# Patient Record
Sex: MALE | Race: WHITE | NOT HISPANIC OR LATINO | URBAN - METROPOLITAN AREA
[De-identification: names, ages, dates, MRNs, and addresses within clinical notes are randomized per-mention and may not be internally consistent; named-entity substitution may affect disease eponyms.]

---

## 2017-01-01 ENCOUNTER — INPATIENT (INPATIENT)
Facility: HOSPITAL | Age: 82
LOS: 15 days | Discharge: HOME | DRG: 300 | End: 2017-01-17
Attending: HOSPITALIST | Admitting: STUDENT IN AN ORGANIZED HEALTH CARE EDUCATION/TRAINING PROGRAM
Payer: MEDICARE

## 2017-01-01 VITALS
SYSTOLIC BLOOD PRESSURE: 138 MMHG | OXYGEN SATURATION: 99 % | WEIGHT: 167.55 LBS | DIASTOLIC BLOOD PRESSURE: 75 MMHG | HEART RATE: 74 BPM | RESPIRATION RATE: 20 BRPM | TEMPERATURE: 99 F

## 2017-01-01 LAB
ALBUMIN SERPL ELPH-MCNC: 2.5 G/DL — LOW (ref 3.3–5)
ALP SERPL-CCNC: 72 U/L — SIGNIFICANT CHANGE UP (ref 40–120)
ALT FLD-CCNC: 14 U/L RC — SIGNIFICANT CHANGE UP (ref 10–45)
ANION GAP SERPL CALC-SCNC: 10 MMOL/L — SIGNIFICANT CHANGE UP (ref 5–17)
APTT BLD: 29.2 SEC — SIGNIFICANT CHANGE UP (ref 27.5–37.4)
AST SERPL-CCNC: 29 U/L — SIGNIFICANT CHANGE UP (ref 10–40)
BASOPHILS # BLD AUTO: 0 K/UL — SIGNIFICANT CHANGE UP (ref 0–0.2)
BASOPHILS NFR BLD AUTO: 0.3 % — SIGNIFICANT CHANGE UP (ref 0–2)
BILIRUB SERPL-MCNC: 0.5 MG/DL — SIGNIFICANT CHANGE UP (ref 0.2–1.2)
BUN SERPL-MCNC: 19 MG/DL — SIGNIFICANT CHANGE UP (ref 7–23)
CALCIUM SERPL-MCNC: 8.1 MG/DL — LOW (ref 8.4–10.5)
CHLORIDE SERPL-SCNC: 101 MMOL/L — SIGNIFICANT CHANGE UP (ref 96–108)
CO2 SERPL-SCNC: 26 MMOL/L — SIGNIFICANT CHANGE UP (ref 22–31)
CREAT SERPL-MCNC: 0.92 MG/DL — SIGNIFICANT CHANGE UP (ref 0.5–1.3)
DIGOXIN SERPL-MCNC: 0.2 NG/ML — LOW (ref 0.8–2)
EOSINOPHIL # BLD AUTO: 0 K/UL — SIGNIFICANT CHANGE UP (ref 0–0.5)
EOSINOPHIL NFR BLD AUTO: 0.4 % — SIGNIFICANT CHANGE UP (ref 0–6)
GAS PNL BLDV: SIGNIFICANT CHANGE UP
GLUCOSE SERPL-MCNC: 102 MG/DL — HIGH (ref 70–99)
HCT VFR BLD CALC: 34.9 % — LOW (ref 39–50)
HGB BLD-MCNC: 11.6 G/DL — LOW (ref 13–17)
INR BLD: 1.05 RATIO — SIGNIFICANT CHANGE UP (ref 0.88–1.16)
LDH SERPL L TO P-CCNC: 208 U/L — SIGNIFICANT CHANGE UP (ref 50–242)
LYMPHOCYTES # BLD AUTO: 0.9 K/UL — LOW (ref 1–3.3)
LYMPHOCYTES # BLD AUTO: 18.6 % — SIGNIFICANT CHANGE UP (ref 13–44)
MCHC RBC-ENTMCNC: 33.2 GM/DL — SIGNIFICANT CHANGE UP (ref 32–36)
MCHC RBC-ENTMCNC: 33.6 PG — SIGNIFICANT CHANGE UP (ref 27–34)
MCV RBC AUTO: 101 FL — HIGH (ref 80–100)
MONOCYTES # BLD AUTO: 0.6 K/UL — SIGNIFICANT CHANGE UP (ref 0–0.9)
MONOCYTES NFR BLD AUTO: 12.8 % — SIGNIFICANT CHANGE UP (ref 2–14)
NEUTROPHILS # BLD AUTO: 3.2 K/UL — SIGNIFICANT CHANGE UP (ref 1.8–7.4)
NEUTROPHILS NFR BLD AUTO: 68 % — SIGNIFICANT CHANGE UP (ref 43–77)
PLATELET # BLD AUTO: 86 K/UL — LOW (ref 150–400)
POTASSIUM SERPL-MCNC: 3.8 MMOL/L — SIGNIFICANT CHANGE UP (ref 3.5–5.3)
POTASSIUM SERPL-SCNC: 3.8 MMOL/L — SIGNIFICANT CHANGE UP (ref 3.5–5.3)
PROT SERPL-MCNC: 7.1 G/DL — SIGNIFICANT CHANGE UP (ref 6–8.3)
PROTHROM AB SERPL-ACNC: 11.3 SEC — SIGNIFICANT CHANGE UP (ref 10–13.1)
RBC # BLD: 3.45 M/UL — LOW (ref 4.2–5.8)
RBC # FLD: 13.7 % — SIGNIFICANT CHANGE UP (ref 10.3–14.5)
SODIUM SERPL-SCNC: 137 MMOL/L — SIGNIFICANT CHANGE UP (ref 135–145)
URATE SERPL-MCNC: 4.9 MG/DL — SIGNIFICANT CHANGE UP (ref 3.4–8.8)
WBC # BLD: 4.7 K/UL — SIGNIFICANT CHANGE UP (ref 3.8–10.5)
WBC # FLD AUTO: 4.7 K/UL — SIGNIFICANT CHANGE UP (ref 3.8–10.5)

## 2017-01-01 PROCEDURE — 71020: CPT | Mod: 26

## 2017-01-01 PROCEDURE — 71260 CT THORAX DX C+: CPT | Mod: 26

## 2017-01-01 PROCEDURE — 99285 EMERGENCY DEPT VISIT HI MDM: CPT | Mod: GC

## 2017-01-01 PROCEDURE — 74177 CT ABD & PELVIS W/CONTRAST: CPT | Mod: 26

## 2017-01-01 RX ORDER — FUROSEMIDE 40 MG
40 TABLET ORAL ONCE
Qty: 0 | Refills: 0 | Status: COMPLETED | OUTPATIENT
Start: 2017-01-01 | End: 2017-01-01

## 2017-01-01 RX ADMIN — Medication 40 MILLIGRAM(S): at 21:39

## 2017-01-01 NOTE — ED PROVIDER NOTE - MEDICAL DECISION MAKING DETAILS
r/o SVC syndrome, CT chest/neck. cxr. ekg labs. r/o SVC syndrome, CT chest/neck. cxr. ekg labs.  Attg: PT presents with 3 week history of right arm neck/face swelling; with bilateral le edema; + smoking hx; no cp/sob; no fevers; on exam rue edema, right facial edema; no neurovascular deficits; nontender abdomen; 2+ bilateral le edema;r/o svc syndrome, fluid overload, dvt; Plan: ct chest, abdomen, pelvis, labs rue us, bnp, admission

## 2017-01-01 NOTE — ED PROVIDER NOTE - PHYSICAL EXAMINATION
Gen: NAD, AOx3  Head: NCAT  HEENT: PERRL, oral mucosa moist, normal conjunctiva, neck supple, significant JVD on rt with swelling, no palpable masses, normal oropharynx w/o swelling  Lung: CTAB, no respiratory distress  CV: rrr, no murmur, Normal perfusion  Abd: soft, NTND  MSK: +3 pitting edema rt UE +2radial pulse, +2 swelling b/l LE with chronic venous stasis skin changes  Neuro: No focal neurologic deficits  Skin: No rash   Psych: normal affect Gen: NAD, AOx3  Head: NCAT  HEENT: PERRL, oral mucosa moist, normal conjunctiva, neck supple, significant JVD on rt with swelling, no palpable masses, normal oropharynx w/o swelling  Lung: CTAB, no respiratory distress  CV: irregular, no murmur, Normal perfusion  Abd: soft, NTND  MSK: +3 pitting edema rt UE +2radial pulse, +2 pitting swelling b/l LE  Neuro: No focal neurologic deficits  Skin: No rash, dilated/prominent superficial veins on chest wall, chronic venous stasis skin changes b/l LE  Psych: normal affect

## 2017-01-01 NOTE — ED ADULT NURSE NOTE - OBJECTIVE STATEMENT
84 yo male a&ox3 presents to the ED with the c/o b/l leg swelling, r arm swelling and r neck pain. According to the patient he fell 3 weeks ago. Pt states that he felt dizzy before the fall. According to the pt he notice swelling last week, progressively getting worst. +2 pitting edema. Pt c/o r arm pain and states that his arm feels heavy. 10/10 pain in b/l legs and arm. Abd round, soft non tender and non distended. MAEX4. States that the only medication he takes on a daily basis is digoxin. Denies cp, sob or cough.

## 2017-01-01 NOTE — ED PROVIDER NOTE - PROGRESS NOTE DETAILS
patient vasovagal with heparin bolus, AOX3, pale/diaphoretic. no pain. 5/5 equal strength, PERRL, FS 96, getting EKG will continue with heparin gtt -Slowey DO

## 2017-01-01 NOTE — ED PROVIDER NOTE - ATTENDING CONTRIBUTION TO CARE
Attg: PT presents with 3 week history of right arm neck/face swelling; with bilateral le edema; + smoking hx; no cp/sob; no fevers; on exam rue edema, right facial edema; no neurovascular deficits; nontender abdomen; 2+ bilateral le edema;r/o svc syndrome, fluid overload, dvt; Plan: ct chest, abdomen, pelvis, labs rue us, bnp, admission

## 2017-01-01 NOTE — ED PROVIDER NOTE - OBJECTIVE STATEMENT
84yo M with some sort of cardiac disease with rt arm/neck swelling x3 weeks, saw dr 3 weeks ago- told nothing to do. also with b/l LE edema, swelling getting worse. no pain. no paresthesias. mild SOB at times. feels swelling of throat, no difficulty eating/swallowing. no HA/vision changes. no weakness. no h/o CA. nonsmoker. no wt loss. no night sweats.

## 2017-01-02 DIAGNOSIS — I48.91 UNSPECIFIED ATRIAL FIBRILLATION: ICD-10-CM

## 2017-01-02 DIAGNOSIS — D69.6 THROMBOCYTOPENIA, UNSPECIFIED: ICD-10-CM

## 2017-01-02 DIAGNOSIS — R91.8 OTHER NONSPECIFIC ABNORMAL FINDING OF LUNG FIELD: ICD-10-CM

## 2017-01-02 DIAGNOSIS — I82.621 ACUTE EMBOLISM AND THROMBOSIS OF DEEP VEINS OF RIGHT UPPER EXTREMITY: ICD-10-CM

## 2017-01-02 DIAGNOSIS — I26.99 OTHER PULMONARY EMBOLISM WITHOUT ACUTE COR PULMONALE: ICD-10-CM

## 2017-01-02 DIAGNOSIS — E89.0 POSTPROCEDURAL HYPOTHYROIDISM: Chronic | ICD-10-CM

## 2017-01-02 DIAGNOSIS — I87.1 COMPRESSION OF VEIN: ICD-10-CM

## 2017-01-02 LAB
APTT BLD: 172.4 SEC — CRITICAL HIGH (ref 27.5–37.4)
APTT BLD: 78.3 SEC — HIGH (ref 27.5–37.4)
APTT BLD: 94.4 SEC — HIGH (ref 27.5–37.4)
HCT VFR BLD CALC: 32.4 % — LOW (ref 39–50)
HCT VFR BLD CALC: 33.8 % — LOW (ref 39–50)
HGB BLD-MCNC: 11.1 G/DL — LOW (ref 13–17)
HGB BLD-MCNC: 11.6 G/DL — LOW (ref 13–17)
MCHC RBC-ENTMCNC: 34.2 GM/DL — SIGNIFICANT CHANGE UP (ref 32–36)
MCHC RBC-ENTMCNC: 34.2 PG — HIGH (ref 27–34)
MCHC RBC-ENTMCNC: 34.2 PG — HIGH (ref 27–34)
MCHC RBC-ENTMCNC: 34.3 GM/DL — SIGNIFICANT CHANGE UP (ref 32–36)
MCV RBC AUTO: 99.5 FL — SIGNIFICANT CHANGE UP (ref 80–100)
MCV RBC AUTO: 99.8 FL — SIGNIFICANT CHANGE UP (ref 80–100)
PLATELET # BLD AUTO: 86 K/UL — LOW (ref 150–400)
PLATELET # BLD AUTO: 94 K/UL — LOW (ref 150–400)
RBC # BLD: 3.25 M/UL — LOW (ref 4.2–5.8)
RBC # BLD: 3.39 M/UL — LOW (ref 4.2–5.8)
RBC # FLD: 13.4 % — SIGNIFICANT CHANGE UP (ref 10.3–14.5)
RBC # FLD: 13.4 % — SIGNIFICANT CHANGE UP (ref 10.3–14.5)
T4 FREE SERPL-MCNC: 1.5 NG/DL — SIGNIFICANT CHANGE UP (ref 0.9–1.8)
TSH SERPL-MCNC: 2.19 UIU/ML — SIGNIFICANT CHANGE UP (ref 0.27–4.2)
WBC # BLD: 6 K/UL — SIGNIFICANT CHANGE UP (ref 3.8–10.5)
WBC # BLD: 7.7 K/UL — SIGNIFICANT CHANGE UP (ref 3.8–10.5)
WBC # FLD AUTO: 6 K/UL — SIGNIFICANT CHANGE UP (ref 3.8–10.5)
WBC # FLD AUTO: 7.7 K/UL — SIGNIFICANT CHANGE UP (ref 3.8–10.5)

## 2017-01-02 PROCEDURE — 99223 1ST HOSP IP/OBS HIGH 75: CPT

## 2017-01-02 PROCEDURE — 99231 SBSQ HOSP IP/OBS SF/LOW 25: CPT

## 2017-01-02 PROCEDURE — 93971 EXTREMITY STUDY: CPT | Mod: 26

## 2017-01-02 RX ORDER — HEPARIN SODIUM 5000 [USP'U]/ML
INJECTION INTRAVENOUS; SUBCUTANEOUS
Qty: 25000 | Refills: 0 | Status: DISCONTINUED | OUTPATIENT
Start: 2017-01-02 | End: 2017-01-02

## 2017-01-02 RX ORDER — HEPARIN SODIUM 5000 [USP'U]/ML
1100 INJECTION INTRAVENOUS; SUBCUTANEOUS
Qty: 25000 | Refills: 0 | Status: DISCONTINUED | OUTPATIENT
Start: 2017-01-02 | End: 2017-01-07

## 2017-01-02 RX ORDER — DIGOXIN 250 MCG
125 TABLET ORAL
Qty: 0 | Refills: 0 | COMMUNITY

## 2017-01-02 RX ORDER — DIGOXIN 250 MCG
0 TABLET ORAL
Qty: 0 | Refills: 0 | COMMUNITY

## 2017-01-02 RX ORDER — HEPARIN SODIUM 5000 [USP'U]/ML
6500 INJECTION INTRAVENOUS; SUBCUTANEOUS EVERY 6 HOURS
Qty: 0 | Refills: 0 | Status: DISCONTINUED | OUTPATIENT
Start: 2017-01-02 | End: 2017-01-07

## 2017-01-02 RX ORDER — DIGOXIN 250 MCG
0.12 TABLET ORAL DAILY
Qty: 0 | Refills: 0 | Status: DISCONTINUED | OUTPATIENT
Start: 2017-01-02 | End: 2017-01-17

## 2017-01-02 RX ORDER — HEPARIN SODIUM 5000 [USP'U]/ML
6500 INJECTION INTRAVENOUS; SUBCUTANEOUS ONCE
Qty: 0 | Refills: 0 | Status: COMPLETED | OUTPATIENT
Start: 2017-01-02 | End: 2017-01-02

## 2017-01-02 RX ORDER — ACETAMINOPHEN 500 MG
650 TABLET ORAL EVERY 6 HOURS
Qty: 0 | Refills: 0 | Status: DISCONTINUED | OUTPATIENT
Start: 2017-01-02 | End: 2017-01-17

## 2017-01-02 RX ORDER — HEPARIN SODIUM 5000 [USP'U]/ML
3000 INJECTION INTRAVENOUS; SUBCUTANEOUS EVERY 6 HOURS
Qty: 0 | Refills: 0 | Status: DISCONTINUED | OUTPATIENT
Start: 2017-01-02 | End: 2017-01-07

## 2017-01-02 RX ADMIN — HEPARIN SODIUM 1400 UNIT(S)/HR: 5000 INJECTION INTRAVENOUS; SUBCUTANEOUS at 01:07

## 2017-01-02 RX ADMIN — HEPARIN SODIUM 5000 UNIT(S): 5000 INJECTION INTRAVENOUS; SUBCUTANEOUS at 01:08

## 2017-01-02 RX ADMIN — HEPARIN SODIUM 1100 UNIT(S)/HR: 5000 INJECTION INTRAVENOUS; SUBCUTANEOUS at 17:02

## 2017-01-02 RX ADMIN — HEPARIN SODIUM 1100 UNIT(S)/HR: 5000 INJECTION INTRAVENOUS; SUBCUTANEOUS at 09:51

## 2017-01-02 RX ADMIN — Medication 0.12 MILLIGRAM(S): at 12:07

## 2017-01-02 RX ADMIN — HEPARIN SODIUM 0 UNIT(S)/HR: 5000 INJECTION INTRAVENOUS; SUBCUTANEOUS at 08:35

## 2017-01-02 NOTE — H&P ADULT. - RADIOLOGY RESULTS AND INTERPRETATION
CT scan chest personally reviewed - large goiter extending into chest compression R chest veins  R arm doppler - DVT

## 2017-01-02 NOTE — H&P ADULT. - HISTORY OF PRESENT ILLNESS
86 yo man with PMH of A.fib refused anticoagulation, HTN, past thyroidectomy a/w R arm swelling.  Pt reports he had his thyroid completely removed 60 yrs ago.  3 week ago started having R arm swelling and mild SOB but still fully functional.  Chronic throat clearing issues and some difficulty swallowing.  No chest pain.  No h/o malignancy or blood clots.  Can still move R arm and arm is not painful.

## 2017-01-02 NOTE — ED ADULT NURSE REASSESSMENT NOTE - NS ED NURSE REASSESS COMMENT FT1
0110:Shortly after pt received Heparin Bolus, pt began to appear pale, diaphoretic and clammy. Pt FS 96. States that he is feeling "fine". Pt denies pain. Lungs clear, equal b/l, no sob. Pt places on cardiac monitor NSR.

## 2017-01-02 NOTE — H&P ADULT. - EXTREMITIES COMMENTS
R arm markedly swollen, full range of motion of arm and hand, good cap refill, warm, 2+ radial pulse  R leg > L leg edema

## 2017-01-02 NOTE — H&P ADULT. - ASSESSMENT
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  Last TFTs normal in August 2016.  Gen surg consulted - will see patient and assess if surgery (with potential CT surgery input) is feasible.  On heparin gtt for now.  Unclear etiology of pt's thrombocytopenia.      I have discussed pt's status with nephew Nilesh and another family member Debi.  Prognosis of this condition may be poor if major surgery is needed and pt thought too high risk - will need to be a discussion with patient once surgery has evaluated patient.

## 2017-01-02 NOTE — H&P ADULT. - PROBLEM SELECTOR PLAN 1
Due to large goiter  Gen surg consulted - will see pt and see if CT surgery needs to be called as well - will need to discuss if pt can tolerate major surgery if needed  Consulted endocrine as well - sending TSH/T4 - but last TFTs show that goiter is not hormonally active  Check echo

## 2017-01-03 LAB
ANION GAP SERPL CALC-SCNC: 9 MMOL/L — SIGNIFICANT CHANGE UP (ref 5–17)
BASOPHILS # BLD AUTO: 0 K/UL — SIGNIFICANT CHANGE UP (ref 0–0.2)
BASOPHILS NFR BLD AUTO: 0 % — SIGNIFICANT CHANGE UP (ref 0–2)
BUN SERPL-MCNC: 20 MG/DL — SIGNIFICANT CHANGE UP (ref 7–23)
CALCIUM SERPL-MCNC: 8.2 MG/DL — LOW (ref 8.4–10.5)
CHLORIDE SERPL-SCNC: 103 MMOL/L — SIGNIFICANT CHANGE UP (ref 96–108)
CO2 SERPL-SCNC: 28 MMOL/L — SIGNIFICANT CHANGE UP (ref 22–31)
CREAT SERPL-MCNC: 0.68 MG/DL — SIGNIFICANT CHANGE UP (ref 0.5–1.3)
EOSINOPHIL # BLD AUTO: 0 K/UL — SIGNIFICANT CHANGE UP (ref 0–0.5)
EOSINOPHIL NFR BLD AUTO: 0.5 % — SIGNIFICANT CHANGE UP (ref 0–6)
GLUCOSE SERPL-MCNC: 81 MG/DL — SIGNIFICANT CHANGE UP (ref 70–99)
HCT VFR BLD CALC: 33.2 % — LOW (ref 39–50)
HGB BLD-MCNC: 11.5 G/DL — LOW (ref 13–17)
INR BLD: 1.05 RATIO — SIGNIFICANT CHANGE UP (ref 0.88–1.16)
LYMPHOCYTES # BLD AUTO: 0.8 K/UL — LOW (ref 1–3.3)
LYMPHOCYTES # BLD AUTO: 18 % — SIGNIFICANT CHANGE UP (ref 13–44)
MCHC RBC-ENTMCNC: 34.7 GM/DL — SIGNIFICANT CHANGE UP (ref 32–36)
MCHC RBC-ENTMCNC: 34.7 PG — HIGH (ref 27–34)
MCV RBC AUTO: 100 FL — SIGNIFICANT CHANGE UP (ref 80–100)
MONOCYTES # BLD AUTO: 0.6 K/UL — SIGNIFICANT CHANGE UP (ref 0–0.9)
MONOCYTES NFR BLD AUTO: 13 % — SIGNIFICANT CHANGE UP (ref 2–14)
NEUTROPHILS # BLD AUTO: 2.9 K/UL — SIGNIFICANT CHANGE UP (ref 1.8–7.4)
NEUTROPHILS NFR BLD AUTO: 68.6 % — SIGNIFICANT CHANGE UP (ref 43–77)
PLATELET # BLD AUTO: 91 K/UL — LOW (ref 150–400)
POTASSIUM SERPL-MCNC: 3.2 MMOL/L — LOW (ref 3.5–5.3)
POTASSIUM SERPL-SCNC: 3.2 MMOL/L — LOW (ref 3.5–5.3)
PROTHROM AB SERPL-ACNC: 11.4 SEC — SIGNIFICANT CHANGE UP (ref 10–13.1)
RBC # BLD: 3.31 M/UL — LOW (ref 4.2–5.8)
RBC # FLD: 13.4 % — SIGNIFICANT CHANGE UP (ref 10.3–14.5)
SODIUM SERPL-SCNC: 140 MMOL/L — SIGNIFICANT CHANGE UP (ref 135–145)
WBC # BLD: 4.2 K/UL — SIGNIFICANT CHANGE UP (ref 3.8–10.5)
WBC # FLD AUTO: 4.2 K/UL — SIGNIFICANT CHANGE UP (ref 3.8–10.5)

## 2017-01-03 PROCEDURE — 99233 SBSQ HOSP IP/OBS HIGH 50: CPT | Mod: GC

## 2017-01-03 PROCEDURE — 99232 SBSQ HOSP IP/OBS MODERATE 35: CPT

## 2017-01-03 RX ORDER — POTASSIUM CHLORIDE 20 MEQ
40 PACKET (EA) ORAL ONCE
Qty: 0 | Refills: 0 | Status: COMPLETED | OUTPATIENT
Start: 2017-01-03 | End: 2017-01-03

## 2017-01-03 RX ADMIN — Medication 0.12 MILLIGRAM(S): at 06:10

## 2017-01-03 RX ADMIN — HEPARIN SODIUM 1100 UNIT(S)/HR: 5000 INJECTION INTRAVENOUS; SUBCUTANEOUS at 08:52

## 2017-01-03 RX ADMIN — HEPARIN SODIUM 1100 UNIT(S)/HR: 5000 INJECTION INTRAVENOUS; SUBCUTANEOUS at 00:06

## 2017-01-03 RX ADMIN — Medication 40 MILLIEQUIVALENT(S): at 15:28

## 2017-01-04 ENCOUNTER — TRANSCRIPTION ENCOUNTER (OUTPATIENT)
Age: 82
End: 2017-01-04

## 2017-01-04 LAB
24R-OH-CALCIDIOL SERPL-MCNC: 27.2 NG/ML — LOW (ref 30–100)
ANION GAP SERPL CALC-SCNC: 9 MMOL/L — SIGNIFICANT CHANGE UP (ref 5–17)
APTT BLD: 61.4 SEC — HIGH (ref 27.5–37.4)
BLD GP AB SCN SERPL QL: NEGATIVE — SIGNIFICANT CHANGE UP
BUN SERPL-MCNC: 17 MG/DL — SIGNIFICANT CHANGE UP (ref 7–23)
CALCIUM SERPL-MCNC: 8.1 MG/DL — LOW (ref 8.4–10.5)
CALCIUM SERPL-MCNC: 8.2 MG/DL — LOW (ref 8.4–10.5)
CHLORIDE SERPL-SCNC: 105 MMOL/L — SIGNIFICANT CHANGE UP (ref 96–108)
CO2 SERPL-SCNC: 27 MMOL/L — SIGNIFICANT CHANGE UP (ref 22–31)
CREAT SERPL-MCNC: 0.66 MG/DL — SIGNIFICANT CHANGE UP (ref 0.5–1.3)
GLUCOSE SERPL-MCNC: 85 MG/DL — SIGNIFICANT CHANGE UP (ref 70–99)
HCT VFR BLD CALC: 31.2 % — LOW (ref 39–50)
HCT VFR BLD CALC: 36.4 % — LOW (ref 39–50)
HGB BLD-MCNC: 10.6 G/DL — LOW (ref 13–17)
HGB BLD-MCNC: 12.6 G/DL — LOW (ref 13–17)
MAGNESIUM SERPL-MCNC: 1.9 MG/DL — SIGNIFICANT CHANGE UP (ref 1.6–2.6)
MCHC RBC-ENTMCNC: 33.8 GM/DL — SIGNIFICANT CHANGE UP (ref 32–36)
MCHC RBC-ENTMCNC: 33.8 PG — SIGNIFICANT CHANGE UP (ref 27–34)
MCHC RBC-ENTMCNC: 34.5 PG — HIGH (ref 27–34)
MCHC RBC-ENTMCNC: 34.6 GM/DL — SIGNIFICANT CHANGE UP (ref 32–36)
MCV RBC AUTO: 100 FL — SIGNIFICANT CHANGE UP (ref 80–100)
MCV RBC AUTO: 99.8 FL — SIGNIFICANT CHANGE UP (ref 80–100)
PHOSPHATE SERPL-MCNC: 2.6 MG/DL — SIGNIFICANT CHANGE UP (ref 2.5–4.5)
PLATELET # BLD AUTO: 100 K/UL — LOW (ref 150–400)
PLATELET # BLD AUTO: 125 K/UL — LOW (ref 150–400)
POTASSIUM SERPL-MCNC: 3.8 MMOL/L — SIGNIFICANT CHANGE UP (ref 3.5–5.3)
POTASSIUM SERPL-SCNC: 3.8 MMOL/L — SIGNIFICANT CHANGE UP (ref 3.5–5.3)
PTH-INTACT FLD-MCNC: 28 PG/ML — SIGNIFICANT CHANGE UP (ref 15–65)
RBC # BLD: 3.13 M/UL — LOW (ref 4.2–5.8)
RBC # BLD: 3.65 M/UL — LOW (ref 4.2–5.8)
RBC # FLD: 13.2 % — SIGNIFICANT CHANGE UP (ref 10.3–14.5)
RBC # FLD: 13.4 % — SIGNIFICANT CHANGE UP (ref 10.3–14.5)
RH IG SCN BLD-IMP: POSITIVE — SIGNIFICANT CHANGE UP
SODIUM SERPL-SCNC: 141 MMOL/L — SIGNIFICANT CHANGE UP (ref 135–145)
WBC # BLD: 4.5 K/UL — SIGNIFICANT CHANGE UP (ref 3.8–10.5)
WBC # BLD: 5.5 K/UL — SIGNIFICANT CHANGE UP (ref 3.8–10.5)
WBC # FLD AUTO: 4.5 K/UL — SIGNIFICANT CHANGE UP (ref 3.8–10.5)
WBC # FLD AUTO: 5.5 K/UL — SIGNIFICANT CHANGE UP (ref 3.8–10.5)

## 2017-01-04 PROCEDURE — 99232 SBSQ HOSP IP/OBS MODERATE 35: CPT

## 2017-01-04 PROCEDURE — 99232 SBSQ HOSP IP/OBS MODERATE 35: CPT | Mod: GC

## 2017-01-04 PROCEDURE — 99222 1ST HOSP IP/OBS MODERATE 55: CPT | Mod: GC

## 2017-01-04 PROCEDURE — 99233 SBSQ HOSP IP/OBS HIGH 50: CPT | Mod: GC

## 2017-01-04 RX ORDER — APIXABAN 2.5 MG/1
10 TABLET, FILM COATED ORAL
Qty: 600 | Refills: 0 | OUTPATIENT
Start: 2017-01-04 | End: 2017-02-03

## 2017-01-04 RX ADMIN — HEPARIN SODIUM 1100 UNIT(S)/HR: 5000 INJECTION INTRAVENOUS; SUBCUTANEOUS at 08:37

## 2017-01-04 RX ADMIN — Medication 0.12 MILLIGRAM(S): at 05:45

## 2017-01-04 NOTE — DISCHARGE NOTE ADULT - MEDICATION SUMMARY - MEDICATIONS TO TAKE
I will START or STAY ON the medications listed below when I get home from the hospital:    digoxin 125 mcg (0.125 mg) oral tablet  -- 1 tab(s) by mouth once a day  -- Indication: For Atrial fibrillation, unspecified type    Coumadin 2 mg oral tablet  -- 1 tab(s) by mouth once a day  -- Do not take this drug if you are pregnant.  It is very important that you take or use this exactly as directed.  Do not skip doses or discontinue unless directed by your doctor.  Obtain medical advice before taking any non-prescription drugs as some may affect the action of this medication.    -- Indication: For Atrial fibrillation and Venousthrombus

## 2017-01-04 NOTE — DISCHARGE NOTE ADULT - ADDITIONAL INSTRUCTIONS
Endocrinology - Dexa scan Endocrinology - Dexa scan  Pulmonary nodules - follow up with your PCP. Endocrinology - Dexa scan  Pulmonary nodules - follow up with your PCP Dr. Gardner .  The patient was bridged to coumadin and instructed to follow up with his primary care physician on Friday 1/20 to have his INR checked. The patient was bridged to coumadin and instructed to follow up with his primary care physician on Friday 1/20 at 2:30 PM to have his INR checked.  Endocrinology - Dexa scan, PAthology results  Pulmonary nodules - follow up with your PCP Dr. Gardner .

## 2017-01-04 NOTE — DISCHARGE NOTE ADULT - PLAN OF CARE
Stable. You were seen by a team of surgeons, who offered a surgery to remove the goiter which is compressing your blood vessels and causing a blood clot. You opted that you did not want surgery, and should continue to take Eliquis, a blood thinner, to help prevent growth of this clot which is causing your right arm to swell. Continue with eliquis, Continue with Digoxin. stable. Continue with Digoxin. On CT of your chest, you were noted to have bilateral pulmonary nodules. You should follow up with your PCP regarding CT surveillance of these nodules. You should have a chest CT in 6 months. Your platelet counts were noted to be low during this hospital admission. At no point did you require a transfusion, and you have this as a chronic condition. Follow up with your PCP. You were seen by a team of surgeons, who offered a surgery to remove the goiter which is compressing your blood vessels and causing a blood clot. You opted that you did not want surgery, and should continue to take Coumadin, a blood thinner, to help prevent growth of this clot which is causing your right arm to swell. Continue with Coumadin, Continue with Digoxin. You were seen by a team of surgeons, who offered a surgery to remove the goiter which is compressing your blood vessels and causing a blood clot. You opted that you did not want surgery, and should continue to take Coumadin, a blood thinner, to help prevent growth of this clot which is causing your right arm to swell. The patient was bridged to coumadin and instructed to follow up with his primary care physician on Friday 1/20 to have his INR checked.

## 2017-01-04 NOTE — DISCHARGE NOTE ADULT - PATIENT PORTAL LINK FT
“You can access the Follow[employer] Patient Portal, offered by [facility], by registering with the following website: [email or web address]”

## 2017-01-04 NOTE — DISCHARGE NOTE ADULT - CARE PROVIDERS DIRECT ADDRESSES
[email or web address],DirectAddress_Unknown

## 2017-01-04 NOTE — DISCHARGE NOTE ADULT - CARE PROVIDER_API CALL
Roman Gardner), Gastroenterology; Internal Medicine  [address]  [place] [address]  Phone: [phone]  Fax: [phone] Roman Gardner), Gastroenterology; Internal Medicine  [address]  [place] [address]  Phone: [phone]  Fax: [phone]    Koffi Owen (), Internal Medicine  [address] [place] [id number]  Phone: [phone]  Fax: [phone]

## 2017-01-04 NOTE — PHYSICAL THERAPY INITIAL EVALUATION ADULT - PLANNED THERAPY INTERVENTIONS, PT EVAL
balance training/stair negotiation/ROM/gait training/bed mobility training/transfer training/strengthening

## 2017-01-04 NOTE — DISCHARGE NOTE ADULT - HOSPITAL COURSE
86 yo M PMH of afib (not on A/C), HTN, goiter 84 yo M PMH of afib ( previously not on A/C), HTN, thyroidectomy presents with a 3 week history of R arm swelling. The patient was noted on chest CT to have R arm swelling secondary to thrombus of the R subclavian IJ secondary to multinodular goiter compression. Cardiothoracic surgery consulted, the patient refused surgery. Patient was also seen by Endocrinology, which recommended that the patient have surgery. TFT's were within normal limits and the patient was clinically euthyroid. Patient preferred to be discharged on Eliquis instead of have surgery. The patient exhibited no signs of compartment syndrome.    Of note, the patient was noted to have pulmonary nodules on CT with recommendation of follow up CT in 6 months. Patient was made aware to follow up at his PCP. 84 yo M PMH of afib ( previously not on A/C), HTN, thyroidectomy presents with a 3 week history of R arm swelling. The patient was noted on chest CT to have R arm swelling secondary to thrombus of the R subclavian IJ secondary to multinodular goiter compression. Cardiothoracic surgery consulted, the patient refused surgery. Patient was also seen by Endocrinology, which recommended that the patient have surgery. TFT's were within normal limits and the patient was clinically euthyroid. Patient preferred to be discharged on Coumadin instead of have surgery. The patient exhibited no signs of compartment syndrome. The pathology report was pending additional stains for differentiation between parathyroid tissue and possible atypical cells. The patient was instructed to follow up with Dr. Mendoza of endocrinology for the final pathology report.    Of note, the patient was noted to have pulmonary nodules on CT with recommendation of follow up CT in 6 months. Patient was made aware to follow up at his PCP. 84 yo M PMH of afib ( previously not on A/C), HTN, thyroidectomy presents with a 3 week history of R arm swelling. The patient was noted on chest CT to have R arm swelling secondary to thrombus of the R subclavian IJ secondary to multinodular goiter compression. Cardiothoracic surgery consulted, the patient refused surgery. Patient was also seen by Endocrinology, which recommended that the patient have surgery. TFT's were within normal limits and the patient was clinically euthyroid. Patient preferred to be discharged on Coumadin instead of have surgery. The patient exhibited no signs of compartment syndrome. The pathology report was pending additional stains for differentiation between parathyroid tissue and possible atypical cells. The patient was instructed to follow up with Dr. Mendoza of endocrinology for the final pathology report. The patient was bridged to coumadin and instructed to follow up with his primary care physician on Friday 1/20 to have his INR checked.    Of note, the patient was noted to have pulmonary nodules on CT with recommendation of follow up CT in 6 months. Patient was made aware to follow up at his PCP.

## 2017-01-04 NOTE — DISCHARGE NOTE ADULT - CARE PLAN
Principal Discharge DX:	Acute deep vein thrombosis (DVT) of right upper extremity, unspecified vein  Secondary Diagnosis:	Atrial fibrillation, unspecified type  Secondary Diagnosis:	Essential hypertension  Secondary Diagnosis:	Pulmonary nodules  Secondary Diagnosis:	Thrombocytopenia Principal Discharge DX:	Acute deep vein thrombosis (DVT) of right upper extremity, unspecified vein  Goal:	Stable.  Instructions for follow-up, activity and diet:	You were seen by a team of surgeons, who offered a surgery to remove the goiter which is compressing your blood vessels and causing a blood clot. You opted that you did not want surgery, and should continue to take Eliquis, a blood thinner, to help prevent growth of this clot which is causing your right arm to swell.  Secondary Diagnosis:	Atrial fibrillation, unspecified type  Goal:	Stable.  Instructions for follow-up, activity and diet:	Continue with eliquis, Continue with Digoxin.  Secondary Diagnosis:	Essential hypertension  Goal:	stable.  Instructions for follow-up, activity and diet:	Continue with Digoxin.  Secondary Diagnosis:	Pulmonary nodules  Instructions for follow-up, activity and diet:	On CT of your chest, you were noted to have bilateral pulmonary nodules. You should follow up with your PCP regarding CT surveillance of these nodules. You should have a chest CT in 6 months.  Secondary Diagnosis:	Thrombocytopenia  Goal:	stable.  Instructions for follow-up, activity and diet:	Your platelet counts were noted to be low during this hospital admission. At no point did you require a transfusion, and you have this as a chronic condition. Follow up with your PCP. Principal Discharge DX:	Acute deep vein thrombosis (DVT) of right upper extremity, unspecified vein  Goal:	Stable.  Instructions for follow-up, activity and diet:	You were seen by a team of surgeons, who offered a surgery to remove the goiter which is compressing your blood vessels and causing a blood clot. You opted that you did not want surgery, and should continue to take Coumadin, a blood thinner, to help prevent growth of this clot which is causing your right arm to swell.  Secondary Diagnosis:	Atrial fibrillation, unspecified type  Goal:	Stable.  Instructions for follow-up, activity and diet:	Continue with Coumadin, Continue with Digoxin.  Secondary Diagnosis:	Essential hypertension  Goal:	stable.  Instructions for follow-up, activity and diet:	Continue with Digoxin.  Secondary Diagnosis:	Pulmonary nodules  Instructions for follow-up, activity and diet:	On CT of your chest, you were noted to have bilateral pulmonary nodules. You should follow up with your PCP regarding CT surveillance of these nodules. You should have a chest CT in 6 months.  Secondary Diagnosis:	Thrombocytopenia  Goal:	stable.  Instructions for follow-up, activity and diet:	Your platelet counts were noted to be low during this hospital admission. At no point did you require a transfusion, and you have this as a chronic condition. Follow up with your PCP. Principal Discharge DX:	Acute deep vein thrombosis (DVT) of right upper extremity, unspecified vein  Goal:	Stable.  Instructions for follow-up, activity and diet:	You were seen by a team of surgeons, who offered a surgery to remove the goiter which is compressing your blood vessels and causing a blood clot. You opted that you did not want surgery, and should continue to take Coumadin, a blood thinner, to help prevent growth of this clot which is causing your right arm to swell. The patient was bridged to coumadin and instructed to follow up with his primary care physician on Friday 1/20 to have his INR checked.  Secondary Diagnosis:	Atrial fibrillation, unspecified type  Goal:	Stable.  Instructions for follow-up, activity and diet:	Continue with Coumadin, Continue with Digoxin.  Secondary Diagnosis:	Essential hypertension  Goal:	stable.  Instructions for follow-up, activity and diet:	Continue with Digoxin.  Secondary Diagnosis:	Pulmonary nodules  Instructions for follow-up, activity and diet:	On CT of your chest, you were noted to have bilateral pulmonary nodules. You should follow up with your PCP regarding CT surveillance of these nodules. You should have a chest CT in 6 months.  Secondary Diagnosis:	Thrombocytopenia  Goal:	stable.  Instructions for follow-up, activity and diet:	Your platelet counts were noted to be low during this hospital admission. At no point did you require a transfusion, and you have this as a chronic condition. Follow up with your PCP.

## 2017-01-04 NOTE — PHYSICAL THERAPY INITIAL EVALUATION ADULT - ADDITIONAL COMMENTS
Pt lives alone in a private house with 8 steps inside. Pt was I with all ADLs prior to admission. Pt's family live close by and can provide assistance as needed.

## 2017-01-04 NOTE — PHYSICAL THERAPY INITIAL EVALUATION ADULT - PERTINENT HX OF CURRENT PROBLEM, REHAB EVAL
Pt is a 86 y/o male admitted to [facility] on 1/2/17 presents with 3 week ago pt started having R arm swelling and mild SOB but still fully functional.  Chronic throat clearing issues and some difficulty swallowing.  No chest pain.  No h/o malignancy or blood clots.  Can still move R arm and arm is not painful.  VA duplex RUE: Thrombus in the right brachiocephalic, internal jugular, subclavian, axillary and basilic veins.

## 2017-01-04 NOTE — PHYSICAL THERAPY INITIAL EVALUATION ADULT - PASSIVE RANGE OF MOTION EXAMINATION, REHAB EVAL
bilateral lower extremity Passive ROM was WFL (within functional limits)/bilateral upper extremity Passive ROM was WFL (within functional limits)

## 2017-01-05 LAB
ANION GAP SERPL CALC-SCNC: 8 MMOL/L — SIGNIFICANT CHANGE UP (ref 5–17)
APTT BLD: 66.1 SEC — HIGH (ref 27.5–37.4)
BLD GP AB SCN SERPL QL: NEGATIVE — SIGNIFICANT CHANGE UP
BUN SERPL-MCNC: 17 MG/DL — SIGNIFICANT CHANGE UP (ref 7–23)
CALCIUM SERPL-MCNC: 8.1 MG/DL — LOW (ref 8.4–10.5)
CHLORIDE SERPL-SCNC: 106 MMOL/L — SIGNIFICANT CHANGE UP (ref 96–108)
CO2 SERPL-SCNC: 26 MMOL/L — SIGNIFICANT CHANGE UP (ref 22–31)
CREAT SERPL-MCNC: 0.68 MG/DL — SIGNIFICANT CHANGE UP (ref 0.5–1.3)
GLUCOSE SERPL-MCNC: 83 MG/DL — SIGNIFICANT CHANGE UP (ref 70–99)
HCT VFR BLD CALC: 31.9 % — LOW (ref 39–50)
HGB BLD-MCNC: 11 G/DL — LOW (ref 13–17)
MAGNESIUM SERPL-MCNC: 1.9 MG/DL — SIGNIFICANT CHANGE UP (ref 1.6–2.6)
MCHC RBC-ENTMCNC: 34.2 PG — HIGH (ref 27–34)
MCHC RBC-ENTMCNC: 34.4 GM/DL — SIGNIFICANT CHANGE UP (ref 32–36)
MCV RBC AUTO: 99.5 FL — SIGNIFICANT CHANGE UP (ref 80–100)
PHOSPHATE SERPL-MCNC: 3.3 MG/DL — SIGNIFICANT CHANGE UP (ref 2.5–4.5)
PLATELET # BLD AUTO: 120 K/UL — LOW (ref 150–400)
POTASSIUM SERPL-MCNC: 3.6 MMOL/L — SIGNIFICANT CHANGE UP (ref 3.5–5.3)
POTASSIUM SERPL-SCNC: 3.6 MMOL/L — SIGNIFICANT CHANGE UP (ref 3.5–5.3)
RBC # BLD: 3.21 M/UL — LOW (ref 4.2–5.8)
RBC # FLD: 13.2 % — SIGNIFICANT CHANGE UP (ref 10.3–14.5)
RH IG SCN BLD-IMP: POSITIVE — SIGNIFICANT CHANGE UP
SODIUM SERPL-SCNC: 140 MMOL/L — SIGNIFICANT CHANGE UP (ref 135–145)
WBC # BLD: 4.7 K/UL — SIGNIFICANT CHANGE UP (ref 3.8–10.5)
WBC # FLD AUTO: 4.7 K/UL — SIGNIFICANT CHANGE UP (ref 3.8–10.5)

## 2017-01-05 PROCEDURE — 99233 SBSQ HOSP IP/OBS HIGH 50: CPT | Mod: GC

## 2017-01-05 PROCEDURE — 99232 SBSQ HOSP IP/OBS MODERATE 35: CPT

## 2017-01-05 RX ADMIN — Medication 0.12 MILLIGRAM(S): at 05:08

## 2017-01-05 RX ADMIN — HEPARIN SODIUM 1100 UNIT(S)/HR: 5000 INJECTION INTRAVENOUS; SUBCUTANEOUS at 07:59

## 2017-01-06 LAB
ANION GAP SERPL CALC-SCNC: 8 MMOL/L — SIGNIFICANT CHANGE UP (ref 5–17)
APTT BLD: 76.1 SEC — HIGH (ref 27.5–37.4)
BUN SERPL-MCNC: 18 MG/DL — SIGNIFICANT CHANGE UP (ref 7–23)
CALCIUM SERPL-MCNC: 8.4 MG/DL — SIGNIFICANT CHANGE UP (ref 8.4–10.5)
CHLORIDE SERPL-SCNC: 105 MMOL/L — SIGNIFICANT CHANGE UP (ref 96–108)
CO2 SERPL-SCNC: 27 MMOL/L — SIGNIFICANT CHANGE UP (ref 22–31)
CREAT SERPL-MCNC: 0.69 MG/DL — SIGNIFICANT CHANGE UP (ref 0.5–1.3)
GLUCOSE SERPL-MCNC: 84 MG/DL — SIGNIFICANT CHANGE UP (ref 70–99)
HCT VFR BLD CALC: 33.5 % — LOW (ref 39–50)
HGB BLD-MCNC: 11.3 G/DL — LOW (ref 13–17)
MAGNESIUM SERPL-MCNC: 2 MG/DL — SIGNIFICANT CHANGE UP (ref 1.6–2.6)
MCHC RBC-ENTMCNC: 33.7 GM/DL — SIGNIFICANT CHANGE UP (ref 32–36)
MCHC RBC-ENTMCNC: 33.8 PG — SIGNIFICANT CHANGE UP (ref 27–34)
MCV RBC AUTO: 100 FL — SIGNIFICANT CHANGE UP (ref 80–100)
PHOSPHATE SERPL-MCNC: 2.8 MG/DL — SIGNIFICANT CHANGE UP (ref 2.5–4.5)
PLATELET # BLD AUTO: 106 K/UL — LOW (ref 150–400)
POTASSIUM SERPL-MCNC: 3.8 MMOL/L — SIGNIFICANT CHANGE UP (ref 3.5–5.3)
POTASSIUM SERPL-SCNC: 3.8 MMOL/L — SIGNIFICANT CHANGE UP (ref 3.5–5.3)
RBC # BLD: 3.34 M/UL — LOW (ref 4.2–5.8)
RBC # FLD: 14 % — SIGNIFICANT CHANGE UP (ref 10.3–14.5)
SODIUM SERPL-SCNC: 140 MMOL/L — SIGNIFICANT CHANGE UP (ref 135–145)
WBC # BLD: 4.4 K/UL — SIGNIFICANT CHANGE UP (ref 3.8–10.5)
WBC # FLD AUTO: 4.4 K/UL — SIGNIFICANT CHANGE UP (ref 3.8–10.5)

## 2017-01-06 PROCEDURE — 99233 SBSQ HOSP IP/OBS HIGH 50: CPT | Mod: GC

## 2017-01-06 PROCEDURE — 99232 SBSQ HOSP IP/OBS MODERATE 35: CPT

## 2017-01-06 PROCEDURE — 76536 US EXAM OF HEAD AND NECK: CPT | Mod: 26

## 2017-01-06 RX ADMIN — Medication 0.12 MILLIGRAM(S): at 05:09

## 2017-01-06 RX ADMIN — HEPARIN SODIUM 1100 UNIT(S)/HR: 5000 INJECTION INTRAVENOUS; SUBCUTANEOUS at 08:35

## 2017-01-07 LAB
ANION GAP SERPL CALC-SCNC: 8 MMOL/L — SIGNIFICANT CHANGE UP (ref 5–17)
APTT BLD: 74 SEC — HIGH (ref 27.5–37.4)
BUN SERPL-MCNC: 17 MG/DL — SIGNIFICANT CHANGE UP (ref 7–23)
CALCIUM SERPL-MCNC: 8.3 MG/DL — LOW (ref 8.4–10.5)
CHLORIDE SERPL-SCNC: 106 MMOL/L — SIGNIFICANT CHANGE UP (ref 96–108)
CO2 SERPL-SCNC: 26 MMOL/L — SIGNIFICANT CHANGE UP (ref 22–31)
CREAT SERPL-MCNC: 0.64 MG/DL — SIGNIFICANT CHANGE UP (ref 0.5–1.3)
GLUCOSE SERPL-MCNC: 85 MG/DL — SIGNIFICANT CHANGE UP (ref 70–99)
HCT VFR BLD CALC: 33 % — LOW (ref 39–50)
HGB BLD-MCNC: 11.3 G/DL — LOW (ref 13–17)
INR BLD: 1.05 RATIO — SIGNIFICANT CHANGE UP (ref 0.88–1.16)
MCHC RBC-ENTMCNC: 34.2 GM/DL — SIGNIFICANT CHANGE UP (ref 32–36)
MCHC RBC-ENTMCNC: 34.3 PG — HIGH (ref 27–34)
MCV RBC AUTO: 100 FL — SIGNIFICANT CHANGE UP (ref 80–100)
PLATELET # BLD AUTO: 102 K/UL — LOW (ref 150–400)
POTASSIUM SERPL-MCNC: 4 MMOL/L — SIGNIFICANT CHANGE UP (ref 3.5–5.3)
POTASSIUM SERPL-SCNC: 4 MMOL/L — SIGNIFICANT CHANGE UP (ref 3.5–5.3)
PROTHROM AB SERPL-ACNC: 11.5 SEC — SIGNIFICANT CHANGE UP (ref 10–13.1)
RBC # BLD: 3.28 M/UL — LOW (ref 4.2–5.8)
RBC # FLD: 13.9 % — SIGNIFICANT CHANGE UP (ref 10.3–14.5)
SODIUM SERPL-SCNC: 140 MMOL/L — SIGNIFICANT CHANGE UP (ref 135–145)
WBC # BLD: 4.2 K/UL — SIGNIFICANT CHANGE UP (ref 3.8–10.5)
WBC # FLD AUTO: 4.2 K/UL — SIGNIFICANT CHANGE UP (ref 3.8–10.5)

## 2017-01-07 PROCEDURE — 99233 SBSQ HOSP IP/OBS HIGH 50: CPT | Mod: GC

## 2017-01-07 RX ORDER — HEPARIN SODIUM 5000 [USP'U]/ML
6500 INJECTION INTRAVENOUS; SUBCUTANEOUS EVERY 6 HOURS
Qty: 0 | Refills: 0 | Status: DISCONTINUED | OUTPATIENT
Start: 2017-01-07 | End: 2017-01-07

## 2017-01-07 RX ORDER — HEPARIN SODIUM 5000 [USP'U]/ML
1100 INJECTION INTRAVENOUS; SUBCUTANEOUS
Qty: 25000 | Refills: 0 | Status: DISCONTINUED | OUTPATIENT
Start: 2017-01-07 | End: 2017-01-07

## 2017-01-07 RX ORDER — ENOXAPARIN SODIUM 100 MG/ML
80 INJECTION SUBCUTANEOUS
Qty: 0 | Refills: 0 | Status: COMPLETED | OUTPATIENT
Start: 2017-01-07 | End: 2017-01-08

## 2017-01-07 RX ORDER — HEPARIN SODIUM 5000 [USP'U]/ML
3000 INJECTION INTRAVENOUS; SUBCUTANEOUS EVERY 6 HOURS
Qty: 0 | Refills: 0 | Status: DISCONTINUED | OUTPATIENT
Start: 2017-01-07 | End: 2017-01-07

## 2017-01-07 RX ORDER — HEPARIN SODIUM 5000 [USP'U]/ML
INJECTION INTRAVENOUS; SUBCUTANEOUS
Qty: 25000 | Refills: 0 | Status: DISCONTINUED | OUTPATIENT
Start: 2017-01-07 | End: 2017-01-07

## 2017-01-07 RX ADMIN — Medication 0.12 MILLIGRAM(S): at 05:15

## 2017-01-07 RX ADMIN — ENOXAPARIN SODIUM 80 MILLIGRAM(S): 100 INJECTION SUBCUTANEOUS at 19:00

## 2017-01-07 RX ADMIN — HEPARIN SODIUM 1100 UNIT(S)/HR: 5000 INJECTION INTRAVENOUS; SUBCUTANEOUS at 12:25

## 2017-01-08 ENCOUNTER — RESULT REVIEW (OUTPATIENT)
Age: 82
End: 2017-01-08

## 2017-01-08 LAB
ANION GAP SERPL CALC-SCNC: 8 MMOL/L — SIGNIFICANT CHANGE UP (ref 5–17)
APTT BLD: 26.8 SEC — LOW (ref 27.5–37.4)
BUN SERPL-MCNC: 18 MG/DL — SIGNIFICANT CHANGE UP (ref 7–23)
CALCIUM SERPL-MCNC: 8.4 MG/DL — SIGNIFICANT CHANGE UP (ref 8.4–10.5)
CHLORIDE SERPL-SCNC: 103 MMOL/L — SIGNIFICANT CHANGE UP (ref 96–108)
CO2 SERPL-SCNC: 28 MMOL/L — SIGNIFICANT CHANGE UP (ref 22–31)
CREAT SERPL-MCNC: 0.73 MG/DL — SIGNIFICANT CHANGE UP (ref 0.5–1.3)
GLUCOSE SERPL-MCNC: 78 MG/DL — SIGNIFICANT CHANGE UP (ref 70–99)
HCT VFR BLD CALC: 32.2 % — LOW (ref 39–50)
HGB BLD-MCNC: 11.1 G/DL — LOW (ref 13–17)
INR BLD: 1.02 RATIO — SIGNIFICANT CHANGE UP (ref 0.88–1.16)
MCHC RBC-ENTMCNC: 34.3 GM/DL — SIGNIFICANT CHANGE UP (ref 32–36)
MCHC RBC-ENTMCNC: 34.5 PG — HIGH (ref 27–34)
MCV RBC AUTO: 101 FL — HIGH (ref 80–100)
PLATELET # BLD AUTO: 107 K/UL — LOW (ref 150–400)
POTASSIUM SERPL-MCNC: 4.2 MMOL/L — SIGNIFICANT CHANGE UP (ref 3.5–5.3)
POTASSIUM SERPL-SCNC: 4.2 MMOL/L — SIGNIFICANT CHANGE UP (ref 3.5–5.3)
PROTHROM AB SERPL-ACNC: 11 SEC — SIGNIFICANT CHANGE UP (ref 10–13.1)
RBC # BLD: 3.2 M/UL — LOW (ref 4.2–5.8)
RBC # FLD: 14 % — SIGNIFICANT CHANGE UP (ref 10.3–14.5)
SODIUM SERPL-SCNC: 139 MMOL/L — SIGNIFICANT CHANGE UP (ref 135–145)
WBC # BLD: 3.9 K/UL — SIGNIFICANT CHANGE UP (ref 3.8–10.5)
WBC # FLD AUTO: 3.9 K/UL — SIGNIFICANT CHANGE UP (ref 3.8–10.5)

## 2017-01-08 PROCEDURE — 99232 SBSQ HOSP IP/OBS MODERATE 35: CPT | Mod: GC

## 2017-01-08 RX ADMIN — ENOXAPARIN SODIUM 80 MILLIGRAM(S): 100 INJECTION SUBCUTANEOUS at 05:46

## 2017-01-08 RX ADMIN — Medication 0.12 MILLIGRAM(S): at 05:46

## 2017-01-08 RX ADMIN — ENOXAPARIN SODIUM 80 MILLIGRAM(S): 100 INJECTION SUBCUTANEOUS at 18:37

## 2017-01-09 LAB
ANION GAP SERPL CALC-SCNC: 10 MMOL/L — SIGNIFICANT CHANGE UP (ref 5–17)
BUN SERPL-MCNC: 16 MG/DL — SIGNIFICANT CHANGE UP (ref 7–23)
CALCIUM SERPL-MCNC: 8.6 MG/DL — SIGNIFICANT CHANGE UP (ref 8.4–10.5)
CHLORIDE SERPL-SCNC: 105 MMOL/L — SIGNIFICANT CHANGE UP (ref 96–108)
CO2 SERPL-SCNC: 25 MMOL/L — SIGNIFICANT CHANGE UP (ref 22–31)
CREAT SERPL-MCNC: 0.68 MG/DL — SIGNIFICANT CHANGE UP (ref 0.5–1.3)
GLUCOSE SERPL-MCNC: 84 MG/DL — SIGNIFICANT CHANGE UP (ref 70–99)
HCT VFR BLD CALC: 33.6 % — LOW (ref 39–50)
HGB BLD-MCNC: 11.2 G/DL — LOW (ref 13–17)
INR BLD: 0.99 RATIO — SIGNIFICANT CHANGE UP (ref 0.88–1.16)
MCHC RBC-ENTMCNC: 33.4 GM/DL — SIGNIFICANT CHANGE UP (ref 32–36)
MCHC RBC-ENTMCNC: 33.5 PG — SIGNIFICANT CHANGE UP (ref 27–34)
MCV RBC AUTO: 100 FL — SIGNIFICANT CHANGE UP (ref 80–100)
PLATELET # BLD AUTO: 106 K/UL — LOW (ref 150–400)
POTASSIUM SERPL-MCNC: 4.3 MMOL/L — SIGNIFICANT CHANGE UP (ref 3.5–5.3)
POTASSIUM SERPL-SCNC: 4.3 MMOL/L — SIGNIFICANT CHANGE UP (ref 3.5–5.3)
PROTHROM AB SERPL-ACNC: 10.7 SEC — SIGNIFICANT CHANGE UP (ref 10–13.1)
RBC # BLD: 3.35 M/UL — LOW (ref 4.2–5.8)
RBC # FLD: 13.9 % — SIGNIFICANT CHANGE UP (ref 10.3–14.5)
SODIUM SERPL-SCNC: 140 MMOL/L — SIGNIFICANT CHANGE UP (ref 135–145)
WBC # BLD: 4 K/UL — SIGNIFICANT CHANGE UP (ref 3.8–10.5)
WBC # FLD AUTO: 4 K/UL — SIGNIFICANT CHANGE UP (ref 3.8–10.5)

## 2017-01-09 PROCEDURE — 88173 CYTOPATH EVAL FNA REPORT: CPT | Mod: 26

## 2017-01-09 PROCEDURE — 10022: CPT

## 2017-01-09 PROCEDURE — 99232 SBSQ HOSP IP/OBS MODERATE 35: CPT | Mod: GC

## 2017-01-09 PROCEDURE — 76942 ECHO GUIDE FOR BIOPSY: CPT | Mod: 26

## 2017-01-09 RX ORDER — ENOXAPARIN SODIUM 100 MG/ML
80 INJECTION SUBCUTANEOUS
Qty: 0 | Refills: 0 | Status: DISCONTINUED | OUTPATIENT
Start: 2017-01-09 | End: 2017-01-17

## 2017-01-09 RX ORDER — RIVAROXABAN 15 MG-20MG
1 KIT ORAL
Qty: 1 | Refills: 0 | OUTPATIENT
Start: 2017-01-09

## 2017-01-09 RX ADMIN — ENOXAPARIN SODIUM 80 MILLIGRAM(S): 100 INJECTION SUBCUTANEOUS at 17:58

## 2017-01-09 RX ADMIN — Medication 0.12 MILLIGRAM(S): at 05:01

## 2017-01-10 LAB
BLD GP AB SCN SERPL QL: NEGATIVE — SIGNIFICANT CHANGE UP
HCT VFR BLD CALC: 34 % — LOW (ref 39–50)
HGB BLD-MCNC: 11.5 G/DL — LOW (ref 13–17)
INR BLD: 1.01 RATIO — SIGNIFICANT CHANGE UP (ref 0.88–1.16)
MCHC RBC-ENTMCNC: 33.9 GM/DL — SIGNIFICANT CHANGE UP (ref 32–36)
MCHC RBC-ENTMCNC: 34.2 PG — HIGH (ref 27–34)
MCV RBC AUTO: 101 FL — HIGH (ref 80–100)
PLATELET # BLD AUTO: 100 K/UL — LOW (ref 150–400)
PROTHROM AB SERPL-ACNC: 10.9 SEC — SIGNIFICANT CHANGE UP (ref 10–13.1)
RBC # BLD: 3.37 M/UL — LOW (ref 4.2–5.8)
RBC # FLD: 14.2 % — SIGNIFICANT CHANGE UP (ref 10.3–14.5)
RH IG SCN BLD-IMP: POSITIVE — SIGNIFICANT CHANGE UP
WBC # BLD: 4.4 K/UL — SIGNIFICANT CHANGE UP (ref 3.8–10.5)
WBC # FLD AUTO: 4.4 K/UL — SIGNIFICANT CHANGE UP (ref 3.8–10.5)

## 2017-01-10 PROCEDURE — 99232 SBSQ HOSP IP/OBS MODERATE 35: CPT | Mod: GC

## 2017-01-10 PROCEDURE — 99232 SBSQ HOSP IP/OBS MODERATE 35: CPT

## 2017-01-10 RX ADMIN — Medication 0.12 MILLIGRAM(S): at 05:03

## 2017-01-10 RX ADMIN — ENOXAPARIN SODIUM 80 MILLIGRAM(S): 100 INJECTION SUBCUTANEOUS at 05:06

## 2017-01-10 RX ADMIN — ENOXAPARIN SODIUM 80 MILLIGRAM(S): 100 INJECTION SUBCUTANEOUS at 17:17

## 2017-01-11 LAB
HCT VFR BLD CALC: 34.9 % — LOW (ref 39–50)
HGB BLD-MCNC: 11.7 G/DL — LOW (ref 13–17)
INR BLD: 0.98 RATIO — SIGNIFICANT CHANGE UP (ref 0.88–1.16)
MCHC RBC-ENTMCNC: 33.4 GM/DL — SIGNIFICANT CHANGE UP (ref 32–36)
MCHC RBC-ENTMCNC: 33.5 PG — SIGNIFICANT CHANGE UP (ref 27–34)
MCV RBC AUTO: 100 FL — SIGNIFICANT CHANGE UP (ref 80–100)
PLATELET # BLD AUTO: 91 K/UL — LOW (ref 150–400)
PROTHROM AB SERPL-ACNC: 10.7 SEC — SIGNIFICANT CHANGE UP (ref 10–13.1)
RBC # BLD: 3.48 M/UL — LOW (ref 4.2–5.8)
RBC # FLD: 14.2 % — SIGNIFICANT CHANGE UP (ref 10.3–14.5)
WBC # BLD: 3.3 K/UL — LOW (ref 3.8–10.5)
WBC # FLD AUTO: 3.3 K/UL — LOW (ref 3.8–10.5)

## 2017-01-11 PROCEDURE — 99233 SBSQ HOSP IP/OBS HIGH 50: CPT | Mod: GC

## 2017-01-11 RX ADMIN — Medication 650 MILLIGRAM(S): at 16:25

## 2017-01-11 RX ADMIN — Medication 0.12 MILLIGRAM(S): at 05:24

## 2017-01-11 RX ADMIN — ENOXAPARIN SODIUM 80 MILLIGRAM(S): 100 INJECTION SUBCUTANEOUS at 18:42

## 2017-01-11 RX ADMIN — Medication 650 MILLIGRAM(S): at 15:55

## 2017-01-11 RX ADMIN — ENOXAPARIN SODIUM 80 MILLIGRAM(S): 100 INJECTION SUBCUTANEOUS at 05:24

## 2017-01-12 PROCEDURE — 99233 SBSQ HOSP IP/OBS HIGH 50: CPT | Mod: GC

## 2017-01-12 RX ADMIN — ENOXAPARIN SODIUM 80 MILLIGRAM(S): 100 INJECTION SUBCUTANEOUS at 17:40

## 2017-01-12 RX ADMIN — ENOXAPARIN SODIUM 80 MILLIGRAM(S): 100 INJECTION SUBCUTANEOUS at 06:04

## 2017-01-12 RX ADMIN — Medication 0.12 MILLIGRAM(S): at 06:04

## 2017-01-12 NOTE — DIETITIAN INITIAL EVALUATION ADULT. - FACTORS AFF FOOD INTAKE
difficulty swallowing 2/2 goiter, difficulty chewing as pt missing bottom teeth, has dentures but does not routinely wear./difficulty chewing/difficulty swallowing

## 2017-01-12 NOTE — DIETITIAN INITIAL EVALUATION ADULT. - OTHER INFO
pt seen for length of stay. Reports he has a good appetite, consuming ~75% at meals. pt complains he does not get what he would like on the menu. Encouraged pt to fill out menu daily, as menu at bedside was blank at visit. pt has bottom dentures but will not regularly wear them, would rather choose softer food items. Reports he had some swallowing issues due to goiter, but this is resolving. No GI distress noted.

## 2017-01-12 NOTE — DIETITIAN INITIAL EVALUATION ADULT. - ORAL INTAKE PTA
pt reports good oral intake PTA. Diet recall reveals pt consumes 3 meals per day. Breakfast: scrambled egg, pancakes c syrup & coffee with milk. Lunch: Some type of pasta dish, with soft vegetables. Dinner: Varies, pt reports his sister and her family live nearby, will sometimes provide dinner for pt. States he does not snack throughout the day. pt will not eat past 7:00pm as he reports experiencing GERD overnight if he eats too close to bedtime./good

## 2017-01-12 NOTE — DIETITIAN INITIAL EVALUATION ADULT. - ENERGY NEEDS
Ht: 5'10" Wt: 168lbs UBW: 164lbs %UBW:102% IBW: 166lbs %IBW:98% BMI:23.5.   Other Pertinent Information: DVT- right arm and swelling secondary to thrombus of right subclavian internal jugular veins due to nodular goiter compression. FNA of thyroid (1/9) to evaluate if mass is cancerous. Awaiting pathology results.  Skin: Intact, Edema noted above

## 2017-01-12 NOTE — DIETITIAN INITIAL EVALUATION ADULT. - ADHERENCE
n/a/pt prefers soft food choices. No micronutrient or commercial beverage supplementation PTA. Reports no known allergies.

## 2017-01-12 NOTE — DIETITIAN INITIAL EVALUATION ADULT. - PROBLEM SELECTOR PLAN 3
Unclear etiology - outpt labs from 8/16 show plt count of 97k, now plts 86k on two separate draws  Will need to monitor plts while on heparin gtt

## 2017-01-13 LAB
HCT VFR BLD CALC: 32 % — LOW (ref 39–50)
HGB BLD-MCNC: 10.8 G/DL — LOW (ref 13–17)
INR BLD: 1.01 RATIO — SIGNIFICANT CHANGE UP (ref 0.88–1.16)
MCHC RBC-ENTMCNC: 33.7 GM/DL — SIGNIFICANT CHANGE UP (ref 32–36)
MCHC RBC-ENTMCNC: 33.7 PG — SIGNIFICANT CHANGE UP (ref 27–34)
MCV RBC AUTO: 99.9 FL — SIGNIFICANT CHANGE UP (ref 80–100)
PLATELET # BLD AUTO: 86 K/UL — LOW (ref 150–400)
PROTHROM AB SERPL-ACNC: 11 SEC — SIGNIFICANT CHANGE UP (ref 10–13.1)
RBC # BLD: 3.21 M/UL — LOW (ref 4.2–5.8)
RBC # FLD: 14 % — SIGNIFICANT CHANGE UP (ref 10.3–14.5)
WBC # BLD: 3.7 K/UL — LOW (ref 3.8–10.5)
WBC # FLD AUTO: 3.7 K/UL — LOW (ref 3.8–10.5)

## 2017-01-13 PROCEDURE — 99232 SBSQ HOSP IP/OBS MODERATE 35: CPT

## 2017-01-13 PROCEDURE — 99232 SBSQ HOSP IP/OBS MODERATE 35: CPT | Mod: GC

## 2017-01-13 RX ORDER — WARFARIN SODIUM 2.5 MG/1
7.5 TABLET ORAL ONCE
Qty: 0 | Refills: 0 | Status: COMPLETED | OUTPATIENT
Start: 2017-01-13 | End: 2017-01-13

## 2017-01-13 RX ADMIN — ENOXAPARIN SODIUM 80 MILLIGRAM(S): 100 INJECTION SUBCUTANEOUS at 17:14

## 2017-01-13 RX ADMIN — ENOXAPARIN SODIUM 80 MILLIGRAM(S): 100 INJECTION SUBCUTANEOUS at 06:36

## 2017-01-13 RX ADMIN — Medication 650 MILLIGRAM(S): at 21:01

## 2017-01-13 RX ADMIN — WARFARIN SODIUM 7.5 MILLIGRAM(S): 2.5 TABLET ORAL at 21:01

## 2017-01-13 RX ADMIN — Medication 650 MILLIGRAM(S): at 21:31

## 2017-01-14 LAB
APTT BLD: 31.1 SEC — SIGNIFICANT CHANGE UP (ref 27.5–37.4)
HCT VFR BLD CALC: 31.4 % — LOW (ref 39–50)
HGB BLD-MCNC: 10.8 G/DL — LOW (ref 13–17)
INR BLD: 1.11 RATIO — SIGNIFICANT CHANGE UP (ref 0.88–1.16)
MCHC RBC-ENTMCNC: 34.5 GM/DL — SIGNIFICANT CHANGE UP (ref 32–36)
MCHC RBC-ENTMCNC: 34.6 PG — HIGH (ref 27–34)
MCV RBC AUTO: 100 FL — SIGNIFICANT CHANGE UP (ref 80–100)
PLATELET # BLD AUTO: 84 K/UL — LOW (ref 150–400)
PROTHROM AB SERPL-ACNC: 12 SEC — SIGNIFICANT CHANGE UP (ref 10–13.1)
RBC # BLD: 3.14 M/UL — LOW (ref 4.2–5.8)
RBC # FLD: 14 % — SIGNIFICANT CHANGE UP (ref 10.3–14.5)
WBC # BLD: 3.2 K/UL — LOW (ref 3.8–10.5)
WBC # FLD AUTO: 3.2 K/UL — LOW (ref 3.8–10.5)

## 2017-01-14 PROCEDURE — 71020: CPT | Mod: 26

## 2017-01-14 PROCEDURE — 99232 SBSQ HOSP IP/OBS MODERATE 35: CPT

## 2017-01-14 RX ORDER — WARFARIN SODIUM 2.5 MG/1
5 TABLET ORAL ONCE
Qty: 0 | Refills: 0 | Status: COMPLETED | OUTPATIENT
Start: 2017-01-14 | End: 2017-01-14

## 2017-01-14 RX ADMIN — Medication 0.12 MILLIGRAM(S): at 06:18

## 2017-01-14 RX ADMIN — ENOXAPARIN SODIUM 80 MILLIGRAM(S): 100 INJECTION SUBCUTANEOUS at 17:32

## 2017-01-14 RX ADMIN — ENOXAPARIN SODIUM 80 MILLIGRAM(S): 100 INJECTION SUBCUTANEOUS at 06:18

## 2017-01-14 RX ADMIN — WARFARIN SODIUM 5 MILLIGRAM(S): 2.5 TABLET ORAL at 21:31

## 2017-01-15 LAB
HCT VFR BLD CALC: 31.9 % — LOW (ref 39–50)
HGB BLD-MCNC: 10.9 G/DL — LOW (ref 13–17)
INR BLD: 2.5 RATIO — HIGH (ref 0.88–1.16)
MCHC RBC-ENTMCNC: 34.2 PG — HIGH (ref 27–34)
MCHC RBC-ENTMCNC: 34.3 GM/DL — SIGNIFICANT CHANGE UP (ref 32–36)
MCV RBC AUTO: 99.5 FL — SIGNIFICANT CHANGE UP (ref 80–100)
PLATELET # BLD AUTO: 86 K/UL — LOW (ref 150–400)
PROTHROM AB SERPL-ACNC: 27.2 SEC — HIGH (ref 10–13.1)
RBC # BLD: 3.2 M/UL — LOW (ref 4.2–5.8)
RBC # FLD: 14.1 % — SIGNIFICANT CHANGE UP (ref 10.3–14.5)
WBC # BLD: 4.6 K/UL — SIGNIFICANT CHANGE UP (ref 3.8–10.5)
WBC # FLD AUTO: 4.6 K/UL — SIGNIFICANT CHANGE UP (ref 3.8–10.5)

## 2017-01-15 PROCEDURE — 99232 SBSQ HOSP IP/OBS MODERATE 35: CPT

## 2017-01-15 RX ORDER — WARFARIN SODIUM 2.5 MG/1
4 TABLET ORAL ONCE
Qty: 0 | Refills: 0 | Status: COMPLETED | OUTPATIENT
Start: 2017-01-15 | End: 2017-01-15

## 2017-01-15 RX ADMIN — ENOXAPARIN SODIUM 80 MILLIGRAM(S): 100 INJECTION SUBCUTANEOUS at 06:19

## 2017-01-15 RX ADMIN — WARFARIN SODIUM 4 MILLIGRAM(S): 2.5 TABLET ORAL at 21:42

## 2017-01-15 RX ADMIN — ENOXAPARIN SODIUM 80 MILLIGRAM(S): 100 INJECTION SUBCUTANEOUS at 18:23

## 2017-01-15 RX ADMIN — Medication 0.12 MILLIGRAM(S): at 06:19

## 2017-01-16 LAB
HCT VFR BLD CALC: 30.9 % — LOW (ref 39–50)
HGB BLD-MCNC: 10.3 G/DL — LOW (ref 13–17)
INR BLD: 4.24 RATIO — HIGH (ref 0.88–1.16)
MCHC RBC-ENTMCNC: 32.9 PG — SIGNIFICANT CHANGE UP (ref 27–34)
MCHC RBC-ENTMCNC: 33.2 GM/DL — SIGNIFICANT CHANGE UP (ref 32–36)
MCV RBC AUTO: 98.9 FL — SIGNIFICANT CHANGE UP (ref 80–100)
PLATELET # BLD AUTO: 96 K/UL — LOW (ref 150–400)
PROTHROM AB SERPL-ACNC: 46.5 SEC — HIGH (ref 10–13.1)
RBC # BLD: 3.13 M/UL — LOW (ref 4.2–5.8)
RBC # FLD: 13.6 % — SIGNIFICANT CHANGE UP (ref 10.3–14.5)
WBC # BLD: 3.8 K/UL — SIGNIFICANT CHANGE UP (ref 3.8–10.5)
WBC # FLD AUTO: 3.8 K/UL — SIGNIFICANT CHANGE UP (ref 3.8–10.5)

## 2017-01-16 PROCEDURE — 99233 SBSQ HOSP IP/OBS HIGH 50: CPT | Mod: GC

## 2017-01-16 RX ORDER — NYSTATIN CREAM 100000 [USP'U]/G
1 CREAM TOPICAL DAILY
Qty: 0 | Refills: 0 | Status: DISCONTINUED | OUTPATIENT
Start: 2017-01-16 | End: 2017-01-17

## 2017-01-16 RX ADMIN — ENOXAPARIN SODIUM 80 MILLIGRAM(S): 100 INJECTION SUBCUTANEOUS at 06:12

## 2017-01-16 RX ADMIN — ENOXAPARIN SODIUM 80 MILLIGRAM(S): 100 INJECTION SUBCUTANEOUS at 18:37

## 2017-01-16 RX ADMIN — Medication 0.12 MILLIGRAM(S): at 06:12

## 2017-01-17 VITALS
SYSTOLIC BLOOD PRESSURE: 107 MMHG | OXYGEN SATURATION: 96 % | TEMPERATURE: 98 F | HEART RATE: 87 BPM | RESPIRATION RATE: 18 BRPM | DIASTOLIC BLOOD PRESSURE: 64 MMHG

## 2017-01-17 LAB
HCT VFR BLD CALC: 31.1 % — LOW (ref 39–50)
HGB BLD-MCNC: 10.2 G/DL — LOW (ref 13–17)
INR BLD: 4.45 RATIO — HIGH (ref 0.88–1.16)
MCHC RBC-ENTMCNC: 32.6 PG — SIGNIFICANT CHANGE UP (ref 27–34)
MCHC RBC-ENTMCNC: 32.9 GM/DL — SIGNIFICANT CHANGE UP (ref 32–36)
MCV RBC AUTO: 98.9 FL — SIGNIFICANT CHANGE UP (ref 80–100)
PLATELET # BLD AUTO: 104 K/UL — LOW (ref 150–400)
PROTHROM AB SERPL-ACNC: 49.2 SEC — HIGH (ref 10–13.1)
RBC # BLD: 3.15 M/UL — LOW (ref 4.2–5.8)
RBC # FLD: 13.4 % — SIGNIFICANT CHANGE UP (ref 10.3–14.5)
WBC # BLD: 3.9 K/UL — SIGNIFICANT CHANGE UP (ref 3.8–10.5)
WBC # FLD AUTO: 3.9 K/UL — SIGNIFICANT CHANGE UP (ref 3.8–10.5)

## 2017-01-17 PROCEDURE — 84132 ASSAY OF SERUM POTASSIUM: CPT

## 2017-01-17 PROCEDURE — 99285 EMERGENCY DEPT VISIT HI MDM: CPT | Mod: 25

## 2017-01-17 PROCEDURE — 83615 LACTATE (LD) (LDH) ENZYME: CPT

## 2017-01-17 PROCEDURE — 85027 COMPLETE CBC AUTOMATED: CPT

## 2017-01-17 PROCEDURE — 82803 BLOOD GASES ANY COMBINATION: CPT

## 2017-01-17 PROCEDURE — 83735 ASSAY OF MAGNESIUM: CPT

## 2017-01-17 PROCEDURE — 97116 GAIT TRAINING THERAPY: CPT

## 2017-01-17 PROCEDURE — 93005 ELECTROCARDIOGRAM TRACING: CPT

## 2017-01-17 PROCEDURE — 99239 HOSP IP/OBS DSCHRG MGMT >30: CPT

## 2017-01-17 PROCEDURE — 80053 COMPREHEN METABOLIC PANEL: CPT

## 2017-01-17 PROCEDURE — 84550 ASSAY OF BLOOD/URIC ACID: CPT

## 2017-01-17 PROCEDURE — 86901 BLOOD TYPING SEROLOGIC RH(D): CPT

## 2017-01-17 PROCEDURE — 83970 ASSAY OF PARATHORMONE: CPT

## 2017-01-17 PROCEDURE — 82310 ASSAY OF CALCIUM: CPT

## 2017-01-17 PROCEDURE — 71046 X-RAY EXAM CHEST 2 VIEWS: CPT

## 2017-01-17 PROCEDURE — 84100 ASSAY OF PHOSPHORUS: CPT

## 2017-01-17 PROCEDURE — 85730 THROMBOPLASTIN TIME PARTIAL: CPT

## 2017-01-17 PROCEDURE — 96375 TX/PRO/DX INJ NEW DRUG ADDON: CPT | Mod: XU

## 2017-01-17 PROCEDURE — 82306 VITAMIN D 25 HYDROXY: CPT

## 2017-01-17 PROCEDURE — 10022: CPT

## 2017-01-17 PROCEDURE — 82330 ASSAY OF CALCIUM: CPT

## 2017-01-17 PROCEDURE — 85014 HEMATOCRIT: CPT

## 2017-01-17 PROCEDURE — 85610 PROTHROMBIN TIME: CPT

## 2017-01-17 PROCEDURE — 80048 BASIC METABOLIC PNL TOTAL CA: CPT

## 2017-01-17 PROCEDURE — 76536 US EXAM OF HEAD AND NECK: CPT

## 2017-01-17 PROCEDURE — 74177 CT ABD & PELVIS W/CONTRAST: CPT

## 2017-01-17 PROCEDURE — 86850 RBC ANTIBODY SCREEN: CPT

## 2017-01-17 PROCEDURE — 80162 ASSAY OF DIGOXIN TOTAL: CPT

## 2017-01-17 PROCEDURE — 82947 ASSAY GLUCOSE BLOOD QUANT: CPT

## 2017-01-17 PROCEDURE — 93971 EXTREMITY STUDY: CPT

## 2017-01-17 PROCEDURE — 88173 CYTOPATH EVAL FNA REPORT: CPT

## 2017-01-17 PROCEDURE — 71260 CT THORAX DX C+: CPT

## 2017-01-17 PROCEDURE — 84295 ASSAY OF SERUM SODIUM: CPT

## 2017-01-17 PROCEDURE — 84439 ASSAY OF FREE THYROXINE: CPT

## 2017-01-17 PROCEDURE — 86140 C-REACTIVE PROTEIN: CPT

## 2017-01-17 PROCEDURE — 76942 ECHO GUIDE FOR BIOPSY: CPT

## 2017-01-17 PROCEDURE — 96374 THER/PROPH/DIAG INJ IV PUSH: CPT | Mod: XU

## 2017-01-17 PROCEDURE — 83605 ASSAY OF LACTIC ACID: CPT

## 2017-01-17 PROCEDURE — 97530 THERAPEUTIC ACTIVITIES: CPT

## 2017-01-17 PROCEDURE — 97161 PT EVAL LOW COMPLEX 20 MIN: CPT

## 2017-01-17 PROCEDURE — 88172 CYTP DX EVAL FNA 1ST EA SITE: CPT

## 2017-01-17 PROCEDURE — 83880 ASSAY OF NATRIURETIC PEPTIDE: CPT

## 2017-01-17 PROCEDURE — 84443 ASSAY THYROID STIM HORMONE: CPT

## 2017-01-17 PROCEDURE — 82435 ASSAY OF BLOOD CHLORIDE: CPT

## 2017-01-17 PROCEDURE — 86900 BLOOD TYPING SEROLOGIC ABO: CPT

## 2017-01-17 PROCEDURE — 85652 RBC SED RATE AUTOMATED: CPT

## 2017-01-17 RX ORDER — WARFARIN SODIUM 2.5 MG/1
1 TABLET ORAL
Qty: 7 | Refills: 0
Start: 2017-01-17 | End: 2017-01-24

## 2017-01-17 RX ORDER — WARFARIN SODIUM 2.5 MG/1
2 TABLET ORAL ONCE
Qty: 0 | Refills: 0 | Status: COMPLETED | OUTPATIENT
Start: 2017-01-17 | End: 2017-01-17

## 2017-01-17 RX ADMIN — NYSTATIN CREAM 1 APPLICATION(S): 100000 CREAM TOPICAL at 11:20

## 2017-01-17 RX ADMIN — WARFARIN SODIUM 2 MILLIGRAM(S): 2.5 TABLET ORAL at 11:19

## 2017-01-17 RX ADMIN — Medication 0.12 MILLIGRAM(S): at 06:12

## 2017-01-17 RX ADMIN — ENOXAPARIN SODIUM 80 MILLIGRAM(S): 100 INJECTION SUBCUTANEOUS at 06:12

## 2017-01-19 LAB — NON-GYNECOLOGICAL CYTOLOGY STUDY: SIGNIFICANT CHANGE UP

## 2017-01-20 ENCOUNTER — APPOINTMENT (OUTPATIENT)
Dept: INTERNAL MEDICINE | Facility: CLINIC | Age: 82
End: 2017-01-20

## 2017-01-20 VITALS
DIASTOLIC BLOOD PRESSURE: 76 MMHG | TEMPERATURE: 97.5 F | BODY MASS INDEX: 27.82 KG/M2 | HEIGHT: 63 IN | SYSTOLIC BLOOD PRESSURE: 126 MMHG | WEIGHT: 157 LBS

## 2017-01-20 DIAGNOSIS — I80.9 PHLEBITIS AND THROMBOPHLEBITIS OF UNSPECIFIED SITE: ICD-10-CM

## 2017-01-20 LAB
INR PPP: 3.1 RATIO
QUALITY CONTROL: YES

## 2017-01-20 RX ORDER — RIVAROXABAN 15 MG-20MG
15 & 20 KIT ORAL
Qty: 51 | Refills: 0 | Status: COMPLETED | COMMUNITY
Start: 2017-01-09

## 2017-02-03 ENCOUNTER — APPOINTMENT (OUTPATIENT)
Dept: INTERNAL MEDICINE | Facility: CLINIC | Age: 82
End: 2017-02-03

## 2017-02-03 VITALS
HEART RATE: 78 BPM | OXYGEN SATURATION: 88 % | HEIGHT: 62 IN | SYSTOLIC BLOOD PRESSURE: 120 MMHG | BODY MASS INDEX: 29.08 KG/M2 | TEMPERATURE: 97.6 F | DIASTOLIC BLOOD PRESSURE: 72 MMHG | WEIGHT: 158 LBS

## 2017-02-03 PROBLEM — I10 ESSENTIAL (PRIMARY) HYPERTENSION: Chronic | Status: ACTIVE | Noted: 2017-01-02

## 2017-02-03 PROBLEM — I48.91 UNSPECIFIED ATRIAL FIBRILLATION: Chronic | Status: ACTIVE | Noted: 2017-01-02

## 2017-02-03 LAB
INR PPP: 2.1 RATIO
QUALITY CONTROL: YES

## 2017-02-03 RX ORDER — WARFARIN 2 MG/1
2 TABLET ORAL
Qty: 7 | Refills: 0 | Status: DISCONTINUED | COMMUNITY
Start: 2017-01-17

## 2017-02-03 RX ORDER — AZITHROMYCIN 250 MG/1
250 TABLET, FILM COATED ORAL
Qty: 6 | Refills: 1 | Status: DISCONTINUED | COMMUNITY
Start: 2017-01-20 | End: 2017-02-03

## 2017-03-06 ENCOUNTER — LABORATORY RESULT (OUTPATIENT)
Age: 82
End: 2017-03-06

## 2017-04-07 ENCOUNTER — APPOINTMENT (OUTPATIENT)
Dept: INTERNAL MEDICINE | Facility: CLINIC | Age: 82
End: 2017-04-07

## 2017-04-07 VITALS
TEMPERATURE: 97.8 F | OXYGEN SATURATION: 94 % | WEIGHT: 164 LBS | BODY MASS INDEX: 30.18 KG/M2 | HEIGHT: 62 IN | SYSTOLIC BLOOD PRESSURE: 116 MMHG | DIASTOLIC BLOOD PRESSURE: 72 MMHG | HEART RATE: 84 BPM

## 2017-04-07 DIAGNOSIS — J06.9 ACUTE UPPER RESPIRATORY INFECTION, UNSPECIFIED: ICD-10-CM

## 2017-04-07 LAB
INR PPP: 1.2 RATIO
QUALITY CONTROL: YES

## 2017-04-10 ENCOUNTER — APPOINTMENT (OUTPATIENT)
Dept: INTERNAL MEDICINE | Facility: CLINIC | Age: 82
End: 2017-04-10

## 2017-04-21 ENCOUNTER — LABORATORY RESULT (OUTPATIENT)
Age: 82
End: 2017-04-21

## 2017-05-19 ENCOUNTER — APPOINTMENT (OUTPATIENT)
Dept: INTERNAL MEDICINE | Facility: CLINIC | Age: 82
End: 2017-05-19

## 2017-05-19 VITALS
WEIGHT: 178 LBS | HEART RATE: 72 BPM | OXYGEN SATURATION: 94 % | TEMPERATURE: 98.7 F | SYSTOLIC BLOOD PRESSURE: 110 MMHG | DIASTOLIC BLOOD PRESSURE: 71 MMHG | HEIGHT: 62 IN | BODY MASS INDEX: 32.76 KG/M2

## 2017-07-07 ENCOUNTER — APPOINTMENT (OUTPATIENT)
Dept: INTERNAL MEDICINE | Facility: CLINIC | Age: 82
End: 2017-07-07

## 2017-07-07 VITALS
TEMPERATURE: 97.6 F | BODY MASS INDEX: 30.18 KG/M2 | OXYGEN SATURATION: 97 % | DIASTOLIC BLOOD PRESSURE: 67 MMHG | HEART RATE: 76 BPM | WEIGHT: 165 LBS | SYSTOLIC BLOOD PRESSURE: 120 MMHG

## 2017-07-07 LAB
INR PPP: 1.7 RATIO
QUALITY CONTROL: YES

## 2017-07-07 RX ORDER — WARFARIN 1 MG/1
1 TABLET ORAL
Qty: 30 | Refills: 0 | Status: COMPLETED | COMMUNITY
Start: 2017-03-27

## 2017-07-07 RX ORDER — CEPHALEXIN 500 MG/1
500 CAPSULE ORAL
Qty: 21 | Refills: 0 | Status: COMPLETED | COMMUNITY
Start: 2017-04-19

## 2017-08-04 ENCOUNTER — LABORATORY RESULT (OUTPATIENT)
Age: 82
End: 2017-08-04

## 2017-08-17 NOTE — PATIENT PROFILE ADULT. - FUNCTIONAL SCREEN CURRENT LEVEL: COMMUNICATION, MLM
Date/Time of Note


Date/Time of Note


DATE: 8/17/17 


TIME: 19:27





Assessment/Plan


VTE Prophylaxis


VTE Prophylaxis Intervention:  other





Lines/Catheters


IV Catheter Type (from Nrsg):  Peripheral IV





Assessment/Plan


Chief Complaint/Hosp Course


AMS


 CIRRHOSIS


sepsis


bacteremia


ANEMIA


dm


back pain


SIRS PLAN antibiotic


per giand id


ambulate


insulin


 dr tai to see called


Problems:  





Subjective


24 Hr Interval Summary


Subjective hx not possible:  other (back pain dr tai to see)


Cardiovascular:  no complaints


Gastrointestinal:  no complaints





Exam/Review of Systems


Vital Signs


Vitals





 Vital Signs








  Date Time  Temp Pulse Resp B/P Pulse Ox O2 Delivery O2 Flow Rate FiO2


 


8/17/17 16:00  104      


 


8/17/17 15:37 98.0  19 125/64 99   


 


8/15/17 09:10      Nasal Cannula 2.0 














 Intake and Output   


 


 8/16/17 8/16/17 8/17/17





 15:00 23:00 07:00


 


Intake Total  1940 ml 910 ml


 


Output Total  400 ml 650 ml


 


Balance  1540 ml 260 ml











Exam


Neck:  supple


Respiratory:  clear to auscultation


Cardiovascular:  regular rate and rhythm


Gastrointestinal:  soft


Musculoskeletal:  nl extremities to inspection


Extremities:  normal pulses





Results


Result Diagram:  


8/15/17 1015                                                                   

             8/15/17 1015





Results 24 hrs





Laboratory Tests








Test


  8/16/17


20:34 8/17/17


02:21 8/17/17


03:18 8/17/17


08:10


 


Bedside Glucose 366  H 390  H 354  H 374  H














Test


  8/17/17


11:30 8/17/17


17:52 


  


 


 


Bedside Glucose 324  H 306  H  











Medications


Medications





 Current Medications


Multivitamins/ Thiamine HCl/ Folic Acid/Sodium Chloride (Mvi Adult/ Vitamin B1/

Folic Acid/NS) 1,011.2 ml  @ 125 mls/ hr DAILY@09 IVPB  Last administered on 8/ 17/17at 08:40; Admin Dose 125 MLS/HR;  Start 8/12/17 at 11:00


Lorazepam (Ativan) 1 mg Q6H  PRN IV anxiety/agitation Last administered on 8/15/

17at 16:55; Admin Dose 1 MG;  Start 8/12/17 at 11:00


Miscellaneous Information 1 ea NOTE XX ;  Start 8/14/17 at 01:00


Glucose (Glutose) 15 gm Q15M  PRN PO DECREASED GLUCOSE;  Start 8/14/17 at 01:00


Glucose (Glutose) 22.5 gm Q15M  PRN PO DECREASED GLUCOSE;  Start 8/14/17 at 01:

00


Dextrose (D50w Syringe) 25 ml Q15M  PRN IV DECREASED GLUCOSE;  Start 8/14/17 at 

01:00


Dextrose (D50w Syringe) 50 ml Q15M  PRN IV DECREASED GLUCOSE;  Start 8/14/17 at 

01:00


Glucagon (Glucagen) 1 mg Q15M  PRN IM DECREASED GLUCOSE;  Start 8/14/17 at 01:00


Glucose (Glutose) 15 gm Q15M  PRN BUCCAL DECREASED GLUCOSE;  Start 8/14/17 at 01

:00


Morphine Sulfate (morphine) 1 mg Q4H  PRN IV PAIN LEVEL 7-10 Last administered 

on 8/17/17at 17:00; Admin Dose 1 MG;  Start 8/14/17 at 14:00


Potassium Chloride (Klor-Con 20) 20 meq BID PO  Last administered on 8/17/17at 

08:39; Admin Dose 20 MEQ;  Start 8/14/17 at 21:00


Diagnostic Test (Pha) 1 ea 1 ea 02 XX  Last administered on 8/16/17at 02:16; 

Admin Dose 1 EA;  Start 8/15/17 at 02:00


Sodium Chloride (1/2 NS) 1,000 ml @  30 mls/hr Q24H IV  Last administered on 8/ 16/17at 00:43; Admin Dose 30 MLS/HR;  Start 8/15/17 at 00:30


Lactulose 26.7 gm 26.7 gm TID PO  Last administered on 8/17/17at 12:44; Admin 

Dose 26.7 GM;  Start 8/15/17 at 13:00


Cefepime HCl (Maxipime 1gm/50 ml (Pmx)) 50 ml @  100 mls/hr Q12 IVPB  Last 

administered on 8/17/17at 08:39; Admin Dose 100 MLS/HR;  Start 8/16/17 at 21:00


Pantoprazole (Protonix Tab) 40 mg BID@06,18 PO  Last administered on 8/17/17at 

18:02; Admin Dose 40 MG;  Start 8/16/17 at 18:00


Rifaximin (Xifaxan) 550 mg BID PO  Last administered on 8/17/17at 08:39; Admin 

Dose 550 MG;  Start 8/16/17 at 21:00


Insulin Glargine (Lantus) 12 unit DAILY@20 SC ;  Start 8/17/17 at 20:00


Ondansetron HCl 4 mg 4 mg Q6H  PRN IV NAUSEA AND/OR VOMITING Last administered 

on 8/17/17at 12:40; Admin Dose 4 MG;  Start 8/16/17 at 22:30


Caspofungin/ Sodium Chloride (Cancidas/NS) 250 ml @  250 mls/hr Q24H IVPB ;  

Start 8/18/17 at 16:00











HANNAH CASTELLANO MD Aug 17, 2017 19:28 (0) understands/communicates without difficulty

## 2017-10-13 ENCOUNTER — APPOINTMENT (OUTPATIENT)
Dept: INTERNAL MEDICINE | Facility: CLINIC | Age: 82
End: 2017-10-13
Payer: MEDICARE

## 2017-10-13 VITALS
HEIGHT: 62 IN | TEMPERATURE: 97.9 F | DIASTOLIC BLOOD PRESSURE: 72 MMHG | OXYGEN SATURATION: 96 % | HEART RATE: 75 BPM | SYSTOLIC BLOOD PRESSURE: 125 MMHG

## 2017-10-13 LAB
INR PPP: 2.4 RATIO
QUALITY CONTROL: YES

## 2017-10-13 PROCEDURE — 85610 PROTHROMBIN TIME: CPT | Mod: QW

## 2017-10-13 PROCEDURE — G0439: CPT

## 2017-10-13 PROCEDURE — G0008: CPT

## 2017-10-13 PROCEDURE — 99214 OFFICE O/P EST MOD 30 MIN: CPT | Mod: 25

## 2017-10-13 PROCEDURE — 90662 IIV NO PRSV INCREASED AG IM: CPT

## 2017-10-27 ENCOUNTER — LABORATORY RESULT (OUTPATIENT)
Age: 82
End: 2017-10-27

## 2017-11-24 ENCOUNTER — LABORATORY RESULT (OUTPATIENT)
Age: 82
End: 2017-11-24

## 2018-01-12 ENCOUNTER — APPOINTMENT (OUTPATIENT)
Dept: INTERNAL MEDICINE | Facility: CLINIC | Age: 83
End: 2018-01-12
Payer: MEDICARE

## 2018-01-12 VITALS
HEIGHT: 62 IN | WEIGHT: 168 LBS | TEMPERATURE: 97.6 F | HEART RATE: 69 BPM | BODY MASS INDEX: 30.91 KG/M2 | OXYGEN SATURATION: 95 % | DIASTOLIC BLOOD PRESSURE: 69 MMHG | SYSTOLIC BLOOD PRESSURE: 125 MMHG

## 2018-01-12 DIAGNOSIS — B35.1 TINEA UNGUIUM: ICD-10-CM

## 2018-01-12 LAB
INR PPP: 2.2 RATIO
QUALITY CONTROL: YES

## 2018-01-12 PROCEDURE — 99214 OFFICE O/P EST MOD 30 MIN: CPT | Mod: 25

## 2018-01-12 PROCEDURE — 85610 PROTHROMBIN TIME: CPT | Mod: QW

## 2018-01-19 ENCOUNTER — LABORATORY RESULT (OUTPATIENT)
Age: 83
End: 2018-01-19

## 2018-04-13 ENCOUNTER — LABORATORY RESULT (OUTPATIENT)
Age: 83
End: 2018-04-13

## 2018-04-23 ENCOUNTER — APPOINTMENT (OUTPATIENT)
Dept: INTERNAL MEDICINE | Facility: CLINIC | Age: 83
End: 2018-04-23
Payer: MEDICARE

## 2018-04-23 VITALS
OXYGEN SATURATION: 97 % | SYSTOLIC BLOOD PRESSURE: 121 MMHG | BODY MASS INDEX: 31.65 KG/M2 | HEIGHT: 62 IN | DIASTOLIC BLOOD PRESSURE: 73 MMHG | WEIGHT: 172 LBS | TEMPERATURE: 98.4 F | HEART RATE: 69 BPM

## 2018-04-23 DIAGNOSIS — I87.2 VENOUS INSUFFICIENCY (CHRONIC) (PERIPHERAL): ICD-10-CM

## 2018-04-23 LAB
INR PPP: 2.7 RATIO
QUALITY CONTROL: YES

## 2018-04-23 PROCEDURE — 99214 OFFICE O/P EST MOD 30 MIN: CPT | Mod: 25

## 2018-04-23 PROCEDURE — 85610 PROTHROMBIN TIME: CPT | Mod: QW

## 2018-06-22 ENCOUNTER — LABORATORY RESULT (OUTPATIENT)
Age: 83
End: 2018-06-22

## 2018-07-23 ENCOUNTER — APPOINTMENT (OUTPATIENT)
Dept: INTERNAL MEDICINE | Facility: CLINIC | Age: 83
End: 2018-07-23
Payer: MEDICARE

## 2018-07-23 VITALS
SYSTOLIC BLOOD PRESSURE: 132 MMHG | TEMPERATURE: 98.3 F | DIASTOLIC BLOOD PRESSURE: 73 MMHG | WEIGHT: 169 LBS | HEIGHT: 62 IN | HEART RATE: 96 BPM | OXYGEN SATURATION: 94 % | BODY MASS INDEX: 31.1 KG/M2

## 2018-07-23 LAB
INR PPP: 4.6 RATIO
QUALITY CONTROL: YES

## 2018-07-23 PROCEDURE — 99214 OFFICE O/P EST MOD 30 MIN: CPT | Mod: 25

## 2018-07-23 PROCEDURE — 85610 PROTHROMBIN TIME: CPT | Mod: QW

## 2018-07-23 NOTE — PHYSICAL EXAM
[No Acute Distress] : no acute distress [Well Nourished] : well nourished [No JVD] : no jugular venous distention [Supple] : supple [No Respiratory Distress] : no respiratory distress  [Clear to Auscultation] : lungs were clear to auscultation bilaterally [No Carotid Bruits] : no carotid bruits [Soft] : abdomen soft [No HSM] : no HSM [de-identified] : afib at 76 [de-identified] : 2 plus edema

## 2018-07-23 NOTE — PLAN
[FreeTextEntry1] : inr 4.6\par skip 3 day then 12 mg per week\par inr every 2 weeks x 3\par bp good edema present

## 2018-07-23 NOTE — HISTORY OF PRESENT ILLNESS
[FreeTextEntry1] : afib and warfarin [de-identified] : Patient here for management of atrial fibrillation and coumadin\par Patient has been compliant with taking the coumadin/warfarin as directed.\par he  has had no problems with bleeding.\par no change in medicine, diet etc??\par feels well

## 2018-08-07 ENCOUNTER — MEDICATION RENEWAL (OUTPATIENT)
Age: 83
End: 2018-08-07

## 2018-08-07 LAB
INR PPP: 1.16 RATIO
PT BLD: 13.1 SEC

## 2018-08-20 ENCOUNTER — LABORATORY RESULT (OUTPATIENT)
Age: 83
End: 2018-08-20

## 2018-09-03 ENCOUNTER — LABORATORY RESULT (OUTPATIENT)
Age: 83
End: 2018-09-03

## 2018-10-22 ENCOUNTER — APPOINTMENT (OUTPATIENT)
Dept: INTERNAL MEDICINE | Facility: CLINIC | Age: 83
End: 2018-10-22
Payer: MEDICARE

## 2018-10-22 VITALS
SYSTOLIC BLOOD PRESSURE: 124 MMHG | HEIGHT: 62 IN | BODY MASS INDEX: 30.91 KG/M2 | WEIGHT: 168 LBS | DIASTOLIC BLOOD PRESSURE: 76 MMHG

## 2018-10-22 DIAGNOSIS — M54.9 DORSALGIA, UNSPECIFIED: ICD-10-CM

## 2018-10-22 LAB
INR PPP: 3.7 RATIO
QUALITY CONTROL: YES

## 2018-10-22 PROCEDURE — 99214 OFFICE O/P EST MOD 30 MIN: CPT | Mod: 25

## 2018-10-22 PROCEDURE — 36415 COLL VENOUS BLD VENIPUNCTURE: CPT

## 2018-10-22 PROCEDURE — 85610 PROTHROMBIN TIME: CPT | Mod: QW

## 2018-10-22 PROCEDURE — G0439: CPT

## 2018-10-22 NOTE — PHYSICAL EXAM
[No Acute Distress] : no acute distress [Well Nourished] : well nourished [No JVD] : no jugular venous distention [Supple] : supple [No Respiratory Distress] : no respiratory distress  [No Carotid Bruits] : no carotid bruits [Soft] : abdomen soft [No HSM] : no HSM [de-identified] : afib syst 2 / 6 [de-identified] : plus 1 edema

## 2018-10-22 NOTE — HEALTH RISK ASSESSMENT
[Fair] :  ~his/her~ mood as fair [No falls in past year] : Patient reported no falls in the past year [1] : 2) Feeling down, depressed, or hopeless for several days (1) [QOS2Rildp] : 2 [Change in mental status noted] : No change in mental status noted [Language] : difficulty with language [Behavior] : denies difficulty with behavior [Learning/Retaining New Information] : difficulty learning/retaining new information [Handling Complex Tasks] : difficulty handling complex tasks [Reasoning] : denies difficulty with reasoning [Spatial Ability and Orientation] : difficulty with spatial ability and orientation [None] : None [Alone] : lives alone [Retired] : retired [High School] : high school [] :  [Feels Safe at Home] : Feels safe at home [Independent] : managing finances [Some assistance needed] : managing medications [Full assistance needed] : using transportation [Reports changes in hearing] : Reports no changes in hearing [Reports changes in vision] : Reports no changes in vision [Reports changes in dental health] : Reports no changes in dental health [Smoke Detector] : smoke detector [Carbon Monoxide Detector] : carbon monoxide detector [Guns at Home] : no guns at home [Safety elements used in home] : safety elements used in home [Seat Belt] :  uses seat belt

## 2018-10-22 NOTE — HISTORY OF PRESENT ILLNESS
[FreeTextEntry1] : Annual exam [de-identified] : Annual exam\par Had flu and prevnar \par Probably had pneumo 23\par aged out of other\par \par \par Afib on warfarin\par supposed to be on 12 mg  2 mg x 6\par may have taken extra pill no bleedomg\par had flu shot\par some swelling of legs\par eating well\par still with some back pain\par some aches and pains\par

## 2019-01-01 ENCOUNTER — RESULT REVIEW (OUTPATIENT)
Age: 84
End: 2019-01-01

## 2019-01-01 ENCOUNTER — OUTPATIENT (OUTPATIENT)
Dept: OUTPATIENT SERVICES | Facility: HOSPITAL | Age: 84
LOS: 1 days | End: 2019-01-01
Payer: MEDICARE

## 2019-01-01 ENCOUNTER — APPOINTMENT (OUTPATIENT)
Dept: INTERNAL MEDICINE | Facility: CLINIC | Age: 84
End: 2019-01-01
Payer: MEDICARE

## 2019-01-01 ENCOUNTER — TRANSCRIPTION ENCOUNTER (OUTPATIENT)
Age: 84
End: 2019-01-01

## 2019-01-01 ENCOUNTER — LABORATORY RESULT (OUTPATIENT)
Age: 84
End: 2019-01-01

## 2019-01-01 ENCOUNTER — APPOINTMENT (OUTPATIENT)
Dept: HEMATOLOGY ONCOLOGY | Facility: CLINIC | Age: 84
End: 2019-01-01
Payer: MEDICARE

## 2019-01-01 ENCOUNTER — INBOUND DOCUMENT (OUTPATIENT)
Age: 84
End: 2019-01-01

## 2019-01-01 ENCOUNTER — MEDICATION RENEWAL (OUTPATIENT)
Age: 84
End: 2019-01-01

## 2019-01-01 ENCOUNTER — INPATIENT (INPATIENT)
Facility: HOSPITAL | Age: 84
LOS: 5 days | Discharge: NURSING, REHAB OR LONG-TERM CARE | End: 2019-10-11
Attending: INTERNAL MEDICINE | Admitting: INTERNAL MEDICINE
Payer: MEDICARE

## 2019-01-01 ENCOUNTER — OUTPATIENT (OUTPATIENT)
Dept: OUTPATIENT SERVICES | Facility: HOSPITAL | Age: 84
LOS: 1 days | Discharge: HOME | End: 2019-01-01

## 2019-01-01 ENCOUNTER — APPOINTMENT (OUTPATIENT)
Dept: INTERNAL MEDICINE | Facility: CLINIC | Age: 84
End: 2019-01-01

## 2019-01-01 ENCOUNTER — INPATIENT (INPATIENT)
Facility: HOSPITAL | Age: 84
LOS: 16 days | Discharge: HOME HEALTH | DRG: 949 | End: 2019-10-28
Attending: PHYSICAL MEDICINE & REHABILITATION | Admitting: PHYSICAL MEDICINE & REHABILITATION
Payer: MEDICARE

## 2019-01-01 VITALS
WEIGHT: 168 LBS | HEIGHT: 62 IN | BODY MASS INDEX: 30.91 KG/M2 | OXYGEN SATURATION: 97 % | TEMPERATURE: 98.9 F | HEART RATE: 85 BPM

## 2019-01-01 VITALS
WEIGHT: 169 LBS | DIASTOLIC BLOOD PRESSURE: 80 MMHG | SYSTOLIC BLOOD PRESSURE: 140 MMHG | HEART RATE: 64 BPM | HEIGHT: 62 IN | BODY MASS INDEX: 31.1 KG/M2

## 2019-01-01 VITALS
SYSTOLIC BLOOD PRESSURE: 129 MMHG | HEIGHT: 61.22 IN | WEIGHT: 155.64 LBS | TEMPERATURE: 98.4 F | HEART RATE: 95 BPM | OXYGEN SATURATION: 96 % | BODY MASS INDEX: 29.39 KG/M2 | DIASTOLIC BLOOD PRESSURE: 76 MMHG | RESPIRATION RATE: 16 BRPM

## 2019-01-01 VITALS
OXYGEN SATURATION: 97 % | RESPIRATION RATE: 14 BRPM | HEART RATE: 88 BPM | TEMPERATURE: 99 F | DIASTOLIC BLOOD PRESSURE: 63 MMHG | SYSTOLIC BLOOD PRESSURE: 116 MMHG

## 2019-01-01 VITALS
SYSTOLIC BLOOD PRESSURE: 107 MMHG | HEART RATE: 74 BPM | DIASTOLIC BLOOD PRESSURE: 56 MMHG | RESPIRATION RATE: 15 BRPM | OXYGEN SATURATION: 98 % | WEIGHT: 163.14 LBS | TEMPERATURE: 98 F | HEIGHT: 60 IN

## 2019-01-01 VITALS
SYSTOLIC BLOOD PRESSURE: 112 MMHG | DIASTOLIC BLOOD PRESSURE: 60 MMHG | OXYGEN SATURATION: 95 % | RESPIRATION RATE: 17 BRPM | TEMPERATURE: 98 F | HEART RATE: 72 BPM

## 2019-01-01 VITALS
HEART RATE: 73 BPM | RESPIRATION RATE: 18 BRPM | TEMPERATURE: 98 F | SYSTOLIC BLOOD PRESSURE: 127 MMHG | DIASTOLIC BLOOD PRESSURE: 53 MMHG | OXYGEN SATURATION: 100 %

## 2019-01-01 DIAGNOSIS — E89.0 POSTPROCEDURAL HYPOTHYROIDISM: Chronic | ICD-10-CM

## 2019-01-01 DIAGNOSIS — I48.91 UNSPECIFIED ATRIAL FIBRILLATION: ICD-10-CM

## 2019-01-01 DIAGNOSIS — M19.90 UNSPECIFIED OSTEOARTHRITIS, UNSPECIFIED SITE: ICD-10-CM

## 2019-01-01 DIAGNOSIS — I62.00 NONTRAUMATIC SUBDURAL HEMORRHAGE, UNSPECIFIED: ICD-10-CM

## 2019-01-01 DIAGNOSIS — D69.6 THROMBOCYTOPENIA, UNSPECIFIED: ICD-10-CM

## 2019-01-01 DIAGNOSIS — E53.8 DEFICIENCY OF OTHER SPECIFIED B GROUP VITAMINS: ICD-10-CM

## 2019-01-01 DIAGNOSIS — G93.40 ENCEPHALOPATHY, UNSPECIFIED: ICD-10-CM

## 2019-01-01 DIAGNOSIS — R00.1 BRADYCARDIA, UNSPECIFIED: ICD-10-CM

## 2019-01-01 DIAGNOSIS — Z29.9 ENCOUNTER FOR PROPHYLACTIC MEASURES, UNSPECIFIED: ICD-10-CM

## 2019-01-01 DIAGNOSIS — S06.5X9A TRAUMATIC SUBDURAL HEMORRHAGE WITH LOSS OF CONSCIOUSNESS OF UNSPECIFIED DURATION, INITIAL ENCOUNTER: ICD-10-CM

## 2019-01-01 DIAGNOSIS — R41.0 DISORIENTATION, UNSPECIFIED: ICD-10-CM

## 2019-01-01 DIAGNOSIS — I82.401 ACUTE EMBOLISM AND THROMBOSIS OF UNSPECIFIED DEEP VEINS OF RIGHT LOWER EXTREMITY: ICD-10-CM

## 2019-01-01 DIAGNOSIS — Z79.01 LONG TERM (CURRENT) USE OF ANTICOAGULANTS: ICD-10-CM

## 2019-01-01 DIAGNOSIS — N39.0 URINARY TRACT INFECTION, SITE NOT SPECIFIED: ICD-10-CM

## 2019-01-01 DIAGNOSIS — I10 ESSENTIAL (PRIMARY) HYPERTENSION: ICD-10-CM

## 2019-01-01 DIAGNOSIS — E87.6 HYPOKALEMIA: ICD-10-CM

## 2019-01-01 LAB
-  AMIKACIN: SIGNIFICANT CHANGE UP
-  AMPICILLIN/SULBACTAM: SIGNIFICANT CHANGE UP
-  AMPICILLIN: SIGNIFICANT CHANGE UP
-  AZTREONAM: SIGNIFICANT CHANGE UP
-  CEFAZOLIN: SIGNIFICANT CHANGE UP
-  CEFEPIME: SIGNIFICANT CHANGE UP
-  CEFOXITIN: SIGNIFICANT CHANGE UP
-  CEFTAZIDIME: SIGNIFICANT CHANGE UP
-  CEFTRIAXONE: SIGNIFICANT CHANGE UP
-  ERTAPENEM: SIGNIFICANT CHANGE UP
-  GENTAMICIN: SIGNIFICANT CHANGE UP
-  LEVOFLOXACIN: SIGNIFICANT CHANGE UP
-  MEROPENEM: SIGNIFICANT CHANGE UP
-  NITROFURANTOIN: SIGNIFICANT CHANGE UP
-  PIPERACILLIN/TAZOBACTAM: SIGNIFICANT CHANGE UP
-  TOBRAMYCIN: SIGNIFICANT CHANGE UP
-  TRIMETHOPRIM/SULFAMETHOXAZOLE: SIGNIFICANT CHANGE UP
ALBUMIN SERPL ELPH-MCNC: 2.9 G/DL — LOW (ref 3.3–5)
ALBUMIN SERPL ELPH-MCNC: 3.3 G/DL — SIGNIFICANT CHANGE UP (ref 3.3–5)
ALBUMIN SERPL ELPH-MCNC: 3.4 G/DL — SIGNIFICANT CHANGE UP (ref 3.3–5)
ALBUMIN SERPL ELPH-MCNC: 3.6 G/DL
ALP BLD-CCNC: 73 U/L
ALP SERPL-CCNC: 49 U/L — SIGNIFICANT CHANGE UP (ref 40–120)
ALP SERPL-CCNC: 50 U/L — SIGNIFICANT CHANGE UP (ref 40–120)
ALP SERPL-CCNC: 80 U/L — SIGNIFICANT CHANGE UP (ref 40–120)
ALT FLD-CCNC: 10 U/L — SIGNIFICANT CHANGE UP (ref 4–41)
ALT FLD-CCNC: 14 U/L — SIGNIFICANT CHANGE UP (ref 10–45)
ALT FLD-CCNC: 7 U/L — SIGNIFICANT CHANGE UP (ref 4–41)
ALT SERPL-CCNC: 7 U/L
ANION GAP SERPL CALC-SCNC: 10 MMO/L — SIGNIFICANT CHANGE UP (ref 7–14)
ANION GAP SERPL CALC-SCNC: 10 MMOL/L — SIGNIFICANT CHANGE UP (ref 5–17)
ANION GAP SERPL CALC-SCNC: 11 MMO/L — SIGNIFICANT CHANGE UP (ref 7–14)
ANION GAP SERPL CALC-SCNC: 11 MMO/L — SIGNIFICANT CHANGE UP (ref 7–14)
ANION GAP SERPL CALC-SCNC: 11 MMOL/L
ANION GAP SERPL CALC-SCNC: 11 MMOL/L — SIGNIFICANT CHANGE UP (ref 5–17)
ANION GAP SERPL CALC-SCNC: 13 MMO/L — SIGNIFICANT CHANGE UP (ref 7–14)
ANION GAP SERPL CALC-SCNC: 6 MMOL/L — SIGNIFICANT CHANGE UP (ref 5–17)
ANION GAP SERPL CALC-SCNC: 7 MMOL/L — SIGNIFICANT CHANGE UP (ref 5–17)
ANION GAP SERPL CALC-SCNC: 7 MMOL/L — SIGNIFICANT CHANGE UP (ref 5–17)
ANION GAP SERPL CALC-SCNC: 8 MMO/L — SIGNIFICANT CHANGE UP (ref 7–14)
ANION GAP SERPL CALC-SCNC: 8 MMO/L — SIGNIFICANT CHANGE UP (ref 7–14)
ANION GAP SERPL CALC-SCNC: 8 MMOL/L — SIGNIFICANT CHANGE UP (ref 5–17)
ANION GAP SERPL CALC-SCNC: 8 MMOL/L — SIGNIFICANT CHANGE UP (ref 5–17)
ANION GAP SERPL CALC-SCNC: 9 MMO/L — SIGNIFICANT CHANGE UP (ref 7–14)
ANION GAP SERPL CALC-SCNC: 9 MMOL/L — SIGNIFICANT CHANGE UP (ref 5–17)
APCR PPP: 3.04 RATIO
APPEARANCE UR: CLEAR — SIGNIFICANT CHANGE UP
APPEARANCE UR: SIGNIFICANT CHANGE UP
APTT BLD: 25.6 SEC — LOW (ref 27.5–36.3)
APTT BLD: 26.5 SEC — LOW (ref 27.5–36.3)
APTT BLD: 30.6 SEC
AST SERPL-CCNC: 18 U/L
AST SERPL-CCNC: 24 U/L — SIGNIFICANT CHANGE UP (ref 10–40)
AST SERPL-CCNC: 26 U/L — SIGNIFICANT CHANGE UP (ref 4–40)
AST SERPL-CCNC: 30 U/L — SIGNIFICANT CHANGE UP (ref 4–40)
AT III PPP CHRO-ACNC: 76 %
B2 GLYCOPROT1 IGG SER-ACNC: 6.3 SGU
B2 GLYCOPROT1 IGM SER-ACNC: <5 SMU
BACTERIA # UR AUTO: NEGATIVE — SIGNIFICANT CHANGE UP
BACTERIA UR CULT: SIGNIFICANT CHANGE UP
BASOPHILS # BLD AUTO: 0 K/UL — SIGNIFICANT CHANGE UP (ref 0–0.2)
BASOPHILS # BLD AUTO: 0.01 K/UL — SIGNIFICANT CHANGE UP (ref 0–0.2)
BASOPHILS # BLD AUTO: 0.02 K/UL — SIGNIFICANT CHANGE UP (ref 0–0.2)
BASOPHILS # BLD AUTO: 0.03 K/UL — SIGNIFICANT CHANGE UP (ref 0–0.2)
BASOPHILS NFR BLD AUTO: 0.1 % — SIGNIFICANT CHANGE UP (ref 0–2)
BASOPHILS NFR BLD AUTO: 0.1 % — SIGNIFICANT CHANGE UP (ref 0–2)
BASOPHILS NFR BLD AUTO: 0.2 % — SIGNIFICANT CHANGE UP (ref 0–2)
BASOPHILS NFR BLD AUTO: 0.2 % — SIGNIFICANT CHANGE UP (ref 0–2)
BASOPHILS NFR BLD AUTO: 0.3 % — SIGNIFICANT CHANGE UP (ref 0–2)
BASOPHILS NFR BLD AUTO: 0.5 % — SIGNIFICANT CHANGE UP (ref 0–2)
BASOPHILS NFR BLD AUTO: 0.6 % — SIGNIFICANT CHANGE UP (ref 0–2)
BASOPHILS NFR SPEC: 0 % — SIGNIFICANT CHANGE UP (ref 0–2)
BILIRUB DIRECT SERPL-MCNC: 0.3 MG/DL — HIGH (ref 0.1–0.2)
BILIRUB SERPL-MCNC: 0.5 MG/DL — SIGNIFICANT CHANGE UP (ref 0.2–1.2)
BILIRUB SERPL-MCNC: 0.8 MG/DL
BILIRUB SERPL-MCNC: 1 MG/DL — SIGNIFICANT CHANGE UP (ref 0.2–1.2)
BILIRUB SERPL-MCNC: 1.4 MG/DL — HIGH (ref 0.2–1.2)
BILIRUB SERPL-MCNC: 1.5 MG/DL — HIGH (ref 0.2–1.2)
BILIRUB UR-MCNC: NEGATIVE — SIGNIFICANT CHANGE UP
BILIRUB UR-MCNC: NEGATIVE — SIGNIFICANT CHANGE UP
BLASTS # FLD: 0 % — SIGNIFICANT CHANGE UP (ref 0–0)
BLD GP AB SCN SERPL QL: NEGATIVE — SIGNIFICANT CHANGE UP
BLOOD UR QL VISUAL: HIGH
BUN SERPL-MCNC: 18 MG/DL — SIGNIFICANT CHANGE UP (ref 7–23)
BUN SERPL-MCNC: 18 MG/DL — SIGNIFICANT CHANGE UP (ref 7–23)
BUN SERPL-MCNC: 19 MG/DL — SIGNIFICANT CHANGE UP (ref 7–23)
BUN SERPL-MCNC: 20 MG/DL
BUN SERPL-MCNC: 21 MG/DL — SIGNIFICANT CHANGE UP (ref 7–23)
BUN SERPL-MCNC: 22 MG/DL — SIGNIFICANT CHANGE UP (ref 7–23)
BUN SERPL-MCNC: 22 MG/DL — SIGNIFICANT CHANGE UP (ref 7–23)
BUN SERPL-MCNC: 23 MG/DL — SIGNIFICANT CHANGE UP (ref 7–23)
BUN SERPL-MCNC: 24 MG/DL — HIGH (ref 7–23)
BUN SERPL-MCNC: 27 MG/DL — HIGH (ref 7–23)
BUN SERPL-MCNC: 29 MG/DL — HIGH (ref 7–23)
CALCIUM SERPL-MCNC: 8.5 MG/DL — SIGNIFICANT CHANGE UP (ref 8.4–10.5)
CALCIUM SERPL-MCNC: 8.5 MG/DL — SIGNIFICANT CHANGE UP (ref 8.4–10.5)
CALCIUM SERPL-MCNC: 8.6 MG/DL — SIGNIFICANT CHANGE UP (ref 8.4–10.5)
CALCIUM SERPL-MCNC: 8.7 MG/DL — SIGNIFICANT CHANGE UP (ref 8.4–10.5)
CALCIUM SERPL-MCNC: 8.8 MG/DL — SIGNIFICANT CHANGE UP (ref 8.4–10.5)
CALCIUM SERPL-MCNC: 8.8 MG/DL — SIGNIFICANT CHANGE UP (ref 8.4–10.5)
CALCIUM SERPL-MCNC: 8.9 MG/DL — SIGNIFICANT CHANGE UP (ref 8.4–10.5)
CALCIUM SERPL-MCNC: 8.9 MG/DL — SIGNIFICANT CHANGE UP (ref 8.4–10.5)
CALCIUM SERPL-MCNC: 9 MG/DL
CALCIUM SERPL-MCNC: 9 MG/DL — SIGNIFICANT CHANGE UP (ref 8.4–10.5)
CALCIUM SERPL-MCNC: 9.1 MG/DL — SIGNIFICANT CHANGE UP (ref 8.4–10.5)
CALCIUM SERPL-MCNC: 9.2 MG/DL — SIGNIFICANT CHANGE UP (ref 8.4–10.5)
CARDIOLIPIN IGM SER-MCNC: 7.4 MPL
CARDIOLIPIN IGM SER-MCNC: 8.4 GPL
CHLORIDE SERPL-SCNC: 100 MMOL/L — SIGNIFICANT CHANGE UP (ref 98–107)
CHLORIDE SERPL-SCNC: 102 MMOL/L — SIGNIFICANT CHANGE UP (ref 98–107)
CHLORIDE SERPL-SCNC: 103 MMOL/L — SIGNIFICANT CHANGE UP (ref 98–107)
CHLORIDE SERPL-SCNC: 104 MMOL/L — SIGNIFICANT CHANGE UP (ref 96–108)
CHLORIDE SERPL-SCNC: 105 MMOL/L — SIGNIFICANT CHANGE UP (ref 96–108)
CHLORIDE SERPL-SCNC: 106 MMOL/L
CHLORIDE SERPL-SCNC: 106 MMOL/L — SIGNIFICANT CHANGE UP (ref 96–108)
CHLORIDE SERPL-SCNC: 106 MMOL/L — SIGNIFICANT CHANGE UP (ref 96–108)
CHLORIDE SERPL-SCNC: 107 MMOL/L — SIGNIFICANT CHANGE UP (ref 96–108)
CHLORIDE SERPL-SCNC: 107 MMOL/L — SIGNIFICANT CHANGE UP (ref 96–108)
CHLORIDE SERPL-SCNC: 98 MMOL/L — SIGNIFICANT CHANGE UP (ref 98–107)
CK SERPL-CCNC: 80 U/L — SIGNIFICANT CHANGE UP (ref 30–200)
CK SERPL-CCNC: 97 U/L — SIGNIFICANT CHANGE UP (ref 30–200)
CO2 SERPL-SCNC: 24 MMOL/L
CO2 SERPL-SCNC: 24 MMOL/L — SIGNIFICANT CHANGE UP (ref 22–31)
CO2 SERPL-SCNC: 24 MMOL/L — SIGNIFICANT CHANGE UP (ref 22–31)
CO2 SERPL-SCNC: 25 MMOL/L — SIGNIFICANT CHANGE UP (ref 22–31)
CO2 SERPL-SCNC: 25 MMOL/L — SIGNIFICANT CHANGE UP (ref 22–31)
CO2 SERPL-SCNC: 26 MMOL/L — SIGNIFICANT CHANGE UP (ref 22–31)
CO2 SERPL-SCNC: 27 MMOL/L — SIGNIFICANT CHANGE UP (ref 22–31)
CO2 SERPL-SCNC: 27 MMOL/L — SIGNIFICANT CHANGE UP (ref 22–31)
CO2 SERPL-SCNC: 28 MMOL/L — SIGNIFICANT CHANGE UP (ref 22–31)
CO2 SERPL-SCNC: 29 MMOL/L — SIGNIFICANT CHANGE UP (ref 22–31)
COLOR SPEC: YELLOW — SIGNIFICANT CHANGE UP
COLOR SPEC: YELLOW — SIGNIFICANT CHANGE UP
CONFIRM: 30.4 SEC
CREAT SERPL-MCNC: 0.56 MG/DL — SIGNIFICANT CHANGE UP (ref 0.5–1.3)
CREAT SERPL-MCNC: 0.57 MG/DL — SIGNIFICANT CHANGE UP (ref 0.5–1.3)
CREAT SERPL-MCNC: 0.58 MG/DL — SIGNIFICANT CHANGE UP (ref 0.5–1.3)
CREAT SERPL-MCNC: 0.61 MG/DL — SIGNIFICANT CHANGE UP (ref 0.5–1.3)
CREAT SERPL-MCNC: 0.62 MG/DL — SIGNIFICANT CHANGE UP (ref 0.5–1.3)
CREAT SERPL-MCNC: 0.63 MG/DL — SIGNIFICANT CHANGE UP (ref 0.5–1.3)
CREAT SERPL-MCNC: 0.66 MG/DL — SIGNIFICANT CHANGE UP (ref 0.5–1.3)
CREAT SERPL-MCNC: 0.67 MG/DL — SIGNIFICANT CHANGE UP (ref 0.5–1.3)
CREAT SERPL-MCNC: 0.69 MG/DL
CREAT SERPL-MCNC: 0.75 MG/DL — SIGNIFICANT CHANGE UP (ref 0.5–1.3)
CREAT SERPL-MCNC: 0.76 MG/DL — SIGNIFICANT CHANGE UP (ref 0.5–1.3)
CREAT SERPL-MCNC: 0.76 MG/DL — SIGNIFICANT CHANGE UP (ref 0.5–1.3)
CREAT SERPL-MCNC: 0.79 MG/DL — SIGNIFICANT CHANGE UP (ref 0.5–1.3)
CREAT SERPL-MCNC: 0.81 MG/DL — SIGNIFICANT CHANGE UP (ref 0.5–1.3)
CREAT SERPL-MCNC: 0.87 MG/DL — SIGNIFICANT CHANGE UP (ref 0.5–1.3)
CREAT SERPL-MCNC: 0.88 MG/DL — SIGNIFICANT CHANGE UP (ref 0.5–1.3)
D DIMER BLD IA.RAPID-MCNC: 3198 NG/ML — SIGNIFICANT CHANGE UP
DIFF PNL FLD: ABNORMAL
DIGOXIN SERPL-MCNC: 0.7 NG/ML — LOW (ref 0.8–2)
DIR ANTIGLOB POLYSPECIFIC INTERPRETATION: SIGNIFICANT CHANGE UP
DRVVT IMM 1:2 NP PPP: NORMAL
DRVVT SCREEN TO CONFIRM RATIO: 0.89 RATIO
EOSINOPHIL # BLD AUTO: 0.01 K/UL — SIGNIFICANT CHANGE UP (ref 0–0.5)
EOSINOPHIL # BLD AUTO: 0.03 K/UL — SIGNIFICANT CHANGE UP (ref 0–0.5)
EOSINOPHIL # BLD AUTO: 0.04 K/UL — SIGNIFICANT CHANGE UP (ref 0–0.5)
EOSINOPHIL # BLD AUTO: 0.06 K/UL — SIGNIFICANT CHANGE UP (ref 0–0.5)
EOSINOPHIL # BLD AUTO: 0.07 K/UL — SIGNIFICANT CHANGE UP (ref 0–0.5)
EOSINOPHIL # BLD AUTO: 0.08 K/UL — SIGNIFICANT CHANGE UP (ref 0–0.5)
EOSINOPHIL # BLD AUTO: 0.09 K/UL — SIGNIFICANT CHANGE UP (ref 0–0.5)
EOSINOPHIL # BLD AUTO: 0.1 K/UL — SIGNIFICANT CHANGE UP (ref 0–0.5)
EOSINOPHIL # BLD AUTO: 0.1 K/UL — SIGNIFICANT CHANGE UP (ref 0–0.5)
EOSINOPHIL NFR BLD AUTO: 0.2 % — SIGNIFICANT CHANGE UP (ref 0–6)
EOSINOPHIL NFR BLD AUTO: 0.4 % — SIGNIFICANT CHANGE UP (ref 0–6)
EOSINOPHIL NFR BLD AUTO: 0.6 % — SIGNIFICANT CHANGE UP (ref 0–6)
EOSINOPHIL NFR BLD AUTO: 0.8 % — SIGNIFICANT CHANGE UP (ref 0–6)
EOSINOPHIL NFR BLD AUTO: 1 % — SIGNIFICANT CHANGE UP (ref 0–6)
EOSINOPHIL NFR BLD AUTO: 1.3 % — SIGNIFICANT CHANGE UP (ref 0–6)
EOSINOPHIL NFR BLD AUTO: 1.4 % — SIGNIFICANT CHANGE UP (ref 0–6)
EOSINOPHIL NFR BLD AUTO: 1.5 % — SIGNIFICANT CHANGE UP (ref 0–6)
EOSINOPHIL NFR BLD AUTO: 1.7 % — SIGNIFICANT CHANGE UP (ref 0–6)
EOSINOPHIL NFR FLD: 0 % — SIGNIFICANT CHANGE UP (ref 0–6)
FIBRINOGEN PPP-MCNC: 453.3 MG/DL — SIGNIFICANT CHANGE UP (ref 350–510)
FOLATE SERPL-MCNC: 10.6 NG/ML — SIGNIFICANT CHANGE UP (ref 4.7–20)
FVL BLANK: 40
FVL TEST: 121.5
GIANT PLATELETS BLD QL SMEAR: PRESENT — SIGNIFICANT CHANGE UP
GLUCOSE SERPL-MCNC: 104 MG/DL — HIGH (ref 70–99)
GLUCOSE SERPL-MCNC: 110 MG/DL — HIGH (ref 70–99)
GLUCOSE SERPL-MCNC: 78 MG/DL — SIGNIFICANT CHANGE UP (ref 70–99)
GLUCOSE SERPL-MCNC: 80 MG/DL — SIGNIFICANT CHANGE UP (ref 70–99)
GLUCOSE SERPL-MCNC: 85 MG/DL
GLUCOSE SERPL-MCNC: 86 MG/DL — SIGNIFICANT CHANGE UP (ref 70–99)
GLUCOSE SERPL-MCNC: 87 MG/DL — SIGNIFICANT CHANGE UP (ref 70–99)
GLUCOSE SERPL-MCNC: 88 MG/DL — SIGNIFICANT CHANGE UP (ref 70–99)
GLUCOSE SERPL-MCNC: 89 MG/DL — SIGNIFICANT CHANGE UP (ref 70–99)
GLUCOSE SERPL-MCNC: 89 MG/DL — SIGNIFICANT CHANGE UP (ref 70–99)
GLUCOSE SERPL-MCNC: 90 MG/DL — SIGNIFICANT CHANGE UP (ref 70–99)
GLUCOSE SERPL-MCNC: 92 MG/DL — SIGNIFICANT CHANGE UP (ref 70–99)
GLUCOSE SERPL-MCNC: 93 MG/DL — SIGNIFICANT CHANGE UP (ref 70–99)
GLUCOSE SERPL-MCNC: 94 MG/DL — SIGNIFICANT CHANGE UP (ref 70–99)
GLUCOSE SERPL-MCNC: 95 MG/DL — SIGNIFICANT CHANGE UP (ref 70–99)
GLUCOSE SERPL-MCNC: 96 MG/DL — SIGNIFICANT CHANGE UP (ref 70–99)
GLUCOSE UR QL: NEGATIVE — SIGNIFICANT CHANGE UP
GLUCOSE UR-MCNC: NEGATIVE — SIGNIFICANT CHANGE UP
GLYCOPROTEIN IV ANTIBODY: NEGATIVE
HAPTOGLOB SERPL-MCNC: 15 MG/DL — LOW (ref 34–200)
HAPTOGLOB SERPL-MCNC: 87 MG/DL — SIGNIFICANT CHANGE UP (ref 34–200)
HBV CORE IGG+IGM SER QL: REACTIVE
HBV SURFACE AB SER QL: NONREACTIVE
HBV SURFACE AG SER QL: NONREACTIVE
HCT VFR BLD CALC: 30.6 % — LOW (ref 39–50)
HCT VFR BLD CALC: 30.8 % — LOW (ref 39–50)
HCT VFR BLD CALC: 30.9 % — LOW (ref 39–50)
HCT VFR BLD CALC: 31.1 % — LOW (ref 39–50)
HCT VFR BLD CALC: 31.5 % — LOW (ref 39–50)
HCT VFR BLD CALC: 31.6 % — LOW (ref 39–50)
HCT VFR BLD CALC: 31.6 % — LOW (ref 39–50)
HCT VFR BLD CALC: 32 % — LOW (ref 39–50)
HCT VFR BLD CALC: 32.5 % — LOW (ref 39–50)
HCT VFR BLD CALC: 33 % — LOW (ref 39–50)
HCT VFR BLD CALC: 33.5 % — LOW (ref 39–50)
HCT VFR BLD CALC: 33.6 % — LOW (ref 39–50)
HCT VFR BLD CALC: 34 % — LOW (ref 39–50)
HCT VFR BLD CALC: 34 % — LOW (ref 39–50)
HCT VFR BLD CALC: 34.2 % — LOW (ref 39–50)
HCT VFR BLD CALC: 34.5 % — LOW (ref 39–50)
HCT VFR BLD CALC: 35.3 % — LOW (ref 39–50)
HCV AB SER QL: NONREACTIVE
HCV S/CO RATIO: 0.48 S/CO
HCYS SERPL-MCNC: 15.1 UMOL/L — HIGH
HGB BLD-MCNC: 10 G/DL — LOW (ref 13–17)
HGB BLD-MCNC: 10 G/DL — LOW (ref 13–17)
HGB BLD-MCNC: 10.1 G/DL — LOW (ref 13–17)
HGB BLD-MCNC: 10.2 G/DL — LOW (ref 13–17)
HGB BLD-MCNC: 10.2 G/DL — LOW (ref 13–17)
HGB BLD-MCNC: 10.3 G/DL — LOW (ref 13–17)
HGB BLD-MCNC: 10.5 G/DL — LOW (ref 13–17)
HGB BLD-MCNC: 10.7 G/DL — LOW (ref 13–17)
HGB BLD-MCNC: 10.9 G/DL — LOW (ref 13–17)
HGB BLD-MCNC: 10.9 G/DL — LOW (ref 13–17)
HGB BLD-MCNC: 11 G/DL — LOW (ref 13–17)
HGB BLD-MCNC: 11 G/DL — LOW (ref 13–17)
HGB BLD-MCNC: 11.2 G/DL — LOW (ref 13–17)
HGB BLD-MCNC: 11.5 G/DL — LOW (ref 13–17)
HGB BLD-MCNC: 11.5 G/DL — LOW (ref 13–17)
HLA CLASS 1 AB: NEGATIVE
HYALINE CASTS # UR AUTO: NEGATIVE — SIGNIFICANT CHANGE UP
HYPOCHROMIA BLD QL: SLIGHT — SIGNIFICANT CHANGE UP
IA/IIA AB: NORMAL
IB/IX AB: NEGATIVE
IF BLOCK AB SER-ACNC: SIGNIFICANT CHANGE UP
IIB/IIIA AB: POSITIVE
IMM GRANULOCYTES NFR BLD AUTO: 0.3 % — SIGNIFICANT CHANGE UP (ref 0–1.5)
IMM GRANULOCYTES NFR BLD AUTO: 0.4 % — SIGNIFICANT CHANGE UP (ref 0–1.5)
IMM GRANULOCYTES NFR BLD AUTO: 0.4 % — SIGNIFICANT CHANGE UP (ref 0–1.5)
IMM GRANULOCYTES NFR BLD AUTO: 0.5 % — SIGNIFICANT CHANGE UP (ref 0–1.5)
IMM GRANULOCYTES NFR BLD AUTO: 0.6 % — SIGNIFICANT CHANGE UP (ref 0–1.5)
INR BLD: 1.23 — HIGH (ref 0.88–1.17)
INR BLD: 1.24 — HIGH (ref 0.88–1.17)
INR PPP: 1.1 RATIO
INR PPP: 2.1 RATIO
KETONES UR-MCNC: HIGH
KETONES UR-MCNC: NEGATIVE — SIGNIFICANT CHANGE UP
LDH SERPL L TO P-CCNC: 257 U/L — HIGH (ref 135–225)
LEUKOCYTE ESTERASE UR-ACNC: NEGATIVE — SIGNIFICANT CHANGE UP
LEUKOCYTE ESTERASE UR-ACNC: SIGNIFICANT CHANGE UP
LYMPHOCYTES # BLD AUTO: 0.57 K/UL — LOW (ref 1–3.3)
LYMPHOCYTES # BLD AUTO: 0.67 K/UL — LOW (ref 1–3.3)
LYMPHOCYTES # BLD AUTO: 0.75 K/UL — LOW (ref 1–3.3)
LYMPHOCYTES # BLD AUTO: 0.76 K/UL — LOW (ref 1–3.3)
LYMPHOCYTES # BLD AUTO: 0.96 K/UL — LOW (ref 1–3.3)
LYMPHOCYTES # BLD AUTO: 0.97 K/UL — LOW (ref 1–3.3)
LYMPHOCYTES # BLD AUTO: 1.05 K/UL — SIGNIFICANT CHANGE UP (ref 1–3.3)
LYMPHOCYTES # BLD AUTO: 1.15 K/UL — SIGNIFICANT CHANGE UP (ref 1–3.3)
LYMPHOCYTES # BLD AUTO: 1.6 K/UL — SIGNIFICANT CHANGE UP (ref 1–3.3)
LYMPHOCYTES # BLD AUTO: 11.5 % — LOW (ref 13–44)
LYMPHOCYTES # BLD AUTO: 14.1 % — SIGNIFICANT CHANGE UP (ref 13–44)
LYMPHOCYTES # BLD AUTO: 14.3 % — SIGNIFICANT CHANGE UP (ref 13–44)
LYMPHOCYTES # BLD AUTO: 14.7 % — SIGNIFICANT CHANGE UP (ref 13–44)
LYMPHOCYTES # BLD AUTO: 14.9 % — SIGNIFICANT CHANGE UP (ref 13–44)
LYMPHOCYTES # BLD AUTO: 19.2 % — SIGNIFICANT CHANGE UP (ref 13–44)
LYMPHOCYTES # BLD AUTO: 24.1 % — SIGNIFICANT CHANGE UP (ref 13–44)
LYMPHOCYTES # BLD AUTO: 9.3 % — LOW (ref 13–44)
LYMPHOCYTES # BLD AUTO: 9.6 % — LOW (ref 13–44)
LYMPHOCYTES NFR SPEC AUTO: 7.8 % — LOW (ref 13–44)
MAGNESIUM SERPL-MCNC: 1.8 MG/DL — SIGNIFICANT CHANGE UP (ref 1.6–2.6)
MAGNESIUM SERPL-MCNC: 1.8 MG/DL — SIGNIFICANT CHANGE UP (ref 1.6–2.6)
MAGNESIUM SERPL-MCNC: 1.9 MG/DL — SIGNIFICANT CHANGE UP (ref 1.6–2.6)
MAGNESIUM SERPL-MCNC: 2 MG/DL — SIGNIFICANT CHANGE UP (ref 1.6–2.6)
MAGNESIUM SERPL-MCNC: 2 MG/DL — SIGNIFICANT CHANGE UP (ref 1.6–2.6)
MANUAL SMEAR VERIFICATION: SIGNIFICANT CHANGE UP
MCHC RBC-ENTMCNC: 31.3 PG — SIGNIFICANT CHANGE UP (ref 27–34)
MCHC RBC-ENTMCNC: 31.4 PG — SIGNIFICANT CHANGE UP (ref 27–34)
MCHC RBC-ENTMCNC: 31.5 PG — SIGNIFICANT CHANGE UP (ref 27–34)
MCHC RBC-ENTMCNC: 31.5 PG — SIGNIFICANT CHANGE UP (ref 27–34)
MCHC RBC-ENTMCNC: 31.6 PG — SIGNIFICANT CHANGE UP (ref 27–34)
MCHC RBC-ENTMCNC: 31.7 PG — SIGNIFICANT CHANGE UP (ref 27–34)
MCHC RBC-ENTMCNC: 31.8 GM/DL — LOW (ref 32–36)
MCHC RBC-ENTMCNC: 31.8 PG — SIGNIFICANT CHANGE UP (ref 27–34)
MCHC RBC-ENTMCNC: 31.9 PG — SIGNIFICANT CHANGE UP (ref 27–34)
MCHC RBC-ENTMCNC: 31.9 PG — SIGNIFICANT CHANGE UP (ref 27–34)
MCHC RBC-ENTMCNC: 32 % — SIGNIFICANT CHANGE UP (ref 32–36)
MCHC RBC-ENTMCNC: 32 PG — SIGNIFICANT CHANGE UP (ref 27–34)
MCHC RBC-ENTMCNC: 32.1 % — SIGNIFICANT CHANGE UP (ref 32–36)
MCHC RBC-ENTMCNC: 32.1 GM/DL — SIGNIFICANT CHANGE UP (ref 32–36)
MCHC RBC-ENTMCNC: 32.1 PG — SIGNIFICANT CHANGE UP (ref 27–34)
MCHC RBC-ENTMCNC: 32.2 % — SIGNIFICANT CHANGE UP (ref 32–36)
MCHC RBC-ENTMCNC: 32.2 GM/DL — SIGNIFICANT CHANGE UP (ref 32–36)
MCHC RBC-ENTMCNC: 32.2 GM/DL — SIGNIFICANT CHANGE UP (ref 32–36)
MCHC RBC-ENTMCNC: 32.4 % — SIGNIFICANT CHANGE UP (ref 32–36)
MCHC RBC-ENTMCNC: 32.4 GM/DL — SIGNIFICANT CHANGE UP (ref 32–36)
MCHC RBC-ENTMCNC: 32.4 GM/DL — SIGNIFICANT CHANGE UP (ref 32–36)
MCHC RBC-ENTMCNC: 32.4 PG — SIGNIFICANT CHANGE UP (ref 27–34)
MCHC RBC-ENTMCNC: 32.6 % — SIGNIFICANT CHANGE UP (ref 32–36)
MCHC RBC-ENTMCNC: 32.6 GM/DL — SIGNIFICANT CHANGE UP (ref 32–36)
MCHC RBC-ENTMCNC: 32.6 PG — SIGNIFICANT CHANGE UP (ref 27–34)
MCHC RBC-ENTMCNC: 32.7 % — SIGNIFICANT CHANGE UP (ref 32–36)
MCHC RBC-ENTMCNC: 32.7 % — SIGNIFICANT CHANGE UP (ref 32–36)
MCHC RBC-ENTMCNC: 32.7 GM/DL — SIGNIFICANT CHANGE UP (ref 32–36)
MCHC RBC-ENTMCNC: 32.8 PG — SIGNIFICANT CHANGE UP (ref 27–34)
MCHC RBC-ENTMCNC: 32.9 GM/DL — SIGNIFICANT CHANGE UP (ref 32–36)
MCHC RBC-ENTMCNC: 33.1 % — SIGNIFICANT CHANGE UP (ref 32–36)
MCHC RBC-ENTMCNC: 33.4 GM/DL — SIGNIFICANT CHANGE UP (ref 32–36)
MCHC RBC-ENTMCNC: 33.5 G/DL — SIGNIFICANT CHANGE UP (ref 32–36)
MCHC RBC-ENTMCNC: 34 PG — SIGNIFICANT CHANGE UP (ref 27–34)
MCV RBC AUTO: 100 FL — SIGNIFICANT CHANGE UP (ref 80–100)
MCV RBC AUTO: 100.3 FL — HIGH (ref 80–100)
MCV RBC AUTO: 101 FL — HIGH (ref 80–100)
MCV RBC AUTO: 96.6 FL — SIGNIFICANT CHANGE UP (ref 80–100)
MCV RBC AUTO: 96.9 FL — SIGNIFICANT CHANGE UP (ref 80–100)
MCV RBC AUTO: 97.1 FL — SIGNIFICANT CHANGE UP (ref 80–100)
MCV RBC AUTO: 97.2 FL — SIGNIFICANT CHANGE UP (ref 80–100)
MCV RBC AUTO: 97.4 FL — SIGNIFICANT CHANGE UP (ref 80–100)
MCV RBC AUTO: 97.5 FL — SIGNIFICANT CHANGE UP (ref 80–100)
MCV RBC AUTO: 97.5 FL — SIGNIFICANT CHANGE UP (ref 80–100)
MCV RBC AUTO: 97.6 FL — SIGNIFICANT CHANGE UP (ref 80–100)
MCV RBC AUTO: 98 FL — SIGNIFICANT CHANGE UP (ref 80–100)
MCV RBC AUTO: 98.2 FL — SIGNIFICANT CHANGE UP (ref 80–100)
MCV RBC AUTO: 98.3 FL — SIGNIFICANT CHANGE UP (ref 80–100)
MCV RBC AUTO: 99.1 FL — SIGNIFICANT CHANGE UP (ref 80–100)
MCV RBC AUTO: 99.4 FL — SIGNIFICANT CHANGE UP (ref 80–100)
MCV RBC AUTO: 99.4 FL — SIGNIFICANT CHANGE UP (ref 80–100)
METAMYELOCYTES # FLD: 0 % — SIGNIFICANT CHANGE UP (ref 0–1)
METHOD TYPE: SIGNIFICANT CHANGE UP
METHYLMALONATE SERPL-SCNC: 210 NMOL/L — SIGNIFICANT CHANGE UP (ref 0–378)
MONOCYTES # BLD AUTO: 0.69 K/UL — SIGNIFICANT CHANGE UP (ref 0–0.9)
MONOCYTES # BLD AUTO: 0.74 K/UL — SIGNIFICANT CHANGE UP (ref 0–0.9)
MONOCYTES # BLD AUTO: 0.76 K/UL — SIGNIFICANT CHANGE UP (ref 0–0.9)
MONOCYTES # BLD AUTO: 0.76 K/UL — SIGNIFICANT CHANGE UP (ref 0–0.9)
MONOCYTES # BLD AUTO: 0.8 K/UL — SIGNIFICANT CHANGE UP (ref 0–0.9)
MONOCYTES # BLD AUTO: 0.9 K/UL — SIGNIFICANT CHANGE UP (ref 0–0.9)
MONOCYTES # BLD AUTO: 0.92 K/UL — HIGH (ref 0–0.9)
MONOCYTES # BLD AUTO: 0.92 K/UL — HIGH (ref 0–0.9)
MONOCYTES # BLD AUTO: 0.93 K/UL — HIGH (ref 0–0.9)
MONOCYTES NFR BLD AUTO: 12.3 % — SIGNIFICANT CHANGE UP (ref 2–14)
MONOCYTES NFR BLD AUTO: 12.4 % — SIGNIFICANT CHANGE UP (ref 2–14)
MONOCYTES NFR BLD AUTO: 12.7 % — SIGNIFICANT CHANGE UP (ref 2–14)
MONOCYTES NFR BLD AUTO: 12.9 % — SIGNIFICANT CHANGE UP (ref 2–14)
MONOCYTES NFR BLD AUTO: 13.4 % — SIGNIFICANT CHANGE UP (ref 2–14)
MONOCYTES NFR BLD AUTO: 14 % — SIGNIFICANT CHANGE UP (ref 2–14)
MONOCYTES NFR BLD AUTO: 14.7 % — HIGH (ref 2–14)
MONOCYTES NFR BLD AUTO: 14.9 % — HIGH (ref 2–14)
MONOCYTES NFR BLD AUTO: 9.8 % — SIGNIFICANT CHANGE UP (ref 2–14)
MONOCYTES NFR BLD: 10.5 % — HIGH (ref 2–9)
MYELOCYTES NFR BLD: 0 % — SIGNIFICANT CHANGE UP (ref 0–0)
NEUTROPHIL AB SER-ACNC: 79.1 % — HIGH (ref 43–77)
NEUTROPHILS # BLD AUTO: 3.62 K/UL — SIGNIFICANT CHANGE UP (ref 1.8–7.4)
NEUTROPHILS # BLD AUTO: 3.92 K/UL — SIGNIFICANT CHANGE UP (ref 1.8–7.4)
NEUTROPHILS # BLD AUTO: 4 K/UL — SIGNIFICANT CHANGE UP (ref 1.8–7.4)
NEUTROPHILS # BLD AUTO: 4.53 K/UL — SIGNIFICANT CHANGE UP (ref 1.8–7.4)
NEUTROPHILS # BLD AUTO: 4.73 K/UL — SIGNIFICANT CHANGE UP (ref 1.8–7.4)
NEUTROPHILS # BLD AUTO: 4.87 K/UL — SIGNIFICANT CHANGE UP (ref 1.8–7.4)
NEUTROPHILS # BLD AUTO: 4.9 K/UL — SIGNIFICANT CHANGE UP (ref 1.8–7.4)
NEUTROPHILS # BLD AUTO: 5.14 K/UL — SIGNIFICANT CHANGE UP (ref 1.8–7.4)
NEUTROPHILS # BLD AUTO: 5.51 K/UL — SIGNIFICANT CHANGE UP (ref 1.8–7.4)
NEUTROPHILS NFR BLD AUTO: 59.7 % — SIGNIFICANT CHANGE UP (ref 43–77)
NEUTROPHILS NFR BLD AUTO: 65.6 % — SIGNIFICANT CHANGE UP (ref 43–77)
NEUTROPHILS NFR BLD AUTO: 69.8 % — SIGNIFICANT CHANGE UP (ref 43–77)
NEUTROPHILS NFR BLD AUTO: 70.7 % — SIGNIFICANT CHANGE UP (ref 43–77)
NEUTROPHILS NFR BLD AUTO: 71.1 % — SIGNIFICANT CHANGE UP (ref 43–77)
NEUTROPHILS NFR BLD AUTO: 73.2 % — SIGNIFICANT CHANGE UP (ref 43–77)
NEUTROPHILS NFR BLD AUTO: 75 % — SIGNIFICANT CHANGE UP (ref 43–77)
NEUTROPHILS NFR BLD AUTO: 76.2 % — SIGNIFICANT CHANGE UP (ref 43–77)
NEUTROPHILS NFR BLD AUTO: 76.6 % — SIGNIFICANT CHANGE UP (ref 43–77)
NEUTS BAND # BLD: 0 % — SIGNIFICANT CHANGE UP (ref 0–6)
NITRITE UR-MCNC: NEGATIVE — SIGNIFICANT CHANGE UP
NITRITE UR-MCNC: NEGATIVE — SIGNIFICANT CHANGE UP
NRBC # BLD: 0 /100 WBCS — SIGNIFICANT CHANGE UP (ref 0–0)
NRBC # FLD: 0 K/UL — SIGNIFICANT CHANGE UP (ref 0–0)
ORGANISM # SPEC MICROSCOPIC CNT: SIGNIFICANT CHANGE UP
ORGANISM # SPEC MICROSCOPIC CNT: SIGNIFICANT CHANGE UP
OTHER - HEMATOLOGY %: 0 — SIGNIFICANT CHANGE UP
PH UR: 5 — SIGNIFICANT CHANGE UP (ref 5–8)
PH UR: 6.5 — SIGNIFICANT CHANGE UP (ref 5–8)
PHOSPHATE SERPL-MCNC: 2.2 MG/DL — LOW (ref 2.5–4.5)
PHOSPHATE SERPL-MCNC: 2.7 MG/DL — SIGNIFICANT CHANGE UP (ref 2.5–4.5)
PLATELET # BLD AUTO: 20 K/UL — CRITICAL LOW (ref 150–400)
PLATELET # BLD AUTO: 21 K/UL — LOW (ref 150–400)
PLATELET # BLD AUTO: 35 K/UL — LOW (ref 150–400)
PLATELET # BLD AUTO: 36 K/UL — LOW (ref 150–400)
PLATELET # BLD AUTO: 37 K/UL — LOW (ref 150–400)
PLATELET # BLD AUTO: 37 K/UL — LOW (ref 150–400)
PLATELET # BLD AUTO: 38 K/UL — LOW (ref 150–400)
PLATELET # BLD AUTO: 42 K/UL — LOW (ref 150–400)
PLATELET # BLD AUTO: 43 K/UL — LOW (ref 150–400)
PLATELET # BLD AUTO: 43 K/UL — LOW (ref 150–400)
PLATELET # BLD AUTO: 69 K/UL — LOW (ref 150–400)
PLATELET # BLD AUTO: 69 K/UL — LOW (ref 150–400)
PLATELET # BLD AUTO: 70 K/UL — LOW (ref 150–400)
PLATELET # BLD AUTO: 72 K/UL — LOW (ref 150–400)
PLATELET # BLD AUTO: 78 K/UL — LOW (ref 150–400)
PLATELET # BLD AUTO: 80 K/UL — LOW (ref 150–400)
PLATELET # BLD AUTO: 86 K/UL — LOW (ref 150–400)
PLATELET COUNT - ESTIMATE: SIGNIFICANT CHANGE UP
PMV BLD: 12 FL — SIGNIFICANT CHANGE UP (ref 7–13)
PMV BLD: 12.6 FL — SIGNIFICANT CHANGE UP (ref 7–13)
PMV BLD: 12.9 FL — SIGNIFICANT CHANGE UP (ref 7–13)
PMV BLD: 13 FL — SIGNIFICANT CHANGE UP (ref 7–13)
PMV BLD: 14.9 FL — HIGH (ref 7–13)
PMV BLD: SIGNIFICANT CHANGE UP FL (ref 7–13)
POTASSIUM SERPL-MCNC: 3.2 MMOL/L — LOW (ref 3.5–5.3)
POTASSIUM SERPL-MCNC: 3.4 MMOL/L — LOW (ref 3.5–5.3)
POTASSIUM SERPL-MCNC: 3.6 MMOL/L — SIGNIFICANT CHANGE UP (ref 3.5–5.3)
POTASSIUM SERPL-MCNC: 3.7 MMOL/L — SIGNIFICANT CHANGE UP (ref 3.5–5.3)
POTASSIUM SERPL-MCNC: 3.8 MMOL/L — SIGNIFICANT CHANGE UP (ref 3.5–5.3)
POTASSIUM SERPL-MCNC: 3.9 MMOL/L — SIGNIFICANT CHANGE UP (ref 3.5–5.3)
POTASSIUM SERPL-MCNC: 4 MMOL/L — SIGNIFICANT CHANGE UP (ref 3.5–5.3)
POTASSIUM SERPL-MCNC: 4 MMOL/L — SIGNIFICANT CHANGE UP (ref 3.5–5.3)
POTASSIUM SERPL-SCNC: 3.2 MMOL/L — LOW (ref 3.5–5.3)
POTASSIUM SERPL-SCNC: 3.4 MMOL/L — LOW (ref 3.5–5.3)
POTASSIUM SERPL-SCNC: 3.6 MMOL/L — SIGNIFICANT CHANGE UP (ref 3.5–5.3)
POTASSIUM SERPL-SCNC: 3.7 MMOL/L — SIGNIFICANT CHANGE UP (ref 3.5–5.3)
POTASSIUM SERPL-SCNC: 3.8 MMOL/L — SIGNIFICANT CHANGE UP (ref 3.5–5.3)
POTASSIUM SERPL-SCNC: 3.9 MMOL/L
POTASSIUM SERPL-SCNC: 3.9 MMOL/L — SIGNIFICANT CHANGE UP (ref 3.5–5.3)
POTASSIUM SERPL-SCNC: 4 MMOL/L — SIGNIFICANT CHANGE UP (ref 3.5–5.3)
POTASSIUM SERPL-SCNC: 4 MMOL/L — SIGNIFICANT CHANGE UP (ref 3.5–5.3)
PROMYELOCYTES # FLD: 0 % — SIGNIFICANT CHANGE UP (ref 0–0)
PROT SERPL-MCNC: 6.9 G/DL — SIGNIFICANT CHANGE UP (ref 6–8.3)
PROT SERPL-MCNC: 7 G/DL — SIGNIFICANT CHANGE UP (ref 6–8.3)
PROT SERPL-MCNC: 7.1 G/DL
PROT SERPL-MCNC: 7.1 G/DL — SIGNIFICANT CHANGE UP (ref 6–8.3)
PROT UR-MCNC: 20 — SIGNIFICANT CHANGE UP
PROT UR-MCNC: NEGATIVE — SIGNIFICANT CHANGE UP
PROTHROM AB SERPL-ACNC: 14.1 SEC — HIGH (ref 9.8–13.1)
PROTHROM AB SERPL-ACNC: 14.2 SEC — HIGH (ref 9.8–13.1)
PT BLD: 12.5 SEC
PTR INTERPRETATION: SIGNIFICANT CHANGE UP
QUALITY CONTROL: YES
RBC # BLD: 3.15 M/UL — LOW (ref 4.2–5.8)
RBC # BLD: 3.15 M/UL — LOW (ref 4.2–5.8)
RBC # BLD: 3.18 M/UL — LOW (ref 4.2–5.8)
RBC # BLD: 3.18 M/UL — LOW (ref 4.2–5.8)
RBC # BLD: 3.19 M/UL — LOW (ref 4.2–5.8)
RBC # BLD: 3.23 M/UL — LOW (ref 4.2–5.8)
RBC # BLD: 3.25 M/UL — LOW (ref 4.2–5.8)
RBC # BLD: 3.28 M/UL — LOW (ref 4.2–5.8)
RBC # BLD: 3.28 M/UL — LOW (ref 4.2–5.8)
RBC # BLD: 3.29 M/UL — LOW (ref 4.2–5.8)
RBC # BLD: 3.4 M/UL — LOW (ref 4.2–5.8)
RBC # BLD: 3.41 M/UL — LOW (ref 4.2–5.8)
RBC # BLD: 3.41 M/UL — LOW (ref 4.2–5.8)
RBC # BLD: 3.43 M/UL — LOW (ref 4.2–5.8)
RBC # BLD: 3.45 M/UL — LOW (ref 4.2–5.8)
RBC # BLD: 3.46 M/UL — LOW (ref 4.2–5.8)
RBC # BLD: 3.49 M/UL — LOW (ref 4.2–5.8)
RBC # BLD: 3.55 M/UL — LOW (ref 4.2–5.8)
RBC # FLD: 13.2 % — SIGNIFICANT CHANGE UP (ref 10.3–14.5)
RBC # FLD: 13.8 % — SIGNIFICANT CHANGE UP (ref 10.3–14.5)
RBC # FLD: 13.8 % — SIGNIFICANT CHANGE UP (ref 10.3–14.5)
RBC # FLD: 13.9 % — SIGNIFICANT CHANGE UP (ref 10.3–14.5)
RBC # FLD: 14 % — SIGNIFICANT CHANGE UP (ref 10.3–14.5)
RBC # FLD: 14.1 % — SIGNIFICANT CHANGE UP (ref 10.3–14.5)
RBC # FLD: 14.1 % — SIGNIFICANT CHANGE UP (ref 10.3–14.5)
RBC # FLD: 14.2 % — SIGNIFICANT CHANGE UP (ref 10.3–14.5)
RBC # FLD: 14.2 % — SIGNIFICANT CHANGE UP (ref 10.3–14.5)
RBC # FLD: 14.3 % — SIGNIFICANT CHANGE UP (ref 10.3–14.5)
RBC # FLD: 14.4 % — SIGNIFICANT CHANGE UP (ref 10.3–14.5)
RBC # FLD: 14.6 % — HIGH (ref 10.3–14.5)
RBC CASTS # UR COMP ASSIST: HIGH (ref 0–?)
RETICS #: 58.4 K/UL — SIGNIFICANT CHANGE UP (ref 25–125)
RETICS #: 68 K/UL — SIGNIFICANT CHANGE UP (ref 25–125)
RETICS #: 82.9 K/UL — SIGNIFICANT CHANGE UP (ref 25–125)
RETICS/RBC NFR: 1.7 % — SIGNIFICANT CHANGE UP (ref 0.5–2.5)
RETICS/RBC NFR: 2 % — SIGNIFICANT CHANGE UP (ref 0.5–2.5)
RETICS/RBC NFR: 2.4 % — SIGNIFICANT CHANGE UP (ref 0.5–2.5)
RH IG SCN BLD-IMP: POSITIVE — SIGNIFICANT CHANGE UP
RH IG SCN BLD-IMP: POSITIVE — SIGNIFICANT CHANGE UP
SCREEN DRVVT: 30.2 SEC
SODIUM SERPL-SCNC: 133 MMOL/L — LOW (ref 135–145)
SODIUM SERPL-SCNC: 136 MMOL/L — SIGNIFICANT CHANGE UP (ref 135–145)
SODIUM SERPL-SCNC: 137 MMOL/L — SIGNIFICANT CHANGE UP (ref 135–145)
SODIUM SERPL-SCNC: 137 MMOL/L — SIGNIFICANT CHANGE UP (ref 135–145)
SODIUM SERPL-SCNC: 138 MMOL/L — SIGNIFICANT CHANGE UP (ref 135–145)
SODIUM SERPL-SCNC: 138 MMOL/L — SIGNIFICANT CHANGE UP (ref 135–145)
SODIUM SERPL-SCNC: 139 MMOL/L — SIGNIFICANT CHANGE UP (ref 135–145)
SODIUM SERPL-SCNC: 140 MMOL/L — SIGNIFICANT CHANGE UP (ref 135–145)
SODIUM SERPL-SCNC: 141 MMOL/L
SODIUM SERPL-SCNC: 141 MMOL/L — SIGNIFICANT CHANGE UP (ref 135–145)
SODIUM SERPL-SCNC: 142 MMOL/L — SIGNIFICANT CHANGE UP (ref 135–145)
SODIUM SERPL-SCNC: 143 MMOL/L — SIGNIFICANT CHANGE UP (ref 135–145)
SP GR SPEC: 1.01 — SIGNIFICANT CHANGE UP (ref 1.01–1.02)
SP GR SPEC: 1.02 — SIGNIFICANT CHANGE UP (ref 1–1.04)
SPECIMEN SOURCE: SIGNIFICANT CHANGE UP
SQUAMOUS # UR AUTO: SIGNIFICANT CHANGE UP
TSH SERPL-MCNC: 1.57 UIU/ML — SIGNIFICANT CHANGE UP (ref 0.27–4.2)
UROBILINOGEN FLD QL: NEGATIVE — SIGNIFICANT CHANGE UP
UROBILINOGEN FLD QL: SIGNIFICANT CHANGE UP
VARIANT LYMPHS # BLD: 2.6 % — SIGNIFICANT CHANGE UP
VIT B12 SERPL-MCNC: 195 PG/ML — LOW (ref 200–900)
WBC # BLD: 4.79 K/UL — SIGNIFICANT CHANGE UP (ref 3.8–10.5)
WBC # BLD: 4.84 K/UL — SIGNIFICANT CHANGE UP (ref 3.8–10.5)
WBC # BLD: 4.9 K/UL — SIGNIFICANT CHANGE UP (ref 3.8–10.5)
WBC # BLD: 5.18 K/UL — SIGNIFICANT CHANGE UP (ref 3.8–10.5)
WBC # BLD: 5.33 K/UL — SIGNIFICANT CHANGE UP (ref 3.8–10.5)
WBC # BLD: 5.36 K/UL — SIGNIFICANT CHANGE UP (ref 3.8–10.5)
WBC # BLD: 5.59 K/UL — SIGNIFICANT CHANGE UP (ref 3.8–10.5)
WBC # BLD: 5.64 K/UL — SIGNIFICANT CHANGE UP (ref 3.8–10.5)
WBC # BLD: 5.66 K/UL — SIGNIFICANT CHANGE UP (ref 3.8–10.5)
WBC # BLD: 5.95 K/UL — SIGNIFICANT CHANGE UP (ref 3.8–10.5)
WBC # BLD: 5.96 K/UL — SIGNIFICANT CHANGE UP (ref 3.8–10.5)
WBC # BLD: 5.98 K/UL — SIGNIFICANT CHANGE UP (ref 3.8–10.5)
WBC # BLD: 6.27 K/UL — SIGNIFICANT CHANGE UP (ref 3.8–10.5)
WBC # BLD: 6.53 K/UL — SIGNIFICANT CHANGE UP (ref 3.8–10.5)
WBC # BLD: 6.7 K/UL — SIGNIFICANT CHANGE UP (ref 3.8–10.5)
WBC # BLD: 6.8 K/UL — SIGNIFICANT CHANGE UP (ref 3.8–10.5)
WBC # BLD: 6.86 K/UL — SIGNIFICANT CHANGE UP (ref 3.8–10.5)
WBC # BLD: 7.03 K/UL — SIGNIFICANT CHANGE UP (ref 3.8–10.5)
WBC # BLD: 7.2 K/UL — SIGNIFICANT CHANGE UP (ref 3.8–10.5)
WBC # FLD AUTO: 4.79 K/UL — SIGNIFICANT CHANGE UP (ref 3.8–10.5)
WBC # FLD AUTO: 4.84 K/UL — SIGNIFICANT CHANGE UP (ref 3.8–10.5)
WBC # FLD AUTO: 4.9 K/UL — SIGNIFICANT CHANGE UP (ref 3.8–10.5)
WBC # FLD AUTO: 5.18 K/UL — SIGNIFICANT CHANGE UP (ref 3.8–10.5)
WBC # FLD AUTO: 5.33 K/UL — SIGNIFICANT CHANGE UP (ref 3.8–10.5)
WBC # FLD AUTO: 5.36 K/UL — SIGNIFICANT CHANGE UP (ref 3.8–10.5)
WBC # FLD AUTO: 5.59 K/UL — SIGNIFICANT CHANGE UP (ref 3.8–10.5)
WBC # FLD AUTO: 5.64 K/UL — SIGNIFICANT CHANGE UP (ref 3.8–10.5)
WBC # FLD AUTO: 5.66 K/UL — SIGNIFICANT CHANGE UP (ref 3.8–10.5)
WBC # FLD AUTO: 5.95 K/UL — SIGNIFICANT CHANGE UP (ref 3.8–10.5)
WBC # FLD AUTO: 5.96 K/UL — SIGNIFICANT CHANGE UP (ref 3.8–10.5)
WBC # FLD AUTO: 5.98 K/UL — SIGNIFICANT CHANGE UP (ref 3.8–10.5)
WBC # FLD AUTO: 6.27 K/UL — SIGNIFICANT CHANGE UP (ref 3.8–10.5)
WBC # FLD AUTO: 6.53 K/UL — SIGNIFICANT CHANGE UP (ref 3.8–10.5)
WBC # FLD AUTO: 6.7 K/UL — SIGNIFICANT CHANGE UP (ref 3.8–10.5)
WBC # FLD AUTO: 6.8 K/UL — SIGNIFICANT CHANGE UP (ref 3.8–10.5)
WBC # FLD AUTO: 6.86 K/UL — SIGNIFICANT CHANGE UP (ref 3.8–10.5)
WBC # FLD AUTO: 7.03 K/UL — SIGNIFICANT CHANGE UP (ref 3.8–10.5)
WBC # FLD AUTO: 7.2 K/UL — SIGNIFICANT CHANGE UP (ref 3.8–10.5)
WBC UR QL: HIGH (ref 0–?)

## 2019-01-01 PROCEDURE — 99232 SBSQ HOSP IP/OBS MODERATE 35: CPT | Mod: GC

## 2019-01-01 PROCEDURE — 99233 SBSQ HOSP IP/OBS HIGH 50: CPT

## 2019-01-01 PROCEDURE — 99215 OFFICE O/P EST HI 40 MIN: CPT

## 2019-01-01 PROCEDURE — 80053 COMPREHEN METABOLIC PANEL: CPT

## 2019-01-01 PROCEDURE — 70450 CT HEAD/BRAIN W/O DYE: CPT | Mod: 26,76

## 2019-01-01 PROCEDURE — 70450 CT HEAD/BRAIN W/O DYE: CPT

## 2019-01-01 PROCEDURE — 99233 SBSQ HOSP IP/OBS HIGH 50: CPT | Mod: GC

## 2019-01-01 PROCEDURE — 99223 1ST HOSP IP/OBS HIGH 75: CPT | Mod: GC

## 2019-01-01 PROCEDURE — 81240 F2 GENE: CPT

## 2019-01-01 PROCEDURE — 99232 SBSQ HOSP IP/OBS MODERATE 35: CPT

## 2019-01-01 PROCEDURE — 36415 COLL VENOUS BLD VENIPUNCTURE: CPT

## 2019-01-01 PROCEDURE — 99239 HOSP IP/OBS DSCHRG MGMT >30: CPT

## 2019-01-01 PROCEDURE — 99231 SBSQ HOSP IP/OBS SF/LOW 25: CPT

## 2019-01-01 PROCEDURE — 97163 PT EVAL HIGH COMPLEX 45 MIN: CPT

## 2019-01-01 PROCEDURE — 99222 1ST HOSP IP/OBS MODERATE 55: CPT | Mod: GC

## 2019-01-01 PROCEDURE — 80048 BASIC METABOLIC PNL TOTAL CA: CPT

## 2019-01-01 PROCEDURE — 93971 EXTREMITY STUDY: CPT

## 2019-01-01 PROCEDURE — 97530 THERAPEUTIC ACTIVITIES: CPT

## 2019-01-01 PROCEDURE — 12345: CPT | Mod: NC

## 2019-01-01 PROCEDURE — 90834 PSYTX W PT 45 MINUTES: CPT

## 2019-01-01 PROCEDURE — 71045 X-RAY EXAM CHEST 1 VIEW: CPT | Mod: 26

## 2019-01-01 PROCEDURE — G0452: CPT | Mod: 26

## 2019-01-01 PROCEDURE — 99213 OFFICE O/P EST LOW 20 MIN: CPT | Mod: 25

## 2019-01-01 PROCEDURE — 83010 ASSAY OF HAPTOGLOBIN QUANT: CPT

## 2019-01-01 PROCEDURE — 92523 SPEECH SOUND LANG COMPREHEN: CPT

## 2019-01-01 PROCEDURE — 85027 COMPLETE CBC AUTOMATED: CPT

## 2019-01-01 PROCEDURE — 86880 COOMBS TEST DIRECT: CPT

## 2019-01-01 PROCEDURE — 99222 1ST HOSP IP/OBS MODERATE 55: CPT

## 2019-01-01 PROCEDURE — 97535 SELF CARE MNGMENT TRAINING: CPT

## 2019-01-01 PROCEDURE — 93306 TTE W/DOPPLER COMPLETE: CPT | Mod: 26

## 2019-01-01 PROCEDURE — 97112 NEUROMUSCULAR REEDUCATION: CPT

## 2019-01-01 PROCEDURE — 92610 EVALUATE SWALLOWING FUNCTION: CPT

## 2019-01-01 PROCEDURE — 96116 NUBHVL XM PHYS/QHP 1ST HR: CPT

## 2019-01-01 PROCEDURE — 93971 EXTREMITY STUDY: CPT | Mod: 26,RT

## 2019-01-01 PROCEDURE — 99223 1ST HOSP IP/OBS HIGH 75: CPT

## 2019-01-01 PROCEDURE — 85610 PROTHROMBIN TIME: CPT | Mod: QW

## 2019-01-01 PROCEDURE — 93970 EXTREMITY STUDY: CPT | Mod: 26

## 2019-01-01 PROCEDURE — 70450 CT HEAD/BRAIN W/O DYE: CPT | Mod: 26

## 2019-01-01 PROCEDURE — 97110 THERAPEUTIC EXERCISES: CPT

## 2019-01-01 PROCEDURE — ZZZZZ: CPT

## 2019-01-01 PROCEDURE — 97116 GAIT TRAINING THERAPY: CPT

## 2019-01-01 PROCEDURE — G0515: CPT

## 2019-01-01 PROCEDURE — 85045 AUTOMATED RETICULOCYTE COUNT: CPT

## 2019-01-01 PROCEDURE — 81001 URINALYSIS AUTO W/SCOPE: CPT

## 2019-01-01 PROCEDURE — 92507 TX SP LANG VOICE COMM INDIV: CPT

## 2019-01-01 RX ORDER — ONDANSETRON 8 MG/1
4 TABLET, FILM COATED ORAL ONCE
Refills: 0 | Status: COMPLETED | OUTPATIENT
Start: 2019-01-01 | End: 2019-01-01

## 2019-01-01 RX ORDER — WARFARIN 2 MG/1
2 TABLET ORAL
Qty: 90 | Refills: 1 | Status: DISCONTINUED | COMMUNITY
Start: 2017-01-17 | End: 2019-01-01

## 2019-01-01 RX ORDER — LEVETIRACETAM 250 MG/1
500 TABLET, FILM COATED ORAL EVERY 12 HOURS
Refills: 0 | Status: DISCONTINUED | OUTPATIENT
Start: 2019-01-01 | End: 2019-01-01

## 2019-01-01 RX ORDER — BACITRACIN ZINC 500 UNIT/G
1 OINTMENT IN PACKET (EA) TOPICAL
Refills: 0 | Status: DISCONTINUED | OUTPATIENT
Start: 2019-01-01 | End: 2019-01-01

## 2019-01-01 RX ORDER — LANOLIN ALCOHOL/MO/W.PET/CERES
3 CREAM (GRAM) TOPICAL AT BEDTIME
Refills: 0 | Status: DISCONTINUED | OUTPATIENT
Start: 2019-01-01 | End: 2019-01-01

## 2019-01-01 RX ORDER — CEPHALEXIN 500 MG
1 CAPSULE ORAL
Qty: 0 | Refills: 0 | DISCHARGE
Start: 2019-01-01

## 2019-01-01 RX ORDER — CEPHALEXIN 500 MG
500 CAPSULE ORAL EVERY 12 HOURS
Refills: 0 | Status: COMPLETED | OUTPATIENT
Start: 2019-01-01 | End: 2019-01-01

## 2019-01-01 RX ORDER — PREGABALIN 225 MG/1
1000 CAPSULE ORAL DAILY
Refills: 0 | Status: COMPLETED | OUTPATIENT
Start: 2019-01-01 | End: 2019-01-01

## 2019-01-01 RX ORDER — OLANZAPINE 15 MG/1
5 TABLET, FILM COATED ORAL ONCE
Refills: 0 | Status: COMPLETED | OUTPATIENT
Start: 2019-01-01 | End: 2019-01-01

## 2019-01-01 RX ORDER — HALOPERIDOL DECANOATE 100 MG/ML
0.5 INJECTION INTRAMUSCULAR ONCE
Refills: 0 | Status: COMPLETED | OUTPATIENT
Start: 2019-01-01 | End: 2019-01-01

## 2019-01-01 RX ORDER — CEFTRIAXONE 500 MG/1
1000 INJECTION, POWDER, FOR SOLUTION INTRAMUSCULAR; INTRAVENOUS ONCE
Refills: 0 | Status: COMPLETED | OUTPATIENT
Start: 2019-01-01 | End: 2019-01-01

## 2019-01-01 RX ORDER — CEFTRIAXONE 500 MG/1
1000 INJECTION, POWDER, FOR SOLUTION INTRAMUSCULAR; INTRAVENOUS EVERY 24 HOURS
Refills: 0 | Status: DISCONTINUED | OUTPATIENT
Start: 2019-01-01 | End: 2019-01-01

## 2019-01-01 RX ORDER — NYSTATIN CREAM 100000 [USP'U]/G
1 CREAM TOPICAL
Refills: 0 | Status: DISCONTINUED | OUTPATIENT
Start: 2019-01-01 | End: 2019-01-01

## 2019-01-01 RX ORDER — LEVETIRACETAM 250 MG/1
1 TABLET, FILM COATED ORAL
Qty: 0 | Refills: 0 | DISCHARGE
Start: 2019-01-01

## 2019-01-01 RX ORDER — PANTOPRAZOLE SODIUM 20 MG/1
40 TABLET, DELAYED RELEASE ORAL
Refills: 0 | Status: DISCONTINUED | OUTPATIENT
Start: 2019-01-01 | End: 2019-01-01

## 2019-01-01 RX ORDER — PANTOPRAZOLE SODIUM 20 MG/1
1 TABLET, DELAYED RELEASE ORAL
Qty: 30 | Refills: 0
Start: 2019-01-01 | End: 2019-01-01

## 2019-01-01 RX ORDER — ATROPINE SULFATE 0.1 MG/ML
0.5 SYRINGE (ML) INJECTION ONCE
Refills: 0 | Status: DISCONTINUED | OUTPATIENT
Start: 2019-01-01 | End: 2019-01-01

## 2019-01-01 RX ORDER — QUETIAPINE FUMARATE 200 MG/1
12.5 TABLET, FILM COATED ORAL
Refills: 0 | Status: DISCONTINUED | OUTPATIENT
Start: 2019-01-01 | End: 2019-01-01

## 2019-01-01 RX ORDER — QUETIAPINE FUMARATE 200 MG/1
25 TABLET, FILM COATED ORAL
Refills: 0 | Status: DISCONTINUED | OUTPATIENT
Start: 2019-01-01 | End: 2019-01-01

## 2019-01-01 RX ORDER — PREGABALIN 225 MG/1
1000 CAPSULE ORAL DAILY
Refills: 0 | Status: DISCONTINUED | OUTPATIENT
Start: 2019-01-01 | End: 2019-01-01

## 2019-01-01 RX ORDER — LANOLIN ALCOHOL/MO/W.PET/CERES
6 CREAM (GRAM) TOPICAL AT BEDTIME
Refills: 0 | Status: DISCONTINUED | OUTPATIENT
Start: 2019-01-01 | End: 2019-01-01

## 2019-01-01 RX ORDER — DIGOXIN 250 MCG
1 TABLET ORAL
Qty: 0 | Refills: 0 | DISCHARGE

## 2019-01-01 RX ORDER — FOLIC ACID 0.8 MG
1 TABLET ORAL DAILY
Refills: 0 | Status: DISCONTINUED | OUTPATIENT
Start: 2019-01-01 | End: 2019-01-01

## 2019-01-01 RX ORDER — QUETIAPINE FUMARATE 200 MG/1
0.5 TABLET, FILM COATED ORAL
Qty: 15 | Refills: 0
Start: 2019-01-01 | End: 2019-01-01

## 2019-01-01 RX ORDER — CEPHALEXIN 500 MG
500 CAPSULE ORAL EVERY 12 HOURS
Refills: 0 | Status: DISCONTINUED | OUTPATIENT
Start: 2019-01-01 | End: 2019-01-01

## 2019-01-01 RX ORDER — PREGABALIN 225 MG/1
1000 CAPSULE ORAL
Qty: 4 | Refills: 0
Start: 2019-01-01

## 2019-01-01 RX ORDER — ONDANSETRON 8 MG/1
4 TABLET, FILM COATED ORAL
Refills: 0 | Status: DISCONTINUED | OUTPATIENT
Start: 2019-01-01 | End: 2019-01-01

## 2019-01-01 RX ORDER — PREGABALIN 225 MG/1
1000 CAPSULE ORAL
Qty: 1 | Refills: 0
Start: 2019-01-01 | End: 2019-01-01

## 2019-01-01 RX ORDER — PHYTONADIONE (VIT K1) 5 MG
2.5 TABLET ORAL ONCE
Refills: 0 | Status: COMPLETED | OUTPATIENT
Start: 2019-01-01 | End: 2019-01-01

## 2019-01-01 RX ORDER — POTASSIUM CHLORIDE 20 MEQ
40 PACKET (EA) ORAL ONCE
Refills: 0 | Status: COMPLETED | OUTPATIENT
Start: 2019-01-01 | End: 2019-01-01

## 2019-01-01 RX ORDER — QUETIAPINE FUMARATE 200 MG/1
12.5 TABLET, FILM COATED ORAL ONCE
Refills: 0 | Status: DISCONTINUED | OUTPATIENT
Start: 2019-01-01 | End: 2019-01-01

## 2019-01-01 RX ORDER — DIGOXIN 250 MCG
0.12 TABLET ORAL DAILY
Refills: 0 | Status: DISCONTINUED | OUTPATIENT
Start: 2019-01-01 | End: 2019-01-01

## 2019-01-01 RX ORDER — LEVETIRACETAM 250 MG/1
500 TABLET, FILM COATED ORAL
Refills: 0 | Status: DISCONTINUED | OUTPATIENT
Start: 2019-01-01 | End: 2019-01-01

## 2019-01-01 RX ORDER — PREGABALIN 225 MG/1
1000 CAPSULE ORAL
Refills: 0 | Status: DISCONTINUED | OUTPATIENT
Start: 2019-01-01 | End: 2019-01-01

## 2019-01-01 RX ORDER — POTASSIUM CHLORIDE 20 MEQ
40 PACKET (EA) ORAL ONCE
Refills: 0 | Status: DISCONTINUED | OUTPATIENT
Start: 2019-01-01 | End: 2019-01-01

## 2019-01-01 RX ORDER — POTASSIUM CHLORIDE 20 MEQ
20 PACKET (EA) ORAL ONCE
Refills: 0 | Status: COMPLETED | OUTPATIENT
Start: 2019-01-01 | End: 2019-01-01

## 2019-01-01 RX ORDER — TRIAMTERENE AND HYDROCHLOROTHIAZIDE 25; 37.5 MG/1; MG/1
37.5-25 TABLET ORAL DAILY
Qty: 90 | Refills: 3 | Status: ACTIVE | COMMUNITY
Start: 2017-04-07 | End: 1900-01-01

## 2019-01-01 RX ORDER — SODIUM CHLORIDE 9 MG/ML
500 INJECTION, SOLUTION INTRAVENOUS
Refills: 0 | Status: COMPLETED | OUTPATIENT
Start: 2019-01-01 | End: 2019-01-01

## 2019-01-01 RX ORDER — PREGABALIN 225 MG/1
1000 CAPSULE ORAL
Qty: 3 | Refills: 0
Start: 2019-01-01 | End: 2019-01-01

## 2019-01-01 RX ORDER — LANOLIN ALCOHOL/MO/W.PET/CERES
2 CREAM (GRAM) TOPICAL
Qty: 0 | Refills: 0 | DISCHARGE
Start: 2019-01-01

## 2019-01-01 RX ORDER — LANOLIN ALCOHOL/MO/W.PET/CERES
3 CREAM (GRAM) TOPICAL ONCE
Refills: 0 | Status: DISCONTINUED | OUTPATIENT
Start: 2019-01-01 | End: 2019-01-01

## 2019-01-01 RX ORDER — LANOLIN ALCOHOL/MO/W.PET/CERES
1 CREAM (GRAM) TOPICAL
Qty: 0 | Refills: 0 | DISCHARGE
Start: 2019-01-01

## 2019-01-01 RX ORDER — POTASSIUM PHOSPHATE, MONOBASIC POTASSIUM PHOSPHATE, DIBASIC 236; 224 MG/ML; MG/ML
15 INJECTION, SOLUTION INTRAVENOUS ONCE
Refills: 0 | Status: COMPLETED | OUTPATIENT
Start: 2019-01-01 | End: 2019-01-01

## 2019-01-01 RX ADMIN — CEFTRIAXONE 100 MILLIGRAM(S): 500 INJECTION, POWDER, FOR SOLUTION INTRAMUSCULAR; INTRAVENOUS at 11:41

## 2019-01-01 RX ADMIN — PANTOPRAZOLE SODIUM 40 MILLIGRAM(S): 20 TABLET, DELAYED RELEASE ORAL at 05:58

## 2019-01-01 RX ADMIN — QUETIAPINE FUMARATE 12.5 MILLIGRAM(S): 200 TABLET, FILM COATED ORAL at 17:14

## 2019-01-01 RX ADMIN — NYSTATIN CREAM 1 APPLICATION(S): 100000 CREAM TOPICAL at 18:52

## 2019-01-01 RX ADMIN — PREGABALIN 1000 MICROGRAM(S): 225 CAPSULE ORAL at 12:55

## 2019-01-01 RX ADMIN — LEVETIRACETAM 500 MILLIGRAM(S): 250 TABLET, FILM COATED ORAL at 18:37

## 2019-01-01 RX ADMIN — QUETIAPINE FUMARATE 12.5 MILLIGRAM(S): 200 TABLET, FILM COATED ORAL at 16:35

## 2019-01-01 RX ADMIN — LEVETIRACETAM 500 MILLIGRAM(S): 250 TABLET, FILM COATED ORAL at 05:18

## 2019-01-01 RX ADMIN — CEFTRIAXONE 100 MILLIGRAM(S): 500 INJECTION, POWDER, FOR SOLUTION INTRAMUSCULAR; INTRAVENOUS at 17:39

## 2019-01-01 RX ADMIN — Medication 6 MILLIGRAM(S): at 21:01

## 2019-01-01 RX ADMIN — SODIUM CHLORIDE 75 MILLILITER(S): 9 INJECTION, SOLUTION INTRAVENOUS at 03:35

## 2019-01-01 RX ADMIN — PREGABALIN 1000 MICROGRAM(S): 225 CAPSULE ORAL at 11:27

## 2019-01-01 RX ADMIN — PANTOPRAZOLE SODIUM 40 MILLIGRAM(S): 20 TABLET, DELAYED RELEASE ORAL at 06:46

## 2019-01-01 RX ADMIN — NYSTATIN CREAM 1 APPLICATION(S): 100000 CREAM TOPICAL at 18:32

## 2019-01-01 RX ADMIN — Medication 500 MILLIGRAM(S): at 18:36

## 2019-01-01 RX ADMIN — LEVETIRACETAM 500 MILLIGRAM(S): 250 TABLET, FILM COATED ORAL at 17:39

## 2019-01-01 RX ADMIN — CEFTRIAXONE 100 MILLIGRAM(S): 500 INJECTION, POWDER, FOR SOLUTION INTRAMUSCULAR; INTRAVENOUS at 19:15

## 2019-01-01 RX ADMIN — PREGABALIN 1000 MICROGRAM(S): 225 CAPSULE ORAL at 11:41

## 2019-01-01 RX ADMIN — PREGABALIN 1000 MICROGRAM(S): 225 CAPSULE ORAL at 11:18

## 2019-01-01 RX ADMIN — PREGABALIN 1000 MICROGRAM(S): 225 CAPSULE ORAL at 11:43

## 2019-01-01 RX ADMIN — QUETIAPINE FUMARATE 12.5 MILLIGRAM(S): 200 TABLET, FILM COATED ORAL at 18:13

## 2019-01-01 RX ADMIN — QUETIAPINE FUMARATE 12.5 MILLIGRAM(S): 200 TABLET, FILM COATED ORAL at 17:35

## 2019-01-01 RX ADMIN — NYSTATIN CREAM 1 APPLICATION(S): 100000 CREAM TOPICAL at 16:56

## 2019-01-01 RX ADMIN — Medication 0.12 MILLIGRAM(S): at 06:06

## 2019-01-01 RX ADMIN — CEFTRIAXONE 100 MILLIGRAM(S): 500 INJECTION, POWDER, FOR SOLUTION INTRAMUSCULAR; INTRAVENOUS at 17:00

## 2019-01-01 RX ADMIN — LEVETIRACETAM 500 MILLIGRAM(S): 250 TABLET, FILM COATED ORAL at 05:59

## 2019-01-01 RX ADMIN — LEVETIRACETAM 500 MILLIGRAM(S): 250 TABLET, FILM COATED ORAL at 05:21

## 2019-01-01 RX ADMIN — CEFTRIAXONE 100 MILLIGRAM(S): 500 INJECTION, POWDER, FOR SOLUTION INTRAMUSCULAR; INTRAVENOUS at 17:20

## 2019-01-01 RX ADMIN — CEFTRIAXONE 100 MILLIGRAM(S): 500 INJECTION, POWDER, FOR SOLUTION INTRAMUSCULAR; INTRAVENOUS at 16:46

## 2019-01-01 RX ADMIN — LEVETIRACETAM 500 MILLIGRAM(S): 250 TABLET, FILM COATED ORAL at 05:00

## 2019-01-01 RX ADMIN — NYSTATIN CREAM 1 APPLICATION(S): 100000 CREAM TOPICAL at 16:39

## 2019-01-01 RX ADMIN — NYSTATIN CREAM 1 APPLICATION(S): 100000 CREAM TOPICAL at 06:46

## 2019-01-01 RX ADMIN — NYSTATIN CREAM 1 APPLICATION(S): 100000 CREAM TOPICAL at 17:53

## 2019-01-01 RX ADMIN — ONDANSETRON 4 MILLIGRAM(S): 8 TABLET, FILM COATED ORAL at 00:42

## 2019-01-01 RX ADMIN — NYSTATIN CREAM 1 APPLICATION(S): 100000 CREAM TOPICAL at 05:28

## 2019-01-01 RX ADMIN — Medication 1 APPLICATION(S): at 05:02

## 2019-01-01 RX ADMIN — ONDANSETRON 4 MILLIGRAM(S): 8 TABLET, FILM COATED ORAL at 21:56

## 2019-01-01 RX ADMIN — Medication 500 MILLIGRAM(S): at 08:54

## 2019-01-01 RX ADMIN — NYSTATIN CREAM 1 APPLICATION(S): 100000 CREAM TOPICAL at 17:13

## 2019-01-01 RX ADMIN — Medication 1 APPLICATION(S): at 05:49

## 2019-01-01 RX ADMIN — Medication 40 MILLIEQUIVALENT(S): at 19:21

## 2019-01-01 RX ADMIN — PREGABALIN 1000 MICROGRAM(S): 225 CAPSULE ORAL at 11:40

## 2019-01-01 RX ADMIN — Medication 6 MILLIGRAM(S): at 22:00

## 2019-01-01 RX ADMIN — OLANZAPINE 5 MILLIGRAM(S): 15 TABLET, FILM COATED ORAL at 02:17

## 2019-01-01 RX ADMIN — LEVETIRACETAM 500 MILLIGRAM(S): 250 TABLET, FILM COATED ORAL at 17:20

## 2019-01-01 RX ADMIN — Medication 1 APPLICATION(S): at 17:36

## 2019-01-01 RX ADMIN — Medication 3 MILLIGRAM(S): at 20:44

## 2019-01-01 RX ADMIN — PANTOPRAZOLE SODIUM 40 MILLIGRAM(S): 20 TABLET, DELAYED RELEASE ORAL at 05:28

## 2019-01-01 RX ADMIN — PREGABALIN 1000 MICROGRAM(S): 225 CAPSULE ORAL at 12:02

## 2019-01-01 RX ADMIN — NYSTATIN CREAM 1 APPLICATION(S): 100000 CREAM TOPICAL at 08:54

## 2019-01-01 RX ADMIN — Medication 1 APPLICATION(S): at 18:13

## 2019-01-01 RX ADMIN — Medication 6 MILLIGRAM(S): at 22:23

## 2019-01-01 RX ADMIN — ONDANSETRON 4 MILLIGRAM(S): 8 TABLET, FILM COATED ORAL at 12:29

## 2019-01-01 RX ADMIN — PREGABALIN 1000 MICROGRAM(S): 225 CAPSULE ORAL at 11:57

## 2019-01-01 RX ADMIN — Medication 0.25 MILLIGRAM(S): at 04:00

## 2019-01-01 RX ADMIN — NYSTATIN CREAM 1 APPLICATION(S): 100000 CREAM TOPICAL at 05:59

## 2019-01-01 RX ADMIN — Medication 1 APPLICATION(S): at 06:47

## 2019-01-01 RX ADMIN — PREGABALIN 1000 MICROGRAM(S): 225 CAPSULE ORAL at 11:35

## 2019-01-01 RX ADMIN — LEVETIRACETAM 500 MILLIGRAM(S): 250 TABLET, FILM COATED ORAL at 08:55

## 2019-01-01 RX ADMIN — PANTOPRAZOLE SODIUM 40 MILLIGRAM(S): 20 TABLET, DELAYED RELEASE ORAL at 08:55

## 2019-01-01 RX ADMIN — PANTOPRAZOLE SODIUM 40 MILLIGRAM(S): 20 TABLET, DELAYED RELEASE ORAL at 06:56

## 2019-01-01 RX ADMIN — QUETIAPINE FUMARATE 12.5 MILLIGRAM(S): 200 TABLET, FILM COATED ORAL at 17:02

## 2019-01-01 RX ADMIN — LEVETIRACETAM 500 MILLIGRAM(S): 250 TABLET, FILM COATED ORAL at 17:07

## 2019-01-01 RX ADMIN — NYSTATIN CREAM 1 APPLICATION(S): 100000 CREAM TOPICAL at 06:56

## 2019-01-01 RX ADMIN — Medication 3 MILLIGRAM(S): at 21:58

## 2019-01-01 RX ADMIN — Medication 6 MILLIGRAM(S): at 21:23

## 2019-01-01 RX ADMIN — NYSTATIN CREAM 1 APPLICATION(S): 100000 CREAM TOPICAL at 17:03

## 2019-01-01 RX ADMIN — Medication 3 MILLIGRAM(S): at 23:43

## 2019-01-01 RX ADMIN — LEVETIRACETAM 500 MILLIGRAM(S): 250 TABLET, FILM COATED ORAL at 05:33

## 2019-01-01 RX ADMIN — NYSTATIN CREAM 1 APPLICATION(S): 100000 CREAM TOPICAL at 05:27

## 2019-01-01 RX ADMIN — POTASSIUM PHOSPHATE, MONOBASIC POTASSIUM PHOSPHATE, DIBASIC 62.5 MILLIMOLE(S): 236; 224 INJECTION, SOLUTION INTRAVENOUS at 17:38

## 2019-01-01 RX ADMIN — PREGABALIN 1000 MICROGRAM(S): 225 CAPSULE ORAL at 12:06

## 2019-01-01 RX ADMIN — QUETIAPINE FUMARATE 12.5 MILLIGRAM(S): 200 TABLET, FILM COATED ORAL at 17:17

## 2019-01-01 RX ADMIN — NYSTATIN CREAM 1 APPLICATION(S): 100000 CREAM TOPICAL at 17:37

## 2019-01-01 RX ADMIN — Medication 2.5 MILLIGRAM(S): at 23:15

## 2019-01-01 RX ADMIN — CEFTRIAXONE 100 MILLIGRAM(S): 500 INJECTION, POWDER, FOR SOLUTION INTRAMUSCULAR; INTRAVENOUS at 17:26

## 2019-01-01 RX ADMIN — PREGABALIN 1000 MICROGRAM(S): 225 CAPSULE ORAL at 16:45

## 2019-01-01 RX ADMIN — Medication 1 APPLICATION(S): at 17:13

## 2019-01-01 RX ADMIN — ONDANSETRON 4 MILLIGRAM(S): 8 TABLET, FILM COATED ORAL at 12:52

## 2019-01-01 RX ADMIN — Medication 6 MILLIGRAM(S): at 20:26

## 2019-01-01 RX ADMIN — Medication 3 MILLIGRAM(S): at 21:16

## 2019-01-01 RX ADMIN — NYSTATIN CREAM 1 APPLICATION(S): 100000 CREAM TOPICAL at 05:10

## 2019-01-01 RX ADMIN — Medication 1 APPLICATION(S): at 17:06

## 2019-01-01 RX ADMIN — PREGABALIN 1000 MICROGRAM(S): 225 CAPSULE ORAL at 11:48

## 2019-01-01 RX ADMIN — PANTOPRAZOLE SODIUM 40 MILLIGRAM(S): 20 TABLET, DELAYED RELEASE ORAL at 06:58

## 2019-01-01 RX ADMIN — Medication 6 MILLIGRAM(S): at 22:04

## 2019-01-01 RX ADMIN — Medication 1 APPLICATION(S): at 16:38

## 2019-01-01 RX ADMIN — PANTOPRAZOLE SODIUM 40 MILLIGRAM(S): 20 TABLET, DELAYED RELEASE ORAL at 05:02

## 2019-01-01 RX ADMIN — NYSTATIN CREAM 1 APPLICATION(S): 100000 CREAM TOPICAL at 17:33

## 2019-01-01 RX ADMIN — NYSTATIN CREAM 1 APPLICATION(S): 100000 CREAM TOPICAL at 17:14

## 2019-01-01 RX ADMIN — Medication 0.12 MILLIGRAM(S): at 05:06

## 2019-01-01 RX ADMIN — LEVETIRACETAM 500 MILLIGRAM(S): 250 TABLET, FILM COATED ORAL at 06:21

## 2019-01-01 RX ADMIN — LEVETIRACETAM 500 MILLIGRAM(S): 250 TABLET, FILM COATED ORAL at 18:20

## 2019-01-01 RX ADMIN — Medication 6 MILLIGRAM(S): at 23:17

## 2019-01-01 RX ADMIN — Medication 3 MILLIGRAM(S): at 22:28

## 2019-01-01 RX ADMIN — NYSTATIN CREAM 1 APPLICATION(S): 100000 CREAM TOPICAL at 17:20

## 2019-01-01 RX ADMIN — NYSTATIN CREAM 1 APPLICATION(S): 100000 CREAM TOPICAL at 18:12

## 2019-01-01 RX ADMIN — NYSTATIN CREAM 1 APPLICATION(S): 100000 CREAM TOPICAL at 05:51

## 2019-01-01 RX ADMIN — PANTOPRAZOLE SODIUM 40 MILLIGRAM(S): 20 TABLET, DELAYED RELEASE ORAL at 05:10

## 2019-01-01 RX ADMIN — QUETIAPINE FUMARATE 25 MILLIGRAM(S): 200 TABLET, FILM COATED ORAL at 22:00

## 2019-01-01 RX ADMIN — Medication 6 MILLIGRAM(S): at 22:33

## 2019-01-01 RX ADMIN — PREGABALIN 1000 MICROGRAM(S): 225 CAPSULE ORAL at 11:31

## 2019-01-01 RX ADMIN — NYSTATIN CREAM 1 APPLICATION(S): 100000 CREAM TOPICAL at 17:15

## 2019-01-01 RX ADMIN — OLANZAPINE 5 MILLIGRAM(S): 15 TABLET, FILM COATED ORAL at 22:45

## 2019-01-01 RX ADMIN — NYSTATIN CREAM 1 APPLICATION(S): 100000 CREAM TOPICAL at 17:23

## 2019-01-01 RX ADMIN — NYSTATIN CREAM 1 APPLICATION(S): 100000 CREAM TOPICAL at 06:22

## 2019-01-01 RX ADMIN — PANTOPRAZOLE SODIUM 40 MILLIGRAM(S): 20 TABLET, DELAYED RELEASE ORAL at 06:11

## 2019-01-01 RX ADMIN — NYSTATIN CREAM 1 APPLICATION(S): 100000 CREAM TOPICAL at 05:03

## 2019-01-01 RX ADMIN — Medication 3 MILLIGRAM(S): at 00:42

## 2019-01-01 RX ADMIN — Medication 6 MILLIGRAM(S): at 21:26

## 2019-01-01 RX ADMIN — LEVETIRACETAM 500 MILLIGRAM(S): 250 TABLET, FILM COATED ORAL at 16:45

## 2019-01-01 RX ADMIN — NYSTATIN CREAM 1 APPLICATION(S): 100000 CREAM TOPICAL at 05:49

## 2019-01-01 RX ADMIN — NYSTATIN CREAM 1 APPLICATION(S): 100000 CREAM TOPICAL at 17:06

## 2019-01-01 RX ADMIN — LEVETIRACETAM 500 MILLIGRAM(S): 250 TABLET, FILM COATED ORAL at 17:26

## 2019-01-01 RX ADMIN — PREGABALIN 1000 MICROGRAM(S): 225 CAPSULE ORAL at 12:42

## 2019-01-01 RX ADMIN — Medication 3 MILLIGRAM(S): at 21:33

## 2019-01-01 RX ADMIN — NYSTATIN CREAM 1 APPLICATION(S): 100000 CREAM TOPICAL at 19:46

## 2019-01-22 ENCOUNTER — APPOINTMENT (OUTPATIENT)
Dept: INTERNAL MEDICINE | Facility: CLINIC | Age: 84
End: 2019-01-22
Payer: MEDICARE

## 2019-01-22 VITALS
BODY MASS INDEX: 30.18 KG/M2 | DIASTOLIC BLOOD PRESSURE: 78 MMHG | HEIGHT: 62 IN | WEIGHT: 164 LBS | HEART RATE: 68 BPM | SYSTOLIC BLOOD PRESSURE: 120 MMHG

## 2019-01-22 DIAGNOSIS — I82.409 ACUTE EMBOLISM AND THROMBOSIS OF UNSPECIFIED DEEP VEINS OF UNSPECIFIED LOWER EXTREMITY: ICD-10-CM

## 2019-01-22 PROCEDURE — 85610 PROTHROMBIN TIME: CPT | Mod: QW

## 2019-01-22 PROCEDURE — 99213 OFFICE O/P EST LOW 20 MIN: CPT | Mod: 25

## 2019-01-22 NOTE — HISTORY OF PRESENT ILLNESS
[FreeTextEntry1] : warfarin and afib [de-identified] : Patient here for management of atrial fibrillation and coumadin\par Patient has been compliant with taking the coumadin/warfarin as directed.\par he  has had no problems with bleeding.\par supposed to skip 2 day not skipping at all?\par feel well

## 2019-01-22 NOTE — REVIEW OF SYSTEMS
[Fever] : no fever [Chest Pain] : no chest pain [Orthopena] : no orthopnea [Shortness Of Breath] : no shortness of breath [Wheezing] : no wheezing

## 2019-01-22 NOTE — PHYSICAL EXAM
[Well Nourished] : well nourished [No JVD] : no jugular venous distention [Supple] : supple [No Respiratory Distress] : no respiratory distress  [Clear to Auscultation] : lungs were clear to auscultation bilaterally [de-identified] : afib at 70 [de-identified] : plus 1 edema

## 2019-01-22 NOTE — PLAN
[FreeTextEntry1] : inr 3.4\par take 5 days a week skip sat and sunday\par afib and bp and edema all good

## 2019-02-26 ENCOUNTER — MEDICATION RENEWAL (OUTPATIENT)
Age: 84
End: 2019-02-26

## 2019-04-23 ENCOUNTER — APPOINTMENT (OUTPATIENT)
Dept: INTERNAL MEDICINE | Facility: CLINIC | Age: 84
End: 2019-04-23
Payer: MEDICARE

## 2019-04-23 VITALS
SYSTOLIC BLOOD PRESSURE: 122 MMHG | WEIGHT: 168 LBS | BODY MASS INDEX: 30.91 KG/M2 | DIASTOLIC BLOOD PRESSURE: 78 MMHG | HEIGHT: 62 IN | HEART RATE: 76 BPM

## 2019-04-23 VITALS
HEART RATE: 87 BPM | TEMPERATURE: 98.5 F | SYSTOLIC BLOOD PRESSURE: 107 MMHG | DIASTOLIC BLOOD PRESSURE: 62 MMHG | OXYGEN SATURATION: 96 %

## 2019-04-23 DIAGNOSIS — N40.0 BENIGN PROSTATIC HYPERPLASIA WITHOUT LOWER URINARY TRACT SYMPMS: ICD-10-CM

## 2019-04-23 DIAGNOSIS — R60.9 EDEMA, UNSPECIFIED: ICD-10-CM

## 2019-04-23 DIAGNOSIS — I48.91 UNSPECIFIED ATRIAL FIBRILLATION: ICD-10-CM

## 2019-04-23 LAB
INR PPP: 1.5 RATIO
QUALITY CONTROL: YES

## 2019-04-23 PROCEDURE — 99214 OFFICE O/P EST MOD 30 MIN: CPT | Mod: 25

## 2019-04-23 PROCEDURE — 36415 COLL VENOUS BLD VENIPUNCTURE: CPT

## 2019-04-23 PROCEDURE — 85610 PROTHROMBIN TIME: CPT | Mod: QW

## 2019-04-23 NOTE — PHYSICAL EXAM
[No Acute Distress] : no acute distress [Well Nourished] : well nourished [No JVD] : no jugular venous distention [Supple] : supple [No Respiratory Distress] : no respiratory distress  [Clear to Auscultation] : lungs were clear to auscultation bilaterally [Normal Rate] : normal rate  [Soft] : abdomen soft [No HSM] : no HSM [de-identified] : afib  grade 3/6 [de-identified] : 2 plus edema

## 2019-04-23 NOTE — HISTORY OF PRESENT ILLNESS
[FreeTextEntry1] : afib and warfarin [de-identified] : Patient here for management of atrial fibrillation and coumadin\par Patient has been compliant with taking the coumadin/warfarin as directed.\par he  has had no problems with bleeding.\par no inr for 4 months\par had urine problems\par used cue tip followed by some blood 2 day\par now resolved\par

## 2019-04-23 NOTE — PLAN
[FreeTextEntry1] : Afib rate controlled\par bp good\par edema present\par no recent inr\par inr 1.5 on 2 mg 5 day\par to increase to 2 mg 6 days a week\par full blood and check urine

## 2019-04-23 NOTE — REVIEW OF SYSTEMS
[Earache] : no earache [Chest Pain] : no chest pain [Orthopena] : no orthopnea [Shortness Of Breath] : no shortness of breath [Wheezing] : no wheezing [Abdominal Pain] : no abdominal pain [Hematuria] : hematuria

## 2019-04-25 LAB
ALBUMIN SERPL ELPH-MCNC: 3.7 G/DL
ALP BLD-CCNC: 72 U/L
ALT SERPL-CCNC: 11 U/L
ANION GAP SERPL CALC-SCNC: 11 MMOL/L
AST SERPL-CCNC: 23 U/L
BASOPHILS # BLD AUTO: 0.01 K/UL
BASOPHILS NFR BLD AUTO: 0.2 %
BILIRUB SERPL-MCNC: 0.7 MG/DL
BUN SERPL-MCNC: 18 MG/DL
CALCIUM SERPL-MCNC: 9 MG/DL
CHLORIDE SERPL-SCNC: 104 MMOL/L
CHOLEST SERPL-MCNC: 143 MG/DL
CHOLEST/HDLC SERPL: 2.1 RATIO
CO2 SERPL-SCNC: 26 MMOL/L
CREAT SERPL-MCNC: 0.68 MG/DL
DIGOXIN SERPL-MCNC: 0.5 NG/ML
EOSINOPHIL # BLD AUTO: 0.05 K/UL
EOSINOPHIL NFR BLD AUTO: 1.1 %
ESTIMATED AVERAGE GLUCOSE: 105 MG/DL
GLUCOSE SERPL-MCNC: 116 MG/DL
HBA1C MFR BLD HPLC: 5.3 %
HCT VFR BLD CALC: 36 %
HDLC SERPL-MCNC: 67 MG/DL
HGB BLD-MCNC: 11.5 G/DL
IMM GRANULOCYTES NFR BLD AUTO: 0.2 %
LDLC SERPL CALC-MCNC: 62 MG/DL
LYMPHOCYTES # BLD AUTO: 1.41 K/UL
LYMPHOCYTES NFR BLD AUTO: 30.5 %
MAN DIFF?: NORMAL
MCHC RBC-ENTMCNC: 31.9 GM/DL
MCHC RBC-ENTMCNC: 32.5 PG
MCV RBC AUTO: 101.7 FL
MONOCYTES # BLD AUTO: 0.53 K/UL
MONOCYTES NFR BLD AUTO: 11.5 %
NEUTROPHILS # BLD AUTO: 2.61 K/UL
NEUTROPHILS NFR BLD AUTO: 56.5 %
PLATELET # BLD AUTO: 102 K/UL
POTASSIUM SERPL-SCNC: 4.2 MMOL/L
PROT SERPL-MCNC: 6.9 G/DL
RBC # BLD: 3.54 M/UL
RBC # FLD: 14.2 %
SODIUM SERPL-SCNC: 141 MMOL/L
T4 FREE SERPL-MCNC: 1.1 NG/DL
TRIGL SERPL-MCNC: 68 MG/DL
TSH SERPL-ACNC: 1.68 UIU/ML
WBC # FLD AUTO: 4.62 K/UL

## 2019-05-01 NOTE — DISCHARGE NOTE ADULT - NS ED TOBCES PHONELINE
Adequate: hears normal conversation without difficulty
[facility] Smokers Quitline ([phone])

## 2019-09-04 NOTE — DISCHARGE NOTE ADULT - VISION (WITH CORRECTIVE LENSES IF THE PATIENT USUALLY WEARS THEM):
Partially impaired: cannot see medication labels or newsprint, but can see obstacles in path, and the surrounding layout; can count fingers at arm's length
Declined

## 2019-09-17 PROBLEM — M19.90 ARTHRITIS: Status: ACTIVE | Noted: 2017-05-19

## 2019-09-17 NOTE — HISTORY OF PRESENT ILLNESS
[de-identified] : Patient here for management of atrial fibrillation and coumadin\par Patient has been compliant with taking the coumadin/warfarin as directed.\par he  has had no problems with bleeding.\par take it 5 days a week skip sat and sunday\par a little depressed\par no complaints [FreeTextEntry1] : afib

## 2019-09-17 NOTE — PLAN
[FreeTextEntry1] : inr 2.1 as is 5 days\par bp good\par afib under control\par clinically stable\par f/u 6 months office\par

## 2019-09-17 NOTE — PHYSICAL EXAM
[No Acute Distress] : no acute distress [Well Nourished] : well nourished [No JVD] : no jugular venous distention [Normal Oropharynx] : the oropharynx was normal [Normal Outer Ear/Nose] : the outer ears and nose were normal in appearance [No Respiratory Distress] : no respiratory distress  [No Lymphadenopathy] : no lymphadenopathy [Normal Rate] : normal rate  [No Accessory Muscle Use] : no accessory muscle use [de-identified] : afib with murmu [de-identified] : 1 plus edema

## 2019-09-17 NOTE — REVIEW OF SYSTEMS
[Fever] : no fever [Night Sweats] : no night sweats [Chest Pain] : no chest pain [Earache] : no earache [Nasal Discharge] : no nasal discharge [Shortness Of Breath] : no shortness of breath [Orthopena] : no orthopnea [Wheezing] : no wheezing

## 2019-10-05 NOTE — ED ADULT NURSE NOTE - NSFALLRSKHRMRISKTYPE_ED_ALL_ED
Please call and inform the patient that they need to have labs done prior to having their medication refilled. coagulation(Bleeding disorder R/T clinical cond/anti-coags)/age(85 years old or older)

## 2019-10-05 NOTE — ED ADULT NURSE REASSESSMENT NOTE - NS ED NURSE REASSESS COMMENT FT1
receiving pt from break rn. pt oriented to person, place and slightly confused on date. pt states it is Saturday, but cannot articulate correct date. pt family states mental status has gradually declining over last month, however acute change this past week.

## 2019-10-05 NOTE — ED PROVIDER NOTE - CARE PLAN
Principal Discharge DX:	SDH (subdural hematoma)  Secondary Diagnosis:	Altered mental status, unspecified altered mental status type  Secondary Diagnosis:	Urinary tract infection without hematuria, site unspecified Principal Discharge DX:	SDH (subdural hematoma)  Secondary Diagnosis:	Altered mental status, unspecified altered mental status type  Secondary Diagnosis:	Urinary tract infection without hematuria, site unspecified  Secondary Diagnosis:	Thrombocytopenia

## 2019-10-05 NOTE — ED ADULT NURSE NOTE - CAS EDP DISCH DISPOSITION ADMI
You can access the [facility] Patient Portal, offered by [facility], by registering with the following website: [email or web address]
Telemetry

## 2019-10-05 NOTE — ED PROVIDER NOTE - PHYSICAL EXAMINATION
Haresh NICOLE MD PGY2:   PHYSICAL EXAM:    GENERAL: NAD, well-developed  HEENT:  Atraumatic, Normocephalic  CHEST/LUNG: Chest rise equal bilaterally  HEART: Regular rate and rhythm  ABDOMEN: Soft, Nontender, Nondistended  EXTREMITIES:  2+ Peripheral Pulses. Ecchymosis over b/l upper extremities and lower extremities.   PSYCH: A&Ox2  SKIN: No obvious rashes or lesions

## 2019-10-05 NOTE — ED ADULT TRIAGE NOTE - CHIEF COMPLAINT QUOTE
patient sent in by family as per EMS family states pt has been more confused over the past week as well as increased BL upper extremity swelling, both arms are bruised in varios stages of healing however pt denies falls. as per EMS pt was ambulatory at scene and appears alert at this time aside from situation. pt states he feels "too weak".

## 2019-10-05 NOTE — ED PROVIDER NOTE - PROGRESS NOTE DETAILS
nsgy recc 24 fu ct vit k, keppra, ptlts, q4hr neuro checks, patient was admitted to medicine for further evaluation and treatment/management pt's niece and patient refusing further medications besides vitamin k, refused keppra and platlet xfusion, does not want these medications at this time and can be revisited in the morning. (juan guzman [phone])

## 2019-10-05 NOTE — CONSULT NOTE ADULT - SUBJECTIVE AND OBJECTIVE BOX
Haresh NICOLE MD PGY2: 87 M PMH PMH of Afib on coumadin, HTN, past thyroidectomy and on BIB niece Mily Garnica ([id number]) for new bruising over arms and legs over the past two weeks and increasing confusion. Patient lives alone at home and is a  and is capable of living independently. Patient's niece noticed that on the phone he seems to be more confused than usual in terms of perseverating on certain words, but able to function independantly.  Patient denies any falls or head trauma. No change in medication regimen.     WDWN male in NAD  Vital Signs Last 24 Hrs  T(C): 36.6 (05 Oct 2019 15:02), Max: 36.6 (05 Oct 2019 15:02)  T(F): 97.9 (05 Oct 2019 15:02), Max: 97.9 (05 Oct 2019 15:02)  HR: 59 (05 Oct 2019 21:03) (59 - 73)  BP: 145/81 (05 Oct 2019 21:03) (127/53 - 145/81)  BP(mean): --  RR: 18 (05 Oct 2019 21:03) (18 - 18)  SpO2: 100% (05 Oct 2019 20:11) (100% - 100%)    AAO X 2  PERRLA, EOMI  CN 2-12 grossly intact  KENNEDY strength 5/5  SILT                          10.1   5.18  )-----------( 21       ( 05 Oct 2019 16:25 )             31.6     10-05    140  |  103  |  23  ----------------------------<  89  3.2<L>   |  24  |  0.63    Ca    9.0      05 Oct 2019 16:25    TPro  7.1  /  Alb  3.4  /  TBili  1.5<H>  /  DBili  x   /  AST  26  /  ALT  7   /  AlkPhos  50  10-05    PT/INR - ( 05 Oct 2019 16:25 )   PT: 14.1 SEC;   INR: 1.23          PTT - ( 05 Oct 2019 16:25 )  PTT:26.5 SEC    < from: CT Head No Cont (10.05.19 @ 18:12) >  Iso to slightly hyperdense extra-axial collection identified along the   frontoparietal convexity, which may represent a concerning for   acute/subacute subdural hematoma, measuring up to 3 mm.    Focal extra-axial prominence along the left inferior frontal convexity   may represent an arachnoid cyst. There is focal thinning of the inner   table of the calvarium at this level. No significant mass effect, midline   shift, hydrocephalus, or evidence ofacute vascular territorial   infarction.    Mild patchy hypodensities within the periventricular and subcortical   white matter, although nonspecific, likely reflect chronic microvascular   disease. Cerebral volume loss results in prominence of the ventricles and   sulci.    Underpneumatized right mastoid air cells. Visualized paranasal sinuses   and left mastoid air cells are clear. Status post bilateral cataract   surgery. Visualized osseous structures are intact.    IMPRESSION:     Findings suspicious for within acute/subacute subdural hematoma along the   left frontoparietal convexity. No significant mass effect, midline shift,   or hydrocephalus.    < end of copied text >    Urinalysis Basic - ( 05 Oct 2019 17:30 )    Color: YELLOW / Appearance: Lt TURBID / S.025 / pH: 6.5  Gluc: NEGATIVE / Ketone: MODERATE  / Bili: NEGATIVE / Urobili: TRACE   Blood: MODERATE / Protein: 20 / Nitrite: NEGATIVE   Leuk Esterase: LARGE / RBC: 6-10 / WBC 11-25   Sq Epi: FEW / Non Sq Epi: x / Bacteria: NEGATIVE

## 2019-10-05 NOTE — ED PROVIDER NOTE - SECONDARY DIAGNOSIS.
Altered mental status, unspecified altered mental status type Urinary tract infection without hematuria, site unspecified Thrombocytopenia

## 2019-10-05 NOTE — ED ADULT NURSE NOTE - NSIMPLEMENTINTERV_GEN_ALL_ED
Implemented All Fall with Harm Risk Interventions:  [place] to call system. Call bell, personal items and telephone within reach. Instruct patient to call for assistance. Room bathroom lighting operational. Non-slip footwear when patient is off stretcher. Physically safe environment: no spills, clutter or unnecessary equipment. Stretcher in lowest position, wheels locked, appropriate side rails in place. Provide visual cue, wrist band, yellow gown, etc. Monitor gait and stability. Monitor for mental status changes and reorient to person, place, and time. Review medications for side effects contributing to fall risk. Reinforce activity limits and safety measures with patient and family. Provide visual clues: red socks.

## 2019-10-05 NOTE — CONSULT NOTE ADULT - PROBLEM SELECTOR RECOMMENDATION 2
No need for surgery  Admit to medicine service  Neurologic checks Q4H  Start Keppra 500 mg BID  Repeat Head CT in AM

## 2019-10-05 NOTE — ED PROVIDER NOTE - ATTENDING CONTRIBUTION TO CARE
87 M PMH of Afib on coumadin, HTN, past thyroidectomy, pw altered mental status. Per family more confused x 1 week. Family came to visit pt and found to have more ecchymosis than usual on upper extremities. On coumadin, last INR check one month ago. No known cough, fevers, chills, abdominal pain, urinary symptoms. will check CT head, basics, ua, cxr.   GEN - NAD; well appearing; A+O x2   HEAD - NC/AT     EYES - EOMI, no conjunctival pallor, no scleral icterus  ENT -   mucous membranes  moist , no discharge      NECK - Neck supple  PULM - CTA b/l,  symmetric breath sounds  COR -  RRR, S1 S2, no murmurs  ABD - , ND, NT, soft, no guarding, no rebound, no masses    BACK - no CVA tenderness, nontender spine     EXTREMS - upper and lower ext. ecchymosis   SKIN - no rash or bruising      NEUROLOGIC - alert, sensation nl, motor 5/5 RUE/LUE/RLE/LLE

## 2019-10-05 NOTE — ED ADULT NURSE REASSESSMENT NOTE - CONDITION
rec'd pt in room 15 with niece at bedside.   pt aa&ox3 but answers slowly.  pt needs redirection but is cooeprative.  has breuises to all extremities , back, face,.  all ext edmatous.  denies cp or sob.  wiating for cat scan read.

## 2019-10-05 NOTE — ED ADULT NURSE NOTE - OBJECTIVE STATEMENT
Break Coverage RN: Received pt in room 15, A&Ox3 at this time, respirations even and unlabored b/l. Pt brought in by family, c/o b/l arm swelling and ecchymosis, intermittent hand trembling. Per family, pt has been becoming increasingly confused this week, also reports worsening shuffling of feet for a couple months. Reports is on coumadin. Pt reports he lives alone, denies any fall. Pt denies chest pain, SOB. Ecchymosis noted on b/l arms and hands, shoulders, left upper back, sacrum, b/l LE, b/l thighs, sacrum, upper abdomen. Blanchable redness noted on buttocks/sacrum. PMHx afib, htn. Family at bedside. Awaiting MD borrego. Will continue to monitor. Break Coverage RN: Received pt in room 15, A&Ox3 at this time, respirations even and unlabored b/l. Pt brought in by family, c/o b/l arm swelling and ecchymosis, intermittent hand trembling. Per family, pt has been becoming increasingly confused this week, also reports worsening shuffling of feet for a couple months. Reports is on coumadin. Pt reports he lives alone, denies any fall. Pt denies chest pain, SOB. Ecchymosis noted on b/l arms and hands, shoulders, left upper back, sacrum, b/l LE, b/l thighs, sacrum, upper abdomen. Blanchable redness noted on buttocks/sacrum. Afib on cardiac monitor. PMHx afib, htn. Family at bedside. Awaiting MD borrego. Will continue to monitor. Break Coverage RN: Received pt in room 15, A&Ox3 at this time, respirations even and unlabored b/l. Pt brought in by family, c/o b/l arm swelling and ecchymosis, intermittent hand trembling. Per family, pt has been becoming increasingly confused this week, also reports worsening shuffling of feet for a couple months. Reports is on coumadin. Pt reports he lives alone, denies any fall. Pt denies chest pain, SOB. Ecchymosis noted on b/l arms and hands, shoulders, left upper back, sacrum, b/l LE, b/l thighs, sacrum, upper abdomen. Blanchable redness noted on buttocks/sacrum. Afib on cardiac monitor. IVL 20g Angiocath placed on right AC. Labs sent. PMHx afib, htn. Family at bedside. Awaiting MD borrego. Will continue to monitor.

## 2019-10-05 NOTE — ED PROVIDER NOTE - CLINICAL SUMMARY MEDICAL DECISION MAKING FREE TEXT BOX
Haresh NICOLE MD PGY2: Patient here with AMS and new ecchymosis. Will obtain CTH and INR and infectious evaluation. If all negative patient and niece are comfortable with him returning home.

## 2019-10-05 NOTE — ED PROVIDER NOTE - OBJECTIVE STATEMENT
Haresh NICOLE MD PGY2: 87 M PMH PMH of Afib on coumadin, HTN, past thyroidectomy and on BIB niece Mily Garnica ([phone]) for new bruising over arms and legs over the past two weeks and increasing confusion. Patient lives alone at home and is a  and is capable of living independantly. Patient's niece noticed that on the phone he seems to be more confused than usual in terms of perseverating on certain words, but able to function independantly.  Patient denies any falls or head trauma. No change in medication regimen. Taking dig and coumadin as directed. Last INR check 1 month ago purportedly within range.

## 2019-10-06 NOTE — H&P ADULT - PROBLEM SELECTOR PLAN 5
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  - bilateral upper extremities swollen as well as lower extremities w/ diffuse bruising.  - RLE bigger than LLE and slightly warmer. Will acquire Duplex study to r/o DVT. 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  - bilateral upper extremities swollen as well as lower extremities w/ diffuse bruising.  - RLE bigger than LLE and slightly warmer. Will acquire Duplex study to r/o DVT.  - TTE to assess cardiac function to r/o cardiac etiology of anasarca. Afib on Coumadin and Digoxin  - [id number] score = 3  - c/w home digoxin (level = 0.7)  - hold AC for now  - monitor on tele

## 2019-10-06 NOTE — H&P ADULT - PROBLEM SELECTOR PLAN 2
- patient denies dysuria or urinary symptom, yet elderly patient with confusion and positive U/A concerning for UTI.  - will treat with ABx as above.   - f/u urine culture.

## 2019-10-06 NOTE — H&P ADULT - PROBLEM SELECTOR PLAN 7
- DVT ppx: SCDs  - Diet: NPO for now given mental status. ice chips and mouth swabs with aspiration precaution.  - fall precaution.

## 2019-10-06 NOTE — PROGRESS NOTE ADULT - ASSESSMENT
Mr. Barger is a 88 y/o male with PMH of Afib on coumadin, DVT, HTN, and past thyroidectomy, who is admitted to the hospital for encephalopathy.

## 2019-10-06 NOTE — CHART NOTE - NSCHARTNOTEFT_GEN_A_CORE
< from: CT Head No Cont (10.06.19 @ 10:41) >    PROCEDURE DATE:  Oct  6 2019         INTERPRETATION:  Clinical indications: Follow-up subdural hematoma.    Multiple axial sections were performed from base of skull to vertex   without contrast enhancement. Coronal and sagittal reconstructions were   performed as well    This exam is compared with prior noncontrast head CT performed on October 50,019.    Parenchymal volume loss and chronic microvascular ischemic changes are   again seen.    Again seen is extra-axial area low-attenuation involving the left frontal   region with adjacent scalloping of bone. This finding measures   approximately 4.3 x 3.1 cm compatible with arachnoid cyst.    Tiny extra-axial area of high attenuation is again suspected involving   theleft frontal region. This finding measures approximately 0.3 cm   widest diameter. This is likely compatible with a small acute subdural   hematoma. No significant shift or herniation is seen.    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    Sclerotic change seen involving the right mastoid which is likely   compatible with chronic inflammatory change.    Impression: Small subdural hematoma again seen.    Head CT stable, no neurosurgical intervention required. Patient may follow up outpatient with Dr Manuel.   Will sign off case for this admission.  Please reconsult as needed

## 2019-10-06 NOTE — H&P ADULT - PROBLEM SELECTOR PLAN 3
SDH found on CT head in setting of thrombocytopenia and coumadin use.  - hold AC  - Type and Screen STAT. Transfuse plt as needed.  - Trend CBC  - neurosurgery consulted. appreciate recs  - repeat CT in the AM SDH found on CT head in setting of thrombocytopenia and coumadin use.  - hold AC.  - elevate HOB. Monitor BP.  - Type and Screen STAT. Transfuse plt as needed.  - Trend CBC  - neurosurgery consulted. appreciate recs  - repeat CT in the AM SDH found on CT head in setting of thrombocytopenia and coumadin use.  - hold AC.  - elevate HOB. Monitor BP.  - Platelet of 21. Type and Screen STAT. Transfuse plt as needed: goal to transfuse platelet above 50 if there is concern for active bleeding in repeat CT or any other locations. Otherwise, transfuse with goal of above 10 unless febrile.  - Trend CBC  - neurosurgery consulted. appreciate recs  - repeat CT in the AM - potassium = 3.2  - supplementation prescribed  - f/u AM lab-work (including magnesium level)

## 2019-10-06 NOTE — H&P ADULT - PROBLEM SELECTOR PLAN 6
- DVT ppx: SCDs  - Diet: NPO for now given mental status. ice chips and mouth swabs with aspiration precaution.  - fall precaution. 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  - bilateral upper extremities swollen as well as lower extremities w/ diffuse bruising.  - RLE bigger than LLE and slightly warmer. Will acquire Duplex study to r/o DVT. (INR subtherapeutic on presentation = 1.23)  - TTE to assess cardiac function to r/o cardiac etiology of anasarca.

## 2019-10-06 NOTE — CONSULT NOTE ADULT - ASSESSMENT
86 y/o male with PMHx of Afib on coumadin, DVT, HTN, and past thyroidectomy who presented with increasing confusion and bruises on extremities, found to have severe thrombocytopenia.     #Thrombocytopenia  - baseline around 80-100k  - acutely worsened to 20k range  - peripheral smear reviewed: many toxic granulated neutrophils seen, few platelets with large platelets seen, no schistocytes  - likely etiology acute worsening from sepsis/UTI, will need to treat underlying infection  - check DIC panel (d-dimer, fibrinogen) and give cryo if fibrinogen <150  - would check retic count and fractionate bili; given stability of hemoglobin and smear findings, not suspecting MAHA  - would keep platelet goal >50 k in setting of subdural bleed  - hold all anti-platelets and AC  - continue to tread CBC w/ diff daily  - given chronic thrombocytopenia, possibility of underlying MDS or ITP as well     #Encephelopathy  - likely secondary to infection, continue to treat with antibiotics  - subdural bleeds stable on repeat imaging  - frequent orientation, avoid sedating meds/benzos  - will need to see what baseline is from family     Lori Kohli, PGY-5  Hematology-Oncology Fellow  [phone] ([place]) [phone] ([facility]) 86 y/o male with PMHx of Afib on coumadin, DVT, HTN, and past thyroidectomy who presented with increasing confusion and bruises on extremities, found to have severe thrombocytopenia.     #Thrombocytopenia  - baseline around 80-100k  - acutely worsened to 20k range  - peripheral smear reviewed: many toxic granulated neutrophils seen, few platelets with large platelets seen, no schistocytes  - likely etiology acute worsening from sepsis/UTI, will need to treat underlying infection  - check DIC panel (d-dimer, fibrinogen) and give cryo if fibrinogen <150  - would check retic count and fractionate bili; given stability of hemoglobin and smear findings, not suspecting MAHA  - would keep platelet goal >50 k in setting of subdural bleed  - hold all anti-platelets and anticoagulation  - continue to trend CBC w/ diff daily  - given chronic thrombocytopenia, possibility of underlying MDS as well     #Encephelopathy  - likely secondary to infection, continue to treat with antibiotics  - subdural bleeds stable on repeat imaging  - frequent orientation, avoid sedating meds/benzos  - will need to see what baseline is from family     Lori Kohli, PGY-5  Hematology-Oncology Fellow  [phone] ([place]) [id number] ([facility]

## 2019-10-06 NOTE — PROGRESS NOTE ADULT - PROBLEM SELECTOR PLAN 3
Platelet of 21,on  repeat  20 . 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   -f/u cbc post transfusion : goal to transfuse platelet above 50   - Trend CBC  - will f/u hematology recommendations for w/u of thrombocytopenia

## 2019-10-06 NOTE — H&P ADULT - ASSESSMENT
Mr. Barger is a 86 y/o male with PMH of Afib on coumadin, DVT, HTN, and past thyroidectomy, who is admitted to the hospital for encephalopathy.

## 2019-10-06 NOTE — PROGRESS NOTE ADULT - PROBLEM SELECTOR PLAN 7
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  - bilateral upper extremities swollen as well as lower extremities w/ diffuse bruising.  - RLE bigger than LLE and slightly warmer.   -Duplex study noted Rt soleal vein DVT, cannot A/c at this time as pt with SDH and low platelets , will check Serial doppler to assess progression   - TTE to assess cardiac function to r/o cardiac etiology of anasarca.

## 2019-10-06 NOTE — CONSULT NOTE ADULT - ATTENDING COMMENTS
suspect underlying MDS , possibly acute on chronic thrombocytopenia from underlying MDS or other primary bone marrow d/o  transfuse platelets given SDH and goal of keep above 50  consider pall care eval as well

## 2019-10-06 NOTE — H&P ADULT - NSHPLABSRESULTS_GEN_ALL_CORE
10.1   5.18  )-----------( 21       ( 05 Oct 2019 16:25 )             31.6       10-    140  |  103  |  23  ----------------------------<  89  3.2<L>   |  24  |  0.63    Ca    9.0      05 Oct 2019 16:25    TPro  7.1  /  Alb  3.4  /  TBili  1.5<H>  /  DBili  x   /  AST  26  /  ALT  7   /  AlkPhos  50  10-05              Urinalysis Basic - ( 05 Oct 2019 17:30 )    Color: YELLOW / Appearance: Lt TURBID / S.025 / pH: 6.5  Gluc: NEGATIVE / Ketone: MODERATE  / Bili: NEGATIVE / Urobili: TRACE   Blood: MODERATE / Protein: 20 / Nitrite: NEGATIVE   Leuk Esterase: LARGE / RBC: 6-10 / WBC 11-25   Sq Epi: FEW / Non Sq Epi: x / Bacteria: NEGATIVE        PT/INR - ( 05 Oct 2019 16:25 )   PT: 14.1 SEC;   INR: 1.23          PTT - ( 05 Oct 2019 16:25 )  PTT:26.5 SEC    Lactate Trend            CAPILLARY BLOOD GLUCOSE      POCT Blood Glucose.: 89 mg/dL (05 Oct 2019 15:15)            RADIOLOGY, EKG & ADDITIONAL TESTS: Reviewed.

## 2019-10-06 NOTE — CONSULT NOTE ADULT - SUBJECTIVE AND OBJECTIVE BOX
HPI:  History obtained from chart review and pt is not reliable historian.   Pt is an 86 y/o male with PMHx of Afib on coumadin, DVT, HTN, and past thyroidectomy who presented with increasing confusion and bruises on extremities. Patient was brought to the hospital by niece given new bruising over arms and legs over the past two weeks. He reportedly has also been increasingly confused which is not usual for him. At baseline, he is independent and can perform ADLs. During our encounter, he cannot state his name and only comments upon his bruising. Can not answer other questions.     In terms of thrombocytopenia, pt normally is in the range of 80-100k. Has not had extensive workup for this from allscripts review.       14 point ROS otherwise negative    PAST MEDICAL & SURGICAL HISTORY:  Lung nodules  SVC syndrome  Acute DVT (deep venous thrombosis)  Essential hypertension  Atrial fibrillation, unspecified type  H/O thyroidectomy      Allergies    No Known Allergies    Intolerances        MEDICATIONS  (STANDING):  cefTRIAXone   IVPB 1000 milliGRAM(s) IV Intermittent every 24 hours  digoxin     Tablet 0.125 milliGRAM(s) Oral daily  potassium phosphate IVPB 15 milliMole(s) IV Intermittent once    MEDICATIONS  (PRN):      FAMILY HISTORY:  No pertinent family history in first degree relatives      SOCIAL HISTORY: Unable to obtain     Height (cm): 172.7 (10-05 @ 23:59)  Weight (kg): 77.1 (10-05 @ 23:59)  BMI (kg/m2): 25.9 (10-05 @ 23:59)  BSA (m2): 1.91 (10-05 @ 23:59)    VITALS:   T(F): 96.8 (10-06-19 @ 11:32), Max: 98 (10-06-19 @ 04:53)  HR: 88 (10-06-19 @ 11:32)  BP: 140/64 (10-06-19 @ 11:32)  RR: 18 (10-06-19 @ 11:32)  SpO2: 100% (10-06-19 @ 11:32)  Wt(kg): --    PHYSICAL EXAM    GENERAL: NAD, lying comfortably   HEAD:  Atraumatic, Normocephalic  EYES: EOMI, PERRLA, conjunctiva and sclera clear  NECK: Supple, No JVD  CHEST/LUNG: Clear to auscultation bilaterally; No wheeze  HEART: Irregular, No murmurs, rubs, or gallops  ABDOMEN: Soft, Nontender, Nondistended; Bowel sounds present  EXTREMITIES: No clubbing, cyanosis, or edema  NEUROLOGY: non-focal  SKIN: diffuse ecchymosis in upper/lower extremities     LABS:                         10.2   6.70  )-----------( 20       ( 06 Oct 2019 06:55 )             30.8     10-06    138  |  103  |  19  ----------------------------<  87  3.8   |  24  |  0.58    Ca    8.7      06 Oct 2019 06:55  Phos  2.2     10-06  Mg     1.8     10-06    TPro  7.0  /  Alb  3.3  /  TBili  1.4<H>  /  DBili  x   /  AST  30  /  ALT  10  /  AlkPhos  49  10-06    Phosphorus Level, Serum: 2.2 mg/dL (10-06 @ 06:55)  Magnesium, Serum: 1.8 mg/dL (10-06 @ 06:55)    PT/INR - ( 06 Oct 2019 06:55 )   PT: 14.2 SEC;   INR: 1.24          PTT - ( 06 Oct 2019 06:55 )  PTT:25.6 SEC  URINE MIDSTREAM  10-05 @ 21:27 --  --  --          IMAGING:    EXAM:  CT BRAIN        PROCEDURE DATE:  Oct  6 2019         INTERPRETATION:  Clinical indications: Follow-up subdural hematoma.    Multiple axial sections were performed from base of skull to vertex   without contrast enhancement. Coronal and sagittal reconstructions were   performed as well    This exam is compared with prior noncontrast head CT performed on October 50,019.    Parenchymal volume loss and chronic microvascular ischemic changes are   again seen.    Again seen is extra-axial area low-attenuation involving the left frontal   region with adjacent scalloping of bone. This finding measures   approximately 4.3 x 3.1 cm compatible with arachnoid cyst.    Tiny extra-axial area of high attenuation is again suspected involving   the left frontal region. This finding measures approximately 0.3 cm   widest diameter. This is likely compatible with a small acute subdural   hematoma. No significant shift or herniation is seen.    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    Sclerotic change seen involving the right mastoid which is likely   compatible with chronic inflammatory change.    Impression: Small subdural hematoma again seen.

## 2019-10-06 NOTE — H&P ADULT - HISTORY OF PRESENT ILLNESS
Mr. Barger is a 86 y/o male with PMH of Afib on coumadin, DVT, HTN, and past thyroidectomy, who presented to the hospital for increasing confusion and bruises on extremities.     Patient is a poor historian, and is confused at the time of interview. History obtained from the chart.  Patient was brought to the hospital by niece Mily Trinhsina ([phone]) for new bruising over arms and legs over the past two weeks and increasing confusion. He lives alone at home with independent ADL/IADL. However, the niece noticed increased confusion during conversation over the phone with perseverating on certain words.     Upon interview, patient is AOx2 (, 2019, yet [facility]).   He states that he is fine and wants to call his wife and go home (yet patient is  per chart review and lives alone).   Patient denies any falls or head trauma. No change in medication regimen. He takes medications as prescribed. Last INR was checked at outpatient office on , and it was 2.1.  He is suppose to take Warfarin 2mg qD for  and skip Saturday/ per outpatient note.    In ED, temp 97.9, HR 73, /81, and RR 18, SpO2 100%.  He received one dose of CTX, Vitamin K and potassium supp.

## 2019-10-06 NOTE — CHART NOTE - NSCHARTNOTEFT_GEN_A_CORE
Case was d/w Hematology attending Dr Oneill, recommend one unit platelets Informed by JESSE Lora , pt refusing and niece Ms Garnica  also not consenting as she does not want to force the patient . Psychiatry was called for capacity , Psych saying , Encephalopathy documented on H&P , pt has obviously no capacity . I spoke to MS Garnica  , explained the urgency of the situation as not giving the platelets can be life-threatening,. Ms Garnica believes patient has capacity . Pt was re evaluated by myself at 5:30 PM, explained to patient the benefit of platelet transfusion and risk of expanding SDH or new bleed and risk of stroke and even death . Pt still refusing and not explaining his reasons to refuse . In my opinion Pt does not have capacity at this time . I called Ms Garnica back , explained to her that it is urgent that we can give him platelets, which can be lifesaving . She said their are other family member involved, she cannot make this decision on her own. explained to her that if the bleeding gets worse she will have to make the same decision in an  emergency situation. Requested her to come tonight and talk to patient , but she wants to wait until tomorrow morning as she thinks pt is frightened and nervous at this time and he will be able to think more logically in the morning    case was d/w neuro sx again in the evening by NP, no acute intervention recommended, no need to repeat CT head     will do neuro check q 4  hrs , if change in status  , will do urgent ct head and d/w family if concern for bleed   Will request formal Psych eval for capacity in morning   Ethics might have to be involved if family not able to make decisions

## 2019-10-06 NOTE — CHART NOTE - NSCHARTNOTEFT_GEN_A_CORE
Patient is required to get platelet transfusion for Platelets 20. Patient with intermittent confusion and refused to consent. Patient' s niece Raciel Minor spoke to the pt. and could not convince him. Patient does not have health care proxy.  Dr. Mcmahon notified. Continue to monitor

## 2019-10-06 NOTE — CHART NOTE - NSCHARTNOTEFT_GEN_A_CORE
PA Note     Called by nurse, Pt's nephew/ godson at the bedside     Hematology recommended one unit platelets, pt's niece Ms Garnica refused to consent for transfusion because she did not want to force the patient and wanted to discuss it with other family members.  Pt was evaluated by Psychiatry and he does not have capacity due to Encephalopathy.  MS Garnica spoke the family and they have decided to consent for the platelet transfusion.  Consent obtained, type & screen ordered, and one unit of platelets ordered.    Dr. Monteiro notified, will continue to monitor PA Note     Called by nurse, Pt's nephew/eh Su at the bedside     Hematology recommended one unit platelets, pt's niece Ms Garnica refused to consent for transfusion because she did not want to force the patient and wanted to discuss it with other family members.  Pt was evaluated by Psychiatry and he does not have capacity due to Encephalopathy.  MS Garnica spoke the family and they have decided to consent for the platelet transfusion.  Consent obtained from Mr. Carl Su, type & screen ordered, and one unit of platelets ordered.    Dr. Monteiro notified, will continue to monitor

## 2019-10-06 NOTE — PROGRESS NOTE ADULT - PROBLEM SELECTOR PLAN 6
Afib on Coumadin and Digoxin  - [id number] score = 3  - c/w home digoxin (level = 0.7)  - hold AC for now  - monitor on tele

## 2019-10-06 NOTE — H&P ADULT - NSHPREVIEWOFSYSTEMS_GEN_ALL_CORE
CONSTITUTIONAL: No fever, weight loss, or fatigue  EYES: No eye pain, visual disturbances, or discharge  ENMT:  No difficulty hearing, tinnitus, vertigo; No sinus or throat pain  RESPIRATORY: No cough, wheezing, chills or hemoptysis; No shortness of breath  CARDIOVASCULAR: No chest pain, palpitations, dizziness, or leg swelling  GASTROINTESTINAL: No abdominal or epigastric pain. No nausea, vomiting, or hematemesis; No diarrhea or constipation. No melena or hematochezia.  GENITOURINARY: No dysuria, frequency, hematuria, or incontinence  NEUROLOGICAL: No headaches, loss of strength, numbness, or tremors  SKIN: bruises in all four extremities.  LYMPH NODES: No enlarged glands  ENDOCRINE: No heat or cold intolerance; No polydipsia or polyuria  MUSCULOSKELETAL: No joint pain or swelling;   PSYCHIATRIC: Denies depression, anxiety  HEME/LYMPH: No easy bruising, or bleeding gums  ALLERGY AND IMMUNOLOGIC: No hives or eczema

## 2019-10-06 NOTE — H&P ADULT - PROBLEM SELECTOR PLAN 4
Afib on Coumadin and Digoxin  - c/w home digoxin  - hold AC for now  - monitor on tele SDH found on CT head in setting of thrombocytopenia and coumadin use.  - hold AC.  - elevate HOB. Monitor BP.  - Platelet of 21. Type and Screen STAT. Transfuse plt as needed: goal to transfuse platelet above 50 if there is concern for active bleeding in repeat CT or any other locations. Otherwise, transfuse with goal of above 10 unless febrile.  - Trend CBC  - neurosurgery consulted. appreciate recs  - repeat CT in the AM

## 2019-10-06 NOTE — PROGRESS NOTE ADULT - PROBLEM SELECTOR PLAN 1
Acute metabolic encephalopathy likely 2/2 UTI  - CBC w/ left shift and positive U/A, confusion in elders most consistent with acute metabolic encephalopathy from UTI.  - SDH found on CT scan also can be contributory, yet with no mass effect or midline shift, less likely.   - f/u UCx.  - Repeat CT Head  for changes in SDH done, no change   - c/w CTX  - monitor VS  - CPK in AM for possible fall, gentle fluid of 500cc x1 by 75cc/hr.   - Fall and aspiration precaution.

## 2019-10-06 NOTE — H&P ADULT - NSICDXPASTMEDICALHX_GEN_ALL_CORE_FT
PAST MEDICAL HISTORY:  Acute DVT (deep venous thrombosis)     Atrial fibrillation, unspecified type     Essential hypertension PAST MEDICAL HISTORY:  Acute DVT (deep venous thrombosis)     Atrial fibrillation, unspecified type     Essential hypertension     Lung nodules     SVC syndrome

## 2019-10-06 NOTE — PROGRESS NOTE ADULT - PROBLEM SELECTOR PLAN 2
SDH found on CT head in setting of thrombocytopenia and coumadin use.  - hold AC.  - elevate HOB. Monitor BP.  - neurosurgery consulted. appreciate recs  - repeat CT  without acute change   -continue neuro checks as per protocol

## 2019-10-06 NOTE — H&P ADULT - PROBLEM SELECTOR PLAN 1
Acute metabolic encephalopathy likely 2/2 UTI  - CBC w/ left shift and positive U/A, confusion in elders most consistent with acute metabolic encephalopathy from UTI.  - SDH found on CT scan also can be contributory, yet with no mass effect or midline shift, less likely.   - f/u UCx.  - Repeat CT Head in the AM for changes in SDH.  - c/w CTX  - monitor VS  - Fall and aspiration precaution. Acute metabolic encephalopathy likely 2/2 UTI  - CBC w/ left shift and positive U/A, confusion in elders most consistent with acute metabolic encephalopathy from UTI.  - SDH found on CT scan also can be contributory, yet with no mass effect or midline shift, less likely.   - f/u UCx.  - Repeat CT Head in the AM for changes in SDH.  - c/w CTX  - monitor VS  - CPK in AM for possible fall, gentle fluid of 500cc x1 by 75cc/hr.   - Fall and aspiration precaution.

## 2019-10-06 NOTE — H&P ADULT - NSHPPHYSICALEXAM_GEN_ALL_CORE
PHYSICAL EXAM:    Constitutional: NAD. elderly male lying on bed comfortably.  HEENT: AT/NC, PERRLA (L 4mm -> 2, R 3mm -> 2); EOMI, MMM, Supple neck; enlarged thyroid gland  Respiratory: CTAB. equal aeration bilaterally. no wheezing, no crackes, no rhonchi. no increase in WOB  Cardiovascular: irregularly irregular, no M/R/G. 2+ distal pulses. Cap refill ~3 seconds.   Gastrointestinal: soft; NT/ND, +BS.  Genitourinary: not examined.  Extremities: no cyanosis; diffuse bruises in all four extremities. Bilateral pedal edema as well as edematous distal upper extremities. DP and Radial pulses intact.   Neurological: A&Ox2 (, 2019, yet states that he is at the [employer]). ; responds to pain; responds to verbal commands; epicritic and protopathic sensation intact: CN nerves grossly intact. moves all four extremities against gravity.  Psych: poor insight. PHYSICAL EXAM:    Constitutional: NAD. elderly male lying on bed comfortably.  HEENT: AT/NC, PERRLA (L 4mm -> 2, R 3mm -> 2); EOMI, MMM, Supple neck; enlarged thyroid gland  Respiratory: CTAB. equal aeration bilaterally. no wheezing, no crackles, no rhonchi. no increase in WOB  Cardiovascular: irregularly irregular, no M/R/G. 2+ distal pulses. Cap refill ~3 seconds.   Gastrointestinal: soft; NT/ND, +BS.  Genitourinary: not examined.  Extremities: no cyanosis; diffuse bruises in all four extremities. Bilateral pedal edema as well as edematous distal upper extremities. DP and Radial pulses intact.   Neurological: A&Ox2 (, 2019, yet states that he is at the [employer]). ; responds to pain; responds to verbal commands; epicritic and protopathic sensation intact: CN nerves grossly intact. moves all four extremities against gravity.  Psych: poor insight.

## 2019-10-06 NOTE — PROGRESS NOTE ADULT - SUBJECTIVE AND OBJECTIVE BOX
Patient is a 87y old  Male who presents with a chief complaint of AMS (06 Oct 2019 01:33)      SUBJECTIVE / OVERNIGHT EVENTS: patient seen and examined by bedside, pt says he is nervous, appears confused, worried about his wallet, says," please don't send me a bill", denies headache, dizziness, SOB, CP, Palpitations , N/V/D, abdominal pain        MEDICATIONS  (STANDING):  cefTRIAXone   IVPB 1000 milliGRAM(s) IV Intermittent every 24 hours  digoxin     Tablet 0.125 milliGRAM(s) Oral daily    MEDICATIONS  (PRN):      Vital Signs Last 24 Hrs  T(C): 36 (06 Oct 2019 11:32), Max: 36.7 (06 Oct 2019 04:53)  T(F): 96.8 (06 Oct 2019 11:32), Max: 98 (06 Oct 2019 04:53)  HR: 88 (06 Oct 2019 11:32) (59 - 88)  BP: 140/64 (06 Oct 2019 11:32) (105/78 - 149/82)  BP(mean): --  RR: 18 (06 Oct 2019 11:32) (18 - 18)  SpO2: 100% (06 Oct 2019 11:32) (100% - 100%)  CAPILLARY BLOOD GLUCOSE      POCT Blood Glucose.: 89 mg/dL (05 Oct 2019 15:15)    I&O's Summary      PHYSICAL EXAM:  GENERAL: NAD, well-developed  HEAD:  Atraumatic, Normocephalic  EYES: EOMI, PERRLA, conjunctiva and sclera clear  CHEST/LUNG: Clear to auscultation bilaterally; No wheeze  HEART: irregular rate and rhythm;   ABDOMEN: Soft, Nontender, Nondistended; Bowel sounds present  EXTREMITIES:  2+ Peripheral Pulses, No clubbing, cyanosis, or edema  PSYCH: AAOx2-3,  confused , anxious ,   NEUROLOGY: non-focal  SKIN: ecchymosis on B/L UE     LABS:                        10.2   6.70  )-----------( 20       ( 06 Oct 2019 06:55 )             30.8     10-06    138  |  103  |  19  ----------------------------<  87  3.8   |  24  |  0.58    Ca    8.7      06 Oct 2019 06:55  Phos  2.2     10-06  Mg     1.8     10-06    TPro  7.0  /  Alb  3.3  /  TBili  1.4<H>  /  DBili  x   /  AST  30  /  ALT  10  /  AlkPhos  49  10-06    PT/INR - ( 06 Oct 2019 06:55 )   PT: 14.2 SEC;   INR: 1.24          PTT - ( 06 Oct 2019 06:55 )  PTT:25.6 SEC      Urinalysis Basic - ( 05 Oct 2019 17:30 )    Color: YELLOW / Appearance: Lt TURBID / S.025 / pH: 6.5  Gluc: NEGATIVE / Ketone: MODERATE  / Bili: NEGATIVE / Urobili: TRACE   Blood: MODERATE / Protein: 20 / Nitrite: NEGATIVE   Leuk Esterase: LARGE / RBC: 6-10 / WBC 11-25   Sq Epi: FEW / Non Sq Epi: x / Bacteria: NEGATIVE        RADIOLOGY & ADDITIONAL TESTS:    Imaging Personally Re< from: CT Head No Cont (10.06.19 @ 10:41) >  Parenchymal volume loss and chronic microvascular ischemic changes are   again seen.    Again seen is extra-axial area low-attenuation involving the left frontal   region with adjacent scalloping of bone. This finding measures   approximately 4.3 x 3.1 cm compatible with arachnoid cyst.    Tiny extra-axial area of high attenuation is again suspected involving   theleft frontal region. This finding measures approximately 0.3 cm   widest diameter. This is likely compatible with a small acute subdural   hematoma. No significant shift or herniation is seen.    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    Sclerotic change seen involving the right mastoid which is likely   compatible with chronic inflammatory change.    Impression: Small subdural hematoma again seen.        < end of copied text >  viewed:  < from: VA Duplex Ext Veins Lower Comp, Bilat. (10.06.19 @ 08:07) >  Summary/Impressions:  1. Non-occlusive deep vein thrombosis noted in the right  soleal vein.  2. No evidence of deep vein thrombosis in the left lower  extremities.    3. Superficial venous insufficiency noted in the left  great saphenous veins.  Results communicated with JESSE Cloud on 10/06/19 at 9  am with read back.    < end of copied text >    Consultant(s) Notes Reviewed:      Care Discussed with Consultants/Other Providers:

## 2019-10-07 NOTE — PHYSICAL THERAPY INITIAL EVALUATION ADULT - PERTINENT HX OF CURRENT PROBLEM, REHAB EVAL
Patient is an 87 year old male who presented with increasing confusion and bruises on extremities. Patient found to have subdural hematoma, right soleal DVT, thrombocytopenia, encephalopathy secondary to UTI. PMH: Afib on coumadin, DVT, HTN, and past thyroidectomy.

## 2019-10-07 NOTE — PROGRESS NOTE ADULT - PROBLEM SELECTOR PLAN 2
SDH found on CT head in setting of thrombocytopenia and coumadin use.  - evaluated by NSG, appreciate recs, no surgical intervention, keppra 500 BID for seizure prophylaxis recommended, repeat CTH with stable SDH  - hold AC  - heme on board, transfused 1 unit plt yesterday, plt 43 today, heme recommending goal >50, will give additional unit at this time

## 2019-10-07 NOTE — PROGRESS NOTE ADULT - PROBLEM SELECTOR PLAN 6
Afib on Coumadin and Digoxin  - [id number] score = 3  - c/w home digoxin (level = 0.7)  - hold AC given SDH above  - monitor on tele

## 2019-10-07 NOTE — PROGRESS NOTE ADULT - ATTENDING COMMENTS
Discussed with patient and niece at bedside, all questions answered    Juan Plaza MD  Hospitalist  pager #[phone]

## 2019-10-07 NOTE — PHYSICAL THERAPY INITIAL EVALUATION ADULT - GENERAL OBSERVATIONS, REHAB EVAL
Patient received seated on edge of bed, +telemetry, in no apparent distress, niece present. Patient agreeable to participate in physical therapy evaluation.

## 2019-10-07 NOTE — PROGRESS NOTE ADULT - PROBLEM SELECTOR PLAN 1
Acute metabolic encephalopathy   - CBC w/ left shift and suspected positive UA, urine culture prelim with proteus, continue ceftriaxone and follow up culture and sensitivities when available  - SDH found on CT scan also can be contributory, yet with no mass effect or midline shift, less likely.   - Repeat CT Head  for changes in SDH done, no change   - monitor VS  - Fall and aspiration precaution  - Per niece, patient near his baseline, significantly improved from admission

## 2019-10-07 NOTE — PROGRESS NOTE ADULT - PROBLEM SELECTOR PLAN 7
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  - bilateral upper extremities swollen as well as lower extremities w/ diffuse bruising.  - RLE bigger than LLE and slightly warmer.   -Duplex study noted Rt soleal vein DVT, cannot A/c at this time as pt with SDH and low platelets  - TTE pending to assess cardiac function to r/o cardiac etiology of edema

## 2019-10-07 NOTE — PROGRESS NOTE ADULT - SUBJECTIVE AND OBJECTIVE BOX
Hematology Oncology Follow-up    INTERVAL HPI/OVERNIGHT EVENTS:  Denies any overnight events, including fevers, headaches, hand pain, leg pain, abdominal pain. No nausea or vomiting. Niece at the bedside reports that he has had a good appetite over the past few weeks, no weight loss or restricted diets; functionally independent except he requires assistance paying for his bills. Walks with a walker and cane.     Review of Systems:  General: denies fevers/chills  Head: denies HA  Eyes: denies vision change  ENT: denies cough, sore throat  Respiratory: denies shortness of breath  Cardiovascular: denies chest pain, palpitations  Gastrointestinal: denies abdominal pain  MSK: denies leg or arm pain  Neuro: denies weakness  Skin: denies rash; multiple bruises  Psych: denies anxiety; per family, no memory problems, but was confused on admission    VITAL SIGNS:  T(F): 99.1 (10-07-19 @ 05:02)  HR: 74 (10-07-19 @ 05:02)  BP: 120/60 (10-07-19 @ 05:02)  RR: 18 (10-07-19 @ 05:02)  SpO2: 98% (10-07-19 @ 05:02)    10-06-19 @ 07:01  -  10-07-19 @ 07:00  --------------------------------------------------------  IN: 677 mL / OUT: 400 mL / NET: 277 mL        PHYSICAL EXAM:    Constitutional: AAOx3, NAD   Eyes: PERRL, EOMI, sclera non-icteric  Neck: supple, no masses, no JVD  Respiratory: CTA b/l, no wheezing, rhonchi, rales, with normal respiratory effort  Cardiovascular: RRR, normal S1S2, no M/R/G  Gastrointestinal: soft, NTND, no masses palpable, BS normal in all four quadrants, no HSM  Extremities:  no c/c/e  MSK: no obvious abnormalities.   Neurological: Grossly intact  Skin: Normal temperature, no rash; multiple echymoses on the hands, forearms, and legs  Psych: flat affect, guarded    MEDICATIONS  (STANDING):  cefTRIAXone   IVPB 1000 milliGRAM(s) IV Intermittent every 24 hours  digoxin     Tablet 0.125 milliGRAM(s) Oral daily    MEDICATIONS  (PRN):      No Known Allergies      LABS:                        10.0   5.96  )-----------( 43       ( 07 Oct 2019 07:00 )             31.1     10-07    137  |  103  |  18  ----------------------------<  93  3.7   |  26  |  0.57    Ca    8.6      07 Oct 2019 07:00  Phos  2.7     10-07  Mg     1.8     10-07    TPro  7.0  /  Alb  3.3  /  TBili  1.4<H>  /  DBili  x   /  AST  30  /  ALT  10  /  AlkPhos  49  10-06    PT/INR - ( 06 Oct 2019 06:55 )   PT: 14.2 SEC;   INR: 1.24          PTT - ( 06 Oct 2019 06:55 )  PTT:25.6 SEC Fibrinogen Assay: 453.3 mg/dL (10-07 @ 07:00)  Haptoglobin, Serum: 15 mg/dL (10-07 @ 07:00)  Lactate Dehydrogenase, Serum: 257 U/L (10-07 @ 07:00)    Urinalysis Basic - ( 05 Oct 2019 17:30 )    Color: YELLOW / Appearance: Lt TURBID / S.025 / pH: 6.5  Gluc: NEGATIVE / Ketone: MODERATE  / Bili: NEGATIVE / Urobili: TRACE   Blood: MODERATE / Protein: 20 / Nitrite: NEGATIVE   Leuk Esterase: LARGE / RBC: 6-10 / WBC 11-25   Sq Epi: FEW / Non Sq Epi: x / Bacteria: NEGATIVE    Haptoglobin, Serum (10.07.19 @ 07:00)    Haptoglobin, Serum: 15 mg/dL    Vitamin B12, Serum in AM (10.07.19 @ :00)    Vitamin B12, Serum: 195 pg/mL    Lactate Dehydrogenase, Serum (10.07.19 @ :00)    Lactate Dehydrogenase, Serum: 257 U/L    Fibrinogen Assay: 453.3 mg/dL (10.07.19 @ 07:00)  D-Dimer Assay, Quantitative: 3198: A result less than 230 ng/mL DDU correlates with the absence  of thrombosis in a patient with low and moderate pre-test  probability of thrombosis.              RADIOLOGY & ADDITIONAL TESTS:    < from: CT Head No Cont (10.06.19 @ 10:41) >    EXAM:  CT BRAIN        PROCEDURE DATE:  Oct  6 2019         INTERPRETATION:  Clinical indications: Follow-up subdural hematoma.    Multiple axial sections were performed from base of skull to vertex   without contrast enhancement. Coronal and sagittal reconstructions were   performed as well    This exam is compared with prior noncontrast head CT performed on .    Parenchymal volume loss and chronic microvascular ischemic changes are   again seen.    Again seen is extra-axial area low-attenuation involving the left frontal   region with adjacent scalloping of bone. This finding measures   approximately 4.3 x 3.1 cm compatible with arachnoid cyst.    Tiny extra-axial area of high attenuation is again suspected involving   theleft frontal region. This finding measures approximately 0.3 cm   widest diameter. This is likely compatible with a small acute subdural   hematoma. No significant shift or herniation is seen.    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    Sclerotic change seen involving the right mastoid which is likely   compatible with chronic inflammatory change.    Impression: Small subdural hematoma again seen.      ANA MARÍA CASTILLO M.D., ATTENDING RADIOLOGIST  This document has been electronically signed. Oct  6 2019 10:52AM      < end of copied text >

## 2019-10-07 NOTE — PROGRESS NOTE ADULT - PROBLEM SELECTOR PLAN 4
- patient denies dysuria or urinary symptom, yet elderly patient with confusion and positive U/A concerning for UTI  - will treat with ABx as above.   - f/u urine culture.

## 2019-10-07 NOTE — PROGRESS NOTE ADULT - ASSESSMENT
88 y/o male with PMHx of Afib on coumadin, DVT, HTN, and past thyroidectomy who presented with increasing confusion and bruises on extremities, found to have severe thrombocytopenia.    #Thrombocytopenia, anemia  - baseline around 80-100k  - acutely worsened to 20k range  - peripheral smear reviewed: many toxic granulated neutrophils seen, few platelets with large platelets seen, no schistocytes  - likely etiology acute worsening from sepsis/UTI, will need to treat underlying infection  - DIC panel shows elevated D-dimer and elevated fibrinogen; elevated fibrinogen makes DIC less likely  - haptoglobin decreased (15); bilirubin elevated, please check fractionated bilirubin, as suspect possible underlying hemolytic process; reticulocyte index 0.99, suggesting underlying hypoproliferation. In combination with low B12 levels, high suspicion for B12 deficiency, which would explain thrombocytopenia, anemia, and hemolytic picture. Discussed with primary team: please check MMA, homocysteine level, and intrinsic factor antibody. Once they are drawn, can be started on IM B12 (1,000 mcg QD IM for 7 days, then weekly for 1 month); don't start prior to drawing lab for intrinsic factor, as can result in false positive.  - would keep platelet goal >50 k in setting of subdural bleed  - hold all anti-platelets and anticoagulation  - continue to trend CBC w/ diff daily  - given chronic thrombocytopenia, possibility of underlying MDS as well; for now, would replete B12 and see response    #Encephelopathy  - likely secondary to infection, continue to treat with antibiotics  - subdural bleeds stable on repeat imaging  - frequent orientation, avoid sedating meds/benzos    Judson Bravo MD, MPH  Hematology-Oncology Fellow, PGY-4  [phone]

## 2019-10-07 NOTE — PROGRESS NOTE ADULT - SUBJECTIVE AND OBJECTIVE BOX
Patient is a 87y old  Male who presents with a chief complaint of AMS (06 Oct 2019 01:33)    SUBJECTIVE / OVERNIGHT EVENTS: patient seen and examined this AM, niece also by bedside  received 1 unit plt transfusion overnight  Pt reports feeling tired overall, but otherwise has no compliants, issues or questions. No HA, no visual issues, no numbness or weakness    MEDICATIONS  (STANDING):  cefTRIAXone   IVPB 1000 milliGRAM(s) IV Intermittent every 24 hours  cyanocobalamin Injectable 1000 MICROGram(s) IntraMuscular daily  digoxin     Tablet 0.125 milliGRAM(s) Oral daily  levETIRAcetam 500 milliGRAM(s) Oral two times a day    MEDICATIONS  (PRN):    Vital Signs Last 24 Hrs  T(C): 37.3 (07 Oct 2019 05:02), Max: 37.6 (07 Oct 2019 00:40)  T(F): 99.1 (07 Oct 2019 05:02), Max: 99.6 (07 Oct 2019 00:40)  HR: 74 (07 Oct 2019 05:02) (67 - 90)  BP: 120/60 (07 Oct 2019 05:02) (107/67 - 124/68)  BP(mean): --  RR: 18 (07 Oct 2019 05:02) (18 - 18)  SpO2: 98% (07 Oct 2019 05:02) (95% - 100%)    I&O's Detail    06 Oct 2019 07:01  -  07 Oct 2019 07:00  --------------------------------------------------------  IN:    Oral Fluid: 460 mL    Platelets - Single Donor: 217 mL  Total IN: 677 mL    OUT:    Voided: 400 mL  Total OUT: 400 mL    Total NET: 277 mL    CAPILLARY BLOOD GLUCOSE    PHYSICAL EXAM:  GENERAL: NAD, well-developed, sitting up in chair  HEAD:  Atraumatic, Normocephalic  EYES: EOMI, PERRLA, conjunctiva and sclera clear  CHEST/LUNG: Clear to auscultation bilaterally; No wheeze  HEART: irregular rate and rhythm;   ABDOMEN: Soft, Nontender, Nondistended; Bowel sounds present  EXTREMITIES:  2+ Peripheral Pulses, No clubbing, cyanosis, or edema  PSYCH: AAOx2-3,  confused , anxious ,   NEUROLOGY: non-focal  SKIN: ecchymosis on B/L UE     LABS:                         10.0   5.96  )-----------( 43       ( 07 Oct 2019 07:00 )             31.1     10-07    137  |  103  |  18  ----------------------------<  93  3.7   |  26  |  0.57    Ca    8.6      07 Oct 2019 07:00  Phos  2.7     10-07  Mg     1.8     10-07    TPro  7.0  /  Alb  3.3  /  TBili  1.4<H>  /  DBili  x   /  AST  30  /  ALT  10  /  AlkPhos  49  10-06    PT/INR - ( 06 Oct 2019 06:55 )   PT: 14.2 SEC;   INR: 1.24       PTT - ( 06 Oct 2019 06:55 )  PTT:25.6 SEC  Urinalysis Basic - ( 05 Oct 2019 17:30 )    Color: YELLOW / Appearance: Lt TURBID / S.025 / pH: 6.5  Gluc: NEGATIVE / Ketone: MODERATE  / Bili: NEGATIVE / Urobili: TRACE   Blood: MODERATE / Protein: 20 / Nitrite: NEGATIVE   Leuk Esterase: LARGE / RBC: 6-10 / WBC 11-25   Sq Epi: FEW / Non Sq Epi: x / Bacteria: NEGATIVE    CARDIAC MARKERS ( 07 Oct 2019 07:00 )  x     / x     / 80 u/L / x     / x      CARDIAC MARKERS ( 06 Oct 2019 06:55 )  x     / x     / 97 u/L / x     / x          RADIOLOGY & ADDITIONAL TESTS:    Imaging Personally Reviewed: < from: CT Head No Cont (10.06.19 @ 10:41) >  Parenchymal volume loss and chronic microvascular ischemic changes are   again seen.    Again seen is extra-axial area low-attenuation involving the left frontal   region with adjacent scalloping of bone. This finding measures   approximately 4.3 x 3.1 cm compatible with arachnoid cyst.    Tiny extra-axial area of high attenuation is again suspected involving   theleft frontal region. This finding measures approximately 0.3 cm   widest diameter. This is likely compatible with a small acute subdural   hematoma. No significant shift or herniation is seen.    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    Sclerotic change seen involving the right mastoid which is likely   compatible with chronic inflammatory change.    Impression: Small subdural hematoma again seen.        < end of copied text >  viewed:  < from: VA Duplex Ext Veins Lower Comp, Bilat. (10.06.19 @ 08:07) >  Summary/Impressions:  1. Non-occlusive deep vein thrombosis noted in the right  soleal vein.  2. No evidence of deep vein thrombosis in the left lower  extremities.    3. Superficial venous insufficiency noted in the left  great saphenous veins.  Results communicated with JESSE Cloud on 10/06/19 at 9  am with read back.    < end of copied text >    Consultant(s) Notes Reviewed:  Hematology

## 2019-10-07 NOTE — PHYSICAL THERAPY INITIAL EVALUATION ADULT - ADDITIONAL COMMENTS
Patient lives in a house with 3 steps to enter. Patient resides on first floor. Patient lives alone. Prior to last Sunday, patient reports independence with all functional mobility with use of a rolling walker/cane.     CT: SDH found on CT scan also can be contributory, yet with no mass effect or midline shift.   VA duplex: Non-occlusive deep vein thrombosis noted in the right soleal vein. No evidence of deep vein thrombosis in the left lower extremities. **Not on AC secondary to SDH** Ok for bed<>chair as per MELINDA Arango.  Patient left seated in bedside chair, all lines intact and MELINDA Arango aware.

## 2019-10-07 NOTE — PHYSICAL THERAPY INITIAL EVALUATION ADULT - PATIENT PROFILE REVIEW, REHAB EVAL
PT orders received: no formal activity order . Consult with MELINDA Arango, patient may participate in PT evaluation./yes

## 2019-10-07 NOTE — PROGRESS NOTE ADULT - PROBLEM SELECTOR PLAN 3
Hematology consulted, appreciate input and workup, follow up recs  - transfusing plt for goal >50 as above per heme

## 2019-10-07 NOTE — PROGRESS NOTE ADULT - ATTENDING COMMENTS
Pt seen and examined, agree with note as above with the following comment. Urine culture grew >100K Proteus, acute on chronic thrombocytopenia likely in the setting of infection and possible B12 deficiency. Will continue to monitor platelets and follow-up trend with you.

## 2019-10-07 NOTE — CHART NOTE - NSCHARTNOTEFT_GEN_A_CORE
Pt noted to be Bradycardic on Tele to 43 and then 39 bpm but rebounded to 70s, denied symptoms and BP remained stable  NO Hx of Bradycardia noted  On digoxin 0.125mg daily for Afib, Dig level was 0.7 on 10/5    a/P:   Continue to Monitor HR on Tele  Pt instructed notify RN if he develops symptoms  If HR dips low again, will putt Zoll and Atropine at bedside  Further evaluation in AM by primary team, EP Consult?

## 2019-10-08 NOTE — PROGRESS NOTE ADULT - PROBLEM SELECTOR PLAN 7
Afib on Coumadin and Digoxin  - [id number] score = 3  - c/w home digoxin (level = 0.7)  - hold AC given SDH above  - monitor on tele

## 2019-10-08 NOTE — PROGRESS NOTE ADULT - PROBLEM SELECTOR PLAN 1
Acute metabolic encephalopathy   - CBC w/ left shift and suspected positive UA, urine culture prelim with proteus, continue ceftriaxone and follow up culture and sensitivities when available  - SDH found on CT scan also can be contributory, yet with no mass effect or midline shift, less likely.   - Repeat CT Head  for changes in SDH done, no change   - NSG recs appreciated  - monitor VS  - Fall and aspiration precaution  - Per niece, patient near his baseline, significantly improved from admission

## 2019-10-08 NOTE — PROGRESS NOTE ADULT - SUBJECTIVE AND OBJECTIVE BOX
Patient is a 87y old  Male who presents with a chief complaint of AMS (06 Oct 2019 01:33)    SUBJECTIVE / OVERNIGHT EVENTS: Overnight, asymptomatic episodes with HR in 30s detected on tele, this AM, patient awake, denies any pain or complaints, denies any bleeding, no other issues    MEDICATIONS  (STANDING):  atropine Injectable 0.5 milliGRAM(s) IV Push once  cefTRIAXone   IVPB 1000 milliGRAM(s) IV Intermittent every 24 hours  cyanocobalamin Injectable 1000 MICROGram(s) IntraMuscular daily  digoxin     Tablet 0.125 milliGRAM(s) Oral daily  levETIRAcetam 500 milliGRAM(s) Oral two times a day    MEDICATIONS  (PRN):    Vital Signs Last 24 Hrs  T(C): 36.4 (08 Oct 2019 09:08), Max: 37.1 (07 Oct 2019 20:19)  T(F): 97.6 (08 Oct 2019 09:08), Max: 98.7 (07 Oct 2019 20:19)  HR: 69 (08 Oct 2019 09:08) (32 - 86)  BP: 140/67 (08 Oct 2019 09:08) (96/41 - 143/73)  BP(mean): --  RR: 18 (08 Oct 2019 09:08) (18 - 18)  SpO2: 96% (08 Oct 2019 09:08) (96% - 100%)    I&O's Detail    07 Oct 2019 07:01  -  08 Oct 2019 07:00  --------------------------------------------------------  IN:    Oral Fluid: 530 mL  Total IN: 530 mL    OUT:  Total OUT: 0 mL    Total NET: 530 mL    CAPILLARY BLOOD GLUCOSE    PHYSICAL EXAM:  GENERAL: NAD, laying in bed  HEAD:  Atraumatic, Normocephalic  EYES: EOMI, PERRLA, conjunctiva and sclera clear  CHEST/LUNG: Clear to auscultation bilaterally; No wheeze  HEART: irregular rate and rhythm;   ABDOMEN: Soft, Nontender, Nondistended; Bowel sounds present  EXTREMITIES:  2+ Peripheral Pulses, No clubbing, cyanosis, or edema  PSYCH: AAOx2-3,  confused , anxious ,   NEUROLOGY: non-focal  SKIN: ecchymosis on B/L UE     LABS:                         10.9   6.53  )-----------( 80       ( 08 Oct 2019 07:00 )             34.0     10-08    136  |  100  |  19  ----------------------------<  104<H>  3.9   |  25  |  0.62    Ca    8.9      08 Oct 2019 07:00  Phos  2.7     10-07  Mg     1.8     10-07    TPro  x   /  Alb  x   /  TBili  1.0  /  DBili  0.3<H>  /  AST  x   /  ALT  x   /  AlkPhos  x   10-08    CARDIAC MARKERS ( 07 Oct 2019 07:00 )  x     / x     / 80 u/L / x     / x        RADIOLOGY & ADDITIONAL TESTS:    Imaging Personally Reviewed: < from: CT Head No Cont (10.06.19 @ 10:41) >  Parenchymal volume loss and chronic microvascular ischemic changes are   again seen.    Again seen is extra-axial area low-attenuation involving the left frontal   region with adjacent scalloping of bone. This finding measures   approximately 4.3 x 3.1 cm compatible with arachnoid cyst.    Tiny extra-axial area of high attenuation is again suspected involving   theleft frontal region. This finding measures approximately 0.3 cm   widest diameter. This is likely compatible with a small acute subdural   hematoma. No significant shift or herniation is seen.    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    Sclerotic change seen involving the right mastoid which is likely   compatible with chronic inflammatory change.    Impression: Small subdural hematoma again seen.      < end of copied text >  viewed:  < from: VA Duplex Ext Veins Lower Comp, Bilat. (10.06.19 @ 08:07) >  Summary/Impressions:  1. Non-occlusive deep vein thrombosis noted in the right  soleal vein.  2. No evidence of deep vein thrombosis in the left lower  extremities.    3. Superficial venous insufficiency noted in the left  great saphenous veins.  Results communicated with JESSE Cloud on 10/06/19 at 9  am with read back.    < end of copied text >    Consultant(s) Notes Reviewed:  Hematology, NSG

## 2019-10-08 NOTE — PROGRESS NOTE ADULT - PROBLEM SELECTOR PLAN 5
Bradycardic to 30s on tele, asymptomatic at that time, multiple episodes on tele, appreciate EP recs.

## 2019-10-08 NOTE — PROGRESS NOTE ADULT - PROBLEM SELECTOR PLAN 2
SDH found on CT head in setting of thrombocytopenia and coumadin use.  - evaluated by NSG, appreciate recs, no surgical intervention, keppra 500 BID for seizure prophylaxis recommended, repeat CTH with stable SDH  - hold AC  - heme on board, transfused 2 units plt this hospitalization, plt 80 today, heme recommending goal >50

## 2019-10-08 NOTE — CONSULT NOTE ADULT - SUBJECTIVE AND OBJECTIVE BOX
87y Male was admitted on 10-05    Patient is a 87y old  Male who presents with a chief complaint of AMS (08 Oct 2019 10:27)    HPI:   86 y/o male with PMH of Afib on coumadin, DVT, HTN, and past thyroidectomy, who brought to the hospital by jihan Garnica ([phone])  for increasing confusion and bruises on extremities on 10/5. Patient is a poor historian History obtained from the chart. Patient had new bruising over arms and legs over the past two weeks and increasing confusion. In Ed, ct head suspicious for left frontal/parietal acute/subacute SDH. Patient was also found to be thrombocytopenic (21), s/p platelets transfusion x 2. NSx was consulted and recc no acute surgical intervention and started keppra. Repeat CT head stable. ON admission, Patient was also found to have UTI and started on abx. Heme/onc consulted for thrombocytopenia, DIC unlikely, attributed to likely infection from uti vs b12 defeciency vs MDS.  LE dopplers with non occlusive DVT on the rigth soleal.  During hospital course, patient also found to be bradycardic to the 30s.      TODAY'S SUBJECTIVE HX:    REVIEW OF SYSTEMS  Constitutional - No fever, No weight loss, No fatigue  HEENT - No eye pain, No visual disturbances, No difficulty hearing, No tinnitus, No vertigo, No neck pain  Respiratory - No cough, No wheezing, No shortness of breath  Cardiovascular - No chest pain, No palpitations  Gastrointestinal - No abdominal pain, No nausea, No vomiting, No diarrhea, No constipation  Genitourinary - No dysuria, No frequency, No hematuria, No incontinence  Neurological - No headaches, No memory loss, No loss of strength, No numbness, No tremors  Skin - No itching, No rashes, No lesions   Endocrine - No temperature intolerance  Musculoskeletal - No joint pain, No joint swelling, No muscle pain  Psychiatric - No depression, No anxiety    VITALS  T(C): 36.4 (10-08-19 @ 09:08), Max: 37.1 (10-07-19 @ 20:19)  HR: 69 (10-08-19 @ 09:08) (32 - 86)  BP: 140/67 (10-08-19 @ 09:08) (96/41 - 143/73)  RR: 18 (10-08-19 @ 09:08) (18 - 18)  SpO2: 96% (10-08-19 @ 09:08) (96% - 100%)  Wt(kg): --    PAST MEDICAL & SURGICAL HISTORY  Lung nodules  SVC syndrome  Acute DVT (deep venous thrombosis)  Essential hypertension  Atrial fibrillation, unspecified type  Cardiac complaint  H/O thyroidectomy  No significant past surgical history      SOCIAL HISTORY  Smoking - Denied  EtOH - Denied   Drugs - Denied    FUNCTIONAL HISTORY  lives in a house with 3 steps to enter. Patient resides on first floor. Patient lives alone. Prior to last Sunday, patient reports independence with all functional mobility with use of a rolling walker/cane    CURRENT FUNCTIONAL STATUS 10 /7  Transfers: min a with RW  Gait: CG with RW bed to chair  ADLs:    FAMILY HISTORY   No pertinent family history in first degree relatives  No pertinent family history in first degree relatives      RECENT LABS/IMAGING  CBC Full  -  ( 08 Oct 2019 07:00 )  WBC Count : 6.53 K/uL  RBC Count : 3.46 M/uL  Hemoglobin : 10.9 g/dL  Hematocrit : 34.0 %  Platelet Count - Automated : 80 K/uL  Mean Cell Volume : 98.3 fL  Mean Cell Hemoglobin : 31.5 pg  Mean Cell Hemoglobin Concentration : 32.1 %  Auto Neutrophil # : 4.90 K/uL  Auto Lymphocyte # : 0.75 K/uL  Auto Monocyte # : 0.80 K/uL  Auto Eosinophil # : 0.04 K/uL  Auto Basophil # : 0.02 K/uL  Auto Neutrophil % : 75.0 %  Auto Lymphocyte % : 11.5 %  Auto Monocyte % : 12.3 %  Auto Eosinophil % : 0.6 %  Auto Basophil % : 0.3 %    10-08    136  |  100  |  19  ----------------------------<  104<H>  3.9   |  25  |  0.62    Ca    8.9      08 Oct 2019 07:00  Phos  2.7     10-07  Mg     1.8     10-07    TPro  x   /  Alb  x   /  TBili  1.0  /  DBili  0.3<H>  /  AST  x   /  ALT  x   /  AlkPhos  x   10-08        ALLERGIES  No Known Allergies      MEDICATIONS   atropine Injectable 0.5 milliGRAM(s) IV Push once  cefTRIAXone   IVPB 1000 milliGRAM(s) IV Intermittent every 24 hours  cyanocobalamin Injectable 1000 MICROGram(s) IntraMuscular daily  digoxin     Tablet 0.125 milliGRAM(s) Oral daily  levETIRAcetam 500 milliGRAM(s) Oral two times a day 87y Male was admitted on 10-05    Patient is a 87y old  Male who presents with a chief complaint of AMS (08 Oct 2019 10:27)    HPI:   88 y/o male with PMH of Afib on coumadin, DVT, HTN, and past thyroidectomy, who brought to the hospital by jihan Garnica ([phone])  for increasing confusion and bruises on extremities on 10/5. Patient is a poor historian History obtained from the chart. Patient had new bruising over arms and legs over the past two weeks and increasing confusion. In Ed, ct head suspicious for left frontal/parietal acute/subacute SDH. Patient was also found to be thrombocytopenic (21), s/p platelets transfusion x 2. NSx was consulted and recc no acute surgical intervention and started keppra. Repeat CT head stable. ON admission, Patient was also found to have UTI and started on abx. Heme/onc consulted for thrombocytopenia, DIC unlikely, attributed to likely infection from uti vs b12 defeciency vs MDS.  LE dopplers with non occlusive DVT on the rigth soleal.  During hospital course, patient also found to be bradycardic to the 30s.      TODAY'S SUBJECTIVE HX:  Patient denies any complaints at this time. last bm was yesterday am as per nephew.    REVIEW OF SYSTEMS  Constitutional - No fever,   HEENT - No eye pain, No visual disturbances,   Respiratory - No cough, No wheezing, No shortness of breath  Cardiovascular - No chest pain, No palpitations  Gastrointestinal - No abdominal pain, No nausea, No vomiting, No diarrhea, No constipation  Genitourinary - No dysuria,   Neurological - No headaches, No memory loss, No loss of strength, No numbness  Musculoskeletal - No joint pain, No joint swelling, No muscle pain  Psychiatric - No depression, No anxiety    VITALS  T(C): 36.4 (10-08-19 @ 09:08), Max: 37.1 (10-07-19 @ 20:19)  HR: 69 (10-08-19 @ 09:08) (32 - 86)  BP: 140/67 (10-08-19 @ 09:08) (96/41 - 143/73)  RR: 18 (10-08-19 @ 09:08) (18 - 18)  SpO2: 96% (10-08-19 @ 09:08) (96% - 100%)  Wt(kg): --    PAST MEDICAL & SURGICAL HISTORY  Lung nodules  SVC syndrome  Acute DVT (deep venous thrombosis)  Essential hypertension  Atrial fibrillation, unspecified type  Cardiac complaint  H/O thyroidectomy  No significant past surgical history      SOCIAL HISTORY  Smoking - Denied  EtOH - Denied   Drugs - Denied    FUNCTIONAL HISTORY  lives in a house alone with 3 steps to enter with rail and no steps inside. PTA, patient was independent with all functional mobility with use of a rolling walker/cane, medication management and adls.    CURRENT FUNCTIONAL STATUS 10 /7  Transfers: min a with RW  Gait: CG with RW bed to chair      FAMILY HISTORY   No pertinent family history in first degree relatives  No pertinent family history in first degree relatives      RECENT LABS/IMAGING  CBC Full  -  ( 08 Oct 2019 07:00 )  WBC Count : 6.53 K/uL  RBC Count : 3.46 M/uL  Hemoglobin : 10.9 g/dL  Hematocrit : 34.0 %  Platelet Count - Automated : 80 K/uL  Mean Cell Volume : 98.3 fL  Mean Cell Hemoglobin : 31.5 pg  Mean Cell Hemoglobin Concentration : 32.1 %  Auto Neutrophil # : 4.90 K/uL  Auto Lymphocyte # : 0.75 K/uL  Auto Monocyte # : 0.80 K/uL  Auto Eosinophil # : 0.04 K/uL  Auto Basophil # : 0.02 K/uL  Auto Neutrophil % : 75.0 %  Auto Lymphocyte % : 11.5 %  Auto Monocyte % : 12.3 %  Auto Eosinophil % : 0.6 %  Auto Basophil % : 0.3 %    10-08    136  |  100  |  19  ----------------------------<  104<H>  3.9   |  25  |  0.62    Ca    8.9      08 Oct 2019 07:00  Phos  2.7     10-07  Mg     1.8     10-07    TPro  x   /  Alb  x   /  TBili  1.0  /  DBili  0.3<H>  /  AST  x   /  ALT  x   /  AlkPhos  x   10-08        ALLERGIES  No Known Allergies      MEDICATIONS   atropine Injectable 0.5 milliGRAM(s) IV Push once  cefTRIAXone   IVPB 1000 milliGRAM(s) IV Intermittent every 24 hours  cyanocobalamin Injectable 1000 MICROGram(s) IntraMuscular daily  digoxin     Tablet 0.125 milliGRAM(s) Oral daily  levETIRAcetam 500 milliGRAM(s) Oral two times a day    ----------------------------------------------------------------------------------------  PHYSICAL EXAM  Constitutional - NAD, Comfortable  HEENT - NCAT, EOMI  Neck - Supple, No limited ROM  Chest - Breathing comfortably,   Cardiovascular - S1S2   Abdomen - Soft   Extremities - b/l upper and le swelling  Neurologic Exam -                    Cognitive - Awake, Alert, AAO to self, year. Requires time to answer correctly. Knows he is in hospital. States he is here because he is "sick"     Memory -does not recall 3 objects immediately     Communication - Fluent, No dysarthria     Cranial Nerves - CN 2-12 intact     Motor - No focal deficits                    LEFT    UE - ShAB 5/5, EF 5/5, EE 5/5, WE 5/5,  5/5                    RIGHT UE - ShAB 5/5, EF 5/5, EE 5/5, WE 5/5,  5/5                    LEFT    LE - HF 5/5, KE 5/5, DF 5/5, PF 5/5                    RIGHT LE - HF 5/5, KE 5/5, DF 5/5, PF 5/5        Sensory - Intact to LT     Reflexes - DTR Intact, No primitive reflexive  Psychiatric - Flast affect    ------------------------------------------------------------------------------------------------  ASSESSMENT/PLAN  87yMale with PMH of Afib on coumadin, DVT, HTN, and past thyroidectomy with functional deficits after left frontal parietal SDH, thrombocytopenia (s/p platelet transfusion), and UTI, right soleal dvt  -SDH- stable on repeat imagin, anticoagulation on hold. If no seizure activity, consider dc ppx with keppra after total course 7 days.  -UTI - on abx  -Thrombocytopenia : as per heme/onc, goal platelets >50, ? 2/2 to b12 defeciency  -Bradycardia - consider cardio consult for possible PPM eval  DVT PPX - SCDs    Disposition   PT - ROM, Bed Mob, Transfers, Amb with AD   OT - ADLs, ROM  SLP -cognitive  Precautions - Falls, Cardiac  Skin - Turn Q2hrs  Diet -  soft     Rehab -   When patient is medically stable, patient would benefit from course acute IPR. 87y Male was admitted on 10-05    Patient is a 87y old  Male who presents with a chief complaint of AMS (08 Oct 2019 10:27)    HPI:   86 y/o male with PMH of Afib on coumadin, DVT, HTN, and past thyroidectomy, who brought to the hospital by jihan Garnica ([phone])  for increasing confusion and bruises on extremities on 10/5. Patient is a poor historian History obtained from the chart. Patient had new bruising over arms and legs over the past two weeks and increasing confusion. In Ed, ct head suspicious for left frontal/parietal acute/subacute SDH. Patient was also found to be thrombocytopenic (21), s/p platelets transfusion x 2. NSx was consulted and recc no acute surgical intervention and started keppra. Repeat CT head stable. ON admission, Patient was also found to have UTI and started on abx. Heme/onc consulted for thrombocytopenia, DIC unlikely, attributed to likely infection from uti vs b12 defeciency vs MDS.  LE dopplers with non occlusive DVT on the rigth soleal.  During hospital course, patient also found to be bradycardic to the 30s.      TODAY'S SUBJECTIVE HX:  Patient denies any complaints at this time. last bm was yesterday am as per nephew.    REVIEW OF SYSTEMS  Constitutional - No fever,   HEENT - No eye pain, No visual disturbances,   Respiratory - No cough, No wheezing, No shortness of breath  Cardiovascular - No chest pain, No palpitations  Gastrointestinal - No abdominal pain, No nausea, No vomiting, No diarrhea, No constipation  Genitourinary - No dysuria,   Neurological - No headaches, No memory loss, No loss of strength, No numbness  Musculoskeletal - No joint pain, No joint swelling, No muscle pain  Psychiatric - No depression, No anxiety    VITALS  T(C): 36.4 (10-08-19 @ 09:08), Max: 37.1 (10-07-19 @ 20:19)  HR: 69 (10-08-19 @ 09:08) (32 - 86)  BP: 140/67 (10-08-19 @ 09:08) (96/41 - 143/73)  RR: 18 (10-08-19 @ 09:08) (18 - 18)  SpO2: 96% (10-08-19 @ 09:08) (96% - 100%)  Wt(kg): --    PAST MEDICAL & SURGICAL HISTORY  Lung nodules  SVC syndrome  Acute DVT (deep venous thrombosis)  Essential hypertension  Atrial fibrillation, unspecified type  Cardiac complaint  H/O thyroidectomy  No significant past surgical history      SOCIAL HISTORY  Smoking - Denied  EtOH - Denied   Drugs - Denied    FUNCTIONAL HISTORY  lives in a house alone with 3 steps to enter with rail and no steps inside. PTA, patient was independent with all functional mobility with use of a rolling walker/cane, medication management and adls.    CURRENT FUNCTIONAL STATUS 10 /7  Transfers: min a with RW  Gait: CG with RW bed to chair      FAMILY HISTORY   No pertinent family history in first degree relatives  No pertinent family history in first degree relatives      RECENT LABS/IMAGING  CBC Full  -  ( 08 Oct 2019 07:00 )  WBC Count : 6.53 K/uL  RBC Count : 3.46 M/uL  Hemoglobin : 10.9 g/dL  Hematocrit : 34.0 %  Platelet Count - Automated : 80 K/uL  Mean Cell Volume : 98.3 fL  Mean Cell Hemoglobin : 31.5 pg  Mean Cell Hemoglobin Concentration : 32.1 %  Auto Neutrophil # : 4.90 K/uL  Auto Lymphocyte # : 0.75 K/uL  Auto Monocyte # : 0.80 K/uL  Auto Eosinophil # : 0.04 K/uL  Auto Basophil # : 0.02 K/uL  Auto Neutrophil % : 75.0 %  Auto Lymphocyte % : 11.5 %  Auto Monocyte % : 12.3 %  Auto Eosinophil % : 0.6 %  Auto Basophil % : 0.3 %    10-08    136  |  100  |  19  ----------------------------<  104<H>  3.9   |  25  |  0.62    Ca    8.9      08 Oct 2019 07:00  Phos  2.7     10-07  Mg     1.8     10-07    TPro  x   /  Alb  x   /  TBili  1.0  /  DBili  0.3<H>  /  AST  x   /  ALT  x   /  AlkPhos  x   10-08        ALLERGIES  No Known Allergies      MEDICATIONS   atropine Injectable 0.5 milliGRAM(s) IV Push once  cefTRIAXone   IVPB 1000 milliGRAM(s) IV Intermittent every 24 hours  cyanocobalamin Injectable 1000 MICROGram(s) IntraMuscular daily  digoxin     Tablet 0.125 milliGRAM(s) Oral daily  levETIRAcetam 500 milliGRAM(s) Oral two times a day    ----------------------------------------------------------------------------------------  PHYSICAL EXAM  Constitutional - NAD, Comfortable  HEENT - NCAT, EOMI  Neck - Supple, No limited ROM  Chest - Breathing comfortably,   Cardiovascular - S1S2   Abdomen - Soft   Extremities - b/l upper and le swelling  Neurologic Exam -                    Cognitive - Awake, Alert, AAO to self, year. Requires time to answer correctly. Knows he is in hospital. States he is here because he is "sick"     Memory -does not recall 3 objects immediately     Communication - Fluent, No dysarthria     Cranial Nerves - CN 2-12 intact     Motor - No focal deficits                    LEFT    UE - ShAB 5/5, EF 5/5, EE 5/5, WE 5/5,  5/5                    RIGHT UE - ShAB 5/5, EF 5/5, EE 5/5, WE 5/5,  5/5                    LEFT    LE - HF 5/5, KE 5/5, DF 5/5, PF 5/5                    RIGHT LE - HF 5/5, KE 5/5, DF 5/5, PF 5/5        Sensory - Intact to LT     Reflexes - DTR Intact, No primitive reflexive  Psychiatric - Flat affect    ------------------------------------------------------------------------------------------------  ASSESSMENT/PLAN  87yMale with PMH of Afib on coumadin, DVT, HTN, and past thyroidectomy with functional deficits after left frontal parietal SDH, thrombocytopenia (s/p platelet transfusion), and UTI, right soleal dvt  -SDH- stable on repeat imaging, anticoagulation on hold.  -UTI - on abx  -Thrombocytopenia : as per heme/onc, goal platelets >50, ? 2/2 to b12 deficiency  -Bradycardia - consider cardio consult for possible PPM eval  DVT PPX - SCDs    PT - ROM, Bed Mob, Transfers, Amb with AD   OT - ADLs, ROM  SLP -cognitive  Precautions - Falls, Cardiac  Skin - Turn Q2hrs  Diet -  soft     Rehab -   When patient is medically stable, patient would benefit from course acute IPR.  pending progress with bedside therapy, recommend acute inpatient rehab. Patient can tolerate 3 hours of therapy and requires medical supervision. 87y Male was admitted on 10-05    Patient is a 87y old  Male who presents with a chief complaint of AMS (08 Oct 2019 10:27)    HPI:   88 y/o male with PMH of Afib on coumadin, DVT, HTN, and past thyroidectomy, who brought to the hospital by jihan Garnica ([phone])  for increasing confusion and bruises on extremities on 10/5. Patient is a poor historian History obtained from the chart. Patient had new bruising over arms and legs over the past two weeks and increasing confusion. In Ed, ct head suspicious for left frontal/parietal acute/subacute SDH. Patient was also found to be thrombocytopenic (21), s/p platelets transfusion x 2. NSx was consulted and recc no acute surgical intervention and started keppra. Repeat CT head stable. ON admission, Patient was also found to have UTI and started on abx. Heme/onc consulted for thrombocytopenia, DIC unlikely, attributed to likely infection from uti vs b12 defeciency vs MDS.  LE dopplers with non occlusive DVT on the rigth soleal.  During hospital course, patient also found to be bradycardic to the 30s.      TODAY'S SUBJECTIVE HX:  Patient denies any complaints at this time. last bm was yesterday am as per nephew.    REVIEW OF SYSTEMS  Constitutional - No fever,   HEENT - No eye pain, No visual disturbances,   Respiratory - No cough, No wheezing, No shortness of breath  Cardiovascular - No chest pain, No palpitations  Gastrointestinal - No abdominal pain, No nausea, No vomiting, No diarrhea, No constipation  Genitourinary - No dysuria,   Neurological - No headaches, No memory loss, No loss of strength, No numbness  Musculoskeletal - No joint pain, No joint swelling, No muscle pain  Psychiatric - No depression, No anxiety    VITALS  T(C): 36.4 (10-08-19 @ 09:08), Max: 37.1 (10-07-19 @ 20:19)  HR: 69 (10-08-19 @ 09:08) (32 - 86)  BP: 140/67 (10-08-19 @ 09:08) (96/41 - 143/73)  RR: 18 (10-08-19 @ 09:08) (18 - 18)  SpO2: 96% (10-08-19 @ 09:08) (96% - 100%)  Wt(kg): --    PAST MEDICAL & SURGICAL HISTORY  Lung nodules  SVC syndrome  Acute DVT (deep venous thrombosis)  Essential hypertension  Atrial fibrillation, unspecified type  Cardiac complaint  H/O thyroidectomy  No significant past surgical history      SOCIAL HISTORY  Smoking - Denied  EtOH - Denied   Drugs - Denied    FUNCTIONAL HISTORY  lives in a house alone with 3 steps to enter with rail and no steps inside. PTA, patient was independent with all functional mobility with use of a rolling walker/cane, medication management and adls.    CURRENT FUNCTIONAL STATUS 10 /7  Transfers: min a with RW  Gait: CG with RW bed to chair      FAMILY HISTORY   No pertinent family history in first degree relatives  No pertinent family history in first degree relatives      RECENT LABS/IMAGING  CBC Full  -  ( 08 Oct 2019 07:00 )  WBC Count : 6.53 K/uL  RBC Count : 3.46 M/uL  Hemoglobin : 10.9 g/dL  Hematocrit : 34.0 %  Platelet Count - Automated : 80 K/uL  Mean Cell Volume : 98.3 fL  Mean Cell Hemoglobin : 31.5 pg  Mean Cell Hemoglobin Concentration : 32.1 %  Auto Neutrophil # : 4.90 K/uL  Auto Lymphocyte # : 0.75 K/uL  Auto Monocyte # : 0.80 K/uL  Auto Eosinophil # : 0.04 K/uL  Auto Basophil # : 0.02 K/uL  Auto Neutrophil % : 75.0 %  Auto Lymphocyte % : 11.5 %  Auto Monocyte % : 12.3 %  Auto Eosinophil % : 0.6 %  Auto Basophil % : 0.3 %    10-08    136  |  100  |  19  ----------------------------<  104<H>  3.9   |  25  |  0.62    Ca    8.9      08 Oct 2019 07:00  Phos  2.7     10-07  Mg     1.8     10-07    TPro  x   /  Alb  x   /  TBili  1.0  /  DBili  0.3<H>  /  AST  x   /  ALT  x   /  AlkPhos  x   10-08        ALLERGIES  No Known Allergies      MEDICATIONS   atropine Injectable 0.5 milliGRAM(s) IV Push once  cefTRIAXone   IVPB 1000 milliGRAM(s) IV Intermittent every 24 hours  cyanocobalamin Injectable 1000 MICROGram(s) IntraMuscular daily  digoxin     Tablet 0.125 milliGRAM(s) Oral daily  levETIRAcetam 500 milliGRAM(s) Oral two times a day    ----------------------------------------------------------------------------------------  PHYSICAL EXAM  Constitutional - NAD, Comfortable  HEENT - NCAT, EOMI  Neck - Supple, No limited ROM  Chest - Breathing comfortably,   Cardiovascular - S1S2   Abdomen - Soft   Extremities - b/l upper and le swelling  Neurologic Exam -                    Cognitive - Awake, Alert, AAO to self, year. Requires time to answer correctly. Knows he is in hospital. States he is here because he is "sick"     Memory -does not recall 3 objects immediately     Communication - Fluent, No dysarthria     Cranial Nerves - CN 2-12 intact     Motor - No focal deficits                    LEFT    UE - ShAB 5/5, EF 5/5, EE 5/5, WE 5/5,  5/5                    RIGHT UE - ShAB 5/5, EF 5/5, EE 5/5, WE 5/5,  5/5                    LEFT    LE - HF 5/5, KE 5/5, DF 5/5, PF 5/5                    RIGHT LE - HF 5/5, KE 5/5, DF 5/5, PF 5/5        Sensory - Intact to LT     Reflexes - DTR Intact, No primitive reflexive  Psychiatric - Flat affect    ------------------------------------------------------------------------------------------------  ASSESSMENT/PLAN  87yMale with PMH of Afib on coumadin, DVT, HTN, and past thyroidectomy with functional deficits after left frontal parietal SDH, thrombocytopenia (s/p platelet transfusion), and UTI, right soleal dvt  -SDH- stable on repeat imaging, anticoagulation on hold.  -UTI - on abx  -Thrombocytopenia : as per heme/onc, goal platelets >50, ? 2/2 to b12 deficiency  -Bradycardia - EP following  DVT PPX - SCDs    PT - ROM, Bed Mob, Transfers, Amb with AD   OT - ADLs, ROM  SLP -cognitive  Precautions - Falls, Cardiac  Skin - Turn Q2hrs  Diet -  soft     Rehab -   When patient is medically stable, patient would benefit from course acute IPR.  pending progress with bedside therapy, recommend acute inpatient rehab. Patient can tolerate 3 hours of therapy and requires medical supervision. 87y Male was admitted on 10-05    Patient is a 87y old  Male who presents with a chief complaint of AMS (08 Oct 2019 10:27)    HPI:   86 y/o male with PMH of Afib on coumadin, DVT, HTN, and past thyroidectomy, who brought to the hospital by jihan Garnica ([phone])  for increasing confusion and bruises on extremities on 10/5. Patient is a poor historian History obtained from the chart. Patient had new bruising over arms and legs over the past two weeks and increasing confusion. In Ed, ct head suspicious for left frontal/parietal acute/subacute SDH. Patient was also found to be thrombocytopenic (21), s/p platelets transfusion x 2. NSx was consulted and recc no acute surgical intervention and started keppra. Repeat CT head stable. ON admission, Patient was also found to have UTI and started on abx. Heme/onc consulted for thrombocytopenia, DIC unlikely, attributed to likely infection from uti vs b12 defeciency vs MDS.  LE dopplers with non occlusive DVT on the rigth soleal.  During hospital course, patient also found to be bradycardic to the 30s.      TODAY'S SUBJECTIVE HX:  Patient denies any complaints at this time. last bm was yesterday am as per nephew.  Reports nausea today.    REVIEW OF SYSTEMS  Constitutional - No fever,   HEENT - No eye pain, No visual disturbances,   Respiratory - No cough, No wheezing, No shortness of breath  Cardiovascular - No chest pain, No palpitations  Gastrointestinal - No abdominal pain, + nausea, No vomiting, No diarrhea, No constipation  Genitourinary - No dysuria,   Neurological - No headaches, No memory loss, No loss of strength, No numbness  Musculoskeletal - No joint pain, No joint swelling, No muscle pain  Psychiatric - No depression, No anxiety    VITALS  T(C): 36.4 (10-08-19 @ 09:08), Max: 37.1 (10-07-19 @ 20:19)  HR: 69 (10-08-19 @ 09:08) (32 - 86)  BP: 140/67 (10-08-19 @ 09:08) (96/41 - 143/73)  RR: 18 (10-08-19 @ 09:08) (18 - 18)  SpO2: 96% (10-08-19 @ 09:08) (96% - 100%)  Wt(kg): --    PAST MEDICAL & SURGICAL HISTORY  Lung nodules  SVC syndrome  Acute DVT (deep venous thrombosis)  Essential hypertension  Atrial fibrillation, unspecified type  Cardiac complaint  H/O thyroidectomy  No significant past surgical history      SOCIAL HISTORY  Smoking - Denied  EtOH - Denied   Drugs - Denied    FUNCTIONAL HISTORY  lives in a house alone with 3 steps to enter with rail and no steps inside. PTA, patient was independent with all functional mobility with use of a rolling walker/cane, medication management and adls.    CURRENT FUNCTIONAL STATUS 10 /7  Transfers: min a with RW  Gait: CG with RW bed to chair    declined functional exam today due to nausea    FAMILY HISTORY   No pertinent family history in first degree relatives  No pertinent family history in first degree relatives      RECENT LABS/IMAGING  CBC Full  -  ( 08 Oct 2019 07:00 )  WBC Count : 6.53 K/uL  RBC Count : 3.46 M/uL  Hemoglobin : 10.9 g/dL  Hematocrit : 34.0 %  Platelet Count - Automated : 80 K/uL  Mean Cell Volume : 98.3 fL  Mean Cell Hemoglobin : 31.5 pg  Mean Cell Hemoglobin Concentration : 32.1 %  Auto Neutrophil # : 4.90 K/uL  Auto Lymphocyte # : 0.75 K/uL  Auto Monocyte # : 0.80 K/uL  Auto Eosinophil # : 0.04 K/uL  Auto Basophil # : 0.02 K/uL  Auto Neutrophil % : 75.0 %  Auto Lymphocyte % : 11.5 %  Auto Monocyte % : 12.3 %  Auto Eosinophil % : 0.6 %  Auto Basophil % : 0.3 %    10-08    136  |  100  |  19  ----------------------------<  104<H>  3.9   |  25  |  0.62    Ca    8.9      08 Oct 2019 07:00  Phos  2.7     10-07  Mg     1.8     10-07    TPro  x   /  Alb  x   /  TBili  1.0  /  DBili  0.3<H>  /  AST  x   /  ALT  x   /  AlkPhos  x   10-08        ALLERGIES  No Known Allergies      MEDICATIONS   atropine Injectable 0.5 milliGRAM(s) IV Push once  cefTRIAXone   IVPB 1000 milliGRAM(s) IV Intermittent every 24 hours  cyanocobalamin Injectable 1000 MICROGram(s) IntraMuscular daily  digoxin     Tablet 0.125 milliGRAM(s) Oral daily  levETIRAcetam 500 milliGRAM(s) Oral two times a day    ----------------------------------------------------------------------------------------  PHYSICAL EXAM  Constitutional - NAD, Comfortable  HEENT - NCAT, EOMI  Neck - Supple, No limited ROM  Chest - Breathing comfortably,   Cardiovascular - S1S2   Abdomen - Soft   Extremities - b/l upper and le swelling  Neurologic Exam -                    Cognitive - Awake, Alert, AAO to self, year. Requires time to answer correctly. Knows he is in hospital. States he is here because he is "sick"     Memory -does not recall 3 objects immediately     Communication - Fluent, No dysarthria     Cranial Nerves - CN 2-12 intact     Motor - No focal deficits                    LEFT    UE - ShAB 5/5, EF 5/5, EE 5/5, WE 5/5,  5/5                    RIGHT UE - ShAB 5/5, EF 5/5, EE 5/5, WE 5/5,  5/5                    LEFT    LE - HF 5/5, KE 5/5, DF 5/5, PF 5/5                    RIGHT LE - HF 5/5, KE 5/5, DF 5/5, PF 5/5        Sensory - Intact to LT     Reflexes - DTR Intact, No primitive reflexive  Psychiatric - Flat affect    ------------------------------------------------------------------------------------------------  ASSESSMENT/PLAN  87yMale with PMH of Afib on coumadin, DVT, HTN, and past thyroidectomy with functional deficits after left frontal parietal SDH, thrombocytopenia (s/p platelet transfusion), and UTI, right soleal dvt  -SDH- stable on repeat imaging, anticoagulation on hold.  -UTI - on abx  -Thrombocytopenia : as per heme/onc, goal platelets >50, ? 2/2 to b12 deficiency  -Bradycardia - EP following  DVT PPX - SCDs    PT - ROM, Bed Mob, Transfers, Amb with AD   OT - ADLs, ROM  SLP -cognitive  Precautions - Falls, Cardiac  Skin - Turn Q2hrs  Diet -  soft     Rehab -   When patient is medically stable, patient would benefit from course acute IPR.  pending progress with bedside therapy, recommend acute inpatient rehab. Patient can tolerate 3 hours of therapy and requires medical supervision.

## 2019-10-08 NOTE — CONSULT NOTE ADULT - SUBJECTIVE AND OBJECTIVE BOX
Patient is a 87y old  Male who presents with a chief complaint of AMS (08 Oct 2019 11:32)          HPI:  87 year old male with pmh of HTN, chronic AFib-on Coumadin past few years, SVC syndrome, thyroidectomy, DVT and lung nodule presented with alerted mental status.  Patient was found have severe thrombocytopenia required platelet transfusion.  On telemetry patient was noted to have episodes of AF with slow ventricular rate down to 40 and pauses up to 2.7 seconds today.  Patient reports "nausea and vomiting" around the time of bradycardia.  Of note he has been on Digoxin therapy for rate control with HR trend in 60-70.  Patient denies near syncope or syncope.  However, patient has poor historian ?poor memory.  CT of brain showed small subdural hematoma, but patient denies fall history. He denies CP/SOB or palpitations.          PAST MEDICAL & SURGICAL HISTORY:  Lung nodules  SVC syndrome  Acute DVT (deep venous thrombosis)  Essential hypertension  Atrial fibrillation, unspecified type  H/O thyroidectomy      MEDICATIONS  (STANDING):  atropine Injectable 0.5 milliGRAM(s) IV Push once  cefTRIAXone   IVPB 1000 milliGRAM(s) IV Intermittent every 24 hours  cyanocobalamin Injectable 1000 MICROGram(s) IntraMuscular daily  digoxin     Tablet 0.125 milliGRAM(s) Oral daily  levETIRAcetam 500 milliGRAM(s) Oral two times a day    MEDICATIONS  (PRN):    Allergies    No Known Allergies    Intolerances      FAMILY HISTORY:  No pertinent family history in first degree relatives      SOCIAL HISTORY:  Denies smoking; no   Alcohol  or  Drug abuse     REVIEW OF SYSTEMS:    CONSTITUTIONAL: No fever, weight loss, chills, shakes, or fatigue  EYES: No eye pain, visual disturbances, or discharge  ENMT:  No difficulty hearing, tinnitus, vertigo; No sinus or throat pain  NECK: No pain or stiffness  RESPIRATORY: No cough, wheezing, hemoptysis, or shortness of breath  CARDIOVASCULAR: No chest pain, dyspnea, palpitations, dizziness, syncope, paroxysmal nocturnal dyspnea, orthopnea, or arm + leg swelling  GASTROINTESTINAL: No abdominal  or epigastric pain, +nausea, vomiting, hematemesis, diarrhea, constipation, melena or bright red blood.  GENITOURINARY: No dysuria, nocturia, hematuria, or urinary incontinence  NEUROLOGICAL: No headaches,  slurred speech, limb weakness, loss of strength, numbness, or tremors +memory loss, +confusion  SKIN: No itching, burning, rashes, or lesions   LYMPH NODES: No enlarged glands  ENDOCRINE: No heat or cold intolerance, or hair loss  MUSCULOSKELETAL: No joint pain or swelling, muscle, back, or extremity pain  PSYCHIATRIC: No depression, anxiety, or difficulty sleeping  HEME/LYMPH: No or bleeding gums+ easy bruising   ALLERY AND IMMUNOLOGIC: No hives or rash.      Vital Signs Last 24 Hrs  T(C): 36.4 (08 Oct 2019 12:56), Max: 37.1 (07 Oct 2019 20:19)  T(F): 97.6 (08 Oct 2019 12:56), Max: 98.7 (07 Oct 2019 20:19)  HR: 54 (08 Oct 2019 12:56) (32 - 78)  BP: 138/70 (08 Oct 2019 12:56) (100/42 - 143/73)  BP(mean): --  RR: 18 (08 Oct 2019 12:56) (18 - 18)  SpO2: 94% (08 Oct 2019 12:56) (94% - 100%)    PHYSICAL EXAM:    GENERAL: In no apparent distress, well nourished, and hydrated.  HEAD:  Atraumatic, Normocephalic  NECK: Supple . No JVD or carotid bruit or thyroidmegaly.  Carotid pulse is 2+ bilaterally.  PULMONARY: Clear to auscultation and perfusion.  No rales, wheezing, or rhonchi bilaterally.  HEART: Regular rate and rhythm; No murmurs, rubs, or gallops.  ABDOMEN: Soft, Nontender, Nondistended; Bowel sounds present  EXTREMITIES: No clubbing, cyanosis, or edema  NEUROLOGICAL: Alert oriented to person, place and time.  Speech clear.  Skin: Dry intact, no rashes or lesions.          INTERPRETATION OF TELEMETRY:  AFib 50-70; episodes of mare down to 40  bpm with pauses up to 2.7 seconds    ECG: AF 70, incomplete RBBB        LABS:                        10.9   6.53  )-----------( 80       ( 08 Oct 2019 07:00 )             34.0     10-08    136  |  100  |  19  ----------------------------<  104<H>  3.9   |  25  |  0.62    Ca    8.9      08 Oct 2019 07:00  Phos  2.7     10-07  Mg     1.8     10-07    TPro  x   /  Alb  x   /  TBili  1.0  /  DBili  0.3<H>  /  AST  x   /  ALT  x   /  AlkPhos  x   10-08    CARDIAC MARKERS ( 07 Oct 2019 07:00 )  x     / x     / 80 u/L / x     / x                BNP  RADIOLOGY & ADDITIONAL STUDIES:  IMPRESSION:  CXR 10/5/2019:    Small right-sided pleural effusion.    PREVIOUS DIAGNOSTIC TESTING:      ECHO FINDINGS:    STRESS FINDINGS:    CATHETERIZATION FINDINGS:

## 2019-10-08 NOTE — PROGRESS NOTE ADULT - PROBLEM SELECTOR PLAN 4
- patient denies dysuria or urinary symptom, yet elderly patient with confusion and positive U/A concerning for UTI  - will treat with ABx as above, UCx with proteus, pending sensitivities  - f/u urine culture

## 2019-10-08 NOTE — CONSULT NOTE ADULT - ASSESSMENT
87 year old male with pmh of HTN, chronic AFib-on Coumadin past few years, SVC syndrome, thyroidectomy, DVT and lung nodule presented with alerted mental status.  Patient was found have severe thrombocytopenia required platelet transfusion in the setting of likely UTI.  On telemetry patient was noted to have episodes of AF with slow ventricular rate down to 40 and pauses up to 2.7 seconds today.  The mare events maybe exacerbated during high vagal tone as patient was nausea and vomiting.  However, patient does have sinus node dysfunction as evidenced by slow AF with pauses.    -Hold off Digoxin to avoid bradycardia, current rate controlled  -Echocardiogram to assess structural heart diseases  -Continue AC for [id number] 3  -Continue monitor for symptomatic mare off Digoxin (HR <40 or pauses >3 seconds)  -Will follow up for possible pacing indication

## 2019-10-09 NOTE — PROGRESS NOTE ADULT - PROBLEM SELECTOR PLAN 5
Bradycardic to 30s on tele, asymptomatic at that time, multiple episodes on tele  - EP consulted, appreciate input. Per EP, digoxin was stopped. No pacemaker indicated at this time. Will continue to hold digoxin even on discharge.

## 2019-10-09 NOTE — PROGRESS NOTE ADULT - ASSESSMENT
87 year old male with pmh of HTN, chronic AFib-on Coumadin past few years, SVC syndrome, thyroidectomy, DVT and lung nodule presented with alerted mental status.  Patient was found have severe thrombocytopenia required platelet transfusion in the setting of likely UTI.  On telemetry patient was noted to have episodes of AF with slow ventricular rate down to 40 and pauses up to 2.7 seconds today.  The mare events maybe exacerbated during high vagal tone as patient was nausea and vomiting.  However, patient does have sinus node dysfunction as evidenced by slow AF with pauses.  HR improved to 60-80 bpm on trend review off Digoxin now.  Echo with normal LVEF 61%.   --Continue AC for [id number] 3  -No acute pacing indication at this time, will follow up  -Continue monitor for symptomatic mare (HR <40 or pauses >3 seconds) 87 year old male with pmh of HTN, chronic AFib-on Coumadin past few years, SVC syndrome, thyroidectomy, DVT and lung nodule presented with alerted mental status.  Patient was found have severe thrombocytopenia required platelet transfusion in the setting of likely UTI.  On telemetry patient was noted to have episodes of AF with slow ventricular rate down to 40 and pauses up to 2.7 seconds today.  The mare events maybe exacerbated during high vagal tone as patient was nausea and vomiting.  However, patient does have sinus node dysfunction as evidenced by slow AF with pauses.  HR improved to 60-80 bpm on trend review off Digoxin now.  Echo with normal LVEF 61%.   --Continuing hold off AC for now per hema, will have to weight in AC in future if no contraindication from CT surgery /hema aspect.  Patient's HAS-BLED =3; [id number] 4 (age/HTN/DVT)  -No acute pacing indication at this time, will follow up  -Continue monitor for symptomatic mare (HR <40 or pauses >3 seconds)  Discussed with Dr. Schumacher

## 2019-10-09 NOTE — PROGRESS NOTE ADULT - PROBLEM SELECTOR PLAN 2
SDH found on CT head in setting of thrombocytopenia and coumadin use.  - evaluated by NSG, appreciate recs, no surgical intervention, keppra 500 BID for seizure prophylaxis recommended, repeat CTH with stable SDH  - Further AC advised against at this time  - Thrombocytopenia being addressed as below, currently plt improved 70-80s at this time  - Discussed with hematology whether patient could consider restarting AC in the future given DVT and risk of stroke given Afib, depends on stability of platelet count/function and deferring to NSG as well to determine given SDH whether AC would be advised against in the future. Patient to have CBC repeated as outpatient and also to follow up with NSG Dr. Manuel to redetermine risks/benefits of AC and whether thrombocytopenia persists increasing risk of bleed  - heme recommending plt goal >50 SDH found on CT head in setting of thrombocytopenia and coumadin use.  - evaluated by NSG, appreciate recs, no surgical intervention, keppra 500 BID for seizure prophylaxis recommended, repeat CTH with stable SDH  - Discussed with NSG and Hematology whether AC could be restarted on pt in the future given ongoing risk of stroke from afib and current DVT (see below), NSG stating pt needs to be off AC for at least 1 month and then would require repeat CTH/reevaluation. Hematology still working up etiology of thrombocytopenia, if plt remains low in future would reassess bleeding risk prior to starting AC. Pt can follow up with PCP for repeat CBC after discharge and Dr. Manuel (NSG).   - Thrombocytopenia being addressed as below, currently plt improved 70-80s at this time  - Discussed with hematology whether patient could consider restarting AC in the future given DVT and risk of stroke given Afib, depends on stability of platelet count/function and deferring to NSG as well to determine given SDH whether AC   - heme recommending plt goal >50, not requiring additional transfusion at this time

## 2019-10-09 NOTE — PROGRESS NOTE ADULT - PROBLEM SELECTOR PLAN 8
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  - bilateral upper extremities swollen as well as lower extremities w/ diffuse bruising.  - RLE bigger than LLE and slightly warmer.   -Duplex study noted Rt soleal vein DVT, cannot A/c at this time as pt with SDH and low platelets

## 2019-10-09 NOTE — PROGRESS NOTE ADULT - PROBLEM SELECTOR PLAN 1
Acute metabolic encephalopathy   - Suspect multifactorial 2/2 UTI (treating as below), delirium, unfamiliar environment  - Fall and aspiration precaution  - Currently AOx3  - Per family, waxes and wanes, appears close to baseline currently and significantly improved from admission per family

## 2019-10-09 NOTE — PROGRESS NOTE ADULT - PROBLEM SELECTOR PLAN 4
- patient denies dysuria or urinary symptom, yet elderly patient with confusion and positive U/A with ucs growing pansensitive proteus   - continue ceftriaxone, transition to keflex 500mg BID upon discharge to complete 7 day course, ending 10/12/19

## 2019-10-09 NOTE — PROGRESS NOTE ADULT - PROBLEM SELECTOR PLAN 3
Baseline 80-100k, on admission in setting of SDH platelets were 20s. Hematology consulted, appreciate input and workup. Suspected could be worsened by infection (treating as below) also appears to be in part due to hypoproliferation and B12 deficiency which is now being treated  - s/p 2 unit plts, today plt 70, continue to trend, transfusing plt for goal >50 as above per heme  - treating B12 deficiency (1,000 mcg QD IM for 7 days, then weekly for 1 month per heme recs)

## 2019-10-09 NOTE — PROGRESS NOTE ADULT - PROBLEM SELECTOR PLAN 7
Afib on Coumadin and Digoxin  - [id number] score = 3  - AC was stopped due to subdural hematoma, decision to consider restarting AC in the future will depend on stability of platelet count, per hematology, if platelet count unstable will need outpatient hematology follow up and patient to follow up with NSG Dr. Manuel, will need ongoing discussion about risks/benefits, at this time will continue to hold all AC and antiplatelet agents Afib on Coumadin and Digoxin  - [id number] score = 3  - AC was stopped due to subdural hematoma, decision to consider restarting AC in the future will depend on stability of platelet count, per hematology and ultimately neurosurgery input, if platelet count unstable will need outpatient hematology follow up and patient to follow up with NSG Dr. Manuel, will need ongoing discussion about risks/benefits, at this time will continue to hold all AC and antiplatelet agents Afib on Coumadin and Digoxin  - [id number] score = 3  - AC was stopped due to subdural hematoma, decision to consider restarting AC in the future will depend on stability of platelet count, per hematology and ultimately neurosurgery input (d/w NSG today, stating requires at least one month off AC and repeat imaging prior to restarting AC, also if platelet count unstable will need outpatient hematology follow up and patient to follow up with NSG Dr. Manuel, will need ongoing discussion about risks/benefits, at this time will continue to hold all AC and antiplatelet agents

## 2019-10-09 NOTE — PROGRESS NOTE ADULT - SUBJECTIVE AND OBJECTIVE BOX
Patient is a 87y old  Male who presents with a chief complaint of AMS (06 Oct 2019 01:33)    SUBJECTIVE / OVERNIGHT EVENTS: This AM, HR 36 on tele, pt asymptomatic. This AM, patient denies any pain or complaints, no headache, no visual changes, no chest pain, no shortness of breath, no bleeding, no leg or arm pain.    MEDICATIONS  (STANDING):  atropine Injectable 0.5 milliGRAM(s) IV Push once  cefTRIAXone   IVPB 1000 milliGRAM(s) IV Intermittent every 24 hours  cyanocobalamin Injectable 1000 MICROGram(s) IntraMuscular daily  levETIRAcetam 500 milliGRAM(s) Oral two times a day  melatonin 3 milliGRAM(s) Oral at bedtime    MEDICATIONS  (PRN):    Vital Signs Last 24 Hrs  T(C): 36.7 (09 Oct 2019 09:30), Max: 36.8 (09 Oct 2019 05:18)  T(F): 98 (09 Oct 2019 09:30), Max: 98.2 (09 Oct 2019 05:18)  HR: 73 (09 Oct 2019 09:30) (36 - 87)  BP: 138/74 (09 Oct 2019 09:30) (113/67 - 144/80)  BP(mean): --  RR: 18 (09 Oct 2019 09:30) (17 - 18)  SpO2: 97% (09 Oct 2019 09:30) (94% - 97%)    I&O's Detail    08 Oct 2019 07:01  -  09 Oct 2019 07:00  --------------------------------------------------------  IN:    Oral Fluid: 480 mL  Total IN: 480 mL    OUT:  Total OUT: 0 mL    Total NET: 480 mL    CAPILLARY BLOOD GLUCOSE    PHYSICAL EXAM:  GENERAL: NAD, laying in bed  HEAD:  Atraumatic, Normocephalic  EYES: EOMI, PERRLA, conjunctiva and sclera clear  CHEST/LUNG: Clear to auscultation bilaterally; No wheeze  HEART: irregular rate and rhythm  ABDOMEN: Soft, Nontender, Nondistended; Bowel sounds present  EXTREMITIES:  2+ Peripheral Pulses, No clubbing, cyanosis, or edema  PSYCH: Alert and oriented to person, place, month and year  NEUROLOGY: no focal deficits, moving all extremities, sensation intact to light touch throughout  SKIN: scattered ecchymosis on B/L extremities    LABS:                         10.9   7.20  )-----------( 70       ( 09 Oct 2019 06:46 )             33.6     10-09    133<L>  |  98  |  19  ----------------------------<  96  3.6   |  27  |  0.56    Ca    9.1      09 Oct 2019 06:46  Mg     1.9     10-09    TPro  x   /  Alb  x   /  TBili  1.0  /  DBili  0.3<H>  /  AST  x   /  ALT  x   /  AlkPhos  x   10-08        RADIOLOGY & ADDITIONAL TESTS:    Imaging Personally Reviewed: < from: CT Head No Cont (10.06.19 @ 10:41) >  Parenchymal volume loss and chronic microvascular ischemic changes are   again seen.    Again seen is extra-axial area low-attenuation involving the left frontal   region with adjacent scalloping of bone. This finding measures   approximately 4.3 x 3.1 cm compatible with arachnoid cyst.    Tiny extra-axial area of high attenuation is again suspected involving   theleft frontal region. This finding measures approximately 0.3 cm   widest diameter. This is likely compatible with a small acute subdural   hematoma. No significant shift or herniation is seen.    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    Sclerotic change seen involving the right mastoid which is likely   compatible with chronic inflammatory change.    Impression: Small subdural hematoma again seen.      < end of copied text >  viewed:  < from: [facility] Duplex Ext Veins Lower Comp, Bilat. (10.06.19 @ 08:07) >  Summary/Impressions:  1. Non-occlusive deep vein thrombosis noted in the right  soleal vein.  2. No evidence of deep vein thrombosis in the left lower  extremities.    3. Superficial venous insufficiency noted in the left  great saphenous veins.  Results communicated with JESSE Cloud on 10/06/19 at 9  am with read back.    < end of copied text >    Consultant(s) Notes Reviewed:  Hematology, NSG  Case personally discussed with Hematologist Patient is a 87y old  Male who presents with a chief complaint of AMS (06 Oct 2019 01:33)    SUBJECTIVE / OVERNIGHT EVENTS: This AM, HR 36 on tele, pt asymptomatic. This AM, patient denies any pain or complaints, no headache, no visual changes, no chest pain, no shortness of breath, no bleeding, no leg or arm pain.    MEDICATIONS  (STANDING):  atropine Injectable 0.5 milliGRAM(s) IV Push once  cefTRIAXone   IVPB 1000 milliGRAM(s) IV Intermittent every 24 hours  cyanocobalamin Injectable 1000 MICROGram(s) IntraMuscular daily  levETIRAcetam 500 milliGRAM(s) Oral two times a day  melatonin 3 milliGRAM(s) Oral at bedtime    MEDICATIONS  (PRN):    Vital Signs Last 24 Hrs  T(C): 36.7 (09 Oct 2019 09:30), Max: 36.8 (09 Oct 2019 05:18)  T(F): 98 (09 Oct 2019 09:30), Max: 98.2 (09 Oct 2019 05:18)  HR: 73 (09 Oct 2019 09:30) (36 - 87)  BP: 138/74 (09 Oct 2019 09:30) (113/67 - 144/80)  BP(mean): --  RR: 18 (09 Oct 2019 09:30) (17 - 18)  SpO2: 97% (09 Oct 2019 09:30) (94% - 97%)    I&O's Detail    08 Oct 2019 07:01  -  09 Oct 2019 07:00  --------------------------------------------------------  IN:    Oral Fluid: 480 mL  Total IN: 480 mL    OUT:  Total OUT: 0 mL    Total NET: 480 mL    CAPILLARY BLOOD GLUCOSE    PHYSICAL EXAM:  GENERAL: NAD, laying in bed  HEAD:  Atraumatic, Normocephalic  EYES: EOMI, PERRLA, conjunctiva and sclera clear  CHEST/LUNG: Clear to auscultation bilaterally; No wheeze  HEART: irregular rate and rhythm  ABDOMEN: Soft, Nontender, Nondistended; Bowel sounds present  EXTREMITIES:  2+ Peripheral Pulses, No clubbing, cyanosis, or edema  PSYCH: Alert and oriented to person, place, month and year  NEUROLOGY: no focal deficits, moving all extremities, sensation intact to light touch throughout  SKIN: scattered ecchymosis on B/L extremities    LABS:                         10.9   7.20  )-----------( 70       ( 09 Oct 2019 06:46 )             33.6     10-09    133<L>  |  98  |  19  ----------------------------<  96  3.6   |  27  |  0.56    Ca    9.1      09 Oct 2019 06:46  Mg     1.9     10-09    TPro  x   /  Alb  x   /  TBili  1.0  /  DBili  0.3<H>  /  AST  x   /  ALT  x   /  AlkPhos  x   10-08        RADIOLOGY & ADDITIONAL TESTS:    Imaging Personally Reviewed: < from: CT Head No Cont (10.06.19 @ 10:41) >  Parenchymal volume loss and chronic microvascular ischemic changes are   again seen.    Again seen is extra-axial area low-attenuation involving the left frontal   region with adjacent scalloping of bone. This finding measures   approximately 4.3 x 3.1 cm compatible with arachnoid cyst.    Tiny extra-axial area of high attenuation is again suspected involving   theleft frontal region. This finding measures approximately 0.3 cm   widest diameter. This is likely compatible with a small acute subdural   hematoma. No significant shift or herniation is seen.    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    Sclerotic change seen involving the right mastoid which is likely   compatible with chronic inflammatory change.    Impression: Small subdural hematoma again seen.      < end of copied text >  viewed:  < from: [facility] Duplex Ext Veins Lower Comp, Bilat. (10.06.19 @ 08:07) >  Summary/Impressions:  1. Non-occlusive deep vein thrombosis noted in the right  soleal vein.  2. No evidence of deep vein thrombosis in the left lower  extremities.    3. Superficial venous insufficiency noted in the left  great saphenous veins.  Results communicated with JESSE Cloud on 10/06/19 at 9  am with read back.    < end of copied text >    Consultant(s) Notes Reviewed:  Hematology, NSG  Case personally discussed with Hematologist and Neurosurgery team

## 2019-10-09 NOTE — PROGRESS NOTE ADULT - SUBJECTIVE AND OBJECTIVE BOX
Patient is a 87y old  Male who presents with a chief complaint of AMS (08 Oct 2019 14:11)  Patient denies CP, SOB or palpitations.  Denies N/V or dizziness.  Tele: AF trend in 60's.    PAST MEDICAL & SURGICAL HISTORY:  Lung nodules  SVC syndrome  Acute DVT (deep venous thrombosis)  Essential hypertension  Atrial fibrillation, unspecified type  Cardiac complaint  H/O thyroidectomy  No significant past surgical history      MEDICATIONS  (STANDING):  atropine Injectable 0.5 milliGRAM(s) IV Push once  cefTRIAXone   IVPB 1000 milliGRAM(s) IV Intermittent every 24 hours  cyanocobalamin Injectable 1000 MICROGram(s) IntraMuscular daily  levETIRAcetam 500 milliGRAM(s) Oral two times a day  melatonin 3 milliGRAM(s) Oral at bedtime    MEDICATIONS  (PRN):            Vital Signs Last 24 Hrs  T(C): 36.7 (09 Oct 2019 09:30), Max: 36.8 (09 Oct 2019 05:18)  T(F): 98 (09 Oct 2019 09:30), Max: 98.2 (09 Oct 2019 05:18)  HR: 73 (09 Oct 2019 09:30) (36 - 87)  BP: 138/74 (09 Oct 2019 09:30) (113/67 - 144/80)  BP(mean): --  RR: 18 (09 Oct 2019 09:30) (17 - 18)  SpO2: 97% (09 Oct 2019 09:30) (94% - 97%)            INTERPRETATION OF TELEMETRY:  AF 60-80 bpm; no significant mare or pauses >3 seconds seen overnight    ECG:        LABS:                        10.9   7.20  )-----------( 70       ( 09 Oct 2019 06:46 )             33.6     10-09    133<L>  |  98  |  19  ----------------------------<  96  3.6   |  27  |  0.56    Ca    9.1      09 Oct 2019 06:46  Mg     1.9     10-09    TPro  x   /  Alb  x   /  TBili  1.0  /  DBili  0.3<H>  /  AST  x   /  ALT  x   /  AlkPhos  x   10-08              BNP  RADIOLOGY & ADDITIONAL STUDIES:  ------------------------------------------------------------------------  CONCLUSIONS:  1. Mitral annular calcification, otherwise normal mitral  valve. Mild mitral regurgitation.  2. Calcified trileaflet aortic valve with normal opening.  Mild-moderate aortic regurgitation.  3. Severely dilated left atrium.  LA volume index = 49  cc/m2.  4. Normal left ventricular internal dimensions and wall  thicknesses.  5. Normal left ventricular systolic function. No segmental  wall motion abnormalities.  6. Severe right atrial enlargement.  7. Normal right ventricular size and function.  8. Estimated right ventricular systolic pressure equals 59  mm Hg, assuming right atrial pressure equals 10 mm Hg,  consistent with moderate pulmonary hypertension.  9. Normal tricuspid valve. Moderate-severe tricuspid  regurgitation.  *** No previous Echo exam.  ------------------------------------------------------------------------  Confirmed on  10/8/2019 - 18:52:54 by Octavio Olguin M.D.  ------------------------------------------------------------------------      PHYSICAL EXAM:    GENERAL: In no apparent distress, well nourished, and hydrated.  NECK: Supple and normal thyroid.  No JVD or carotid bruit.  Carotid pulse is 2+ bilaterally.  HEART: S1S2 irregularly irregular;  2/6 systolic murmurs, no rubs, or gallops.  PULMONARY: Clear to auscultation and perfusion. diminished BS at R base; No rales, wheezing, or rhonchi bilaterally.  ABDOMEN: Soft, Nontender, Nondistended; Bowel sounds present  EXTREMITIES:  2+ Peripheral Pulses, No clubbing, cyanosis, +trace pitting edema  +ecchymosis lashon hands forearms  NEUROLOGICAL: alert oriented x2, no acute confusion noted

## 2019-10-10 NOTE — PROVIDER CONTACT NOTE (OTHER) - ASSESSMENT
Asymptomatic, pt asleep
Pt asymptomatic, afib on tele
Pt asymptomatic, afib on tele
Pt is asymptomatic resting comfortably in chair
Pt is asymptomatic, asleep at the time
asymptomatic

## 2019-10-10 NOTE — PROVIDER CONTACT NOTE (OTHER) - BACKGROUND
Pt presented with AMS, UTI, subdural hematoma; EP is following, pt has had pauses before
Pt presented with subdural hematoma, confusion, UTI, DVT
admitted for dvt subdural hematoma
admitted for dvt subdural hematoma

## 2019-10-10 NOTE — OCCUPATIONAL THERAPY INITIAL EVALUATION ADULT - RANGE OF MOTION EXAMINATION, UPPER EXTREMITY
Left UE Active Assistive ROM was WFL  (within functional limits)/Right UE Active Assistive ROM was WFL  (within functional limits)/except right shoulder 0-65 passive

## 2019-10-10 NOTE — PROGRESS NOTE ADULT - ASSESSMENT
87 year old male with pmh of HTN, chronic AFib-on Coumadin past few years, SVC syndrome, thyroidectomy, DVT and lung nodule presented with alerted mental status.  Patient was found have severe thrombocytopenia required platelet transfusion in the setting of likely UTI.  EP evaluated for episodes of AF with slow ventricular rate down to 40 and pauses up to 2.7 seconds.  HR improved to 60-80 bpm with transient pauses up to 2.6 seconds during sleep after discontinued Digoxin.   He is off AC therapy now given the thrombocytopenia and subdural hematoma findings on CT head.   --Off AC therapy now,  will have to weight in benefit /risks of AC therapy in future to see any contraindications from CT surgery /hema aspect.  Patient's HAS-BLED =3; [id number] 4 (age/HTN/DVT)  -Avoid AV carmen agents  -No pacing indication as HR improved off Digoxin  -Will follow up

## 2019-10-10 NOTE — PROGRESS NOTE ADULT - PROBLEM SELECTOR PLAN 8
initially on digoxin and coumadin  Now coumadin stopped given SDH (See above)  Digoxin stopped by EP given bradycardia (see above)  Rate controlled, no issues, continue to monitor

## 2019-10-10 NOTE — PROGRESS NOTE ADULT - SUBJECTIVE AND OBJECTIVE BOX
Patient is a 87y old  Male who presents with a chief complaint of AMS (06 Oct 2019 01:33)    SUBJECTIVE / OVERNIGHT EVENTS: No acute events. Today patient reports feeling comfortable, no complaints, no pain, no discomfort, no signs of bleeding. Niece at bedside.     MEDICATIONS  (STANDING):  atropine Injectable 0.5 milliGRAM(s) IV Push once  cefTRIAXone   IVPB 1000 milliGRAM(s) IV Intermittent every 24 hours  cyanocobalamin Injectable 1000 MICROGram(s) IntraMuscular daily  levETIRAcetam 500 milliGRAM(s) Oral two times a day  melatonin 3 milliGRAM(s) Oral at bedtime    MEDICATIONS  (PRN):    Vital Signs Last 24 Hrs  T(C): 36.3 (10 Oct 2019 11:23), Max: 37.5 (10 Oct 2019 04:59)  T(F): 97.4 (10 Oct 2019 11:23), Max: 99.5 (10 Oct 2019 04:59)  HR: 73 (10 Oct 2019 11:23) (36 - 92)  BP: 86/51 (10 Oct 2019 11:23) (86/51 - 113/56)  BP(mean): --  RR: 16 (10 Oct 2019 11:23) (16 - 16)  SpO2: 97% (10 Oct 2019 11:23) (97% - 97%)    I&O's Detail    09 Oct 2019 07:01  -  10 Oct 2019 07:00  --------------------------------------------------------  IN:    Oral Fluid: 400 mL  Total IN: 400 mL    OUT:    Voided: 300 mL  Total OUT: 300 mL    Total NET: 100 mL    CAPILLARY BLOOD GLUCOSE    PHYSICAL EXAM:  GENERAL: NAD, laying in bed  HEAD:  Atraumatic, Normocephalic  EYES: EOMI, PERRLA, conjunctiva and sclera clear  CHEST/LUNG: Clear to auscultation bilaterally; No wheeze  HEART: irregular rate and rhythm  ABDOMEN: Soft, Nontender, Nondistended; Bowel sounds present  EXTREMITIES:  2+ Peripheral Pulses, No significant clubbing, cyanosis, or edema  PSYCH: Alert and oriented to person, place, month and year  NEUROLOGY: no focal deficits, moving all extremities, sensation intact to light touch throughout  SKIN: scattered ecchymosis on B/L extremities      LABS:                         10.0   5.95  )-----------( 72       ( 10 Oct 2019 05:30 )             30.6     10-10    138  |  102  |  22  ----------------------------<  89  3.8   |  26  |  0.61    Ca    8.5      10 Oct 2019 08:42  Mg     2.0     10-10      RADIOLOGY & ADDITIONAL TESTS:    Imaging Personally Reviewed: < from: CT Head No Cont (10.06.19 @ 10:41) >  Parenchymal volume loss and chronic microvascular ischemic changes are   again seen.    Again seen is extra-axial area low-attenuation involving the left frontal   region with adjacent scalloping of bone. This finding measures   approximately 4.3 x 3.1 cm compatible with arachnoid cyst.    Tiny extra-axial area of high attenuation is again suspected involving   theleft frontal region. This finding measures approximately 0.3 cm   widest diameter. This is likely compatible with a small acute subdural   hematoma. No significant shift or herniation is seen.    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    Sclerotic change seen involving the right mastoid which is likely   compatible with chronic inflammatory change.    Impression: Small subdural hematoma again seen.      < end of copied text >  viewed:  < from: [facility] Duplex Ext Veins Lower Comp, Bilat. (10.06.19 @ 08:07) >  Summary/Impressions:  1. Non-occlusive deep vein thrombosis noted in the right  soleal vein.  2. No evidence of deep vein thrombosis in the left lower  extremities.    3. Superficial venous insufficiency noted in the left  great saphenous veins.  Results communicated with JESSE Cloud on 10/06/19 at 9  am with read back.    < end of copied text >    Consultant(s) Notes Reviewed:  Hematology, NSG  Case personally discussed with Hematologist, Interventional radiologist and Neurosurgery team

## 2019-10-10 NOTE — OCCUPATIONAL THERAPY INITIAL EVALUATION ADULT - BATHING, PREVIOUS LEVEL OF FUNCTION, OT EVAL
senna, colace, heparin, lactated ringers IV, celebrex, norvasc, zofran, protonix, polyethylene glycol, oxycodone, lyrica, independent

## 2019-10-10 NOTE — PROGRESS NOTE ADULT - PROBLEM SELECTOR PLAN 2
SDH found on CT head in setting of thrombocytopenia and coumadin use. Coumadin stopped. Evaluated by NSG, appreciate recs, no surgical intervention, keppra 500 BID for seizure prophylaxis recommended, repeat CTH with stable SDH. Pt can f/u with NSG outpatient with Dr. Manuel after dc. Thrombocytopenia being addressed below. Per NSG, no AC for at least one month. Given age and risk factors, family not wishing to restart AC in the future despite risks of no AC (rediscussed risk of stroke given afib and recurrent thrombus).

## 2019-10-10 NOTE — PROGRESS NOTE ADULT - PROBLEM SELECTOR PLAN 6
Duplex study noted Rt soleal vein DVT  - No AC per NSG for at least one month, perhaps longer if remains thrombocytopenic or additional issues, and per risk/benefit discussion with family, they do not wish for AC to be restarted in the future despite risk of recurrent thrombus and stroke from afib  - Discussed with NSG, Hematology and IR  - IR recommending against IVC filter given location of thrombus, low probability of becoming PE and comorbid conditions (see IR note from 10/10/19 for further information)

## 2019-10-10 NOTE — OCCUPATIONAL THERAPY INITIAL EVALUATION ADULT - PLANNED THERAPY INTERVENTIONS, OT EVAL
bed mobility training/strengthening/balance training/motor coordination training/transfer training/ADL retraining/cognitive, visual perceptual/neuromuscular re-education

## 2019-10-10 NOTE — OCCUPATIONAL THERAPY INITIAL EVALUATION ADULT - PERTINENT HX OF CURRENT PROBLEM, REHAB EVAL
Mr. Barger is a 86 y/o male with PMH of Afib on coumadin, DVT, HTN, and past thyroidectomy, who is admitted to the hospital for encephalopathy. Pt found to have SDH on head CT. Pt found to have UTI.

## 2019-10-10 NOTE — PROGRESS NOTE ADULT - PROBLEM SELECTOR PLAN 3
Baseline 80-100k, on admission in setting of SDH platelets were 20s. Hematology consulted, appreciate input and workup. Suspected could be worsened by infection (treating as below) also appears to be in part due to hypoproliferation and B12 deficiency which is now being treated. S/p 2 unit plt transfusion, given stability of plt count days after transfusion, hematology thinks pt likely responding and making his own plts at this point. No further inpatient hematology monitoring/workup recommended at this time. Hematology recommending repeat CBC 1 week after DC with PCP and if plts low then outpatient hematology follow up. Continue reating B12 deficiency per heme (1,000 mcg QD IM for 7 days, then weekly for 1 month per heme recs).

## 2019-10-10 NOTE — PROVIDER CONTACT NOTE (OTHER) - ACTION/TREATMENT ORDERED:
Notified tele pa will continue to monitor.
Atropine and Pacer pads at bedside
Continue to monitor
Will continue to monitor.
Will continue to monitor.
continue to monitor, EP is following
Continue to monitor
Notified tele pa
Notified tele pa

## 2019-10-10 NOTE — PROGRESS NOTE ADULT - SUBJECTIVE AND OBJECTIVE BOX
Interventional radiology department was consulted by primary team for possible IVC filter placement.  The patient's medical chart was reviewed, and it was noted that the patient had right DVT below the knee.   Since the risk of developing pulmonary embolism from below the knee DVT is low, it was determined that the risk of procedure outweighs the benefit of the IVC filter placement.     The decision was notified to the primary team.     Thank you for consulting our department for the patient's care.     Please page [phone] if there is further inquiry.

## 2019-10-10 NOTE — PROGRESS NOTE ADULT - SUBJECTIVE AND OBJECTIVE BOX
Patient is a 87y old  Male who presents with a chief complaint of AMS (09 Oct 2019 11:18)  Patient denies CP, SOB or palpitations.  Off AC with stable platelet count and off Digoxin stable HR noted.     PAST MEDICAL & SURGICAL HISTORY:  Lung nodules  SVC syndrome  Acute DVT (deep venous thrombosis)  Essential hypertension  Atrial fibrillation, unspecified type  Cardiac complaint  H/O thyroidectomy  No significant past surgical history      MEDICATIONS  (STANDING):  atropine Injectable 0.5 milliGRAM(s) IV Push once  cefTRIAXone   IVPB 1000 milliGRAM(s) IV Intermittent every 24 hours  cyanocobalamin Injectable 1000 MICROGram(s) IntraMuscular daily  levETIRAcetam 500 milliGRAM(s) Oral two times a day  melatonin 3 milliGRAM(s) Oral at bedtime    MEDICATIONS  (PRN):            Vital Signs Last 24 Hrs  T(C): 36.3 (10 Oct 2019 11:23), Max: 37.5 (10 Oct 2019 04:59)  T(F): 97.4 (10 Oct 2019 11:23), Max: 99.5 (10 Oct 2019 04:59)  HR: 73 (10 Oct 2019 11:23) (36 - 92)  BP: 86/51 (10 Oct 2019 11:23) (86/51 - 113/65)  BP(mean): --  RR: 16 (10 Oct 2019 11:23) (16 - 18)  SpO2: 97% (10 Oct 2019 11:23) (97% - 97%)            INTERPRETATION OF TELEMETRY:  AFib 60-80 with transient pauses up to 2.6 seconds during sleep; no persistent bradycardia <40 bpm seen on tele    ECG: AF        LABS:                        10.0   5.95  )-----------( 72       ( 10 Oct 2019 05:30 )             30.6     10-10    138  |  102  |  22  ----------------------------<  89  3.8   |  26  |  0.61    Ca    8.5      10 Oct 2019 08:42  Mg     2.0     10-10                BNP  RADIOLOGY & ADDITIONAL STUDIES:    OBSERVATIONS:  Mitral Valve: Mitral annular calcification, otherwise  normal mitral valve. Mild mitral regurgitation.  Aortic Root: Normal aortic root.  Aortic Valve: Calcified trileaflet aortic valve with normal  opening. Mild-moderate aortic regurgitation.  Left Atrium: Severely dilated left atrium.  LA volume index  = 49 cc/m2.  Left Ventricle: Normal left ventricular systolic function.  No segmental wall motion abnormalities. Normal left  ventricular internal dimensions and wall thicknesses.  Right Heart: Severe right atrial enlargement. Normal right  ventricular size and function. Normal tricuspid valve.  Moderate-severe tricuspid regurgitation. Normal pulmonic  valve.  Mild pulmonic regurgitation.  Pericardium/PleuraNormal pericardium with no pericardial  effusion.  Hemodynamic: Estimated right ventricular systolic pressure  equals 59 mm Hg, assuming right atrial pressure equals 10  mm Hg, consistent with moderate pulmonary hypertension.  ------------------------------------------------------------------------  CONCLUSIONS:  1. Mitral annular calcification, otherwise normal mitral  valve. Mild mitral regurgitation.  2. Calcified trileaflet aortic valve with normal opening.  Mild-moderate aortic regurgitation.  3. Severely dilated left atrium.  LA volume index = 49  cc/m2.  4. Normal left ventricular internal dimensions and wall  thicknesses.      PHYSICAL EXAM:    GENERAL: In no apparent distress, well nourished, and hydrated.  NECK: Supple and normal thyroid.  No JVD or carotid bruit.  Carotid pulse is 2+ bilaterally.  HEART: S1S2 irregularly irregular; 2/6 systolic murmurs, no rubs, or gallops.  PULMONARY: Clear to auscultation and perfusion.  No rales, wheezing, or rhonchi bilaterally.  ABDOMEN: Soft, Nontender, Nondistended; Bowel sounds present  EXTREMITIES:  2+ Peripheral Pulses, +ecchymosis lashon forearms  +trace pitting edema lashon LE  NEUROLOGICAL: alert oriented x2, follows commands appropriately, no neuro deficit seen

## 2019-10-10 NOTE — PROGRESS NOTE ADULT - SUBJECTIVE AND OBJECTIVE BOX
Patient is a 87y old  Male who presents with a chief complaint of AMS (10 Oct 2019 14:13)      HPI:  Mr. Barger is a 88 y/o male with PMH of Afib on coumadin, DVT, HTN, and past thyroidectomy, who presented to the hospital for increasing confusion and bruises on extremities.     Patient is a poor historian, and is confused at the time of interview. History obtained from the chart.  Patient was brought to the hospital by niece Mily Garnica ([phone]) for new bruising over arms and legs over the past two weeks and increasing confusion. He lives alone at home with independent ADL/IADL. However, the niece noticed increased confusion during conversation over the phone with perseverating on certain words.     Upon interview, patient is AOx2 (, 2019, yet states that [facility] is [facility]).   He states that he is fine and wants to call his wife and go home (yet patient is  per chart review and lives alone).   Patient denies any falls or head trauma. No change in medication regimen. He takes medications as prescribed. Last INR was checked at outpatient office on , and it was 2.1.  He is suppose to take Warfarin 2mg qD for  and skip Saturday/ per outpatient note.    In ED, temp 97.9, HR 73, /81, and RR 18, SpO2 100%.  He received one dose of CTX, Vitamin K and potassium supp. (06 Oct 2019 01:33)      Interval history: Patient reports his strength is improving however he still feels weakness especially in the right leg.   Patient's nephew at bedside, expresses concern that patient lives alone and is weaker than his baseline.       REVIEW OF SYSTEMS  Constitutional - No fever,   HEENT - No eye pain, No visual disturbances,   Respiratory - No cough, No wheezing, No shortness of breath  Cardiovascular - No chest pain, No palpitations  Gastrointestinal - No abdominal pain, + nausea, No vomiting, No diarrhea, No constipation  Genitourinary - No dysuria,   Neurological - No headaches, No memory loss, No loss of strength, No numbness  Musculoskeletal - No joint pain, No joint swelling, No muscle pain  Psychiatric - No depression, No anxiety      PAST MEDICAL & SURGICAL HISTORY  Lung nodules  SVC syndrome  Acute DVT (deep venous thrombosis)  Essential hypertension  Atrial fibrillation, unspecified type  Cardiac complaint  H/O thyroidectomy  No significant past surgical history      SOCIAL HISTORY  Smoking - Denied  EtOH - Denied   Drugs - Denied    FUNCTIONAL HISTORY  lives in a house alone with 3 steps to enter with rail and no steps inside. PTA, patient was independent with all functional mobility with use of a rolling walker/cane, medication management and adls.      CURRENT FUNCTIONAL STATUS  BM: cg/min  T: cg    FAMILY HISTORY   No pertinent family history in first degree relatives      RECENT LABS/IMAGING  CBC Full  -  ( 10 Oct 2019 05:30 )  WBC Count : 5.95 K/uL  RBC Count : 3.15 M/uL  Hemoglobin : 10.0 g/dL  Hematocrit : 30.6 %  Platelet Count - Automated : 72 K/uL  Mean Cell Volume : 97.1 fL  Mean Cell Hemoglobin : 31.7 pg  Mean Cell Hemoglobin Concentration : 32.7 %  Auto Neutrophil # : 4.53 K/uL  Auto Lymphocyte # : 0.57 K/uL  Auto Monocyte # : 0.74 K/uL  Auto Eosinophil # : 0.08 K/uL  Auto Basophil # : 0.01 K/uL  Auto Neutrophil % : 76.2 %  Auto Lymphocyte % : 9.6 %  Auto Monocyte % : 12.4 %  Auto Eosinophil % : 1.3 %  Auto Basophil % : 0.2 %    10-10    138  |  102  |  22  ----------------------------<  89  3.8   |  26  |  0.61    Ca    8.5      10 Oct 2019 08:42  Mg     2.0     10-10          VITALS  T(C): 36.3 (10-10-19 @ 11:23), Max: 37.5 (10-10-19 @ 04:59)  HR: 73 (10-10-19 @ 11:23) (73 - 92)  BP: 86/51 (10-10-19 @ 11:23) (86/51 - 113/56)  RR: 16 (10-10-19 @ 11:23) (16 - 16)  SpO2: 97% (10-10-19 @ 11:23) (97% - 97%)  Wt(kg): --    ALLERGIES  No Known Allergies      MEDICATIONS   atropine Injectable 0.5 milliGRAM(s) IV Push once  cefTRIAXone   IVPB 1000 milliGRAM(s) IV Intermittent every 24 hours  cyanocobalamin Injectable 1000 MICROGram(s) IntraMuscular daily  levETIRAcetam 500 milliGRAM(s) Oral two times a day  melatonin 3 milliGRAM(s) Oral at bedtime      ----------------------------------------------------------------------------------------  PHYSICAL EXAM  Constitutional - NAD, Comfortable  HEENT - NCAT, EOMI  Neck - Supple, No limited ROM  Chest - Breathing comfortably,   Cardiovascular - S1S2   Abdomen - Soft   Extremities - b/l upper and le swelling  Neurologic Exam -                    Cognitive - Awake, Alert, AAO x 3     Communication - Fluent, No dysarthria     Cranial Nerves - CN 2-12 intact     Motor - No focal deficits                    LEFT    UE - ShAB 5/5, EF 5/5, EE 5/5, WE 5/5,  5/5                    RIGHT UE - ShAB 5/5, EF 5/5, EE 5/5, WE 5/5,  5/5                    LEFT    LE - HF 5/5, KE 5/5, DF 5/5, PF 5/5                    RIGHT LE - HF 4+/5, KE 5/5, DF 5/5, PF 5/5        Sensory - Intact to LT     Reflexes - DTR Intact, No primitive reflexive  Psychiatric - mood stable    ------------------------------------------------------------------------------------------------  ASSESSMENT/PLAN  87yMale with PMH of Afib on coumadin, DVT, HTN, and past thyroidectomy with functional deficits after left frontal parietal SDH, thrombocytopenia (s/p platelet transfusion), and UTI, right soleal dvt  -SDH- stable on repeat imaging, anticoagulation on hold. on Keppra   -UTI - on IV ceftriaxone  -Thrombocytopenia : as per heme/onc  -Bradycardia - EP following, on tele monitor  DVT PPX - SCDs  PT - ROM, Bed Mob, Transfers, Amb with AD   OT - ADLs, ROM  SLP -cognitive  Precautions - Falls, Cardiac  Skin - Turn Q2hrs  Diet -  soft     Rehab -   Dispo: pending progress with bedside therapy  When patient is medically stable, patient would benefit from course acute Inpatient rehab.    Patient can tolerate 3 hours of therapy and requires medical supervision.    Patient's mental status and strength have been improving, however patient still requires assistance.  Request follow up PT evaluation, if patient is supervision level with RW then may be able to be discharged home with home PT, however if still requiring assistance and does not have help at home, would benefit from inpatient rehab.

## 2019-10-11 PROBLEM — R91.8 OTHER NONSPECIFIC ABNORMAL FINDING OF LUNG FIELD: Chronic | Status: ACTIVE | Noted: 2019-01-01

## 2019-10-11 PROBLEM — I82.409 ACUTE EMBOLISM AND THROMBOSIS OF UNSPECIFIED DEEP VEINS OF UNSPECIFIED LOWER EXTREMITY: Chronic | Status: ACTIVE | Noted: 2019-01-01

## 2019-10-11 PROBLEM — Z79.01 ANTICOAGULATED ON COUMADIN: Status: RESOLVED | Noted: 2017-05-19 | Resolved: 2019-01-01

## 2019-10-11 PROBLEM — S06.5X9A SUBDURAL HEMATOMA: Status: ACTIVE | Noted: 2019-01-01

## 2019-10-11 PROBLEM — I87.1 COMPRESSION OF VEIN: Chronic | Status: ACTIVE | Noted: 2019-01-01

## 2019-10-11 PROBLEM — D69.6 PLATELETS DECREASED: Status: ACTIVE | Noted: 2019-01-01

## 2019-10-11 PROBLEM — E53.8 B12 DEFICIENCY: Status: ACTIVE | Noted: 2019-01-01

## 2019-10-11 NOTE — H&P ADULT - NSICDXPASTMEDICALHX_GEN_ALL_CORE_FT
PAST MEDICAL HISTORY:  Acute DVT (deep venous thrombosis)     Atrial fibrillation, unspecified type     Essential hypertension     Lung nodules     SVC syndrome

## 2019-10-11 NOTE — PATIENT PROFILE ADULT - TRANSPORTATION
GEN: awake, alert, well appearing, NAD   HENT: atraumatic, normocephalic, DEVORA, EOMI, no midline instability, oropharynx w/o erythema or exudates, no lymphadenopathy  CV: normal rate and rhythm, S1, S2, no MRG, equal pulses throughout, no JVD  RESP: no distress, no IWOB, no retraction, clear to auscultation bilaterally   ABD: soft, nontender, nondistended, no rebound, no guarding, normoactive bowel sounds, no organomegally  MUSCULOSKELETAL: strenght 5/5 x 4, full range of motion, CMS intact   SKIN: normal color, no turgor, no wounds or rash   NEURO: Awake alert oriented x 3, no facial asymmetry, no slurred speech, no pronator drift, moving all extremities  PSYCH: no suicial ideation, no homicidal ideation, no depression, no anxiety, no hallucination no

## 2019-10-11 NOTE — H&P ADULT - ATTENDING COMMENTS
80 y/o M with PMHx of HTN, Afib and DVT who presented with AMS secondary to acute on subacute frontal/parietal  SDH managed non operatively. Hospital course also significant for right soleal DVT and bradycardia, thrombocytopenia requiring transfusion platelets.   Vital Signs Last 24 Hrs  T(C): 36.5 (12 Oct 2019 07:24), Max: 36.6 (11 Oct 2019 17:00)  T(F): 97.7 (12 Oct 2019 07:24), Max: 97.9 (11 Oct 2019 17:00)  HR: 61 (12 Oct 2019 07:24) (61 - 74)  BP: 128/63 (12 Oct 2019 07:24) (107/56 - 128/63)  BP(mean): --  RR: 14 (12 Oct 2019 07:24) (14 - 15)  SpO2: 99% (12 Oct 2019 07:24) (98% - 99%)

## 2019-10-11 NOTE — H&P ADULT - ASSESSMENT
Pt is an 80 y/o M with PMHx of Afib and DVT who presented with confusion and altered mental status. PT was found to have a frontal/parietal acute/subacute SDH, along with an active UTI and right soleal DVT. Pt was taken off of his anticoagulation due to the bleed and his digoxin due to episodes of bradycardia. After discussion with family, pt will no go back on anticoagulation. Pt was also found to be thrombocytopenic requiring 2 platelet transfusions.    #SDH  - repeat CT stable  - off of anticoagulation  - PT/OT/SLP  - continue to monitor neurologic symptoms  - Keppra for seizure ppx    #Weakness  - pt with increased weakness from baseline  - likely secondary to UTI vs SDH  - PT/OT    #Thrombocytopenia  - Platelets were found to be as low as 21  - s/p 2 platelet transfusions  - uptrending platelet counts - 10/11 - 69  - continue to monitor with CBCs  - Hematology to follow as outpatient  - B12 1000mcg IM daily until 10/13 and then weekly    #UTI  - has one day remaining in Keflex course, 500mg bid until 10/12  - monitor symptoms    #AFib  - Pt with hx of Afib on coumadin and digoxin  - AC held due to bleed and family will continue to hold due to bleeding risk  - continue to hold digoxin due to episodes of bradycardia  - monitor vitals  - will follow up as an outpatient to restart any medications    #DVT  - right soleal DVT  - IR consulted for IVC filter and recommended that the risks of the procedure outweighed the benefits  - no AC due to bleed  - Stockings, mobility, continue to monitor    #Diet  - Soft, low sodium    #DVT ppx  - none due to acute SDH  - will use RUSS stockings    MEDICAL PROGNOSIS: GOOD                                   REHAB POTENTIAL: GOOD  ESTIMATED DISPOSITION: HOME                             ELOS: 10-14 Days   EXPECTED THERAPY:     P.T. 1 hr/day       O.T. 1 hr/day      S.L.P. 1 hr/day      EXP FREQUENCY: 5 days per 7 day period     PRESCREEN COMPARISON I have reviewed the prescreen information and I have found no relevant changes between the preadmission screening and my post admission evaluation     RATIONALE FOR INPATIENT ADMISSION - Patient demonstrates the following: (check all that apply)  [X] Medically appropriate for rehabilitation admission  [X] Has attainable rehab goals with an appropriate initial discharge plan  [X] Has rehabilitation potential (expected to make a significant improvement within a reasonable period of time)   [X] Requires close medical management by a rehab physician, rehab nursing care, Hospitalist and comprehensive interdisciplinary team (including PT, OT, & or SLP, Prosthetics and Orthotics) Pt is an 80 y/o M with PMHx of Afib and DVT who presented with confusion and altered mental status. PT was found to have a frontal/parietal acute/subacute SDH, along with an active UTI and right soleal DVT. Pt was taken off of his anticoagulation due to the bleed and his digoxin due to episodes of bradycardia. After discussion with family, pt will not go back on anticoagulation. Pt was also found to be thrombocytopenic requiring 2 platelet transfusions.    #SDH  - repeat CT stable  - off of anticoagulation  - PT/OT/SLP  - continue to monitor neurologic symptoms  - Keppra for seizure ppx    #Weakness  - pt with increased weakness from baseline  - likely secondary to UTI vs SDH  - PT/OT    #Thrombocytopenia  - Platelets were found to be as low as 21  - s/p 2 platelet transfusions  - uptrending platelet counts - 10/11 - 69  - continue to monitor with CBCs  - Hematology to follow as outpatient  - B12 1000mcg IM daily until 10/13 and then weekly    #UTI  - has one day remaining in Keflex course, 500mg bid until 10/12  - monitor symptoms    #AFib  - Pt with hx of Afib on coumadin and digoxin  - AC held due to bleed and family will continue to hold due to bleeding risk  - continue to hold digoxin due to episodes of bradycardia  - monitor vitals  - will follow up as an outpatient to restart any medications    #DVT  - right soleal DVT  - IR consulted for IVC filter and recommended that the risks of the procedure outweighed the benefits  - no AC due to bleed  - Stockings, mobility, continue to monitor    #Diet  - Soft, low sodium    #DVT ppx  - none due to acute SDH  - will use RUSS stockings    MEDICAL PROGNOSIS: GOOD                                   REHAB POTENTIAL: GOOD  ESTIMATED DISPOSITION: HOME                             ELOS: 10-14 Days   EXPECTED THERAPY:     P.T. 1 hr/day       O.T. 1 hr/day      S.L.P. 1 hr/day      EXP FREQUENCY: 5 days per 7 day period     PRESCREEN COMPARISON I have reviewed the prescreen information and I have found no relevant changes between the preadmission screening and my post admission evaluation     RATIONALE FOR INPATIENT ADMISSION - Patient demonstrates the following: (check all that apply)  [X] Medically appropriate for rehabilitation admission  [X] Has attainable rehab goals with an appropriate initial discharge plan  [X] Has rehabilitation potential (expected to make a significant improvement within a reasonable period of time)   [X] Requires close medical management by a rehab physician, rehab nursing care, Hospitalist and comprehensive interdisciplinary team (including PT, OT, & or SLP, Prosthetics and Orthotics) Pt is an 80 y/o M with PMHx of HTN, Afib and DVT who presented with AMS secondary to acute on subacute frontal/parietal  SDH managed non operatively. Hospital course also significant for right soleal DVT and bradycardia, thrombocytopenia requiring transfusion platelets.     #SDH  - repeat CT stable. Neuro checks  - off anticoagulation  - begin comprehensive rehab program PT/OT/SLP  - Keppra for seizure ppx    #Weakness  - pt with increased generalized weakness from baseline  - likely secondary to UTI vs SDH  - PT/OT comprehensive rehab program    #Thrombocytopenia  - Platelets were found to be as low as 21. Differential diagnosis includes UTI, B12 deficiency and MDS  - s/p 2 platelet transfusions  - uptrending platelet counts - 10/11 - 69  - continue to monitor with CBCs  - Hematology to follow as outpatient  - B12 1000mcg IM daily until 10/13 and then weekly    #UTI  - has one day remaining in Keflex course, 500mg bid until 10/12  - monitor symptoms    #AFib  - AC (coumadin) held due to bleed and family will continue to hold due to bleeding risk  - continue to hold digoxin due to episodes of bradycardia  - monitor vitals  - will follow up as an outpatient to restart any medications  -hospitalist consult    #DVT  - right soleal DVT  - no AC due to bleed. Seen by IR, IVCF not recommended  - Stockings, mobility, continue to monitor    #Diet  - Soft, low sodium    #DVT ppx  - none due to acute SDH  - will use RUSS stockings. NO SCDs to RLE due to soleal DVT    MEDICAL PROGNOSIS: GOOD                                   REHAB POTENTIAL: GOOD  ESTIMATED DISPOSITION: HOME                             ELOS: 10-14 Days   EXPECTED THERAPY:     P.T. 1 hr/day       O.T. 1 hr/day      S.L.P. 1 hr/day      EXP FREQUENCY: 5 days per 7 day period     PRESCREEN COMPARISON I have reviewed the prescreen information and I have found no relevant changes between the preadmission screening and my post admission evaluation     RATIONALE FOR INPATIENT ADMISSION - Patient demonstrates the following: (check all that apply)  [X] Medically appropriate for rehabilitation admission  [X] Has attainable rehab goals with an appropriate initial discharge plan  [X] Has rehabilitation potential (expected to make a significant improvement within a reasonable period of time)   [X] Requires close medical management by a rehab physician, rehab nursing care, Hospitalist and comprehensive interdisciplinary team (including PT, OT, & or SLP, Prosthetics and Orthotics)

## 2019-10-11 NOTE — PROGRESS NOTE ADULT - PROBLEM SELECTOR PLAN 8
initially on digoxin and coumadin  Now coumadin stopped given SDH (See above)  Digoxin stopped by EP given bradycardia (see above)  Rate controlled, no issues

## 2019-10-11 NOTE — H&P ADULT - NSHPREVIEWOFSYSTEMS_GEN_ALL_CORE
REVIEW OF SYSTEMS:    CONSTITUTIONAL: No fevers or chills  EYES/ENT: No visual changes;  No vertigo or throat pain   NECK: No pain or stiffness  RESPIRATORY: No cough, wheezing, or shortness of breath  CARDIOVASCULAR: No chest pain or palpitations  GASTROINTESTINAL: No abdominal or epigastric pain. No nausea or vomiting. No diarrhea or constipation.   GENITOURINARY: No dysuria  NEUROLOGICAL: No numbness, + weakness  SKIN: +bruises all extremities, +venous stasis changes in bilateral lower extremities

## 2019-10-11 NOTE — PROGRESS NOTE ADULT - PROBLEM SELECTOR PLAN 3
Baseline 80-100k, on admission in setting of SDH platelets were 20s. Hematology consulted, appreciate input and workup. Suspected could be worsened by infection (treating as below) also appears to be in part due to hypoproliferation and B12 deficiency which is now being treated. S/p 2 unit plt transfusion, given stability of plt count days after transfusion, hematology thinks pt likely responding and making his own plts at this point. No further inpatient hematology monitoring/workup recommended at this time. Hematology recommending repeat CBC 1 week after DC with PCP and if plts low then outpatient hematology follow up. Continue treating B12 deficiency per heme (1,000 mcg QD IM for 7 days, then weekly for 1 month per heme recs).

## 2019-10-11 NOTE — DISCHARGE NOTE PROVIDER - NSDCCPCAREPLAN_GEN_ALL_CORE_FT
PRINCIPAL DISCHARGE DIAGNOSIS  Diagnosis: SDH (subdural hematoma)  Assessment and Plan of Treatment: SDH found on CT head in setting of thrombocytopenia and coumadin use.  - hold AC for 1 month per neurosurgery   - neurosurgery consulted  - started on keppra   -Given age and risk factors, family not wishing to restart AC in the future despite risks of no AC (rediscussed risk of stroke given afib and recurrent thrombus).         SECONDARY DISCHARGE DIAGNOSES  Diagnosis: Thrombocytopenia  Assessment and Plan of Treatment: Anemia & thrombocytopenia  -Heme following  -Possibly 2/2 B12 deficiency --> started B12 supplementation 10/7  -received 2 units of platelets for thrombocytopenia  b12 1000mcg every day IM for total of 7 days last dose 10/13 then change to weekly for month                                                                                                                                   Hematology recommending repeat CBC 1 week after DC with PCP and if plts low then outpatient hematology follow up.    Diagnosis: Atrial fibrillation, unspecified type  Assessment and Plan of Treatment: 5. Atrial fibrillation, .     -Afib on Coumadin and Digoxin at home   - [id number] score = 3   - anticoagulation discontinued   -Per EP, digoxin was stopped. No pacemaker indicated at this time or other intevention. Will continue to hold digoxin even on discharge. Appreciate EP evaluation.   Echo   EF 61%  CONCLUSIONS:  1. Mitral annular calcification, otherwise normal mitral  valve. Mild mitral regurgitation.  2. Calcified trileaflet aortic valve with normal opening.  Mild-moderate aortic regurgitation.  3. Severely dilated left atrium.  LA volume index = 49  cc/m2.  4. Normal left ventricular internal dimensions and wall  thicknesses.  5. Normal left ventricular systolic function. No segmental  wall motion abnormalities.  6. Severe right atrial enlargement.  7. Normal right ventricular size and function.  8. Estimated right ventricular systolic pressure equals 59  mm Hg, assuming right atrial pressure equals 10 mm Hg,  consistent with moderate pulmonary hypertension.  9. Normal tricuspid valve. Moderate-severe tricuspid  regurgitation.      Diagnosis: Acute deep vein thrombosis (DVT) of right lower extremity, unspecified vein  Assessment and Plan of Treatment: R/O Acute DVT   VA duplex   Summary/Impressions:  1. Non-occlusive deep vein thrombosis noted in the right  soleal vein.  2. No evidence of deep vein thrombosis in the left lower  extremities.  3. Superficial venous insufficiency noted in the left  great saphenous veins.   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  per neurosurgery no anticoagulation       Per IR: Since the risk of developing pulmonary embolism from below the knee DVT is low, it was determined that the risk of procedure outweighs the benefit of the IVC filter placement.    Diagnosis: Urinary tract infection without hematuria, site unspecified  Assessment and Plan of Treatment: - UCx: proteus  - treated with Ceftriaxone switch to keflex 500mg bid for discharge last day 10/12 - 2 doses needed

## 2019-10-11 NOTE — H&P ADULT - HISTORY OF PRESENT ILLNESS
Pt is an 86 y/o M with PMHx of Afib on coumadin, DVT, HTN, and past thyroidectomy, who was brought to [facility] by jihan Garnica ([phone]) for increasing confusion and bruises on extremities on 10/5. Patient had new bruising over arms and legs over the two weeks prior to admission and increasing confusion. In the ED, CT head was suspicious for left frontal/parietal acute/subacute SDH. Patient was also found to be thrombocytopenic (21), and is now s/p platelets transfusion x 2. NSx was consulted and recommended no acute surgical intervention and started keppra for seizure prophylaxis. Repeat CT head was stable. Patient was also found to have UTI and started on Keflex which is due to be completed on 10/12. Heme/onc was consulted for thrombocytopenia. They believe that DIC was unlikely, and attributed the thrombocytopenia to likely infection from uti vs b12 deficiency vs MDS. During his stay, LE dopplers with non occlusive DVT on the right  soleal. Interventional radiology was consulted for possible IVC filter, but they felt that since the risk of PE from DVT below the knee was low, the risk outweighed the benefit and so no IVC filter was placed. During hospital course, patient also found to be intermittently bradycardic to the 30s with Afib with slow ventricular rate and pauses. Electrophysiology was consulted and recommended to remove pt's digoxin and they will follow up. Pt was evaluated by PT, OT, and PM&R and was recommended for acute inpatient rehabilitation. Pt was deemed stable for discharge on 10/11/19 and he was transferred to [facility] for acute inpatient rehabilitation. Pt is an 86 y/o Male with PMHx of HTN,. Afib on coumadin, DVT, and past thyroidectomy, who was brought to [facility] for increasing confusion and bruises on arms and legs x 2 weeks on 10/5/19.  In the ED, CT head was suspicious for left frontal/parietal acute/subacute SDH. Neurosurgery was consulted and recommended no acute surgical intervention, started on keppra for seizure prophylaxis. Patient was also found to be thrombocytopenic (21), and transfused platelets x 2. Repeat CT head was stable.    Patient was also found to have UTI and started on Keflex which is due to be completed on 10/12. Heme/onc was consulted for thrombocytopenia. They believe that DIC was unlikely, and attributed the thrombocytopenia to likely infection from UTI vs B12 deficiency vs MDS. He was also found to have non occlusive DVT on the right  soleal vein. Interventional radiology was consulted for possible IVC filter, but they felt that since the risk of PE from DVT below the knee was low, the risk of procedure outweighed the benefit. During hospital course, patient also found to be intermittently bradycardic to the 30s with Afib with slow ventricular rate and pauses. Electrophysiology was consulted and recommended to dc digoxin and they will follow up as outpatient. Pt was evaluated by PT, OT, and PM&R and was recommended for acute inpatient rehabilitation. Pt was deemed stable for discharge on 10/11/19 and he was transferred to [facility] for acute inpatient rehabilitation.

## 2019-10-11 NOTE — H&P ADULT - NSHPSOCIALHISTORY_GEN_ALL_CORE
SOCIAL HISTORY  - Smoking: denied  - Alcohol: denied  - Drug Use: denied    FUNCTIONAL HISTORY  Pt lives in a private home alone. The house has 3 steps to enter to rail and no stairs inside. Prior to admission, he was fully independent with his ADLs, and ambulated with a walker or cane. Pt was able to manage his medications on his own.    CURRENT FUNCTIONAL STATUS  10/11/19  Bed Mobility  Bed Mobility Training Rehab Potential: good, to achieve stated therapy goals  Bed Mobility Training Symptoms Noted During/After Treatment: none  Bed Mobility Training Scooting: minimum assist (75% patient effort);  1 person assist;  verbal cues  Bed Mobility Training Supine-to-Sit: minimum assist (75% patient effort);  1 person assist;  verbal cues  Bed Mobility Training Limitations: decreased strength;  impaired balance;  cognitive, decreased safety awareness;  impaired motor control;  impaired vision    Sit-Stand Transfer Training  Sit-to-Stand Transfer Training Rehab Potential: good, to achieve stated therapy goals  Sit-to-Stand Transfer Training Symptoms Noted During/After Treatment: none  Transfer Training Sit-to-Stand Transfer: minimum assist (75% patient effort);  1 person + 1 person to manage equipment;  verbal cues;  weight-bearing as tolerated   rolling walker  Transfer Training Stand-to-Sit Transfer: minimum assist (75% patient effort);  verbal cues;  set-up required;  weight-bearing as tolerated   rolling walker;  1 person + 1 person to manage equipment  Sit-to-Stand Transfer Training Transfer Safety Analysis: decreased balance;  decreased cognition;  decreased strength;  impaired balance;  impaired vision;  impaired motor control;  impaired postural control;  cognitive, decreased safety awareness;  rolling walker    Gait Training  Gait Training Rehab Potential: good, to achieve stated therapy goals  Gait Training Symptoms Noted During/After Treatment: none  Gait Training: minimum assist (75% patient effort);  verbal cues;  1 person + 1 person to manage equipment;  weight-bearing as tolerated   rolling walker;  50 feet  Gait Analysis: 3-point gait   decreased suzan;  decreased step length;  decreased stride length;  decreased strength;  impaired balance;  impaired motor control;  impaired postural control;  cognitive, decreased safety awareness;  50 feet;  rolling walker

## 2019-10-11 NOTE — PROGRESS NOTE ADULT - ASSESSMENT
Mr. Barger is a 86 y/o male with PMH of Afib on coumadin, DVT, HTN, and past thyroidectomy p/w subdural hematoma in setting of thrombocytopenia and coumadin use, SDH found to be stable, not requiring NSG intervention, also found to have acute DVT not able to be anticoagulated given SDH and IVC filter not recommended by IR, thrombocytopenia stable evaluated by hematology, found to be B12 deficient, now medically stable for discharge to rehab.

## 2019-10-11 NOTE — DISCHARGE NOTE PROVIDER - PROVIDER TOKENS
PROVIDER:[TOKEN:[[id number]]],PROVIDER:[TOKEN:[[id number]]],PROVIDER:[TOKEN:[[id number]]],FREE:[LAST:[primary],PHONE:[(   )    -],FAX:[(   )    -],ADDRESS:[follow up with your primary care doctor call for apointment in 1 week for repeat cbc]]

## 2019-10-11 NOTE — PROGRESS NOTE ADULT - PROBLEM SELECTOR PLAN 5
Bradycardic to 30s on tele, asymptomatic at that time, multiple episodes on tele  - EP consulted, appreciate input. Per EP, digoxin was stopped. No pacemaker indicated at this time or other intevention. Will continue to hold digoxin even on discharge. Appreciate EP evaluation.

## 2019-10-11 NOTE — H&P ADULT - NSHPLABSRESULTS_GEN_ALL_CORE
.  LABS:                         10.3   7.03  )-----------( 69       ( 11 Oct 2019 03:13 )             31.6     10-11    137  |  103  |  29<H>  ----------------------------<  95  3.9   |  25  |  0.67    Ca    8.5      11 Oct 2019 03:13  Mg     2.0     10-11                    RADIOLOGY, EKG & ADDITIONAL TESTS: Reviewed. .  LABS:                         10.3   7.03  )-----------( 69       ( 11 Oct 2019 03:13 )             31.6     10-11    137  |  103  |  29<H>  ----------------------------<  95  3.9   |  25  |  0.67    Ca    8.5      11 Oct 2019 03:13  Mg     2.0     10-11      Diagnosis: Acute deep vein thrombosis (DVT) of right lower extremity, unspecified vein  Assessment and Plan of Treatment: R/O Acute DVT   VA duplex   Summary/Impressions:  1. Non-occlusive deep vein thrombosis noted in the right  soleal vein.  2. No evidence of deep vein thrombosis in the left lower  extremities.  3. Superficial venous insufficiency noted in the left  great saphenous veins.   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  per neurosurgery no anticoagulation       Per IR: Since the risk of developing pulmonary embolism from below the knee DVT is low, it was determined that the risk of procedure outweighs the benefit of the IVC filter placement.                RADIOLOGY, EKG & ADDITIONAL TESTS: Reviewed.

## 2019-10-11 NOTE — PROGRESS NOTE ADULT - PROBLEM SELECTOR PLAN 4
- patient denies dysuria or urinary symptom, yet elderly patient with confusion and positive U/A with ucs growing pansensitive proteus   - continue ceftriaxone for today, transition to keflex 500mg BID upon discharge to complete 7 day course, ending 10/12/19

## 2019-10-11 NOTE — PROGRESS NOTE ADULT - PROBLEM SELECTOR PLAN 1
Acute metabolic encephalopathy   - Suspect multifactorial 2/2 UTI (treating as below), delirium, unfamiliar environment  - Fall and aspiration precaution  - Currently AOx3  - Per family, waxes and wanes, but has improved since admission and now close to baseline per family

## 2019-10-11 NOTE — DISCHARGE NOTE PROVIDER - HOSPITAL COURSE
Mr. Barger is a 86 y/o male with PMH of Afib on coumadin, DVT, HTN, and past thyroidectomy, who presented to the hospital for increasing confusion and bruises on extremities.             Patient denies any falls or head trauma. No change in medication regimen. He takes medications as prescribed. Last INR was checked at outpatient office on 9/17, and it was 2.1.    He is suppose to take Warfarin 2mg qD for weekdays and skip Saturday/Sunday per outpatient note.        In ED, temp 97.9, HR 73, /81, and RR 18, SpO2 100%.    He received one dose of CTX, Vitamin K and potassium supp. Mr. Barger is a 88 y/o male with PMH of Afib on coumadin, DVT, HTN, and past thyroidectomy, who presented to the hospital for increasing confusion and bruises on extremities.         Last INR was checked at outpatient office on 9/17, and it was 2.1.  He is suppose to take Warfarin 2mg qD for weekdays and skip Saturday/Sunday per outpatient note.        In ED he received one dose of CTX, Vitamin K and potassium supp.        1.Acute metabolic encephalopathy     likely 2/2 UTI    - CBC w/ left shift and positive U/A, confusion in elders most consistent with acute metabolic encephalopathy from UTI.    - SDH found on CT scan also can be contributory, yet with no mass effect or midline shift, less likely.     - UCx: proteus    - treated with Ceftriaxone switch to keflex 500mg bid for discharge last day 10/12              2.Urinary tract infection      - patient denies dysuria or urinary symptom found to have a UTI    - will treat with ABx as above.     - UCx: proteus          3. Hypokalemia.     -supplemented         4. SDH    SDH found on CT head in setting of thrombocytopenia and coumadin use.    - hold AC for 1 month per neurosurgery     - neurosurgery consulted. appreciate recs    - started on keppra     -Given age and risk factors, family not wishing to restart AC in the future despite risks of no AC (rediscussed risk of stroke given afib and recurrent thrombus).          5. Atrial fibrillation, .       -Afib on Coumadin and Digoxin at home     - [id number] score = 3     - anticoagulation discontinued     -Per EP, digoxin was stopped. No pacemaker indicated at this time or other intevention. Will continue to hold digoxin even on discharge. Appreciate EP evaluation.             Echo     EF 61%    CONCLUSIONS:    1. Mitral annular calcification, otherwise normal mitral    valve. Mild mitral regurgitation.    2. Calcified trileaflet aortic valve with normal opening.    Mild-moderate aortic regurgitation.    3. Severely dilated left atrium.  LA volume index = 49    cc/m2.    4. Normal left ventricular internal dimensions and wall    thicknesses.    5. Normal left ventricular systolic function. No segmental    wall motion abnormalities.    6. Severe right atrial enlargement.    7. Normal right ventricular size and function.    8. Estimated right ventricular systolic pressure equals 59    mm Hg, assuming right atrial pressure equals 10 mm Hg,    consistent with moderate pulmonary hypertension.    9. Normal tricuspid valve. Moderate-severe tricuspid    regurgitation.                    6. R/O Acute DVT     VA duplex     Summary/Impressions:    1. Non-occlusive deep vein thrombosis noted in the right    soleal vein.    2. No evidence of deep vein thrombosis in the left lower    extremities.    3. Superficial venous insufficiency noted in the left    great saphenous veins.         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    per neurosurgery no anticoagulation         Per IR: Since the risk of developing pulmonary embolism from below the knee DVT is low, it was determined that the risk of procedure outweighs the benefit of the IVC filter placement.                 7. Anemia & thrombocytopenia    -Heme following    -Possibly 2/2 B12 deficiency --> started B12 supplementation 10/7    -received 2 units of platelets for thrombocytopenia    b12 1000mcg every day IM for total of 7 days last dose 10/13 then change to weekly for 1 month      Hematology recommending repeat CBC 1 week after DC with PCP and if plts low then outpatient hematology follow up. 88 y/o male with PMH of Afib on coumadin, DVT, HTN, and past thyroidectomy p/w subdural hematoma in setting of thrombocytopenia and coumadin use, SDH found to be stable, not requiring NSG intervention, also found to have acute DVT not able to be anticoagulated given SDH and IVC filter not recommended by IR, thrombocytopenia stable evaluated by hematology, found to be B12 deficient, now medically stable for discharge to rehab.     CT head in setting of thrombocytopenia and coumadin use. Coumadin stopped. Evaluated by NS        Subdural Hematoma. evaluated by neurosurgery, appreciate recs, no surgical intervention, keppra 500 BID for seizure prophylaxis recommended, repeat CTH with stable SDH. Pt can f/u with NSG outpatient with Dr. Manuel after dc. Thrombocytopenia being addressed below. Per NSG, no AC for at least one month. Given age and risk factors, family not wishing to restart AC in the future despite risks of no AC (rediscussed risk of stroke given afib and recurrent thrombus).         Thrombocytopenia.   Baseline 80-100k, on admission in setting of SDH platelets were 20s. Hematology consulted, appreciate input and workup. Suspected could be worsened by infection (treating as below) also appears to be in part due to hypoproliferation and B12 deficiency which is now being treated. S/p 2 unit plt transfusion, given stability of plt count days after transfusion, hematology thinks pt likely responding and making his own plts at this point. No further inpatient hematology monitoring/workup recommended at this time. Hematology recommending repeat CBC 1 week after DC with PCP and if plts low then outpatient hematology follow up. Continue treating B12 deficiency per heme (1,000 mcg QD IM for 7 days, then weekly for 1 month per heme recs).         Urinary tract infection without hematuria, site unspecified.  patient denies dysuria or urinary symptom, yet elderly patient with confusion and positive U/A with ucs growing pansensitive proteus, initially on ceftriaxone, transitioned to keflex 500mg BID upon discharge to complete 7 day course, ending 10/12/19.         Bradycardia.  Bradycardic to 30s on tele, asymptomatic multiple episodes on tele    - EP consulted, appreciate input. Per EP, digoxin was stopped. No pacemaker indicated at this time or other intevention. Will continue to hold digoxin even on discharge per EP. Appreciate EP evaluation.         Acute deep vein thrombosis (DVT) of right lower extremity, unspecified vein. Plan: Duplex study noted Rt soleal vein DVT- No AC per NSG for at least one month, perhaps longer if remains thrombocytopenic or additional issues, and per risk/benefit discussion with family, they do not wish for AC to be restarted in the future despite risk of recurrent thrombus and stroke from afib    - Discussed with NSG, Hematology and IR    - IR recommending against IVC filter given location of thrombus, low probability of becoming PE and comorbid conditions (see IR note from 10/10/19 for further information).        Atrial fibrillation.  Initially on digoxin and coumadin. Now coumadin stopped given SDH (See above). Digoxin stopped by EP given bradycardia (see above). Rate controlled, no issues.         Medically stable for discharge to rehab. Plan discussed with hematology, neurosurgery, patient, jihan, PA and LÓPEZ/ELVIS. Updated PCP Dr. Gardner via telephone on 10/11/2019 at 2:00pm regarding patient's hospital course.         Home medications were coumadin and digoxin. Both medications were stopped and will be held on discharge. New medications include Keppra 500mg BID, Keflex 500mg BID (for 2 doses, to complete abx course on 10/12/2019), Cyanocobalamin (b12) 1000 micrograms intramuscular daily until 10/13/2019 then weekly x 4 doses). Patient to follow up with PCP in 1 week for repeat CBC and follow up with NSG Dr. Manuel as outpatient. Per hematology, no need for hematology follow up unless concern for dropping platelets on outpatient CBC.                     Echo     EF 61%    CONCLUSIONS:    1. Mitral annular calcification, otherwise normal mitral    valve. Mild mitral regurgitation.    2. Calcified trileaflet aortic valve with normal opening.    Mild-moderate aortic regurgitation.    3. Severely dilated left atrium.  LA volume index = 49    cc/m2.    4. Normal left ventricular internal dimensions and wall    thicknesses.    5. Normal left ventricular systolic function. No segmental    wall motion abnormalities.    6. Severe right atrial enlargement.    7. Normal right ventricular size and function.    8. Estimated right ventricular systolic pressure equals 59    mm Hg, assuming right atrial pressure equals 10 mm Hg,    consistent with moderate pulmonary hypertension.    9. Normal tricuspid valve. Moderate-severe tricuspid    regurgitation.

## 2019-10-11 NOTE — DISCHARGE NOTE PROVIDER - CARE PROVIDERS DIRECT ADDRESSES
[email or web address]

## 2019-10-11 NOTE — DISCHARGE NOTE NURSING/CASE MANAGEMENT/SOCIAL WORK - PATIENT PORTAL LINK FT
You can access the FollowMyHealth Patient Portal offered by [facility] by registering at the following website: [email or web address]. By joining [facility]’s FollowMyHealth portal, you will also be able to view your health information using other applications (apps) compatible with our system.

## 2019-10-11 NOTE — PROGRESS NOTE ADULT - SUBJECTIVE AND OBJECTIVE BOX
Patient is a 87y old  Male who presents with a chief complaint of AMS (06 Oct 2019 01:33)    SUBJECTIVE / OVERNIGHT EVENTS: No acute events. Today patient reports feeling well without any complaints, no pain, no discomfort, no signs of bleeding, ate breakfast without any nausea/vomiting or discomfort. Niece is present.      MEDICATIONS  (STANDING):  atropine Injectable 0.5 milliGRAM(s) IV Push once  cefTRIAXone   IVPB 1000 milliGRAM(s) IV Intermittent once  cyanocobalamin Injectable 1000 MICROGram(s) IntraMuscular daily  levETIRAcetam 500 milliGRAM(s) Oral two times a day  melatonin 3 milliGRAM(s) Oral at bedtime    MEDICATIONS  (PRN):      Vital Signs Last 24 Hrs  T(C): 36.8 (11 Oct 2019 05:17), Max: 36.8 (11 Oct 2019 05:17)  T(F): 98.3 (11 Oct 2019 05:17), Max: 98.3 (11 Oct 2019 05:17)  HR: 68 (11 Oct 2019 05:17) (68 - 79)  BP: 109/65 (11 Oct 2019 05:17) (86/51 - 114/66)  BP(mean): --  RR: 16 (11 Oct 2019 05:17) (16 - 16)  SpO2: 95% (11 Oct 2019 05:17) (95% - 97%)    I&O's Detail    11 Oct 2019 07:01  -  11 Oct 2019 11:03  --------------------------------------------------------  IN:    Oral Fluid: 120 mL  Total IN: 120 mL    OUT:  Total OUT: 0 mL    Total NET: 120 mL    CAPILLARY BLOOD GLUCOSE    PHYSICAL EXAM:  GENERAL: NAD, sitting up in chair  HEAD:  Atraumatic, Normocephalic  EYES: EOMI, PERRLA, conjunctiva and sclera clear  CHEST/LUNG: Clear to auscultation bilaterally; No wheeze  HEART: irregular rate and rhythm  ABDOMEN: Soft, Nontender, Nondistended; Bowel sounds present  EXTREMITIES:  2+ Peripheral Pulses, No significant clubbing, cyanosis, or edema  PSYCH: Alert and oriented to person, place, month and year  NEUROLOGY: no focal deficits, moving all extremities, sensation intact to light touch throughout  SKIN: scattered ecchymosis on B/L extremities      LABS:                         10.3   7.03  )-----------( 69       ( 11 Oct 2019 03:13 )             31.6     10-11    137  |  103  |  29<H>  ----------------------------<  95  3.9   |  25  |  0.67    Ca    8.5      11 Oct 2019 03:13  Mg     2.0     10-11        RADIOLOGY & ADDITIONAL TESTS:    Imaging Personally Reviewed: < from: CT Head No Cont (10.06.19 @ 10:41) >  Parenchymal volume loss and chronic microvascular ischemic changes are   again seen.    Again seen is extra-axial area low-attenuation involving the left frontal   region with adjacent scalloping of bone. This finding measures   approximately 4.3 x 3.1 cm compatible with arachnoid cyst.    Tiny extra-axial area of high attenuation is again suspected involving   theleft frontal region. This finding measures approximately 0.3 cm   widest diameter. This is likely compatible with a small acute subdural   hematoma. No significant shift or herniation is seen.    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    Sclerotic change seen involving the right mastoid which is likely   compatible with chronic inflammatory change.    Impression: Small subdural hematoma again seen.      < end of copied text >  viewed:  < from: VA Duplex Ext Veins Lower Comp, Bilat. (10.06.19 @ 08:07) >  Summary/Impressions:  1. Non-occlusive deep vein thrombosis noted in the right  soleal vein.  2. No evidence of deep vein thrombosis in the left lower  extremities.    3. Superficial venous insufficiency noted in the left  great saphenous veins.  Results communicated with JESSE Cloud on 10/06/19 at 9  am with read back.    < end of copied text >    Consultant(s) Notes Reviewed:  Hematology, NSG  Case personally discussed with Hematologist, Interventional radiologist and Neurosurgery team

## 2019-10-11 NOTE — H&P ADULT - NSHPPHYSICALEXAM_GEN_ALL_CORE
PHYSICAL EXAM  Constitutional - NAD, Comfortably sitting in bed  HEENT - NCAT, EOMI  Neck - Supple, No limited ROM  Chest - Breathing comfortably, No wheezing, clear to auscultation bilaterally  Cardiovascular - S1S2, RRR  Abdomen - Soft, nontender, nondistended, normoactive bowel sounds  Extremities - BLE venous stasis changes, mild edema present right leg below mid calf level, no tenderness to palpation   Neurologic Exam -                    Cognitive - Awake, Alert, AAO to self, place, month, year, situation. Able to repeat 3/3 words, only can recall 1/3 words after 5 minutes     Communication - Fluent, No dysarthria     Cranial Nerves - CN 2-12 intact     Motor -                     LEFT    UE - ShAB 5/5, EF 5/5, EE 5/5, WE 5/5,  5/5                    RIGHT UE - ShAB 5/5, EF 5/5, EE 5/5, WE 5/5,  5/5                    LEFT    LE - HF 4/5, KE 4/5, DF 4/5, PF 4/5                    RIGHT LE - HF 4/5, KE 4/5, DF 4/5, PF 4/5        Sensory - Intact to LT throughout     Coordination - FTN intact  Psychiatric - Mood stable, Affect WNL  Skin: multiple areas of bruising at all extremities and back, upper right thigh with bruise, rash left groin area, blanchable redness at sacrum area

## 2019-10-11 NOTE — PROGRESS NOTE ADULT - ATTENDING COMMENTS
Medically stable for discharge to rehab. Plan discussed with hematology, neurosurgery, patient, niece, PA and LÓPEZ/ELVIS.     Home medications were coumadin and digoxin. Both medications were stopped and will be held on discharge. New medications include Keppra 500mg BID, Keflex 500mg BID (for 2 doses, to complete abx course on 10/12/2019), Cyanocobalamin (b12) 1000 micrograms intramuscular daily until 10/13/2019 then weekly x 4 doses). Patient to follow up with PCP in 1 week for repeat CBC and follow up with NSG Dr. Manuel as outpatient. Per hematology, no need for hematology follow up unless concern for dropping platelets on outpatient CBC.     Juan Plaza MD  Hospitalist  pager #[phone] Medically stable for discharge to rehab. Plan discussed with hematology, neurosurgery, patient, niece, PA and LÓPEZ/ELVIS.     Updated PCP Dr. Gardner via telephone on 10/11/2019 at 2:00pm regarding patient's hospital course.     Discharge planning time 35 minutes.     Home medications were coumadin and digoxin. Both medications were stopped and will be held on discharge. New medications include Keppra 500mg BID, Keflex 500mg BID (for 2 doses, to complete abx course on 10/12/2019), Cyanocobalamin (b12) 1000 micrograms intramuscular daily until 10/13/2019 then weekly x 4 doses). Patient to follow up with PCP in 1 week for repeat CBC and follow up with NSG Dr. Manuel as outpatient. Per hematology, no need for hematology follow up unless concern for dropping platelets on outpatient CBC.     Juan Plaza MD  Hospitalist  pager #[phone]

## 2019-10-11 NOTE — H&P ADULT - NSHPOUTPATIENTPROVIDERS_GEN_ALL_CORE
Soni Manuel)  [facility] Neurosurgery  General  [address], [address]  [place] [address]  Phone: [phone]    Jorge Schumacher (MD)  Cardiac Electrophysiology; Cardiovascular Disease; Internal Medicine  [address] [place] [address], [address] [address]  [place] [address]  Phone: [phone]    Nick Oneill)  Hematology; Medical Oncology  [address] [place] [id number]  Phone: [phone]

## 2019-10-11 NOTE — DISCHARGE NOTE PROVIDER - CARE PROVIDER_API CALL
Soni Manuel)  [facility] Neurosurgery  General  [address], [address]  [place] [address]  Phone: [phone]  Fax: [phone]  Follow Up Time:     Jorge Schumacher)  Cardiac Electrophysiology; Cardiovascular Disease; Internal Medicine  [address] [place] [address], [address] [address]  [place] [address]  Phone: [phone]  Fax: [phone]  Follow Up Time:     Nick Oneill)  Hematology; Medical Oncology  [address] [place] [address]  Phone: [phone]  Fax: [phone]  Follow Up Time:     primary,   follow up with your primary care doctor call for apointment in 1 week for repeat cbc  Phone: (   )    -  Fax: (   )    -  Follow Up Time:

## 2019-10-12 NOTE — PROGRESS NOTE ADULT - SUBJECTIVE AND OBJECTIVE BOX
Patient is a 87y old  Male who presents with a chief complaint of AMS (10 Oct 2019 14:13)      HPI:  Mr. Barger is a 86 y/o male with PMH of Afib on coumadin, DVT, HTN, and past thyroidectomy, who presented to the hospital for increasing confusion and bruises on extremities.     Patient is a poor historian, and is confused at the time of interview. History obtained from the chart.  Patient was brought to the hospital by niece Mily Trinhsina ([phone]) for new bruising over arms and legs over the past two weeks and increasing confusion. He lives alone at home with independent ADL/IADL. However, the niece noticed increased confusion during conversation over the phone with perseverating on certain words.     Upon interview, patient is AOx2 (, 2019, yet [facility]).   He states that he is fine and wants to call his wife and go home (yet patient is  per chart review and lives alone).   Patient denies any falls or head trauma. No change in medication regimen. He takes medications as prescribed. Last INR was checked at outpatient office on , and it was 2.1.  He is suppose to take Warfarin 2mg qD for  and skip Saturday/ per outpatient note.    In ED, temp 97.9, HR 73, /81, and RR 18, SpO2 100%.  He received one dose of CTX, Vitamin K and potassium supp. (06 Oct 2019 01:33)      Interval history: patient seen bedside, no acute events overnight, tolerating therapy well.       REVIEW OF SYSTEMS  Constitutional - No fever,   HEENT - No eye pain, No visual disturbances,   Respiratory - No cough, No wheezing, No shortness of breath  Cardiovascular - No chest pain, No palpitations  Gastrointestinal - No abdominal pain, + nausea, No vomiting, No diarrhea, No constipation  Genitourinary - No dysuria,   Neurological - No headaches, No memory loss, No loss of strength, No numbness  Musculoskeletal - No joint pain, No joint swelling, No muscle pain  Psychiatric - No depression, No anxiety      PAST MEDICAL & SURGICAL HISTORY  Lung nodules  SVC syndrome  Acute DVT (deep venous thrombosis)  Essential hypertension  Atrial fibrillation, unspecified type  Cardiac complaint  H/O thyroidectomy  No significant past surgical history      SOCIAL HISTORY  Smoking - Denied  EtOH - Denied   Drugs - Denied    FUNCTIONAL HISTORY  lives in a house alone with 3 steps to enter with rail and no steps inside. PTA, patient was independent with all functional mobility with use of a rolling walker/cane, medication management and adls.      CURRENT FUNCTIONAL STATUS  BM: cg/min  T: cg    FAMILY HISTORY   No pertinent family history in first degree relatives      RECENT LABS/IMAGING  CBC Full  -  ( 10 Oct 2019 05:30 )  WBC Count : 5.95 K/uL  RBC Count : 3.15 M/uL  Hemoglobin : 10.0 g/dL  Hematocrit : 30.6 %  Platelet Count - Automated : 72 K/uL  Mean Cell Volume : 97.1 fL  Mean Cell Hemoglobin : 31.7 pg  Mean Cell Hemoglobin Concentration : 32.7 %  Auto Neutrophil # : 4.53 K/uL  Auto Lymphocyte # : 0.57 K/uL  Auto Monocyte # : 0.74 K/uL  Auto Eosinophil # : 0.08 K/uL  Auto Basophil # : 0.01 K/uL  Auto Neutrophil % : 76.2 %  Auto Lymphocyte % : 9.6 %  Auto Monocyte % : 12.4 %  Auto Eosinophil % : 1.3 %  Auto Basophil % : 0.2 %    10-10    138  |  102  |  22  ----------------------------<  89  3.8   |  26  |  0.61    Ca    8.5      10 Oct 2019 08:42  Mg     2.0     10-10          VITALS   Vital Signs Last 24 Hrs  T(C): 36.5 (12 Oct 2019 07:24), Max: 36.6 (11 Oct 2019 17:00)  T(F): 97.7 (12 Oct 2019 07:24), Max: 97.9 (11 Oct 2019 17:00)  HR: 61 (12 Oct 2019 07:24) (61 - 74)  BP: 128/63 (12 Oct 2019 07:24) (107/56 - 128/63)  BP(mean): --  RR: 14 (12 Oct 2019 07:24) (14 - 15)  SpO2: 99% (12 Oct 2019 07:24) (98% - 99%)    ALLERGIES  No Known Allergies      MEDICATIONS   atropine Injectable 0.5 milliGRAM(s) IV Push once  cefTRIAXone   IVPB 1000 milliGRAM(s) IV Intermittent every 24 hours  cyanocobalamin Injectable 1000 MICROGram(s) IntraMuscular daily  levETIRAcetam 500 milliGRAM(s) Oral two times a day  melatonin 3 milliGRAM(s) Oral at bedtime      ----------------------------------------------------------------------------------------  PHYSICAL EXAM  Constitutional - NAD, Comfortable  HEENT - NCAT, EOMI  Neck - Supple, No limited ROM  Chest - Breathing comfortably,   Cardiovascular - S1S2   Abdomen - Soft   Extremities - b/l upper and le swelling  Neurologic Exam -                    Cognitive - Awake, Alert, AAO x 3     Communication - Fluent, No dysarthria     Cranial Nerves - CN 2-12 intact           Reflexes - DTR Intact, No primitive reflexive  Psychiatric - mood stable    ------------------------------------------------------------------------------------------------  ASSESSMENT/PLAN  87yMale with PMH of Afib on coumadin, DVT, HTN, and past thyroidectomy with functional deficits after left frontal parietal SDH, thrombocytopenia (s/p platelet transfusion), and UTI, right soleal dvt  -SDH- stable on repeat imaging, anticoagulation on hold. on Keppra   -UTI - on IV ceftriaxone  -Thrombocytopenia : as per heme/onc  -Bradycardia - EP following, on tele monitor  DVT PPX - SCDs  PT - ROM, Bed Mob, Transfers, Amb with AD   OT - ADLs, ROM  SLP -cognitive  Precautions - Falls, Cardiac  Skin - Turn Q2hrs  Diet -  soft     Rehab -   Dispo: pending progress with bedside therapy  When patient is medically stable, patient would benefit from course acute Inpatient rehab.    Patient can tolerate 3 hours of therapy and requires medical supervision.    Patient's mental status and strength have been improving, however patient still requires assistance.  Request follow up PT evaluation, if patient is supervision level with RW then may be able to be discharged home with home PT, however if still requiring assistance and does not have help at home, would benefit from inpatient rehab.

## 2019-10-13 NOTE — PROGRESS NOTE ADULT - SUBJECTIVE AND OBJECTIVE BOX
Patient is a 87y old  Male who presents with a chief complaint of AMS (10 Oct 2019 14:13)      HPI:  Mr. Barger is a 88 y/o male with PMH of Afib on coumadin, DVT, HTN, and past thyroidectomy, who presented to the hospital for increasing confusion and bruises on extremities.     Patient is a poor historian, and is confused at the time of interview. History obtained from the chart.  Patient was brought to the hospital by niece Mily Trinhsina ([phone]) for new bruising over arms and legs over the past two weeks and increasing confusion. He lives alone at home with independent ADL/IADL. However, the niece noticed increased confusion during conversation over the phone with perseverating on certain words.     Upon interview, patient is AOx2 (, 2019, yet [facility]).   He states that he is fine and wants to call his wife and go home (yet patient is  per chart review and lives alone).   Patient denies any falls or head trauma. No change in medication regimen. He takes medications as prescribed. Last INR was checked at outpatient office on , and it was 2.1.  He is suppose to take Warfarin 2mg qD for  and skip Saturday/ per outpatient note.    In ED, temp 97.9, HR 73, /81, and RR 18, SpO2 100%.  He received one dose of CTX, Vitamin K and potassium supp. (06 Oct 2019 01:33)      Interval history: patient seen bedside, no acute events overnight, tolerating therapy well.       REVIEW OF SYSTEMS  Constitutional - No fever,   HEENT - No eye pain, No visual disturbances,   Respiratory - No cough, No wheezing, No shortness of breath  Cardiovascular - No chest pain, No palpitations  Gastrointestinal - No abdominal pain, + nausea, No vomiting, No diarrhea, No constipation  Genitourinary - No dysuria,   Neurological - No headaches, No memory loss, No loss of strength, No numbness  Musculoskeletal - No joint pain, No joint swelling, No muscle pain  Psychiatric - No depression, No anxiety      PAST MEDICAL & SURGICAL HISTORY  Lung nodules  SVC syndrome  Acute DVT (deep venous thrombosis)  Essential hypertension  Atrial fibrillation, unspecified type  Cardiac complaint  H/O thyroidectomy  No significant past surgical history      SOCIAL HISTORY  Smoking - Denied  EtOH - Denied   Drugs - Denied    FUNCTIONAL HISTORY  lives in a house alone with 3 steps to enter with rail and no steps inside. PTA, patient was independent with all functional mobility with use of a rolling walker/cane, medication management and adls.      CURRENT FUNCTIONAL STATUS  BM: cg/min  T: cg    FAMILY HISTORY   No pertinent family history in first degree relatives      RECENT LABS/IMAGING                         11.5   5.98  )-----------( 69       ( 12 Oct 2019 06:35 )             35.3     10-12    142  |  105  |  22  ----------------------------<  88  3.6   |  28  |  0.66    Ca    8.9      12 Oct 2019 06:35                    VITALS  Vital Signs Last 24 Hrs  T(C): 36.8 (13 Oct 2019 08:10), Max: 36.8 (13 Oct 2019 08:10)  T(F): 98.2 (13 Oct 2019 08:10), Max: 98.2 (13 Oct 2019 08:10)  HR: 75 (13 Oct 2019 08:10) (75 - 87)  BP: 115/73 (13 Oct 2019 08:10) (115/73 - 145/76)  BP(mean): --  RR: 14 (13 Oct 2019 08:10) (14 - 14)  SpO2: 95% (13 Oct 2019 08:10) (95% - 100%)    ALLERGIES  No Known Allergies      MEDICATIONS   atropine Injectable 0.5 milliGRAM(s) IV Push once  cefTRIAXone   IVPB 1000 milliGRAM(s) IV Intermittent every 24 hours  cyanocobalamin Injectable 1000 MICROGram(s) IntraMuscular daily  levETIRAcetam 500 milliGRAM(s) Oral two times a day  melatonin 3 milliGRAM(s) Oral at bedtime      ----------------------------------------------------------------------------------------  PHYSICAL EXAM  Constitutional - NAD, Comfortable  HEENT - NCAT, EOMI  Neck - Supple, No limited ROM  Chest - Breathing comfortably,   Cardiovascular - S1S2   Abdomen - Soft   Extremities - b/l upper and le swelling  Neurologic Exam -                    Cognitive - Awake, Alert, AAO x 3     Communication - Fluent, No dysarthria     Cranial Nerves - CN 2-12 intact           Reflexes - DTR Intact, No primitive reflexive  Psychiatric - mood stable    ------------------------------------------------------------------------------------------------  ASSESSMENT/PLAN  87yMale with PMH of Afib on coumadin, DVT, HTN, and past thyroidectomy with functional deficits after left frontal parietal SDH, thrombocytopenia (s/p platelet transfusion), and UTI, right soleal dvt  -SDH- stable on repeat imaging, anticoagulation on hold. on Keppra   -UTI - on IV ceftriaxone  -Thrombocytopenia : as per heme/onc  -Bradycardia - EP following, on tele monitor  DVT PPX - SCDs  PT - ROM, Bed Mob, Transfers, Amb with AD   OT - ADLs, ROM  SLP -cognitive  Precautions - Falls, Cardiac  Skin - Turn Q2hrs  Diet -  soft     Rehab -   Dispo: pending progress with bedside therapy  When patient is medically stable, patient would benefit from course acute Inpatient rehab.    Patient can tolerate 3 hours of therapy and requires medical supervision.    Patient's mental status and strength have been improving, however patient still requires assistance.  Request follow up PT evaluation, if patient is supervision level with RW then may be able to be discharged home with home PT, however if still requiring assistance and does not have help at home, would benefit from inpatient rehab.

## 2019-10-13 NOTE — DIETITIAN INITIAL EVALUATION ADULT. - PERTINENT LABORATORY DATA
(10/12) Hgb 11.5 L, Hct 35.3 L, Na 142 wnl, K 3.6 wnl, Cl 105 wnl, CO2 28 wnl, BUN 22 wnl, Creat 0.66 wnl, eGFR 87 wnl, (10/7) vit B12 195 L

## 2019-10-13 NOTE — DIETITIAN INITIAL EVALUATION ADULT. - ADD RECOMMEND
Pt declined nutrition supplement at this time; obtained food preferences. Pt stated he likes to enjoy his time eating; recommend rest breaks during feeding. Requires assistance w/ feeding per flowsheets. Monitor PO intake, weight (weekly), skin, labs, bowels.

## 2019-10-13 NOTE — CHART NOTE - NSCHARTNOTEFT_GEN_A_CORE
Upon Nutritional Assessment by the Registered Dietitian your patient was determined to meet criteria / has evidence of the following diagnosis/diagnoses:          [ ]  Mild Protein Calorie Malnutrition        [X]  Moderate Protein Calorie Malnutrition- in context of acute illness/injury        [ ] Severe Protein Calorie Malnutrition        [ ] Unspecified Protein Calorie Malnutrition        [ ] Underweight / BMI <19        [ ] Morbid Obesity / BMI > 40      Findings as based on:  [X] Comprehensive nutrition assessment - 7#/4% weight loss x 1 week, suspected decreased PO intake PTA  [X] Nutrition Focused Physical Exam- mild loss of fat from orbital region + triceps, mild loss of muscle from temples, shoulders  [X] Other: 1+ edema bilateral ankles yesterday; no edema today per flowsheets    Nutrition Plan/Recommendations:    1. Pt declined ONS; obtained food preferences, will honor to promote optimal macronutrient intake  2. Pt stated he likes to take his time during meals; encourage rest breaks during meals  3. Monitor PO intake, weight (weekly), skin, labs, bowels and follow up x 5 days      PROVIDER Section:     By signing this assessment you are acknowledging and agree with the diagnosis/diagnoses assigned by the Registered Dietitian    Comments:

## 2019-10-13 NOTE — DIETITIAN INITIAL EVALUATION ADULT. - OTHER INFO
82 y/o M with PMHx of HTN, Afib and DVT who presented with AMS secondary to acute on subacute frontal/parietal SDH managed non operatively. Hospital course also significant for right soleal DVT and bradycardia, thrombocytopenia requiring transfusion platelets. Pt tolerating dysphagia 3, soft, thin liquids, low sodium diet; consumed 100% of breakfast per discussion w/ PCA. Pt confused during parts of interview; unable to assess diet PTA. Weight hx per flowsheets: 169.9 lbs (10/5), 173.2 lbs (10/7), 168.4 lbs (10/9); admission weight to [facility] 163 lbs (10/11). Last BM 10/12. Skin noted w/ MAD. No edema noted today per flowsheets (had trace bilateral ankle edema yesterday). 80 y/o M with PMHx of HTN, Afib and DVT who presented with AMS secondary to acute on subacute frontal/parietal SDH managed non operatively. Hospital course also significant for right soleal DVT and bradycardia, thrombocytopenia requiring transfusion platelets. Pt tolerating dysphagia 3, soft, thin liquids, low sodium diet; consumed 100% of breakfast per discussion w/ PCA. Pt confused during parts of interview; unable to assess diet PTA. Weight hx per flowsheets: 169.9 lbs (10/5), 173.2 lbs (10/7), 168.4 lbs (10/9); admission weight to [facility] 163 lbs (10/11). Last BM 10/12; denies n/v/d/c. Skin noted w/ MAD. No edema noted today per flowsheets (had trace bilateral ankle edema yesterday).

## 2019-10-13 NOTE — DIETITIAN INITIAL EVALUATION ADULT. - PHYSICAL APPEARANCE
other (specify) NFPE: mild loss of fat from orbital region, mild loss of fat from triceps; mild loss of muscle mass from temples, shoulders.

## 2019-10-13 NOTE — DIETITIAN INITIAL EVALUATION ADULT. - NS FNS REASON FOR WEIGHT CHANG
other (specify)/suspected decreased PO intake PTA; fluid shifts/pt noted w/ trace edema bilateral ankles yesterday

## 2019-10-14 NOTE — PROGRESS NOTE ADULT - EXTREMITIES COMMENTS
No edema or tenderness to palpation present in bilateral lower extremities.  Pt with bruising on all 4 extremities, improving from admission.

## 2019-10-14 NOTE — PROGRESS NOTE ADULT - ASSESSMENT
Pt is an 80 y/o M with PMHx of HTN, Afib and DVT who presented with AMS secondary to acute on subacute frontal/parietal  SDH managed non operatively. Hospital course also significant for right soleal DVT and bradycardia, thrombocytopenia requiring transfusion platelets.     #SDH  - repeat CT stable. Neuro checks  - off anticoagulation  - begin comprehensive rehab program PT/OT/SLP  - Keppra for seizure ppx stopped today after 7 days of taking it    #Weakness  - pt with increased generalized weakness from baseline  - likely secondary to UTI vs SDH  - PT/OT comprehensive rehab program    #Thrombocytopenia  - Platelets were found to be as low as 21. Differential diagnosis includes UTI, B12 deficiency and MDS  - s/p 2 platelet transfusions  - uptrending platelet counts - 10/11 - 69, 10/12 - 69  - continue to monitor with CBCs  - Hematology to follow as outpatient  - B12 1000mcg IM weekly    #UTI  - completed course of Keflex  - monitor symptoms    #AFib  - AC (coumadin) held due to bleed and family will continue to hold due to bleeding risk  - continue to hold digoxin due to episodes of bradycardia  - monitor vitals  - will follow up as an outpatient to restart any medications  - hospitalist consult    #DVT  - right soleal DVT  - no AC due to bleed. Seen by IR, IVCF not recommended  - Stockings, mobility, continue to monitor    #Diet  - Soft, low sodium    #DVT ppx  - none due to acute SDH  - will use RUSS stockings. NO SCDs to RLE due to soleal DVT Pt is an 82 y/o M with PMHx of HTN, Afib and DVT who presented with AMS secondary to acute on subacute frontal/parietal  SDH managed non operatively. Hospital course also significant for right soleal DVT and bradycardia, thrombocytopenia requiring transfusion platelets.     #SDH  - repeat CT stable. Neuro checks  - off anticoagulation. Patient and family do NOT want  AC for Afib, understand benefits and risks  - continue comprehensive rehab program PT/OT/SLP  - Keppra for seizure ppx stopped today after 7 days 10/14, patient an dfamily agreeable    #Weakness  - pt with increased generalized weakness from baseline  - likely secondary to UTI vs SDH  - PT/OT comprehensive rehab program    #Thrombocytopenia  - Platelets were found to be as low as 21. Differential diagnosis includes UTI, B12 deficiency and MDS  - s/p 2 platelet transfusions  - uptrending platelet counts - 10/11 - 69, 10/12 - 69  - continue to monitor with CBCs. Ordered for AM 10/15  - Hematology to follow as outpatient  - B12 1000mcg IM weekly    #UTI  - completed course of Keflex  - monitor symptoms    #AFib  - AC (coumadin) held due to bleed and family will continue to hold due to bleeding risk  - continue to hold digoxin due to episodes of bradycardia. Currently rate controlled 76 and asymptomatic  - monitor vitals  - will follow up as an outpatient to restart any medications  - hospitalist consult    #DVT  - right soleal DVT  - no AC due to bleed. Seen by IR, IVCF not recommended  - Stockings, mobility, continue to monitor  no SCDs    #Diet  - Soft, low sodium    LAbs:  cbc BMp 10/15

## 2019-10-14 NOTE — PROGRESS NOTE ADULT - COMMENTS
denies H/A, recurrent falls, syncopal episodes, dizziness. Denies difficulty sleeping feeling overall fatigued. Independent at home in community including iADLs per patient and family

## 2019-10-14 NOTE — PROGRESS NOTE ADULT - CONSTITUTIONAL COMMENTS
NAD, sitting comfortably in wheelchair NAD, sitting comfortably in wheelchair. Alert O x 2-3, difficulty expressing location and day of week but can select with choices

## 2019-10-14 NOTE — PROGRESS NOTE ADULT - MENTAL STATUS
Pt is Alert and Oriented to self and month. He needs prompting to say what type of place he is in and for the day of the week. His first language is Syriac and there may be a component of translation in his brain that is slowed, but is follows commands and answers questions fully in English. Pt is Alert and Oriented to self and month. He needs prompting to say what type of place he is in and for the day of the week. His first language is [place] and there may be a component of translation in his brain that is slowed, but is follows commands and answers questions fully in English.  +word finding deficits, however per jihan this is close to baseline "this is just how he talks"  fair attention, reduced sustained

## 2019-10-14 NOTE — PROGRESS NOTE ADULT - GENERAL COMMENTS
Pt seen sitting in wheelchair at bedside. Pt's niece is also at bedside, and she says that he is speaking more like himself and is doing much better than when he was in the hospital prior. He is feeling well and has no complaints. He has mild difficulty with his memory, but is physically feeling well. Pt seen sitting in wheelchair at bedside. Pt's niece is also at bedside, and she says that he is speaking more like himself and is doing much better than when he was in the hospital prior. He is feeling well and has no complaints. He has mild difficulty with his memory, but is physically feeling well.. Patient and family defer use of language line  in Spanish

## 2019-10-14 NOTE — CONSULT NOTE ADULT - SUBJECTIVE AND OBJECTIVE BOX
Patient is a 87y old  Male who presents with a chief complaint of left frontal-parietal SDH diagnosed 10/5/19 (14 Oct 2019 11:07)    HPI:  Pt is an 86 y/o Male with PMHx of HTN,. Afib on coumadin, DVT, and past thyroidectomy, who was brought to [facility] for increasing confusion and bruises on arms and legs x 2 weeks on 10/5/19.  In the ED, CT head was suspicious for left frontal/parietal acute/subacute SDH. Neurosurgery was consulted and recommended no acute surgical intervention, started on keppra for seizure prophylaxis. Patient was also found to be thrombocytopenic (21), and transfused platelets x 2. Repeat CT head was stable.    Patient was also found to have UTI and started on Keflex which is due to be completed on 10/12. Heme/onc was consulted for thrombocytopenia. They believe that DIC was unlikely, and attributed the thrombocytopenia to likely infection from UTI vs B12 deficiency vs MDS. He was also found to have non occlusive DVT on the right  soleal vein. Interventional radiology was consulted for possible IVC filter, but they felt that since the risk of PE from DVT below the knee was low, the risk of procedure outweighed the benefit. During hospital course, patient also found to be intermittently bradycardic to the 30s with Afib with slow ventricular rate and pauses. Electrophysiology was consulted and recommended to dc digoxin and they will follow up as outpatient. Pt was evaluated by PT, OT, and PM&R and was recommended for acute inpatient rehabilitation. Pt was deemed stable for discharge on 10/11/19 and he was transferred to [facility] for acute inpatient rehabilitation. (11 Oct 2019 13:22)    In SUmmary: 87M PMH HTN, Afib on coumadin, DVT, h/o thyroidectomy presented to [facility] for confusion and bruising on arms and legs.  CT head showed left frontal/parietal Subdural hematoma.   Neurosurgery consulted and did not recommend acute surgical intervention.  Patient also found to have thrombocytopenia of 21 and given platelets x2.  Patient also found to have urinary tract infection and treated with keflex.  Hem/Onc suspected thrombocytopenia secondary to acute infection.  Patient course also complicated by episodes of bradycardia and digoxin was discontinued.  Patient evaluated and accepted for Acute rehab and subsequently transferred to [facility] on 10/11/19.      PAST MEDICAL & SURGICAL HISTORY:  Lung nodules  SVC syndrome  Acute DVT (deep venous thrombosis)  Essential hypertension  Atrial fibrillation, unspecified type  H/O thyroidectomy      Father: - at age - with history of   Mother: - at age - with history of           Substance Use (street drugs): (  ) never used  (  ) other:  Tobacco Usage:  (   ) never smoked   (   ) former smoker   (   ) current smoker  (     ) pack year  Alcohol Usage:  Sexual History:   Recent Travel:      Allergies    No Known Allergies    Intolerances        nystatin Powder 1 Application(s) Topical two times a day      REVIEW OF SYSTEMS:  CONSTITUTIONAL: No fever, weight loss, or fatigue  EYES: No eye pain, visual disturbances, or discharge  ENMT:  No difficulty hearing, tinnitus, vertigo; No sinus or throat pain  NECK: No pain or stiffness  BREASTS: No pain, masses, or nipple discharge  RESPIRATORY: No cough, wheezing, chills or hemoptysis; No shortness of breath  CARDIOVASCULAR: No chest pain, palpitations, dizziness, or leg swelling  GASTROINTESTINAL: No abdominal or epigastric pain. No nausea, vomiting, or hematemesis; No diarrhea or constipation. No melena or hematochezia.  GENITOURINARY: No dysuria, frequency, hematuria, or incontinence  NEUROLOGICAL: No headaches, memory loss, loss of strength, numbness, or tremors  SKIN: No itching, burning, rashes, or lesions   LYMPH NODES: No enlarged glands  ENDOCRINE: No heat or cold intolerance; No hair loss  MUSCULOSKELETAL: No joint pain or swelling; No muscle, back, or extremity pain  PSYCHIATRIC: No depression, anxiety, mood swings, or difficulty sleeping  HEME/LYMPH: No easy bruising, or bleeding gums  ALLERY AND IMMUNOLOGIC: No hives or eczema    ALL ROS REVIEWED AND NORMAL EXCEPT AS STATED ABOVE    T(C): 36.4 (10-14-19 @ 07:37), Max: 36.7 (10-13-19 @ 22:30)  HR: 77 (10-14-19 @ 07:37) (77 - 77)  BP: 126/74 (10-14-19 @ 07:37) (122/70 - 126/74)  RR: 14 (10-14-19 @ 07:37) (14 - 14)  SpO2: 96% (10-14-19 @ 07:37) (96% - 100%)  Wt(kg): --Vital Signs Last 24 Hrs  T(C): 36.4 (14 Oct 2019 07:37), Max: 36.7 (13 Oct 2019 22:30)  T(F): 97.6 (14 Oct 2019 07:37), Max: 98 (13 Oct 2019 22:30)  HR: 77 (14 Oct 2019 07:37) (77 - 77)  BP: 126/74 (14 Oct 2019 07:37) (122/70 - 126/74)  BP(mean): --  RR: 14 (14 Oct 2019 07:37) (14 - 14)  SpO2: 96% (14 Oct 2019 07:37) (96% - 100%)    PHYSICAL EXAM:  GENERAL: NAD, well-groomed, well-developed  HEAD:  Atraumatic, Normocephalic  EYES: EOMI, PERRLA, conjunctiva and sclera clear  ENMT: No tonsillar erythema, exudates, or enlargement; Moist mucous membranes, Good dentition, No lesions  NECK: Supple, No JVD, Normal thyroid  NERVOUS SYSTEM:  Alert & Oriented X3, Good concentration; Motor Strength 5/5 B/L upper and lower extremities; DTRs 2+ intact and symmetric  CHEST/LUNG: Clear to percussion bilaterally; No rales, rhonchi, wheezing, or rubs  HEART: Regular rate and rhythm; No murmurs, rubs, or gallops  ABDOMEN: Soft, Nontender, Nondistended; Bowel sounds present  EXTREMITIES:  2+ Peripheral Pulses, No clubbing, cyanosis, or edema  LYMPH: No lymphadenopathy noted  SKIN: No rashes or lesions    LABS:     CAPILLARY BLOOD GLUCOSE    RADIOLOGY & ADDITIONAL TESTS:    Consultant(s) Notes Reviewed:  [x ] YES  [ ] NO  Care Discussed with Consultants/Other Providers [ x] YES  [ ] NO  Imaging Personally Reviewed:  [ ] YES  [ ] NO

## 2019-10-14 NOTE — PROGRESS NOTE ADULT - SUBJECTIVE AND OBJECTIVE BOX
Patient is a 87y old  Male who presents with a chief complaint of left frontal-parietal SDH diagnosed 10/5/19 (13 Oct 2019 17:02)      HPI:  Pt is an 88 y/o Male with PMHx of HTN,. Afib on coumadin, DVT, and past thyroidectomy, who was brought to [facility] for increasing confusion and bruises on arms and legs x 2 weeks on 10/5/19.  In the ED, CT head was suspicious for left frontal/parietal acute/subacute SDH. Neurosurgery was consulted and recommended no acute surgical intervention, started on keppra for seizure prophylaxis. Patient was also found to be thrombocytopenic (21), and transfused platelets x 2. Repeat CT head was stable.    Patient was also found to have UTI and started on Keflex which is due to be completed on 10/12. Heme/onc was consulted for thrombocytopenia. They believe that DIC was unlikely, and attributed the thrombocytopenia to likely infection from UTI vs B12 deficiency vs MDS. He was also found to have non occlusive DVT on the right  soleal vein. Interventional radiology was consulted for possible IVC filter, but they felt that since the risk of PE from DVT below the knee was low, the risk of procedure outweighed the benefit. During hospital course, patient also found to be intermittently bradycardic to the 30s with Afib with slow ventricular rate and pauses. Electrophysiology was consulted and recommended to dc digoxin and they will follow up as outpatient. Pt was evaluated by PT, OT, and PM&R and was recommended for acute inpatient rehabilitation. Pt was deemed stable for discharge on 10/11/19 and he was transferred to [facility] for acute inpatient rehabilitation. (11 Oct 2019 13:22)        PAST MEDICAL & SURGICAL HISTORY:  Lung nodules  SVC syndrome  Acute DVT (deep venous thrombosis)  Essential hypertension  Atrial fibrillation, unspecified type  H/O thyroidectomy      MEDICATIONS  (STANDING):  cyanocobalamin 1000 MICROGram(s) Oral <User Schedule>  melatonin 3 milliGRAM(s) Oral at bedtime  nystatin Powder 1 Application(s) Topical two times a day    MEDICATIONS  (PRN):      Allergies    No Known Allergies    Intolerances          VITALS  87y  Vital Signs Last 24 Hrs  T(C): 36.4 (14 Oct 2019 07:37), Max: 36.7 (13 Oct 2019 22:30)  T(F): 97.6 (14 Oct 2019 07:37), Max: 98 (13 Oct 2019 22:30)  HR: 77 (14 Oct 2019 07:37) (77 - 77)  BP: 126/74 (14 Oct 2019 07:37) (122/70 - 126/74)  BP(mean): --  RR: 14 (14 Oct 2019 07:37) (14 - 14)  SpO2: 96% (14 Oct 2019 07:37) (96% - 100%)  Daily     Daily         RECENT LABS:                      CAPILLARY BLOOD GLUCOSE Patient is a 87y old  Male who presents with a chief complaint of left frontal-parietal SDH diagnosed 10/5/19 (13 Oct 2019 17:02)      HPI:  Pt is an 88 y/o Male with PMHx of HTN,. Afib on coumadin, DVT, and past thyroidectomy, who was brought to [facility] for increasing confusion and bruises on arms and legs x 2 weeks on 10/5/19.  In the ED, CT head was suspicious for left frontal/parietal acute/subacute SDH. Neurosurgery was consulted and recommended no acute surgical intervention, started on keppra for seizure prophylaxis. Patient was also found to be thrombocytopenic (21), and transfused platelets x 2. Repeat CT head was stable.    Patient was also found to have UTI and started on Keflex which is due to be completed on 10/12. Heme/onc was consulted for thrombocytopenia. They believe that DIC was unlikely, and attributed the thrombocytopenia to likely infection from UTI vs B12 deficiency vs MDS. He was also found to have non occlusive DVT on the right  soleal vein. Interventional radiology was consulted for possible IVC filter, but they felt that since the risk of PE from DVT below the knee was low, the risk of procedure outweighed the benefit. During hospital course, patient also found to be intermittently bradycardic to the 30s with Afib with slow ventricular rate and pauses. Electrophysiology was consulted and recommended to dc digoxin and they will follow up as outpatient. Pt was evaluated by PT, OT, and PM&R and was recommended for acute inpatient rehabilitation. Pt was deemed stable for discharge on 10/11/19 and he was transferred to [facility] for acute inpatient rehabilitation. (11 Oct 2019 13:22)        PAST MEDICAL & SURGICAL HISTORY:  Lung nodules  SVC syndrome  Acute DVT (deep venous thrombosis)  Essential hypertension  Atrial fibrillation, unspecified type  H/O thyroidectomy      MEDICATIONS  (STANDING):  cyanocobalamin 1000 MICROGram(s) Oral <User Schedule>  melatonin 3 milliGRAM(s) Oral at bedtime  nystatin Powder 1 Application(s) Topical two times a day    MEDICATIONS  (PRN):      Allergies    No Known Allergies    Intolerances          VITALS  87y  Vital Signs Last 24 Hrs  T(C): 36.4 (14 Oct 2019 07:37), Max: 36.7 (13 Oct 2019 22:30)  T(F): 97.6 (14 Oct 2019 07:37), Max: 98 (13 Oct 2019 22:30)  HR: 77 (14 Oct 2019 07:37) (77 - 77)  BP: 126/74 (14 Oct 2019 07:37) (122/70 - 126/74)  BP(mean): --  RR: 14 (14 Oct 2019 07:37) (14 - 14)  SpO2: 96% (14 Oct 2019 07:37) (96% - 100%)  Daily     Daily         RECENT LABS:                      CAPILLARY BLOOD GLUCOSE

## 2019-10-14 NOTE — CONSULT NOTE ADULT - ASSESSMENT
87M PMH HTN, Afib on coumadin, DVT, h/o thyroidectomy presented to [facility] for confusion and bruising on arms and legs.  CT head showed left frontal/parietal Subdural hematoma.87M PMH HTN, Afib on coumadin, DVT, h/o thyroidectomy presented to [facility] for confusion and bruising on arms and legs.  CT head showed left frontal/parietal Subdural hematoma.    Subdural hematoma  - avoid blood thinners  - PT/OT/Rehab  - CT head stable    Hypertension  - at goal not on meds    Atrial fibrillation  - monitor heart rate off digoxin    Thrombocytopenia   - stable  - check every 48 hrs for trend     DVT Soleal vein  - already had IR evaluation who did not recommend IVC filter  - no AC for brain bleed

## 2019-10-15 NOTE — PROGRESS NOTE ADULT - SUBJECTIVE AND OBJECTIVE BOX
Patient is a 87y old  Male who presents with a chief complaint of left frontal-parietal SDH diagnosed 10/5/19 (14 Oct 2019 14:26)      HPI:  Pt is an 88 y/o Male with PMHx of HTN,. Afib on coumadin, DVT, and past thyroidectomy, who was brought to [facility] for increasing confusion and bruises on arms and legs x 2 weeks on 10/5/19.  In the ED, CT head was suspicious for left frontal/parietal acute/subacute SDH. Neurosurgery was consulted and recommended no acute surgical intervention, started on keppra for seizure prophylaxis. Patient was also found to be thrombocytopenic (21), and transfused platelets x 2. Repeat CT head was stable.    Patient was also found to have UTI and started on Keflex which is due to be completed on 10/12. Heme/onc was consulted for thrombocytopenia. They believe that DIC was unlikely, and attributed the thrombocytopenia to likely infection from UTI vs B12 deficiency vs MDS. He was also found to have non occlusive DVT on the right  soleal vein. Interventional radiology was consulted for possible IVC filter, but they felt that since the risk of PE from DVT below the knee was low, the risk of procedure outweighed the benefit. During hospital course, patient also found to be intermittently bradycardic to the 30s with Afib with slow ventricular rate and pauses. Electrophysiology was consulted and recommended to dc digoxin and they will follow up as outpatient. Pt was evaluated by PT, OT, and PM&R and was recommended for acute inpatient rehabilitation. Pt was deemed stable for discharge on 10/11/19 and he was transferred to [facility] for acute inpatient rehabilitation. (11 Oct 2019 13:22)        PAST MEDICAL & SURGICAL HISTORY:  Lung nodules  SVC syndrome  Acute DVT (deep venous thrombosis)  Essential hypertension  Atrial fibrillation, unspecified type  H/O thyroidectomy      MEDICATIONS  (STANDING):  melatonin 3 milliGRAM(s) Oral at bedtime  nystatin Powder 1 Application(s) Topical two times a day    MEDICATIONS  (PRN):      Allergies    No Known Allergies    Intolerances          VITALS  87y  Vital Signs Last 24 Hrs  T(C): 36.8 (15 Oct 2019 07:40), Max: 36.8 (14 Oct 2019 20:37)  T(F): 98.3 (15 Oct 2019 07:40), Max: 98.3 (14 Oct 2019 20:37)  HR: 67 (15 Oct 2019 07:40) (67 - 71)  BP: 107/66 (15 Oct 2019 07:40) (107/66 - 137/79)  BP(mean): --  RR: 14 (15 Oct 2019 07:40) (14 - 14)  SpO2: 94% (15 Oct 2019 07:40) (94% - 98%)  Daily     Daily         RECENT LABS:                          10.7   5.64  )-----------( 43       ( 15 Oct 2019 07:49 )             32.5     10-15    141  |  106  |  27<H>  ----------------------------<  92  3.9   |  28  |  0.81    Ca    8.6      15 Oct 2019 07:49                CAPILLARY BLOOD GLUCOSE Patient is a 87y old  Male who presents with a chief complaint of left frontal-parietal SDH diagnosed 10/5/19 (14 Oct 2019 14:26)      HPI:  Pt is an 86 y/o Male with PMHx of HTN,. Afib on coumadin, DVT, and past thyroidectomy, who was brought to [facility] for increasing confusion and bruises on arms and legs x 2 weeks on 10/5/19.  In the ED, CT head was suspicious for left frontal/parietal acute/subacute SDH. Neurosurgery was consulted and recommended no acute surgical intervention, started on keppra for seizure prophylaxis. Patient was also found to be thrombocytopenic (21), and transfused platelets x 2. Repeat CT head was stable.    Patient was also found to have UTI and started on Keflex which is due to be completed on 10/12. Heme/onc was consulted for thrombocytopenia. They believe that DIC was unlikely, and attributed the thrombocytopenia to likely infection from UTI vs B12 deficiency vs MDS. He was also found to have non occlusive DVT on the right  soleal vein. Interventional radiology was consulted for possible IVC filter, but they felt that since the risk of PE from DVT below the knee was low, the risk of procedure outweighed the benefit. During hospital course, patient also found to be intermittently bradycardic to the 30s with Afib with slow ventricular rate and pauses. Electrophysiology was consulted and recommended to dc digoxin and they will follow up as outpatient. Pt was evaluated by PT, OT, and PM&R and was recommended for acute inpatient rehabilitation. Pt was deemed stable for discharge on 10/11/19 and he was transferred to [facility] for acute inpatient rehabilitation. (11 Oct 2019 13:22)        PAST MEDICAL & SURGICAL HISTORY:  Lung nodules  SVC syndrome  Acute DVT (deep venous thrombosis)  Essential hypertension  Atrial fibrillation, unspecified type  H/O thyroidectomy      MEDICATIONS  (STANDING):  melatonin 3 milliGRAM(s) Oral at bedtime  nystatin Powder 1 Application(s) Topical two times a day    MEDICATIONS  (PRN):      Allergies    No Known Allergies    Intolerances          VITALS  87y  Vital Signs Last 24 Hrs  T(C): 36.8 (15 Oct 2019 07:40), Max: 36.8 (14 Oct 2019 20:37)  T(F): 98.3 (15 Oct 2019 07:40), Max: 98.3 (14 Oct 2019 20:37)  HR: 67 (15 Oct 2019 07:40) (67 - 71)  BP: 107/66 (15 Oct 2019 07:40) (107/66 - 137/79)  BP(mean): --  RR: 14 (15 Oct 2019 07:40) (14 - 14)  SpO2: 94% (15 Oct 2019 07:40) (94% - 98%)  Daily     Daily         RECENT LABS:                          10.7   5.64  )-----------( 43       ( 15 Oct 2019 07:49 )             32.5     10-15    141  |  106  |  27<H>  ----------------------------<  92  3.9   |  28  |  0.81    Ca    8.6      15 Oct 2019 07:49                CAPILLARY BLOOD GLUCOSE

## 2019-10-15 NOTE — PROGRESS NOTE ADULT - COMMENTS
transfers from bed to chair stooped posture, reduced dynamic standing balance and min assist with cues

## 2019-10-15 NOTE — PROGRESS NOTE ADULT - CONSTITUTIONAL COMMENTS
NAD, sitting in wheelchair comfortably NAD, sitting in wheelchair. reduced initiation. no ecchymoses face, no cervical PS spasm. Fading ecchymoses forearms

## 2019-10-15 NOTE — PROGRESS NOTE ADULT - GENERAL COMMENTS
Pt seen at bedside moving from bed to wheelchair. He is not having any distress. He states that last night he had nausea and vomiting with dinner. He says that the nausea started right after he began to eat dinner. This morning he denied any nausea. He has no other complaints. Pt seen at bedside moving from bed to wheelchair. He is not having any distress. He states that last night he had nausea and vomiting food particles, self limited with dinner. He says that the nausea started right after he began to eat dinner. This morning he denied any nausea. He has no other complaints.

## 2019-10-15 NOTE — CHART NOTE - NSCHARTNOTEFT_GEN_A_CORE
Nutrition Follow Up Note  Hospital Course   (Per Electronic Medical Record):     Source:   Patient [X]  Medical Record [X]      Diet:   Low Sodium, Soft (Dysphagia 3) Diet w/ Thin Liquids  Tolerates Diet Well  No Chewing/Swallowing Difficulties  No Recent Vomiting, Diarrhea or Constipation  Consumes % of Meals (as Per Documentation)   States Good PO Intake/Appetite   Nutrition Education Provided on Low Sodium Diet   Obtained Food Preferences from Patient    Enteral/Parenteral Nutrition: N/A    Current Weight: 163.1lb on 10/11  Obtain New Weight  Obtain Weights Weekly     Pertinent Medications: MEDICATIONS  (STANDING):  melatonin 3 milliGRAM(s) Oral at bedtime  nystatin Powder 1 Application(s) Topical two times a day  pantoprazole    Tablet 40 milliGRAM(s) Oral before breakfast    MEDICATIONS  (PRN):  ondansetron    Tablet 4 milliGRAM(s) Oral two times a day PRN Nausea    Pertinent Labs:  10-15 Na141 mmol/L Glu 92 mg/dL K+ 3.9 mmol/L Cr  0.81 mg/dL BUN 27 mg/dL<H>    Skin: No Pressure Ulcers     Edema: None Noted     Last Bowel Movement: on 10/13    Estimated Needs:   [X] No Change Since Previous Assessment    Previous Nutrition Diagnosis:   Moderate Malnutrition    Nutrition Diagnosis is [X] Ongoing - Declines Nutrition Supplementation & Intake Improving (Per Patient)     New Nutrition Diagnosis: [X] Chewing Difficulties  Related to SDH as Evidence By Need for Soft (Dysphagia 3) Diet     Interventions:   1. Nutrition Education Provided on Low Sodium Diet   2. Recommend Continue Nutrition Plan of Care     Monitoring & Evaluation:   [X] Weights   [X] PO Intake   [X] Follow Up (Per Protocol)  [X] Tolerance to Diet Prescription   [X] Other: Labs    Registered Dietitian/Nutritionist Remains Available.  Markel Maguire RDN    Pager # [phone]  Phone# [phone]

## 2019-10-15 NOTE — PROGRESS NOTE ADULT - ASSESSMENT
Pt is an 80 y/o M with PMHx of HTN, Afib and DVT who presented with AMS secondary to acute on subacute frontal/parietal  SDH managed non operatively. Hospital course also significant for right soleal DVT and bradycardia, thrombocytopenia requiring transfusion platelets.     #SDH  - repeat CT stable. Neuro checks  - off anticoagulation. Patient and family do NOT want  AC for Afib, understand benefits and risks  - continue comprehensive rehab program PT/OT/SLP  - Keppra for seizure ppx stopped today after 7 days 10/14, patient and family agreeable    #Weakness  - pt with increased generalized weakness from baseline  - likely secondary to UTI vs SDH  - PT/OT comprehensive rehab program    #Thrombocytopenia  - Platelets were found to be as low as 21. Differential diagnosis includes UTI, B12 deficiency and MDS  - s/p 2 platelet transfusions  - platelet counts - 10/11 - 69, 10/12 - 69  - continue to monitor with CBCs. Ordered for AM 10/15 - platelets decreased to 43, will repeat CBC 10/16  - Hematology to follow as outpatient  - B12 1000mcg IM weekly    #UTI  - completed course of Keflex  - monitor symptoms    #AFib  - AC (coumadin) held due to bleed and family will continue to hold due to bleeding risk  - continue to hold digoxin due to episodes of bradycardia. Currently rate controlled 76 and asymptomatic  - monitor vitals  - will follow up as an outpatient to restart any medications  - hospitalist consult    #DVT  - right soleal DVT  - no AC due to bleed. Seen by IR, IVCF not recommended  - Stockings, mobility, continue to monitor  - no SCDs    #Diet  - Soft, low sodium    #Nausea  - will add Zofran PRN, will add protonix daily    LAbs:  cbc 10/16 Pt is an 80 y/o M with PMHx of HTN, Afib and DVT who presented with AMS secondary to acute on subacute frontal/parietal  SDH managed non operatively. Hospital course also significant for right soleal DVT and bradycardia, thrombocytopenia requiring transfusion platelets.     #SDH  - repeat CT stable. Neuro checks  - off anticoagulation. Patient and family do NOT want  AC for Afib, understand benefits and risks  - continue comprehensive rehab program PT/OT/SLP  keppra PPX dc 10/14    #Weakness  - pt with increased generalized weakness from baseline  - likely secondary to UTI vs SDH  - PT/OT comprehensive rehab program    #Thrombocytopenia  - Platelets were found to be as low as 21. Differential diagnosis includes UTI, B12 deficiency and MDS  - s/p 2 platelet transfusions   - platelets decreased from 69  to 43, will repeat CBC 10/16  Neuro checks  - Hematology consult 10/15  - B12 1000mcg IM weekly      #AFib  - AC (coumadin) held due to bleed and family will continue to hold due to bleeding risk  - continue to hold digoxin due to episodes of bradycardia. Currently rate controlled 76 and asymptomatic  - monitor vitals  - will follow up as an outpatient to restart any medications  - hospitalist consult    #DVT  - right soleal DVT  - no AC due to bleed. Seen by IR, IVCF not recommended  - Stockings, mobility, continue to monitor  - no SCDs    #Diet  - Soft, low sodium    #Nausea  - will add Zofran PRN, will add protonix daily  monitor neurological status make sure not related to SDH jose ramon given thrombocytopenia. currently self limited to 1 episode    LAbs:  cbc 10/16  BMP 10/16, encourage po fluids  hematology consult

## 2019-10-15 NOTE — PROGRESS NOTE ADULT - EXTREMITIES COMMENTS
Pt with extensive bruising in all 4 extremities, bruising is not in a pattern of a fall.  There is no edema or tenderness to palpation at the lower extremities. Pt with extensive bruising in all 4 extremities, bruising is not consistent with pattern of a fall.  There is no edema or tenderness to palpation at the lower extremities.  no skin breakdown

## 2019-10-16 NOTE — PROGRESS NOTE ADULT - GENERAL COMMENTS
patient eyes open spontaneously, attention appears baseline although reduced and somewhat distractable. no agitation. no significant H/A

## 2019-10-16 NOTE — PROGRESS NOTE ADULT - EXTREMITIES COMMENTS
no calf swelling +soft, NT  +fading ecchymoses in forearms  no new hematoma or bruising noted, no petechaie

## 2019-10-16 NOTE — PROGRESS NOTE ADULT - COMMENTS
ambualting with RW to bathroom, improved standing posture, reduced foot clearance and stride length, shuffling +CG

## 2019-10-16 NOTE — PROGRESS NOTE ADULT - ASSESSMENT
Pt is an 82 y/o M with PMHx of HTN, Afib and DVT who presented with AMS secondary to acute on subacute frontal/parietal  SDH managed non operatively. Hospital course also significant for right soleal DVT and bradycardia, thrombocytopenia requiring transfusion platelets.     #SDH  - repeat CT stable. Continue neuro checks given thrombocytopenia, low threshold for rescanning given platelet count. Currently GCS=14 10/16  - off anticoagulation. Patient and family do NOT want  AC for Afib, understand benefits and risks  - continue comprehensive rehab program PT/OT/SLP  keppra PPX--> dc 10/14    #Thrombocytopenia  - Platelets were found to be as low as 21. Differential diagnosis includes UTI, B12 deficiency and MDS  - s/p 2 platelet transfusions   - platelets decreased from 69  to 43,--> 36 10/16  - Hematology consult 10/16. Discussed with hospitalist, hold transfusion for now pending hospitalist recommendations, will f/u prior numbners as well  CBC ordered 10/17  - B12 1000mcg IM weekly      #AFib  - AC (coumadin) held due to bleed and family will continue to hold due to bleeding risk  - continue to hold digoxin due to episodes of bradycardia. Currently rate controlled 76 and asymptomatic  - monitor vitals  - will follow up as an outpatient to restart any medications  - hospitalist consult    #DVT  - right soleal DVT  - no AC due to bleed. Seen by IR, IVCF not recommended  - Stockings, mobility, continue to monitor  - no SCDs    #Diet  - Soft, low sodium    #Nausea  - will add Zofran PRN, will add protonix daily  monitor neurological status make sure not related to SDH jose ramon given thrombocytopenia. currently self limited to 1 episode    LAbs:  cbc 10/16  BMP 10/16, encourage po fluids  hematology consult Pt is an 80 y/o M with PMHx of HTN, Afib and DVT who presented with AMS secondary to acute on subacute frontal/parietal  SDH managed non operatively. Hospital course also significant for right soleal DVT and bradycardia, thrombocytopenia requiring transfusion platelets.     #SDH  - repeat CT stable. Continue neuro checks given thrombocytopenia, low threshold for rescanning given platelet count. Currently GCS=14 10/16  - off anticoagulation. Patient and family do NOT want  AC for Afib, understand benefits and risks  - continue comprehensive rehab program PT/OT/SLP  keppra PPX--> dc 10/14    #Thrombocytopenia  - Platelets were found to be as low as 21. Differential diagnosis includes UTI, B12 deficiency and MDS  - s/p 2 platelet transfusions   - platelets decreased from 69  to 43,--> 36 10/16  - Hematology consult 10/16. Discussed with hospitalist, hold transfusion for now pending hospitalist recommendations, will f/u prior numbners as well. monitor closely for any signs of bleeding, reviewed with team  CBC ordered 10/17  - B12 1000mcg IM weekly      #AFib  - AC (coumadin) held due to bleed and family will continue to hold due to bleeding risk  - continue to hold digoxin due to episodes of bradycardia. Currently rate controlled 76 and asymptomatic  - monitor vitals  - will follow up as an outpatient to restart any medications  - hospitalist consult    #DVT  - right soleal DVT  - no AC due to bleed. Seen by IR, IVCF not recommended  - Stockings, mobility, continue to monitor  - no SCDs    #Diet  - Soft, low sodium    #Nausea  - resolved    #Case discussed in IDT rounds 10/16:  Patient with reduced attention and awareness, requires increased time for processing . Reduced carryover and +memory deficits. O x 1-2, requires supervison for bADLs, min assist/CG ambulation and transfers and min assist steps x 4  -goals for supervision bADLs, iADLs, transfers and ambulation/steps in community  -target 10/24/19 dc home if medically stable, and with caregiver training and supervision during waking hours, home PT, OT, SLP services    LAbs:  CBC BMP 10/17  neuro checks  hematology consult

## 2019-10-16 NOTE — PROGRESS NOTE ADULT - CARDIOVASCULAR
Pt presents to ED ambulatory complaining of right upper leg pain and swelling following a fall 4 days ago. Pt reports taking extra strength tylenol. Reports swelling is going down but pain is still present, reports her family told her to come here \"to get it checked out. \" Pt is alert and oriented x 4, RR even and unlabored, skin is warm and dry. Assessment completed and pt updated on plan of care. Emergency Department Nursing Plan of Care       The Nursing Plan of Care is developed from the Nursing assessment and Emergency Department Attending provider initial evaluation. The plan of care may be reviewed in the ED Provider note.     The Plan of Care was developed with the following considerations:   Patient / Family readiness to learn indicated by:verbalized understanding  Persons(s) to be included in education: patient  Barriers to Learning/Limitations:No    Signed     Narinder Sparrow RN    7/30/2019   2:53 PM detailed exam

## 2019-10-16 NOTE — PROGRESS NOTE ADULT - SUBJECTIVE AND OBJECTIVE BOX
Patient is a 87y old  Male who presents with a chief complaint of left frontal-parietal SDH diagnosed 10/5/19 (15 Oct 2019 10:33)      HPI:  Pt is an 88 y/o Male with PMHx of HTN,. Afib on coumadin, DVT, and past thyroidectomy, who was brought to [facility] for increasing confusion and bruises on arms and legs x 2 weeks on 10/5/19.  In the ED, CT head was suspicious for left frontal/parietal acute/subacute SDH. Neurosurgery was consulted and recommended no acute surgical intervention, started on keppra for seizure prophylaxis. Patient was also found to be thrombocytopenic (21), and transfused platelets x 2. Repeat CT head was stable.    Patient was also found to have UTI and started on Keflex which is due to be completed on 10/12. Heme/onc was consulted for thrombocytopenia. They believe that DIC was unlikely, and attributed the thrombocytopenia to likely infection from UTI vs B12 deficiency vs MDS. He was also found to have non occlusive DVT on the right  soleal vein. Interventional radiology was consulted for possible IVC filter, but they felt that since the risk of PE from DVT below the knee was low, the risk of procedure outweighed the benefit. During hospital course, patient also found to be intermittently bradycardic to the 30s with Afib with slow ventricular rate and pauses. Electrophysiology was consulted and recommended to dc digoxin and they will follow up as outpatient. Pt was evaluated by PT, OT, and PM&R and was recommended for acute inpatient rehabilitation. Pt was deemed stable for discharge on 10/11/19 and he was transferred to [facility] for acute inpatient rehabilitation. (11 Oct 2019 13:22)      PAST MEDICAL & SURGICAL HISTORY:  Lung nodules  SVC syndrome  Acute DVT (deep venous thrombosis)  Essential hypertension  Atrial fibrillation, unspecified type  H/O thyroidectomy      MEDICATIONS  (STANDING):  melatonin 3 milliGRAM(s) Oral at bedtime  nystatin Powder 1 Application(s) Topical two times a day  pantoprazole    Tablet 40 milliGRAM(s) Oral before breakfast    MEDICATIONS  (PRN):  ondansetron    Tablet 4 milliGRAM(s) Oral two times a day PRN Nausea      Allergies    No Known Allergies    Intolerances          VITALS  87y  Vital Signs Last 24 Hrs  T(C): 36.3 (16 Oct 2019 08:23), Max: 36.8 (15 Oct 2019 19:43)  T(F): 97.3 (16 Oct 2019 08:23), Max: 98.2 (15 Oct 2019 19:43)  HR: 85 (16 Oct 2019 08:23) (84 - 85)  BP: 143/86 (16 Oct 2019 08:23) (125/73 - 143/86)  BP(mean): --  RR: 14 (16 Oct 2019 08:23) (14 - 14)  SpO2: 99% (16 Oct 2019 08:23) (96% - 99%)  Daily     Daily         RECENT LABS:                          10.3   5.33  )-----------( 36       ( 16 Oct 2019 05:19 )             32.0     10-16    141  |  106  |  23  ----------------------------<  80  3.9   |  27  |  0.76    Ca    8.6      16 Oct 2019 05:19                CAPILLARY BLOOD GLUCOSE

## 2019-10-17 NOTE — PROGRESS NOTE ADULT - SUBJECTIVE AND OBJECTIVE BOX
LOV 5-15-19  NOV 9-11-19    Reviewed chart and refills sent   Patient is a 87y old  Male who presents with a chief complaint of left frontal-parietal SDH diagnosed 10/5/19 (17 Oct 2019 11:07)  HPI:  Pt is an 86 y/o Male with PMHx of HTN,. Afib on coumadin, DVT, and past thyroidectomy, who was brought to [facility] for increasing confusion and bruises on arms and legs x 2 weeks on 10/5/19.  In the ED, CT head was suspicious for left frontal/parietal acute/subacute SDH. Neurosurgery was consulted and recommended no acute surgical intervention, started on keppra for seizure prophylaxis. Patient was also found to be thrombocytopenic (21), and transfused platelets x 2. Repeat CT head was stable.    Patient was also found to have UTI and started on Keflex which is due to be completed on 10/12. Heme/onc was consulted for thrombocytopenia. They believe that DIC was unlikely, and attributed the thrombocytopenia to likely infection from UTI vs B12 deficiency vs MDS. He was also found to have non occlusive DVT on the right  soleal vein. Interventional radiology was consulted for possible IVC filter, but they felt that since the risk of PE from DVT below the knee was low, the risk of procedure outweighed the benefit. During hospital course, patient also found to be intermittently bradycardic to the 30s with Afib with slow ventricular rate and pauses. Electrophysiology was consulted and recommended to dc digoxin and they will follow up as outpatient. Pt was evaluated by PT, OT, and PM&R and was recommended for acute inpatient rehabilitation. Pt was deemed stable for discharge on 10/11/19 and he was transferred to [facility] for acute inpatient rehabilitation. (11 Oct 2019 13:22)      Patient seen and examined at bedside.  Patient denies any pain     ALLERGIES:  No Known Allergies    MEDICATIONS:  cyanocobalamin Injectable 1000 MICROGram(s) IntraMuscular daily  nystatin Powder 1 Application(s) Topical two times a day  ondansetron    Tablet 4 milliGRAM(s) Oral two times a day PRN  pantoprazole    Tablet 40 milliGRAM(s) Oral before breakfast    Vital Signs Last 24 Hrs  T(F): 98.6 (17 Oct 2019 07:35), Max: 98.6 (16 Oct 2019 20:24)  HR: 87 (17 Oct 2019 07:35) (70 - 87)  BP: 107/67 (17 Oct 2019 07:35) (107/67 - 115/72)  RR: 15 (17 Oct 2019 07:35) (15 - 15)  SpO2: 92% (17 Oct 2019 07:35) (92% - 97%)  I&O's Summary    17 Oct 2019 07:01  -  17 Oct 2019 19:53  --------------------------------------------------------  IN: 240 mL / OUT: 0 mL / NET: 240 mL        PHYSICAL EXAM:  General: NAD, A/O x 2  ENT: MMM  Neck: Supple, No JVD  Lungs: Clear to auscultation bilaterally  Cardio: RRR, S1/S2, 2/6 murmur  Abdomen: Soft, Nontender, Nondistended; Bowel sounds present  Extremities: No cyanosis, +2edema LE  LABS:                        10.2   5.36  )-----------( 35       ( 17 Oct 2019 06:27 )             31.5     10-17    139  |  104  |  21  ----------------------------<  94  4.0   |  28  |  0.87    Ca    8.7      17 Oct 2019 06:27      eGFR if Non African American: 78 mL/min/1.73M2 (10-17-19 @ 06:27)  eGFR if African American: 90 mL/min/1.73M2 (10-17-19 @ 06:27)                                    RADIOLOGY & ADDITIONAL TESTS:    Care Discussed with Consultants/Other Providers:

## 2019-10-17 NOTE — PROGRESS NOTE ADULT - SUBJECTIVE AND OBJECTIVE BOX
Patient is a 87y old  Male who presents with a chief complaint of left frontal-parietal SDH diagnosed 10/5/19 (17 Oct 2019 08:51)      HPI:  Pt is an 88 y/o Male with PMHx of HTN,. Afib on coumadin, DVT, and past thyroidectomy, who was brought to [facility] for increasing confusion and bruises on arms and legs x 2 weeks on 10/5/19.  In the ED, CT head was suspicious for left frontal/parietal acute/subacute SDH. Neurosurgery was consulted and recommended no acute surgical intervention, started on keppra for seizure prophylaxis. Patient was also found to be thrombocytopenic (21), and transfused platelets x 2. Repeat CT head was stable.    Patient was also found to have UTI and started on Keflex which is due to be completed on 10/12. Heme/onc was consulted for thrombocytopenia. They believe that DIC was unlikely, and attributed the thrombocytopenia to likely infection from UTI vs B12 deficiency vs MDS. He was also found to have non occlusive DVT on the right  soleal vein. Interventional radiology was consulted for possible IVC filter, but they felt that since the risk of PE from DVT below the knee was low, the risk of procedure outweighed the benefit. During hospital course, patient also found to be intermittently bradycardic to the 30s with Afib with slow ventricular rate and pauses. Electrophysiology was consulted and recommended to dc digoxin and they will follow up as outpatient. Pt was evaluated by PT, OT, and PM&R and was recommended for acute inpatient rehabilitation. Pt was deemed stable for discharge on 10/11/19 and he was transferred to [facility] for acute inpatient rehabilitation. (11 Oct 2019 13:22)        PAST MEDICAL & SURGICAL HISTORY:  Lung nodules  SVC syndrome  Acute DVT (deep venous thrombosis)  Essential hypertension  Atrial fibrillation, unspecified type  H/O thyroidectomy      MEDICATIONS  (STANDING):  cyanocobalamin Injectable 1000 MICROGram(s) IntraMuscular daily  melatonin 3 milliGRAM(s) Oral at bedtime  nystatin Powder 1 Application(s) Topical two times a day  pantoprazole    Tablet 40 milliGRAM(s) Oral before breakfast    MEDICATIONS  (PRN):  ondansetron    Tablet 4 milliGRAM(s) Oral two times a day PRN Nausea      Allergies    No Known Allergies    Intolerances          VITALS  87y  Vital Signs Last 24 Hrs  T(C): 37 (17 Oct 2019 07:35), Max: 37 (16 Oct 2019 20:24)  T(F): 98.6 (17 Oct 2019 07:35), Max: 98.6 (16 Oct 2019 20:24)  HR: 87 (17 Oct 2019 07:35) (70 - 87)  BP: 107/67 (17 Oct 2019 07:35) (107/67 - 115/72)  BP(mean): --  RR: 15 (17 Oct 2019 07:35) (15 - 15)  SpO2: 92% (17 Oct 2019 07:35) (92% - 97%)  Daily     Daily         RECENT LABS:                          10.2   5.36  )-----------( 35       ( 17 Oct 2019 06:27 )             31.5     10-17    139  |  104  |  21  ----------------------------<  94  4.0   |  28  |  0.87    Ca    8.7      17 Oct 2019 06:27                CAPILLARY BLOOD GLUCOSE Patient is a 87y old  Male who presents with a chief complaint of left frontal-parietal SDH diagnosed 10/5/19 (17 Oct 2019 08:51)      HPI:  Pt is an 88 y/o Male with PMHx of HTN,. Afib on coumadin, DVT, and past thyroidectomy, who was brought to [facility] for increasing confusion and bruises on arms and legs x 2 weeks on 10/5/19.  In the ED, CT head was suspicious for left frontal/parietal acute/subacute SDH. Neurosurgery was consulted and recommended no acute surgical intervention, started on keppra for seizure prophylaxis. Patient was also found to be thrombocytopenic (21), and transfused platelets x 2. Repeat CT head was stable.    Patient was also found to have UTI and started on Keflex which is due to be completed on 10/12. Heme/onc was consulted for thrombocytopenia. They believe that DIC was unlikely, and attributed the thrombocytopenia to likely infection from UTI vs B12 deficiency vs MDS. He was also found to have non occlusive DVT on the right  soleal vein. Interventional radiology was consulted for possible IVC filter, but they felt that since the risk of PE from DVT below the knee was low, the risk of procedure outweighed the benefit. During hospital course, patient also found to be intermittently bradycardic to the 30s with Afib with slow ventricular rate and pauses. Electrophysiology was consulted and recommended to dc digoxin and they will follow up as outpatient. Pt was evaluated by PT, OT, and PM&R and was recommended for acute inpatient rehabilitation. Pt was deemed stable for discharge on 10/11/19 and he was transferred to [facility] for acute inpatient rehabilitation. (11 Oct 2019 13:22)        PAST MEDICAL & SURGICAL HISTORY:  Lung nodules  SVC syndrome  Acute DVT (deep venous thrombosis)  Essential hypertension  Atrial fibrillation, unspecified type  H/O thyroidectomy      MEDICATIONS  (STANDING):  cyanocobalamin Injectable 1000 MICROGram(s) IntraMuscular daily  melatonin 3 milliGRAM(s) Oral at bedtime  nystatin Powder 1 Application(s) Topical two times a day  pantoprazole    Tablet 40 milliGRAM(s) Oral before breakfast    MEDICATIONS  (PRN):  ondansetron    Tablet 4 milliGRAM(s) Oral two times a day PRN Nausea      Allergies    No Known Allergies    Intolerances          VITALS  87y  Vital Signs Last 24 Hrs  T(C): 37 (17 Oct 2019 07:35), Max: 37 (16 Oct 2019 20:24)  T(F): 98.6 (17 Oct 2019 07:35), Max: 98.6 (16 Oct 2019 20:24)  HR: 87 (17 Oct 2019 07:35) (70 - 87)  BP: 107/67 (17 Oct 2019 07:35) (107/67 - 115/72)  BP(mean): --  RR: 15 (17 Oct 2019 07:35) (15 - 15)  SpO2: 92% (17 Oct 2019 07:35) (92% - 97%)  Daily     Daily         RECENT LABS:                          10.2   5.36  )-----------( 35       ( 17 Oct 2019 06:27 )             31.5     10-17    139  |  104  |  21  ----------------------------<  94  4.0   |  28  |  0.87    Ca    8.7      17 Oct 2019 06:27                CAPILLARY BLOOD GLUCOSE

## 2019-10-17 NOTE — CONSULT NOTE ADULT - ASSESSMENT
Pt is an 86 y/o Male with PMHx of HTN,. Afib on coumadin, DVT, and past thyroidectomy, who was brought to [facility] for increasing confusion and bruises on arms and legs x 2 weeks on 10/5/19.  In the ED, CT head was suspicious for left frontal/parietal acute/subacute SDH. Neurosurgery was consulted and recommended no acute surgical intervention, started on keppra for seizure prophylaxis. Patient was also found to be thrombocytopenic (21), and transfused platelets x 2. Repeat CT head was stable.  Hematology consult requested for thrombocytopenia.     Vitamin B 12 deficiency can cause pancytopenia, start B 12 1000 mcg injection daily for 5 days, followed by B12 1000 mcg injection weekly for 6 weeks. Will need follow up with hematology in the outpatient.     Reviewed of peripheral smear:       Repeated hemolysis work up today.    Will continue to follow. Pt is an 86 y/o Male with PMHx of HTN,. Afib on coumadin, DVT, and past thyroidectomy, who was brought to [facility] for increasing confusion and bruises on arms and legs x 2 weeks on 10/5/19.  In the ED, CT head was suspicious for left frontal/parietal acute/subacute SDH. Neurosurgery was consulted and recommended no acute surgical intervention, started on keppra for seizure prophylaxis. Patient was also found to be thrombocytopenic (21), and transfused platelets x 2. Repeat CT head was stable.  Hematology consult requested for thrombocytopenia.     Vitamin B 12 deficiency can cause pancytopenia, start B 12 1000 mcg injection daily for 5 days, followed by B12 1000 mcg injection weekly for 6 weeks. Will need follow up with hematology in the outpatient.     Reviewed of peripheral smear: normochromic normocytic, no evidence of leukoerythroblastic features, no evidence of hemolysis, PLT count approx 30-40K. No evidence of lymphoproliferative disorder.       Repeated hemolysis work up today.    Will continue to follow.

## 2019-10-17 NOTE — PROGRESS NOTE ADULT - COMMENTS
also seen later with niece, who feels patient is making neurological improvement, with improved verbalization and attention

## 2019-10-17 NOTE — PROGRESS NOTE ADULT - EXTREMITIES COMMENTS
Bruises are still present in all extremities, but they are improving,  Has venous stasis changes in bilateral lower extremities, but no tenderness to palpation. Bruises are still present in all extremities, but they are improving, no new ecchymotic lesions noted  Has venous stasis changes in bilateral lower extremities, but no tenderness to palpation.  no petechial lesions noted  no pedal edema, calves soft, NT

## 2019-10-17 NOTE — PROGRESS NOTE ADULT - ASSESSMENT
Pt is an 80 y/o M with PMHx of HTN, Afib and DVT who presented with AMS secondary to acute on subacute frontal/parietal  SDH managed non operatively. Hospital course also significant for right soleal DVT and bradycardia, thrombocytopenia requiring transfusion platelets.     #SDH  - repeat CT stable. Continue neuro checks given thrombocytopenia, low threshold for rescanning given platelet count. Currently GCS=14 10/16  - off anticoagulation. Patient and family do NOT want  AC for Afib, understand benefits and risks  - continue comprehensive rehab program PT/OT/SLP  keppra PPX--> dc 10/14    #Thrombocytopenia  - Platelets were found to be as low as 21. Differential diagnosis includes UTI, B12 deficiency and MDS  - s/p 2 platelet transfusions   - platelets decreased from 69  to 43,--> 36 10/16 --> 35 10/17  - Hematology consult 10/16. Discussed with hospitalist, hold transfusion for now pending hospitalist recommendations, will f/u prior numbers as well. Monitor closely for any signs of bleeding, reviewed with team  CBC ordered 10/17  - Hematology consult: Vitamin B12 injections 1000mcg daily for 5 days and then weekly for 6 weeks, will need hematology follow up as outpatient, continue to follow, repeated hemolysis work up      #AFib  - AC (coumadin) held due to bleed and family will continue to hold due to bleeding risk  - continue to hold digoxin due to episodes of bradycardia. Currently rate controlled 76 and asymptomatic  - monitor vitals  - will follow up as an outpatient to restart any medications  - hospitalist consult    #DVT  - right soleal DVT  - no AC due to bleed. Seen by IR, IVCF not recommended  - Stockings, mobility, continue to monitor  - no SCDs    #Diet  - Soft, low sodium    #Nausea  - resolved    #Case discussed in IDT rounds 10/16:  Patient with reduced attention and awareness, requires increased time for processing . Reduced carryover and +memory deficits. O x 1-2, requires supervison for bADLs, min assist/CG ambulation and transfers and min assist steps x 4  -goals for supervision bADLs, iADLs, transfers and ambulation/steps in community  -target 10/24/19 dc home if medically stable, and with caregiver training and supervision during waking hours, home PT, OT, SLP services    Labs:  Hemolysis labs, CBC 10/18  neuro checks Pt is an 80 y/o M with PMHx of HTN, Afib and DVT who presented with AMS secondary to acute on subacute frontal/parietal  SDH managed non operatively. Hospital course also significant for right soleal DVT and bradycardia, thrombocytopenia requiring transfusion platelets.     #SDH. Keppra dc 10/14  - repeat CT stable. Continue neuro checks given thrombocytopenia, low threshold for rescanning given platelet count. Currently GCS=14 10/17.  - off anticoagulation. Patient and family do NOT want  AC for Afib, understand benefits and risks  - continue comprehensive rehab program PT/OT/SLPkeppra PPX--> dc 10/14    #Thrombocytopenia  - Platelets were found to be as low as 21. Differential diagnosis includes UTI, B12 deficiency and MDS  - s/p 2 platelet transfusions   - platelets decreased from 69  to 43,--> 36 10/16 --> 35 10/17  -discussed recommendations and lab values with family (niece) 10/17  - Hematology consult appreciated: Vitamin B12 injections 1000mcg daily for 5 days and then weekly for 6 weeks, will need hematology follow up as outpatient, continue to follow, repeated hemolysis work up  -no recommendation for platelet transfusion at this time      #AFib  - AC (coumadin) held due to bleed and family will continue to hold due to bleeding risk  - continue to hold digoxin due to episodes of bradycardia. Currently rate controlled  - monitor vitals  - will follow up as an outpatient to restart any medications    #DVT  - right soleal DVT  - no AC due to bleed. Seen by IR, IVCF not recommended  - Stockings, mobility, continue to monitor  - no SCDs    #Diet  - Soft, low sodium    #Nausea  - resolved    #Case discussed in IDT rounds 10/16:  Patient with reduced attention and awareness, requires increased time for processing . Reduced carryover and +memory deficits. O x 1-2, requires supervison for bADLs, min assist/CG ambulation and transfers and min assist steps x 4  -goals for supervision bADLs, iADLs, transfers and ambulation/steps in community  -target 10/24/19 dc home if medically stable, and with caregiver training and supervision during waking hours, home PT, OT, SLP services    Labs:  Hemolysis labs, CBC 10/18  neuro checks

## 2019-10-17 NOTE — PROGRESS NOTE ADULT - ASSESSMENT
Pt is an 82 y/o M with PMHx of HTN, Afib and DVT who presented with AMS secondary to acute on subacute frontal/parietal  SDH managed non operatively. Hospital course also significant for right soleal DVT and bradycardia, thrombocytopenia requiring transfusion platelets.     #SDH. Keppra dc 10/14  - patient has limited to no understanding of his health issue, discussed with nephew who translated into Croatian.    - repeat CT stable. Continue neuro checks given thrombocytopenia, low threshold for rescanning given platelet count.   - continue comprehensive rehab program PT/OT/[facility] PPX--> dc 10/14    #Thrombocytopenia  - Platelets were found to be as low as 21. Differential diagnosis includes UTI, B12 deficiency and MDS  - s/p 2 platelet transfusions   - platelets decreased from 69  to 43,--> 36 10/16 --> 35 10/17  -discussed recommendations and lab values with family (niece) 10/17  - Hematology consult appreciated: Vitamin B12 injections 1000mcg daily for 5 days and then weekly for 6 weeks, will need hematology follow up as outpatient, continue to follow, repeated hemolysis work up        #AFib  - AC (coumadin) held due to bleed and family will continue to hold due to bleeding risk  - continue to hold digoxin due to episodes of bradycardia. Currently rate controlled  - monitor vitals  - will follow up as an outpatient to restart any medications    #DVT  - right soleal DVT  - no AC due to bleed. Seen by IR, IVCF not recommended  - Stockings, mobility, continue to monitor  - no SCDs

## 2019-10-17 NOTE — PROGRESS NOTE ADULT - GENERAL COMMENTS
Pt seen at bedside sitting in wheelchair. He has no complaints this morning and sleep well, though not as well as home. He did not have any nausea or vomiting overnight. He is not having any headache or lightheadedness and does not feel dizzy when in therapy. He has no complaints.

## 2019-10-17 NOTE — CONSULT NOTE ADULT - SUBJECTIVE AND OBJECTIVE BOX
Pt is an 88 y/o Male with PMHx of HTN,. Afib on coumadin, DVT, and past thyroidectomy, who was brought to [facility] for increasing confusion and bruises on arms and legs x 2 weeks on 10/5/19.  In the ED, CT head was suspicious for left frontal/parietal acute/subacute SDH. Neurosurgery was consulted and recommended no acute surgical intervention, started on keppra for seizure prophylaxis. Patient was also found to be thrombocytopenic (21), and transfused platelets x 2. Repeat CT head was stable.  Hematology consult requested for thrombocytopenia.     PAST MEDICAL & SURGICAL HISTORY:  Lung nodules  SVC syndrome  Acute DVT (deep venous thrombosis)  Essential hypertension  Atrial fibrillation, unspecified type  H/O thyroidectomy        REVIEW OF SYSTEMS:  CONSTITUTIONAL: No fever, weight loss, or fatigue  EYES: No eye pain, visual disturbances, or discharge  ENMT:  No difficulty hearing, tinnitus, vertigo; No sinus or throat pain  NECK: No pain or stiffness  BREASTS: No pain, masses, or nipple discharge  RESPIRATORY: No cough, wheezing, chills or hemoptysis; No shortness of breath  CARDIOVASCULAR: No chest pain, palpitations, dizziness, or leg swelling  GASTROINTESTINAL: No abdominal or epigastric pain. No nausea, vomiting, or hematemesis; No diarrhea or constipation. No melena or hematochezia.  GENITOURINARY: No dysuria, frequency, hematuria, or incontinence  NEUROLOGICAL: No headaches, memory loss, loss of strength, numbness, or tremors  SKIN: No itching, burning, rashes, or lesions   LYMPH NODES: No enlarged glands  ENDOCRINE: No heat or cold intolerance; No hair loss  MUSCULOSKELETAL: No joint pain or swelling; No muscle, back, or extremity pain  PSYCHIATRIC: No depression, anxiety, mood swings, or difficulty sleeping  HEME/LYMPH: No easy bruising, or bleeding gums  ALLERY AND IMMUNOLOGIC: No hives or eczema      MEDICATIONS  (STANDING):  melatonin 3 milliGRAM(s) Oral at bedtime  nystatin Powder 1 Application(s) Topical two times a day  pantoprazole    Tablet 40 milliGRAM(s) Oral before breakfast    MEDICATIONS  (PRN):  ondansetron    Tablet 4 milliGRAM(s) Oral two times a day PRN Nausea      ICU Vital Signs Last 24 Hrs  T(C): 37 (17 Oct 2019 07:35), Max: 37 (16 Oct 2019 20:24)  T(F): 98.6 (17 Oct 2019 07:35), Max: 98.6 (16 Oct 2019 20:24)  HR: 87 (17 Oct 2019 07:35) (70 - 87)  BP: 107/67 (17 Oct 2019 07:35) (107/67 - 115/72)  BP(mean): --  ABP: --  ABP(mean): --  RR: 15 (17 Oct 2019 07:35) (15 - 15)  SpO2: 92% (17 Oct 2019 07:35) (92% - 97%)      PHYSICAL EXAM:  GENERAL: NAD, well-groomed, well-developed  HEAD:  Atraumatic, Normocephalic  EYES: EOMI, PERRLA, conjunctiva and sclera clear  ENMT: No tonsillar erythema, exudates, or enlargement; Moist mucous membranes, Good dentition, No lesions  NECK: Supple, No JVD, Normal thyroid  NERVOUS SYSTEM:  Alert & Oriented X3, Good concentration; Motor Strength 5/5 B/L upper and lower extremities; DTRs 2+ intact and symmetric  CHEST/LUNG: Clear to percussion bilaterally; No rales, rhonchi, wheezing, or rubs  HEART: Regular rate and rhythm; No murmurs, rubs, or gallops  ABDOMEN: Soft, Nontender, Nondistended; Bowel sounds present  EXTREMITIES:  2+ Peripheral Pulses, No clubbing, cyanosis, or edema  LYMPH: No lymphadenopathy noted  SKIN: No rashes or lesion        Basic Metabolic Panel in AM (10.17.19 @ 06:27)    Sodium, Serum: 139 mmol/L    Potassium, Serum: 4.0 mmol/L    Chloride, Serum: 104 mmol/L    Carbon Dioxide, Serum: 28 mmol/L    Anion Gap, Serum: 7 mmol/L    Blood Urea Nitrogen, Serum: 21 mg/dL    Creatinine, Serum: 0.87 mg/dL    Glucose, Serum: 94 mg/dL    Calcium, Total Serum: 8.7 mg/dL    eGFR if Non : 78: Interpretative comment  The units for eGFR are mL/min/1.73M2 (normalized body surface area). The  eGFR is calculated from a serum creatinine using the CKD-EPI equation.  Other variables required for calculation are race, age and sex. Among  patients with chronic kidney disease (CKD), the eGFR is useful in  determining the stage of disease according to KDOQI CKD classification.  All eGFR results are reported numerically with the following  interpretation.          GFR                    With                 Without     (ml/min/1.73 m2)    Kidney Damage       Kidney Damage        >= 90                    Stage 1                     Normal        60-89                    Stage 2                     Decreased GFR        30-59     Stage 3                     Stage 3        15-29                    Stage 4                     Stage 4        < 15                      Stage 5                     Stage 5  Each stage of CKD assumes that the associated GFR level has been in  effect for at least 3 months. Determination of stages one and two (with  eGFR > 59 ml/min/m2) requires estimation of kidney damage for at least 3  months as defined by structural or functional abnormalities.  Limitations: All estimates of GFR will be less accurate for patients at  extremes of muscle mass (including but not limited to frail elderly,  critically ill, or cancer patients), those with unusual diets, and those  with conditions associated with reduced secretion or extrarenal  elimination of creatinine. The eGFR equation is not recommended for use  in patients with unstable creatinine levels. mL/min/1.73M2    eGFR if African American: 90 mL/min/1.73M2        Complete Blood Count in AM (10.17.19 @ 06:27)    Nucleated RBC: 0 /100 WBCs    WBC Count: 5.36 K/uL    RBC Count: 3.18 M/uL    Hemoglobin: 10.2 g/dL    Hematocrit: 31.5 %    Mean Cell Volume: 99.1 fl    Mean Cell Hemoglobin: 32.1 pg    Mean Cell Hemoglobin Conc: 32.4 gm/dL    Red Cell Distrib Width: 14.2 %    Platelet Count - Automated: 35 K/uL        Bilirubin - Total and Direct in AM (10.08.19 @ 07:00)    Bilirubin Direct, Serum: 0.3 mg/dL    Bilirubin Total, Serum: 1.0 mg/dL    Lactate Dehydrogenase, Serum (10.07.19 @ 07:00)    Lactate Dehydrogenase, Serum: 257 U/L    Haptoglobin, Serum (10.07.19 @ 07:00)    Haptoglobin, Serum: 15 mg/dL    Fibrinogen Assay (10.07.19 @ 07:00)    Fibrinogen Assay: 453.3 mg/dL    Vitamin B12, Serum in AM (10.07.19 @ 07:00)    Vitamin B12, Serum: 195 pg/mL    Vitamin B12, Serum in AM (10.07.19 @ 07:00)    Vitamin B12, Serum: 195 pg/mL

## 2019-10-18 NOTE — PROGRESS NOTE ADULT - EXTREMITIES COMMENTS
Bruising is improving, pt does complain of new bruising where blood was drawn  Bilateral lower extremities with chronic venous stasis changes, no tenderness to palpation. Bruising is improving, pt does complain of new bruising where blood was drawn  Bilateral lower extremities with chronic venous stasis changes, no tenderness to palpation.  no new areas bleeding noted

## 2019-10-18 NOTE — PROGRESS NOTE ADULT - CONSTITUTIONAL COMMENTS
NAD sitting up in bed, confused about his location NAD sitting up in bed, confused about his location O x 1 grossly. eyes open GCS=14 ([id number]) Currently without agitation or restlessness

## 2019-10-18 NOTE — CHART NOTE - NSCHARTNOTEFT_GEN_A_CORE
Called by RN for patient agitation. Patient has been acutely agitated throughout the evening, requiring Code Grey at approximately 2230.  Pt received 5mg Zyprexa IM at that time with moderate response.  Lower dose was chosen out of concern for worsening thrombocytopenia.  Patient continues to be restless and agitated, on 1:1 observation.  He is not easily redirectable.  Given marked thrombocytopenia and what appears to be a persistent and significant change in mental status, will order urgent CTH to evaluate for worsening/new SDH.  Pt will require sedation during exam. After discussion with nocturnist regarding risk/benefit profiles of various sedatives, will attempt ativan 0.25mg IM with repeat dose PRN.  Will continue to follow.  Plan discussed with nursing. Called by RN for patient agitation. Patient has been acutely agitated throughout the evening, requiring Code Grey at approximately 2230.  Pt received 5mg Zyprexa IM at that time with moderate response.  Lower dose was chosen out of concern for worsening thrombocytopenia.  Patient continues to be restless and agitated, on 1:1 observation.  He is not easily redirectable.  Given marked thrombocytopenia and what appears to be a persistent and significant change in mental status, will order urgent CTH to evaluate for worsening/new SDH.  Pt will require sedation during exam. After discussion with nocturnist regarding risk/benefit profiles of various sedatives, will attempt ativan 0.25mg IM with repeat dose PRN.  Will continue to follow.  Plan discussed with nursing.    UPDATE  CTH with no acute findings.  Discussed findings with nursing. Patient resting comfortably in bed. Remains on 1:1. Called by RN for patient agitation. Patient has been acutely agitated throughout the evening, requiring Code Grey at approximately 2230.  Pt received 5mg Zyprexa IM at that time with moderate response.  Lower dose was chosen out of concern for worsening thrombocytopenia.  Patient continues to be restless and agitated, on 1:1 observation.  He is not easily redirectable.  Given marked thrombocytopenia and what appears to be a persistent and significant change in mental status, will order urgent CTH to evaluate for worsening/new SDH.  Pt will require sedation during exam. After discussion with nocturnist regarding risk/benefit profiles of various sedatives, will attempt ativan 0.25mg IM with repeat dose PRN.  Will continue to follow.  Plan discussed with nursing.    UPDATE  CTH with no acute findings.  Discussed findings with nursing. Patient resting comfortably in bed. Remains on 1:1.        attending note:  I was made aware of the events of last night, CT report, and have also evaluated the patient. Please see our management plan in progress notes from this morning

## 2019-10-18 NOTE — PROGRESS NOTE ADULT - GENERAL COMMENTS
Pt seen at bedside. Overnight, pt was very agitated, he was running out of bed and in the unit, he was violent towards the PCAs and he was very delusional. He was given Zyprexa and Ativan, and had CTH repeated, which was negative for increased or new bleed. This morning he is still confused and believes he is at a neighbor's house. He is calmer, but has difficulty understanding where he is and who the doctors are. Samoan  was used.

## 2019-10-18 NOTE — CONSULT NOTE ADULT - ASSESSMENT
87-year-old gentleman who is consulted for subdural hematoma in the left frontoparietal region that was presumed to be due to thrombocytopenia and Coumadin use is consulted for delirium. It appears patient has been waxing and waning even in [place]. 87-year-old gentleman who is consulted for subdural hematoma in the left frontoparietal region that was presumed to be due to thrombocytopenia and Coumadin use is consulted for delirium. It appears patient has been waxing and waning even in [place]. This is consistent with delirium. will recommend carrington chair or other nonpharmacologic ways to reorient him. Case d/w Dr. Nowak

## 2019-10-18 NOTE — CONSULT NOTE ADULT - REASON FOR ADMISSION
left frontal-parietal SDH diagnosed 10/5/19

## 2019-10-18 NOTE — PROGRESS NOTE ADULT - SUBJECTIVE AND OBJECTIVE BOX
Patient is a 87y old  Male who presents with a chief complaint of left frontal-parietal SDH diagnosed 10/5/19 (17 Oct 2019 19:53)      HPI:  Pt is an 88 y/o Male with PMHx of HTN,. Afib on coumadin, DVT, and past thyroidectomy, who was brought to [facility] for increasing confusion and bruises on arms and legs x 2 weeks on 10/5/19.  In the ED, CT head was suspicious for left frontal/parietal acute/subacute SDH. Neurosurgery was consulted and recommended no acute surgical intervention, started on keppra for seizure prophylaxis. Patient was also found to be thrombocytopenic (21), and transfused platelets x 2. Repeat CT head was stable.    Patient was also found to have UTI and started on Keflex which is due to be completed on 10/12. Heme/onc was consulted for thrombocytopenia. They believe that DIC was unlikely, and attributed the thrombocytopenia to likely infection from UTI vs B12 deficiency vs MDS. He was also found to have non occlusive DVT on the right  soleal vein. Interventional radiology was consulted for possible IVC filter, but they felt that since the risk of PE from DVT below the knee was low, the risk of procedure outweighed the benefit. During hospital course, patient also found to be intermittently bradycardic to the 30s with Afib with slow ventricular rate and pauses. Electrophysiology was consulted and recommended to dc digoxin and they will follow up as outpatient. Pt was evaluated by PT, OT, and PM&R and was recommended for acute inpatient rehabilitation. Pt was deemed stable for discharge on 10/11/19 and he was transferred to [facility] for acute inpatient rehabilitation. (11 Oct 2019 13:22)        PAST MEDICAL & SURGICAL HISTORY:  Lung nodules  SVC syndrome  Acute DVT (deep venous thrombosis)  Essential hypertension  Atrial fibrillation, unspecified type  H/O thyroidectomy      MEDICATIONS  (STANDING):  cyanocobalamin Injectable 1000 MICROGram(s) IntraMuscular daily  melatonin 6 milliGRAM(s) Oral at bedtime  nystatin Powder 1 Application(s) Topical two times a day  pantoprazole    Tablet 40 milliGRAM(s) Oral before breakfast    MEDICATIONS  (PRN):  LORazepam   Injectable 0.25 milliGRAM(s) IntraMuscular once PRN Agitation  ondansetron    Tablet 4 milliGRAM(s) Oral two times a day PRN Nausea      Allergies    No Known Allergies    Intolerances          VITALS  87y  Vital Signs Last 24 Hrs  T(C): 36.6 (18 Oct 2019 08:18), Max: 36.6 (18 Oct 2019 03:32)  T(F): 97.9 (18 Oct 2019 08:18), Max: 97.9 (18 Oct 2019 08:18)  HR: 73 (18 Oct 2019 08:18) (73 - 100)  BP: 109/57 (18 Oct 2019 08:18) (103/67 - 146/74)  BP(mean): --  RR: 16 (18 Oct 2019 05:25) (15 - 16)  SpO2: 93% (18 Oct 2019 08:18) (93% - 96%)  Daily     Daily         RECENT LABS:                          11.2   6.27  )-----------( 35       ( 18 Oct 2019 06:18 )             33.5     10-17    139  |  104  |  21  ----------------------------<  94  4.0   |  28  |  0.87    Ca    8.7      17 Oct 2019 06:27                CAPILLARY BLOOD GLUCOSE

## 2019-10-18 NOTE — PROGRESS NOTE ADULT - ASSESSMENT
Pt is an 82 y/o M with PMHx of HTN, Afib and DVT who presented with AMS secondary to acute on subacute frontal/parietal  SDH managed non operatively. Hospital course also significant for right soleal DVT and bradycardia, thrombocytopenia requiring transfusion platelets.     #SDH. Keppra dc 10/14  - repeat CT stable. Continue neuro checks given thrombocytopenia, low threshold for rescanning given platelet count. Currently GCS=14 10/17. - Repeat CTH overnight showed resolved SDH  - off anticoagulation. Patient and family do NOT want  AC for Afib, understand benefits and risks  - continue comprehensive rehab program PT/OT/SLPkeppra PPX--> dc 10/14    #Thrombocytopenia  - Platelets were found to be as low as 21. Differential diagnosis includes UTI, B12 deficiency and MDS  - s/p 2 platelet transfusions   - platelets decreased from 69  to 43,--> 36 10/16 --> 35 10/17 --> 35 10/18  -discussed recommendations and lab values with family (niece) 10/17  - Hematology consult appreciated: Vitamin B12 injections 1000mcg daily for 5 days and then weekly for 6 weeks, will need hematology follow up as outpatient, continue to follow, repeated hemolysis work up  -no recommendation for platelet transfusion at this time    #Delirium  - pt with episode of confusion, violence, and agitation overnight requiring Zyprexa and Ativan  - will increase melatonin after speaking with niece  - Seroquel to be used if he has another episode at night  - Neurology consult    #AFib  - AC (coumadin) held due to bleed and family will continue to hold due to bleeding risk  - continue to hold digoxin due to episodes of bradycardia. Currently rate controlled  - monitor vitals  - will follow up as an outpatient to restart any medications    #DVT  - right soleal DVT  - no AC due to bleed. Seen by IR, IVCF not recommended  - Stockings, mobility, continue to monitor  - no SCDs    #Diet  - Soft, low sodium    #Nausea  - resolved    #Case discussed in IDT rounds 10/16:  Patient with reduced attention and awareness, requires increased time for processing . Reduced carryover and +memory deficits. O x 1-2, requires supervison for bADLs, min assist/CG ambulation and transfers and min assist steps x 4  -goals for supervision bADLs, iADLs, transfers and ambulation/steps in community  -target 10/24/19 dc home if medically stable, and with caregiver training and supervision during waking hours, home PT, OT, SLP services    Labs:  Hemolysis labs, CBC 10/18  neuro checks Pt is an 80 y/o M with PMHx of HTN, Afib and DVT who presented with AMS secondary to acute on subacute frontal/parietal  SDH managed non operatively. Hospital course also significant for right soleal DVT and bradycardia, thrombocytopenia requiring transfusion platelets.     #SDH. Keppra dc 10/14  - Continue neuro checks given thrombocytopenia, low threshold for rescanning given platelet count  Delirium last night 10/17. Currently stable GCS=14 10/18. - Repeat CTH overnight showed resolved SDH 10/18  - off anticoagulation. Patient and family do NOT want  AC for Afib, understand benefits and risks  - continue comprehensive rehab program PT/OT/SLP  off keppra since 10/14 (PPX > 7 days)  neurology consult  avoid benzodiazepines  1:1 observation    #Thrombocytopenia  - Platelets were found to be as low as 21. Differential diagnosis includes UTI, B12 deficiency and MDS  - s/p 2 platelet transfusions   - platelets decreased from 69  to 43,--> 36 10/16 --> 35 10/17 --> 35 10/18  - Hematology consult appreciated: Vitamin B12 injections 1000mcg daily for 5 days and then weekly for 6 weeks, will need hematology follow up as outpatient, continue to follow, repeated hemolysis work up  -no recommendation for platelet transfusion at this time  monitor platelets: CBC in AM 10/19    #Delirium  - pt with episode of confusion, violence, and agitation overnight requiring Zyprexa and Ativan  - will increase melatonin after speaking with niece  - Seroquel to be used if he has another episode at night  - Neurology consult    #AFib  - AC (coumadin) held due to bleed and family will continue to hold due to bleeding risk  - continue to hold digoxin due to episodes of bradycardia. Currently rate controlled    #DVT:  right soleal DVT  - no AC due to bleed. thrombocytopenic. Seen by IR, IVCF not recommended  - Stockings, mobility, continue to monitor  - no SCDs    #Diet  - Soft, low sodium      #Case discussed in IDT rounds 10/16:  Patient with reduced attention and awareness, requires increased time for processing . Reduced carryover and +memory deficits. O x 1-2, requires supervison for bADLs, min assist/CG ambulation and transfers and min assist steps x 4  -goals for supervision bADLs, iADLs, transfers and ambulation/steps in community  -target 10/24/19 dc home if medically stable, and with caregiver training and supervision during waking hours, home PT, OT, SLP services    Labs:  Hemolysis labs,  neuro checks  neurology consult  CBC 10/19  CBC BMp 10/21  1:1 observation Pt is an 82 y/o M with PMHx of HTN, Afib and DVT who presented with AMS secondary to acute on subacute frontal/parietal  SDH managed non operatively. Hospital course also significant for right soleal DVT and bradycardia, thrombocytopenia requiring transfusion platelets.     #SDH. Keppra dc 10/14  - Continue neuro checks given thrombocytopenia, low threshold for rescanning given platelet count  Delirium last night 10/17. Currently stable GCS=14 10/18. - Repeat CTH overnight showed resolved SDH 10/18  - off anticoagulation. Patient and family do NOT want  AC for Afib, understand benefits and risks  - continue comprehensive rehab program PT/OT/SLP  off keppra since 10/14 (PPX > 7 days)  may need to rescan if continued cognitive and behavioral decline  neurology consult  avoid benzodiazepines  1:1 observation    #Thrombocytopenia  - Platelets were found to be as low as 21. Differential diagnosis includes UTI, B12 deficiency and MDS  - s/p 2 platelet transfusions   - platelets decreased from 69  to 43,--> 36 10/16 --> 35 10/17 --> 35 10/18  - Hematology consult appreciated: Vitamin B12 injections 1000mcg daily for 5 days and then weekly for 6 weeks, will need hematology follow up as outpatient, continue to follow, repeated hemolysis work up  -no recommendation for platelet transfusion at this time  monitor platelets: CBC in AM 10/19    #Delirium  - pt with episode of confusion, violence, and agitation overnight requiring Zyprexa and Ativan  - will increase melatonin after speaking with niece  - Seroquel to be used if he has another episode at night  - Neurology consult    #AFib  - AC (coumadin) held due to bleed and family will continue to hold due to bleeding risk  - continue to hold digoxin due to episodes of bradycardia. Currently rate controlled    #DVT:  right soleal DVT  - no AC due to bleed. thrombocytopenic. Seen by IR, IVCF not recommended  - Stockings, mobility, continue to monitor  - no SCDs    #Diet  - Soft, low sodium      #Case discussed in IDT rounds 10/16:  Patient with reduced attention and awareness, requires increased time for processing . Reduced carryover and +memory deficits. O x 1-2, requires supervison for bADLs, min assist/CG ambulation and transfers and min assist steps x 4  -goals for supervision bADLs, iADLs, transfers and ambulation/steps in community  -target 10/24/19 dc home if medically stable, and with caregiver training and supervision during waking hours, home PT, OT, SLP services    Labs:  Hemolysis labs,  neuro checks  neurology consult  CBC 10/19  CBC BMp 10/21  1:1 observation
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
## 2019-10-19 NOTE — PROGRESS NOTE ADULT - SUBJECTIVE AND OBJECTIVE BOX
Subjective:Interval History - No events overnight. Was redirectable as per nurse last night.     Objective:   Vital Signs Last 24 Hrs  T(C): 36.6 (18 Oct 2019 20:45), Max: 36.6 (18 Oct 2019 08:18)  T(F): 97.8 (18 Oct 2019 20:45), Max: 97.9 (18 Oct 2019 08:18)  HR: 83 (18 Oct 2019 20:45) (73 - 84)  BP: 100/59 (18 Oct 2019 20:45) (100/59 - 124/78)  BP(mean): --  RR: 16 (18 Oct 2019 20:45) (16 - 16)  SpO2: 94% (18 Oct 2019 20:45) (93% - 96%)    General Exam: asleep. one to one by bedside who states he was not agitated last night.     Other:    10-18    141  |  105  |  18  ----------------------------<  110<H>  4.0   |  28  |  0.79    Ca    9.2      18 Oct 2019 12:45                              11.2   6.27  )-----------( 35       ( 18 Oct 2019 06:18 )             33.5         MEDICATIONS  (STANDING):  cyanocobalamin Injectable 1000 MICROGram(s) IntraMuscular daily  melatonin 6 milliGRAM(s) Oral at bedtime  nystatin Powder 1 Application(s) Topical two times a day  pantoprazole    Tablet 40 milliGRAM(s) Oral before breakfast    MEDICATIONS  (PRN):  LORazepam   Injectable 0.25 milliGRAM(s) IntraMuscular once PRN Agitation  ondansetron    Tablet 4 milliGRAM(s) Oral two times a day PRN Nausea
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
## 2019-10-20 NOTE — PROGRESS NOTE ADULT - SUBJECTIVE AND OBJECTIVE BOX
Patient seen and examined at bedside. No overnight events per nursing or chart review. Patient is without complaints but of limited insight into his medical conditions.     ALLERGIES:  No Known Allergies    MEDICATIONS  (STANDING):  cyanocobalamin Injectable 1000 MICROGram(s) IntraMuscular daily  melatonin 6 milliGRAM(s) Oral at bedtime  nystatin Powder 1 Application(s) Topical two times a day  pantoprazole    Tablet 40 milliGRAM(s) Oral before breakfast    MEDICATIONS  (PRN):  LORazepam   Injectable 0.25 milliGRAM(s) IntraMuscular once PRN Agitation  ondansetron    Tablet 4 milliGRAM(s) Oral two times a day PRN Nausea    Vital Signs Last 24 Hrs  T(F): 98 (19 Oct 2019 20:35), Max: 98 (19 Oct 2019 09:41)  HR: 75 (19 Oct 2019 20:35) (75 - 80)  BP: 100/82 (19 Oct 2019 20:35) (100/82 - 110/72)  RR: 14 (19 Oct 2019 20:35) (14 - 14)  SpO2: 97% (19 Oct 2019 20:35) (96% - 97%)  I&O's Summary      PHYSICAL EXAM:  Gen: nad, resting in bed  Neuro: aaox3, no focal deficits  Heent: eomi b/l, no jvd, no oral exudates  Pulm: cta b/l, no w/r/r  CV: +s1s2, no m/r/g  Ab: soft, nt/nd, normoactive bs x 4  Extrem: no edema, pulses intact and equal      LABS:                        10.1   4.90  )-----------( 37       ( 19 Oct 2019 05:50 )             30.9       10-18    141  |  105  |  18  ----------------------------<  110  4.0   |  28  |  0.79    Ca    9.2      18 Oct 2019 12:45       eGFR if Non African American: 81 mL/min/1.73M2 (10-18-19 @ 12:45)  eGFR if : 94 mL/min/1.73M2 (10-18-19 @ 12:45)    Urinalysis Basic - ( 18 Oct 2019 10:09 )    Color: Yellow / Appearance: Clear / S.015 / pH: x  Gluc: x / Ketone: Negative  / Bili: Negative / Urobili: Negative   Blood: x / Protein: Negative / Nitrite: Negative   Leuk Esterase: Negative / RBC: Negative /HPF / WBC Negative /HPF   Sq Epi: x / Non Sq Epi: Neg.-Few / Bacteria: Trace /HPF

## 2019-10-20 NOTE — PROGRESS NOTE ADULT - ASSESSMENT
Mr Barger is a pleasent 81 year-old male with hx of HTN, AF and distal DVT that was found to have an acute to subacute frontal/parietal that is being managed non operatively. Hospital course also bradycardia and thrombocytopenia requiring transfusion platelets.     Neurology  Subdural hematoma  - Continue neuro checks given thrombocytopenia, low threshold for rescanning given platelet count or if a fall should occur  - continue comprehensive rehab program PT/OT/SLP per primary team  - seizure prophylaxis with keppra d/c 10/14 - defer to neurology    Cardiovascular  Atrial fibrillation (AF)  - hold coumadin due to thrombocytopenia and SDH  - would avoid digoxin due to narrow therupatic window and episodes of bradycardia  - currently rate controlled  - as patient HR has been  for past 72 hours would consider initiating rate control with lopressor 12.5mg bid if HR remains above 100  - would avoid CCB given episodes of SBP in low 100s  Hypertension  - currently at goal without medications  Distal DVT  - avoid AC given SDH, no need for IVC filter given location  - no SCD or heparin/lovenox, ambulation and OOB to chair frequently    Hematology  Thrombocytopenia  - s/p 2 platelet transfusions  - DDX includes B12 diffency vs MDS, not ITP given involvement of two cell lines (RBC and platelets)	  - Hematology recs appreciated: Vitamin B12 injections 1000mcg daily for 5 days and then weekly for 6 weeks, will need hematology follow up as outpatient, continue to follow  - continue daily CBC    case discussed with PMR resident  should a clinical question arise for this patient, please do not hesitate to contact me at [phone] until 5pm on 10/20/2019 Mr Barger is a pleasant 81 year-old male with hx of HTN, AF and distal DVT that was found to have an acute to subacute frontal/parietal that is being managed non operatively. Hospital course also bradycardia and thrombocytopenia requiring transfusion platelets.     Neurology  Subdural hematoma  - Continue neuro checks given thrombocytopenia, low threshold for rescanning given platelet count or if a fall should occur  - continue comprehensive rehab program PT/OT/SLP per primary team  - seizure prophylaxis with keppra d/c 10/14 - defer to neurology  Delirium  - would avoid ativan and preference Seroquel  - obtain ecg to monitor for qtc if to initiated Seroquel  - appreciate psych recs    Cardiovascular  Atrial fibrillation (AF)  - hold coumadin due to thrombocytopenia and SDH  - would avoid digoxin due to narrow therupatic window and episodes of bradycardia  - currently rate controlled  - as patient HR has been  for past 72 hours would consider initiating rate control with lopressor 12.5mg bid if HR remains above 100  - would avoid CCB given episodes of SBP in low 100s  Hypertension  - currently at goal without medications  Distal DVT  - avoid AC given SDH, no need for IVC filter given location  - no SCD or heparin/lovenox, ambulation and OOB to chair frequently    Hematology  Thrombocytopenia  - s/p 2 platelet transfusions  - DDX includes B12 diffency vs MDS, not ITP given involvement of two cell lines (RBC and platelets)	  - Hematology recs appreciated: Vitamin B12 injections 1000mcg daily for 5 days and then weekly for 6 weeks, will need hematology follow up as outpatient, continue to follow  - continue daily CBC    Infectious Disease  Urinary tract infection  - repeat u/a not suggestive of infection  - patient without symptoms  - above would suggest clearance of infection    case discussed with PMR resident  should a clinical question arise for this patient, please do not hesitate to contact me at [phone] until 5pm on 10/20/2019

## 2019-10-20 NOTE — PROGRESS NOTE ADULT - SUBJECTIVE AND OBJECTIVE BOX
Spoke to patient in [place].  Patient reports feeling well.   Denies pain.  Feels he is doing better while on rehab.     REVIEW OF SYSTEMS  Constitutional - No fever,  No fatigue  Neurological - No headaches, No loss of strength  Musculoskeletal - No joint pain, No joint swelling, No muscle pain        VITALS  T(C): 36.7 (10-19-19 @ 20:35), Max: 36.7 (10-19-19 @ 09:41)  HR: 75 (10-19-19 @ 20:35) (75 - 80)  BP: 100/82 (10-19-19 @ 20:35) (100/82 - 110/72)  RR: 14 (10-19-19 @ 20:35) (14 - 14)  SpO2: 97% (10-19-19 @ 20:35) (96% - 97%)  Wt(kg): --        MEDICATIONS   cyanocobalamin Injectable 1000 MICROGram(s) daily  LORazepam   Injectable 0.25 milliGRAM(s) once PRN  melatonin 6 milliGRAM(s) at bedtime  nystatin Powder 1 Application(s) two times a day  ondansetron    Tablet 4 milliGRAM(s) two times a day PRN  pantoprazole    Tablet 40 milliGRAM(s) before breakfast      RECENT LABS/IMAGING                        10.1   4.90  )-----------( 37       ( 19 Oct 2019 05:50 )             30.9     10-18    141  |  105  |  18  ----------------------------<  110<H>  4.0   |  28  |  0.79    Ca    9.2      18 Oct 2019 12:45        Urinalysis Basic - ( 18 Oct 2019 10:09 )    Color: Yellow / Appearance: Clear / S.015 / pH: x  Gluc: x / Ketone: Negative  / Bili: Negative / Urobili: Negative   Blood: x / Protein: Negative / Nitrite: Negative   Leuk Esterase: Negative / RBC: Negative /HPF / WBC Negative /HPF   Sq Epi: x / Non Sq Epi: Neg.-Few / Bacteria: Trace /HPF              ---------  PHYSICAL EXAM  Constitutional - NAD, Comfortable  Pulm - Breathing comfortably, No wheezing  Abd - Soft, NTND  Extremities - No edema, No calf tenderness  Neurologic Exam -                    Cognitive - Awake, Alert     Communication - Dysarthria   Psychiatric - Mood WNL, Affect WNL    ASSESSMENT/PLAN  87y Male with functional deficits after SDH  Delirium - Enhanced supervision, OOB, Ativan PRN - Consider Seroquel   Sleep - Melatonin  DVT PPX - SCDs    Continue 3hrs a day of comprehensive rehab program.

## 2019-10-21 NOTE — PROGRESS NOTE ADULT - CONSTITUTIONAL COMMENTS
alert, eyes open O x 1 only, not oriented to place or year, even with choices. Redirectable, not agitated this morning, but fixed delusions

## 2019-10-21 NOTE — PROGRESS NOTE ADULT - ASSESSMENT
Pt is an 82 y/o M with PMHx of HTN, Afib and DVT who presented with AMS secondary to acute on subacute frontal/parietal  SDH managed non operatively. Hospital course also significant for right soleal DVT and bradycardia, thrombocytopenia requiring transfusion platelets.     #SDH. Keppra dc 10/14  - Continue neuro checks given thrombocytopenia, low threshold for rescanning given platelet count  -Head CT 10/18 x 2 STABLE, no new bleed or mass effect. Reviewed with family  - off anticoagulation. Patient and family do NOT want  AC for Afib, understand benefits and risks  - continue comprehensive rehab program PT/OT/SLP  off keppra since 10/14 (PPX > 7 days)  neurology consult appreciated  avoid benzodiazepines  1:1 observation--> will trial enhanced observation especially during day  -patient appears to sundown, discussed with family. will start seroquel 25 mg at 7 PM and monitor 10/21    #Thrombocytopenia  - Platelets were found to be as low as 21. Differential diagnosis includes UTI, B12 deficiency and MDS  - s/p 2 platelet transfusions   - platelets decreased from 69  to 43,--> 36 10/16 --> 35 10/17 --> 35 10/18--> 38 10/21  - Hematology consult appreciated: Vitamin B12 injections 1000mcg daily for 5 days and then weekly for 6 weeks, will need hematology follow up as outpatient, continue to follow, repeated hemolysis work up  -no recommendation for platelet transfusion at this time  -CBC 10/22    #hypokalemia 10/21  K+ 3.4. Kdur 20 meq x 1   BMp in AM 10/22    #AFib  - AC (coumadin) held due to bleed and family will continue to hold due to bleeding risk  - continue to hold digoxin due to episodes of bradycardia. Currently rate controlled    #DVT:  right soleal DVT  - no AC due to bleed. thrombocytopenic. Seen by IR, IVCF not recommended  - Stockings, mobility, continue to monitor  - no SCDs    #Diet  - Soft, low sodium      #Case discussed in IDT rounds 10/16:  Patient with reduced attention and awareness, requires increased time for processing . Reduced carryover and +memory deficits. O x 1-2, requires supervison for bADLs, min assist/CG ambulation and transfers and min assist steps x 4  -goals for supervision bADLs, iADLs, transfers and ambulation/steps in community  -target 10/24/19 dc home if medically stable, and with caregiver training and supervision during waking hours, home PT, OT, SLP services. Given platelet count and behavioral changes, may need extension of date. Need for 24 hour supervision on dc reviewed with family who will look at private hire options as well    Labs:  Hemolysis labs,  neuro checks  enhanced supervision  CBC [facility] 10/222
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
## 2019-10-21 NOTE — PROGRESS NOTE ADULT - COMMENTS
denies H/A. Increased verbalization, no dysarthria, still word finding deficits and confused, disoriented

## 2019-10-21 NOTE — PROGRESS NOTE ADULT - SUBJECTIVE AND OBJECTIVE BOX
Patient seen this evening at 7 PM sitting comfortably in chair and accompanied by a "sitter" because of fluctuating mental status. His small left frontal SDH has resolved.  EHR reviewed.    He follows simple commands. No focal neurologic deficits.

## 2019-10-22 NOTE — PROGRESS NOTE ADULT - SUBJECTIVE AND OBJECTIVE BOX
HPI  Pt is a 88yo M admitted to acute rehab s/p left frontal parietal SDH.   Pt was seen and examined at bedside with niece by the side. Pt states he is more tired today and still has not been able to sleep well. Pt had mild orthostatic at PT this morning, however is back to baseline. Pt denies of any headache, blurry vision, dizziness.     Vital Signs Last 24 Hrs  T(C): 36.6 (21 Oct 2019 21:09), Max: 36.6 (21 Oct 2019 21:09)  T(F): 97.9 (21 Oct 2019 21:09), Max: 97.9 (21 Oct 2019 21:09)  HR: 71 (22 Oct 2019 10:15) (71 - 94)  BP: 95/56 (22 Oct 2019 10:15) (95/56 - 115/69)  BP(mean): --  RR: 15 (22 Oct 2019 10:15) (14 - 15)  SpO2: 97% (22 Oct 2019 10:15) (97% - 98%)    I&O's Summary      CAPILLARY BLOOD GLUCOSE          PHYSICAL EXAM:    Constitutional: NAD, awake and alert, well-developed  HEENT: PERR, EOMI, Normal Hearing, MMM  Neck: Soft and supple, No LAD, No JVD  Respiratory: Breath sounds are clear bilaterally, No wheezing, rales or rhonchi  Cardiovascular: S1 and S2,    Gastrointestinal: Bowel Sounds present, soft, nontender, nondistended, no guarding, no rebound  Extremities: +1 bilateral lower ext swelling noted   Vascular: 2+ peripheral pulses  Neurological: A/O x self only         MEDICATIONS:  MEDICATIONS  (STANDING):  melatonin 6 milliGRAM(s) Oral at bedtime  nystatin Powder 1 Application(s) Topical two times a day  pantoprazole    Tablet 40 milliGRAM(s) Oral before breakfast  QUEtiapine 12.5 milliGRAM(s) Oral <User Schedule>      LABS: All Labs Reviewed:                        10.1   4.84  )-----------( 42       ( 22 Oct 2019 06:25 )             31.5     10-22    142  |  107  |  19  ----------------------------<  78  3.6   |  29  |  0.75    Ca    8.8      22 Oct 2019 06:25            Blood Culture:     RADIOLOGY/EKG:    DVT PPX:    ADVANCED DIRECTIVE:    DISPOSITION:

## 2019-10-22 NOTE — PROGRESS NOTE ADULT - GENERAL COMMENTS
Patient was seen with language line  in [place] #[id number]. Patient refused seroquel initially overnight.  Became agitated; walked around unit for some time.  Eventually agreed to take seroquel at 2200.  This morning he is fatigued and confused; delayed responses to questions.  Easily distractable. Does not recall where he is; denies recognizing staff. Patient was seen with language line  in [place] #[id number] [place]. Patient refused seroquel initially overnight.  Became agitated; walked around unit for some time.  Eventually agreed to take seroquel at 2200.  This morning he is fatigued and confused; delayed responses to questions.  Easily distractable. Does not recall where he is; denies recognizing staff.

## 2019-10-22 NOTE — PROGRESS NOTE ADULT - COMMENTS
denies H/A.  no dysarthria, still word finding deficits and confused, disoriented denies H/A.  no dysarthria, still word finding deficits and confused, disoriented  Very distracted, difficulty using language line, did better with English in person due to poor attention  denies difficulty sleeping last night, no persecutory or paranoid delusions this morning. Wants to go home

## 2019-10-22 NOTE — PROGRESS NOTE ADULT - ASSESSMENT
Pt is an 82 y/o M with PMHx of HTN, Afib and DVT who presented with AMS secondary to acute on subacute frontal/parietal  SDH managed non operatively. Hospital course also significant for right soleal DVT and bradycardia, thrombocytopenia requiring transfusion platelets.     #SDH. Keppra dc 10/14  - Continue neuro checks given thrombocytopenia, low threshold for rescanning given platelet count  -Head CT 10/18 x 2 STABLE, no new bleed or mass effect. Reviewed with family  - off anticoagulation. Patient and family do NOT want  AC for Afib, understand benefits and risks  - continue comprehensive rehab program PT/OT/SLP  off keppra since 10/14 (PPX > 7 days)  neurology consult appreciated  avoid benzodiazepines  -trialed enhanced supervision 10/21. Placed back on 1:1 in PM for agitation, danger to self.   -patient continues to sundown; will schedule seroquel for mid/late afternoon   -neuropsych c/s     #Thrombocytopenia  - Platelets were found to be as low as 21. Differential diagnosis includes UTI, B12 deficiency and MDS  - s/p 2 platelet transfusions   - platelets decreased from 69  to 43,--> 36 10/16 --> 35 10/17 --> 35 10/18--> 38 10/21-->42 10/22  - Hematology consult appreciated: Vitamin B12 injections 1000mcg daily for 5 days and then weekly for 6 weeks, will need hematology follow up as outpatient, continue to follow, repeated hemolysis work up  -no recommendation for platelet transfusion at this time  -CBC 10/24    #hypokalemia 10/21 - pt refused K supplementation. K 3.6 10/22. CMP 10/24    #AFib  - AC (coumadin) held due to bleed and family will continue to hold due to bleeding risk  - continue to hold digoxin due to episodes of bradycardia. Currently rate controlled    #DVT:  right soleal DVT  - no AC due to bleed. thrombocytopenic. Seen by IR, IVCF not recommended  - Stockings, mobility, continue to monitor  - no SCDs    #Diet  - Soft, low sodium      #Case discussed in IDT rounds 10/16:  Patient with reduced attention and awareness, requires increased time for processing . Reduced carryover and +memory deficits. O x 1-2, requires supervison for bADLs, min assist/CG ambulation and transfers and min assist steps x 4  -goals for supervision bADLs, iADLs, transfers and ambulation/steps in community  -target 10/24/19 dc home if medically stable, and with caregiver training and supervision during waking hours, home PT, OT, SLP services. Given platelet count and behavioral changes, may need extension of date. Need for 24 hour supervision on dc reviewed with family who will look at private hire options as well    Labs:  Hemolysis labs,  neuro checks  enhanced supervision/1:1 if needed (especially at night)  CBC BMP 10/24 Pt is an 80 y/o M with PMHx of HTN, Afib and DVT who presented with AMS secondary to acute on subacute frontal/parietal  SDH managed non operatively. Hospital course also significant for right soleal DVT and bradycardia, thrombocytopenia requiring transfusion platelets.     #SDH. Keppra dc 10/14  -Head CT 10/18 x 2 STABLE, no new bleed or mass effect. - Continue neuro checks given thrombocytopenia, low threshold for rescanning given platelet count  - off anticoagulation. Patient and family do NOT want  AC for Afib, understand benefits and risks  - continue comprehensive rehab program PT/OT/SLP  avoid benzodiazepines  -trialed enhanced supervision 10/21. Placed back on 1:1 in PM for agitation, danger to self.   -patient continues to sundown; will schedule seroquel for mid/late afternoon . Reviewed with family who are agreeable  -neuropsychology consult for behavioral management plan     #Thrombocytopenia  - Platelets were found to be as low as 21. Differential diagnosis includes UTI, B12 deficiency and MDS  - s/p 2 platelet transfusions   - platelets decreased from 69  to 43,--> 36 10/16 --> 35 10/17 --> 35 10/18--> 38 10/21-->42 10/22  - Hematology consult appreciated: Vitamin B12 injections 1000mcg daily for 5 days and then weekly for 6 weeks, will need hematology follow up as outpatient, continue to follow, repeated hemolysis work up  -no recommendation for platelet transfusion at this time. Reviewed with hospitalist this morning, also discussed recommendations with family 10/22  -CBC 10/24    #hypokalemia 10/21 - pt refused K supplementation. K 3.6 10/22. CMP 10/24    #AFib  - AC (coumadin) held due to bleed and family will continue to hold due to bleeding risk  - continue to hold digoxin due to episodes of bradycardia. Currently rate controlled    #DVT:  right soleal DVT  - no AC due to bleed. thrombocytopenic. Seen by IR, IVCF not recommended  - Stockings, mobility, continue to monitor  - no SCDs    #Diet  - Soft, low sodium      #Case discussed in IDT rounds 10/16:  Patient with reduced attention and awareness, requires increased time for processing . Reduced carryover and +memory deficits. O x 1-2, requires supervison for bADLs, min assist/CG ambulation and transfers and min assist steps x 4  -goals for supervision bADLs, iADLs, transfers and ambulation/steps in community  dc home once medically stable and medications for agitation and sundowning appropriate, and with caregiver training and supervision during waking hours, home PT, OT, SLP services. Given platelet count and behavioral changes, may need extension of date. Need for 24 hour supervision on dc reviewed with family who will look at private hire options as well. Possible improvement in structured and familiar environment discussed with family     Labs:  Hemolysis labs,  neuro checks  enhanced supervision/1:1 if needed (especially at night)  CBC BMP 10/24

## 2019-10-22 NOTE — CHART NOTE - NSCHARTNOTEFT_GEN_A_CORE
Nutrition Follow Up Note  Hospital Course   (Per Electronic Medical Record):     Source:   Nursing Staff [X]   Medical Record [X]      Diet:   Low Sodium Soft (Dysphagia 3) Diet w/ Thin Liquids  Tolerates Diet Well  No Chewing/Swallowing Difficulties  No Recent Nausea, Vomiting, Diarrhea or Constipation  Consumes % of Meals (as Per Documentation)     Enteral/Parenteral Nutrition: N/A    Current Weight: 163.1lb on 10/11  Obtain New Weight  Obtain Weights Weekly     Pertinent Medications: MEDICATIONS  (STANDING):  melatonin 6 milliGRAM(s) Oral at bedtime  nystatin Powder 1 Application(s) Topical two times a day  pantoprazole    Tablet 40 milliGRAM(s) Oral before breakfast  QUEtiapine 12.5 milliGRAM(s) Oral <User Schedule>    MEDICATIONS  (PRN):  ondansetron    Tablet 4 milliGRAM(s) Oral two times a day PRN Nausea    Pertinent Labs:  10-22 Na142 mmol/L Glu 78 mg/dL K+ 3.6 mmol/L Cr  0.75 mg/dL BUN 19 mg/dL    Skin: No Pressure Ulcers     Edema: None Noted     Last Bowel Movement: on 10/18    Estimated Needs:   [X] No Change Since Previous Assessment    Previous Nutrition Diagnosis:   Moderate Malnutrition  Chewing Difficulties    Nutrition Diagnosis is [X] Ongoing - Declines Nutrition Supplementation & Continues on Chewing Difficulties     New Nutrition Diagnosis: [X] Not Applicable    Interventions:   1. Recommend Continue Nutrition Plan of Care     Monitoring & Evaluation:   [X] Weights   [X] PO Intake   [X] Follow Up (Per Protocol)  [X] Tolerance to Diet Prescription   [X] Other: Labwork    Registered Dietitian/Nutritionist Remains Available.  Markel Maguire RDN    Pager # [phone]  Phone# [phone]

## 2019-10-22 NOTE — PROGRESS NOTE ADULT - ASSESSMENT
Pt is a 86yo M admitted to acute rehab s/p left frontal parietal SDH.     *SDH  cont acute rehab   PT/OT/SLP  repeat CT head x2 stable    *Aggitation   Seroquel   Neuropsy   enhanced supervision     *Afib   rate controlled  no AC per family due to bleeding risk     *Thrombocytopenia  Hematology consult appreciated   stable 42 today   cont Vit B12   f/u out pt hematology     *DVT right soleal   No AC due to bleed and thrombocytopenia   no IVC filter per IR   no SCDs

## 2019-10-22 NOTE — PROGRESS NOTE ADULT - SUBJECTIVE AND OBJECTIVE BOX
Patient is a 87y old  Male who presents with a chief complaint of left frontal-parietal SDH diagnosed 10/5/19 (20 Oct 2019 09:06)      HPI:  Pt is an 88 y/o Male with PMHx of HTN,. Afib on coumadin, DVT, and past thyroidectomy, who was brought [facility] for increasing confusion and bruises on arms and legs x 2 weeks on 10/5/19.  In the ED, CT head was suspicious for left frontal/parietal acute/subacute SDH. Neurosurgery was consulted and recommended no acute surgical intervention, started on keppra for seizure prophylaxis. Patient was also found to be thrombocytopenic (21), and transfused platelets x 2. Repeat CT head was stable.    Patient was also found to have UTI and started on Keflex which is due to be completed on 10/12. Heme/onc was consulted for thrombocytopenia. They believe that DIC was unlikely, and attributed the thrombocytopenia to likely infection from UTI vs B12 deficiency vs MDS. He was also found to have non occlusive DVT on the right  soleal vein. Interventional radiology was consulted for possible IVC filter, but they felt that since the risk of PE from DVT below the knee was low, the risk of procedure outweighed the benefit. During hospital course, patient also found to be intermittently bradycardic to the 30s with Afib with slow ventricular rate and pauses. Electrophysiology was consulted and recommended to dc digoxin and they will follow up as outpatient. Pt was evaluated by PT, OT, and PM&R and was recommended for acute inpatient rehabilitation. Pt was deemed stable for discharge on 10/11/19 and he was transferred to [facility] for acute inpatient rehabilitation. (11 Oct 2019 13:22)      PAST MEDICAL & SURGICAL HISTORY:  Lung nodules  SVC syndrome  Acute DVT (deep venous thrombosis)  Essential hypertension  Atrial fibrillation, unspecified type  H/O thyroidectomy      MEDICATIONS  (STANDING):  cyanocobalamin Injectable 1000 MICROGram(s) IntraMuscular daily  melatonin 6 milliGRAM(s) Oral at bedtime  nystatin Powder 1 Application(s) Topical two times a day  pantoprazole    Tablet 40 milliGRAM(s) Oral before breakfast    MEDICATIONS  (PRN):  ondansetron    Tablet 4 milliGRAM(s) Oral two times a day PRN Nausea      Allergies    No Known Allergies    Intolerances          VITALS  87y  Vital Signs Last 24 Hrs  T(C): 36.6 (21 Oct 2019 21:09), Max: 36.6 (21 Oct 2019 21:09)  T(F): 97.9 (21 Oct 2019 21:09), Max: 97.9 (21 Oct 2019 21:09)  HR: 71 (22 Oct 2019 10:15) (71 - 94)  BP: 95/56 (22 Oct 2019 10:15) (95/56 - 115/69)  RR: 15 (22 Oct 2019 10:15) (14 - 15)  SpO2: 97% (22 Oct 2019 10:15) (97% - 98%)      RECENT LABS:    LABS:                        10.1   4.84  )-----------( 42       ( 22 Oct 2019 06:25 )             31.5     22 Oct 2019 06:25    142    |  107    |  19     ----------------------------<  78     3.6     |  29     |  0.75     Ca    8.8        22 Oct 2019 06:25          EXAM:  CT BRAIN      PROCEDURE DATE:  10/18/2019        INTERPRETATION:  CT brain without contrast    History delirium    Comparison 4:20 AM    There is a stable left frontotemporal arachnoid cyst. There is no   significant residual or recurrent subdural hematoma. There is no   hemorrhage or cortical edema, mass effect or midline shift. There is mild   central atrophy without hydrocephalus.    IMPRESSION:    No acute findings    A preliminary report was given by [facility].

## 2019-10-22 NOTE — PROGRESS NOTE ADULT - CONSTITUTIONAL COMMENTS
eyes remained open throughout, but poor attention, focused on remote for TV, disoriented O x self only, not place

## 2019-10-23 NOTE — PROGRESS NOTE ADULT - SUBJECTIVE AND OBJECTIVE BOX
Patient is a 87y old  Male who presents with a chief complaint of left frontal-parietal SDH diagnosed 10/5/19 (23 Oct 2019 11:33)      Patient seen and examined at bedside. No overnight events reported.     ALLERGIES:  No Known Allergies    MEDICATIONS  (STANDING):  cyanocobalamin Injectable 1000 MICROGram(s) IntraMuscular <User Schedule>  melatonin 6 milliGRAM(s) Oral at bedtime  nystatin Powder 1 Application(s) Topical two times a day  pantoprazole    Tablet 40 milliGRAM(s) Oral before breakfast  QUEtiapine 12.5 milliGRAM(s) Oral <User Schedule>    MEDICATIONS  (PRN):  ondansetron    Tablet 4 milliGRAM(s) Oral two times a day PRN Nausea    Vital Signs Last 24 Hrs  T(F): 97.8 (23 Oct 2019 07:57), Max: 97.8 (23 Oct 2019 07:57)  HR: 74 (23 Oct 2019 07:57) (71 - 74)  BP: 96/60 (23 Oct 2019 07:57) (96/60 - 110/64)  RR: 14 (23 Oct 2019 07:57) (14 - 15)  SpO2: 96% (23 Oct 2019 07:57) (96% - 97%)  I&O's Summary    PHYSICAL EXAM:  General: NAD, follows commands  ENT: Dry oral membrane, no thrush  Neck: Supple, No JVD  Lungs: Clear to auscultation bilaterally, good air entry, non-labored breathing  Cardio: IRRR, S1/S2, No murmur  Abdomen: Soft, Nontender, Nondistended; Bowel sounds present  Extremities: No calf tenderness, Trace bilateral pitting edema    LABS:                        10.1   4.84  )-----------( 42       ( 22 Oct 2019 06:25 )             31.5     10-22    142  |  107  |  19  ----------------------------<  78  3.6   |  29  |  0.75    Ca    8.8      22 Oct 2019 06:25      eGFR if Non African American: 83 mL/min/1.73M2 (10-22-19 @ 06:25)  eGFR if : 96 mL/min/1.73M2 (10-22-19 @ 06:25)    RADIOLOGY & ADDITIONAL TESTS:  < from: CT Head No Cont (10.18.19 @ 17:44) >  There is a stable left frontotemporal arachnoid cyst. There is no   significant residual or recurrent subdural hematoma. There is no   hemorrhage or cortical edema, mass effect or midline shift. There is mild   central atrophy without hydrocephalus.    < end of copied text >    Care Discussed with Consultants/Other Providers: Dr. Colin

## 2019-10-23 NOTE — DISCHARGE NOTE PROVIDER - CARE PROVIDER_API CALL
Soni Manuel)  [facility] Neurosurgery  General  [address], [address]  [place] [address]  Phone: [phone]  Fax: [phone]  Follow Up Time: 1 week    Jorge Schumacher)  Cardiac Electrophysiology; Cardiovascular Disease; Internal Medicine  [address] [place] [address], [id number] [place] [address]  Phone: [phone]  Fax: [phone]  Follow Up Time: 2 weeks    Nick Oneill)  Hematology; Medical Oncology  [address] [place] [address]  Phone: [phone]  Fax: [phone]  Follow Up Time: 1 week Soni Manuel)  [facility] Neurosurgery  General  [address], [address]  [place] [address]  Phone: [phone]  Fax: [phone]  Follow Up Time: 1 week    Jorge Schumacher)  Cardiac Electrophysiology; Cardiovascular Disease; Internal Medicine  [address] [place] [address], [address] [address]  [place] [address]  Phone: [phone]  Fax: [phone]  Follow Up Time: 2 weeks    Nick Oneill)  Hematology; Medical Oncology  [address] [place] [address]  Phone: [phone]  Fax: [phone]  Follow Up Time: 1 week    Mehreen Colin)  Brain Injury Medicine; PhysicalRehab Medicine  [address] [place] [address]  Phone: [phone]  Fax: [phone]  Follow Up Time: 1 month Soni Manuel)  [facility] Neurosurgery  General  [address], [address]  [place] [address]  Phone: [phone]  Fax: [phone]  Follow Up Time: 1 week    Jorge Schumacher)  Cardiac Electrophysiology; Cardiovascular Disease; Internal Medicine  [address] [place] [address], [address] [address]  [place] [address]  Phone: [phone]  Fax: [phone]  Follow Up Time: 2 weeks    Nick Oneill)  Hematology; Medical Oncology  [address] [place] [address]  Phone: [phone]  Fax: [phone]  Follow Up Time: 1 week    Mehreen Colin)  Brain Injury Medicine; PhysicalRehab Medicine  [address] [place] [address]  Phone: [phone]  Fax: [phone]  Follow Up Time: 1 month    Shannon Hanna; MSc)  Medical Oncology  [address] [place] [address]  Phone: [phone]  Fax: [phone]  Follow Up Time: 1 week

## 2019-10-23 NOTE — PROGRESS NOTE ADULT - SUBJECTIVE AND OBJECTIVE BOX
Patient is a 87y old  Male who presents with a chief complaint of left frontal-parietal SDH diagnosed 10/5/19 (22 Oct 2019 13:23)      HPI:  Pt is an 88 y/o Male with PMHx of HTN,. Afib on coumadin, DVT, and past thyroidectomy, who was brought to [facility] for increasing confusion and bruises on arms and legs x 2 weeks on 10/5/19.  In the ED, CT head was suspicious for left frontal/parietal acute/subacute SDH. Neurosurgery was consulted and recommended no acute surgical intervention, started on keppra for seizure prophylaxis. Patient was also found to be thrombocytopenic (21), and transfused platelets x 2. Repeat CT head was stable.    Patient was also found to have UTI and started on Keflex which is due to be completed on 10/12. Heme/onc was consulted for thrombocytopenia. They believe that DIC was unlikely, and attributed the thrombocytopenia to likely infection from UTI vs B12 deficiency vs MDS. He was also found to have non occlusive DVT on the right  soleal vein. Interventional radiology was consulted for possible IVC filter, but they felt that since the risk of PE from DVT below the knee was low, the risk of procedure outweighed the benefit. During hospital course, patient also found to be intermittently bradycardic to the 30s with Afib with slow ventricular rate and pauses. Electrophysiology was consulted and recommended to dc digoxin and they will follow up as outpatient. Pt was evaluated by PT, OT, and PM&R and was recommended for acute inpatient rehabilitation. Pt was deemed stable for discharge on 10/11/19 and he was transferred to [facility] for acute inpatient rehabilitation. (11 Oct 2019 13:22)      PAST MEDICAL & SURGICAL HISTORY:  Lung nodules  SVC syndrome  Acute DVT (deep venous thrombosis)  Essential hypertension  Atrial fibrillation, unspecified type  H/O thyroidectomy      MEDICATIONS  (STANDING):  cyanocobalamin Injectable 1000 MICROGram(s) IntraMuscular <User Schedule>  melatonin 6 milliGRAM(s) Oral at bedtime  nystatin Powder 1 Application(s) Topical two times a day  pantoprazole    Tablet 40 milliGRAM(s) Oral before breakfast  QUEtiapine 12.5 milliGRAM(s) Oral <User Schedule>    MEDICATIONS  (PRN):  ondansetron    Tablet 4 milliGRAM(s) Oral two times a day PRN Nausea      Allergies    No Known Allergies    Intolerances          VITALS  87y  Vital Signs Last 24 Hrs  T(C): 36.6 (23 Oct 2019 07:57), Max: 36.6 (23 Oct 2019 07:57)  T(F): 97.8 (23 Oct 2019 07:57), Max: 97.8 (23 Oct 2019 07:57)  HR: 74 (23 Oct 2019 07:57) (71 - 74)  BP: 96/60 (23 Oct 2019 07:57) (96/60 - 110/64)  BP(mean): --  RR: 14 (23 Oct 2019 07:57) (14 - 15)  SpO2: 96% (23 Oct 2019 07:57) (96% - 97%)  Daily     Daily         RECENT LABS:                          10.1   4.84  )-----------( 42       ( 22 Oct 2019 06:25 )             31.5     10-22    142  |  107  |  19  ----------------------------<  78  3.6   |  29  |  0.75    Ca    8.8      22 Oct 2019 06:25                CAPILLARY BLOOD GLUCOSE

## 2019-10-23 NOTE — DISCHARGE NOTE PROVIDER - HOSPITAL COURSE
Pt is an 86 y/o Male with PMHx of HTN,. Afib on coumadin, DVT, and past thyroidectomy, who was brought to [facility] for increasing confusion and bruises on arms and legs x 2 weeks on 10/5/19.  In the ED, CT head was suspicious for left frontal/parietal acute/subacute SDH. Neurosurgery was consulted and recommended no acute surgical intervention, started on keppra for seizure prophylaxis. Patient was also found to be thrombocytopenic (21), and transfused platelets x 2. Repeat CT head was stable.        Patient was also found to have UTI and started on Keflex which is due to be completed on 10/12. Heme/onc was consulted for thrombocytopenia. They believe that DIC was unlikely, and attributed the thrombocytopenia to likely infection from UTI vs B12 deficiency vs MDS. He was also found to have non occlusive DVT on the right  soleal vein. Interventional radiology was consulted for possible IVC filter, but they felt that since the risk of PE from DVT below the knee was low, the risk of procedure outweighed the benefit. During hospital course, patient also found to be intermittently bradycardic to the 30s with Afib with slow ventricular rate and pauses. Electrophysiology was consulted and recommended to dc digoxin and they will follow up as outpatient. Pt was evaluated by PT, OT, and PM&R and was recommended for acute inpatient rehabilitation. Pt was deemed stable for discharge on 10/11/19 and he was transferred to [facility] for acute inpatient rehabilitation.         Patient participated in daily therapies and made good functional gains.  Rehab course notable for right soleal DVT (not requiring anticoagulation).  Keppra discontinued.  Patient was thrombocytopenic to low 30s.  He was seen by hematology, who found no evidence of lymphoproliferative disease or hemolysis.  He was not transfused.  Platelets stabilized in mid-40s.          Rehab course also notable for increased agitation and aggression.  He required xyprexa twice, but after adjusting timing of medication administration he was managed effectively with seroquel.  He also required 1:1 observation during the night for impulsivity, though this was minimal at the time of discharge.          CT head was repeated at the initiation of worsening agitation and was stable from prior.          Patient seen and examined on day of discharge.  Medications, medication side effects, and discharge instructions were reviewed with the patient, who expressed understanding of all information.  Patient was medically and functionally optimized and cleared for discharge. Pt is an 88 y/o Male with PMHx of HTN,. Afib on coumadin, DVT, and past thyroidectomy, who was brought to [facility] for increasing confusion and bruises on arms and legs x 2 weeks on 10/5/19.  In the ED, CT head was suspicious for left frontal/parietal acute/subacute SDH. Neurosurgery was consulted and recommended no acute surgical intervention, started on keppra for seizure prophylaxis. Patient was also found to be thrombocytopenic (21), and transfused platelets x 2. Repeat CT head was stable.        Patient was also found to have UTI and started on Keflex which is due to be completed on 10/12. Heme/onc was consulted for thrombocytopenia. They believe that DIC was unlikely, and attributed the thrombocytopenia to likely infection from UTI vs B12 deficiency vs MDS. He was also found to have non occlusive DVT on the right  soleal vein. Interventional radiology was consulted for possible IVC filter, but they felt that since the risk of PE from DVT below the knee was low, the risk of procedure outweighed the benefit. During hospital course, patient also found to be intermittently bradycardic to the 30s with Afib with slow ventricular rate and pauses. Electrophysiology was consulted and recommended to dc digoxin and they will follow up as outpatient. Pt was evaluated by PT, OT, and PM&R and was recommended for acute inpatient rehabilitation. Pt was deemed stable for discharge on 10/11/19 and he was transferred to [facility] for acute inpatient rehabilitation.         Patient participated in daily therapies and made good functional gains.  Rehab course notable for right soleal DVT (not requiring anticoagulation).  Keppra discontinued.  Patient was thrombocytopenic to low 30s.  He was seen by hematology, who found no evidence of lymphoproliferative disease or hemolysis.  He was not transfused, started on B12 injections, daily for 5 days then followed by weekly  Platelets stabilized in mid-40s.          Rehab course also notable for increased agitation and aggression.  He required xyprexa twice, but after adjusting timing of medication administration he was managed effectively with seroquel.  He also required 1:1 observation during the night for impulsivity, though this was minimal at the time of discharge.          CT head was repeated at the initiation of worsening agitation and was stable from prior.  Second CT within 24 hours also show stability.         Patient seen and examined on day of discharge.  Medications, medication side effects, and discharge instructions were reviewed with the patient, who expressed understanding of all information.  Patient was medically and functionally optimized and cleared for discharge.  Recommend 24 hour supervision in community, caregivers aware and arranged Pt is an 86 y/o Male with PMHx of HTN,. Afib on coumadin, DVT, and past thyroidectomy, who was brought to [facility] for increasing confusion and bruises on arms and legs x 2 weeks on 10/5/19.  In the ED, CT head was suspicious for left frontal/parietal acute/subacute SDH. Neurosurgery was consulted and recommended no acute surgical intervention, started on keppra for seizure prophylaxis. Patient was also found to be thrombocytopenic (21), and transfused platelets x 2. Repeat CT head was stable.        Patient was also found to have UTI and started on Keflex which is due to be completed on 10/12. Heme/onc was consulted for thrombocytopenia. They believe that DIC was unlikely, and attributed the thrombocytopenia to likely infection from UTI vs B12 deficiency vs MDS. He was also found to have non occlusive DVT on the right  soleal vein. Interventional radiology was consulted for possible IVC filter, but they felt that since the risk of PE from DVT below the knee was low, the risk of procedure outweighed the benefit. During hospital course, patient also found to be intermittently bradycardic to the 30s with Afib with slow ventricular rate and pauses. Electrophysiology was consulted and recommended to dc digoxin and they will follow up as outpatient. Pt was evaluated by PT, OT, and PM&R and was recommended for acute inpatient rehabilitation. Pt was deemed stable for discharge on 10/11/19 and he was transferred to [facility] for acute inpatient rehabilitation.         Patient participated in daily therapies and made good functional gains.  Rehab course notable for right soleal DVT (not requiring anticoagulation).  Keppra discontinued.  Patient was thrombocytopenic to low 30s.  He was seen by hematology, who found no evidence of lymphoproliferative disease or hemolysis.  He was not transfused, started on B12 injections, daily for 5 days then followed by weekly  Platelets stabilized in mid-40s.          Rehab course also notable for increased agitation and aggression.  He required xyprexa twice, but after adjusting timing of medication administration he was managed effectively with seroquel.  He also required 1:1 observation during the night for impulsivity for several nights, but was downgraded to enhanced supervision prior to discharge.  His agitation and impulsivity improved during admission.          CT head was repeated at the initiation of worsening agitation and was stable from prior.  Second CT within 24 hours also showed stability.         Patient seen and examined on day of discharge.  Medications, medication side effects, and discharge instructions were reviewed with the patient, who expressed understanding of all information.  Patient was medically and functionally optimized and cleared for discharge.  Recommend 24 hour supervision in community, caregivers aware and arranged Pt is an 86 y/o Male with PMHx of HTN,. Afib on coumadin, DVT, and past thyroidectomy, who was brought to [facility] for increasing confusion and bruises on arms and legs x 2 weeks on 10/5/19.  In the ED, CT head was suspicious for left frontal/parietal acute/subacute SDH. Neurosurgery was consulted and recommended no acute surgical intervention, started on keppra for seizure prophylaxis. Patient was also found to be thrombocytopenic (21), and transfused platelets x 2. Repeat CT head was stable.        Patient was also found to have UTI and started on Keflex which is due to be completed on 10/12. Heme/onc was consulted for thrombocytopenia. They believe that DIC was unlikely, and attributed the thrombocytopenia to likely infection from UTI vs B12 deficiency vs MDS. He was also found to have non occlusive DVT on the right  soleal vein. Interventional radiology was consulted for possible IVC filter, but they felt that since the risk of PE from DVT below the knee was low, the risk of procedure outweighed the benefit. During hospital course, patient also found to be intermittently bradycardic to the 30s with Afib with slow ventricular rate and pauses. Electrophysiology was consulted and recommended to dc digoxin and they will follow up as outpatient. Pt was evaluated by PT, OT, and PM&R and was recommended for acute inpatient rehabilitation. Pt was deemed stable for discharge on 10/11/19 and he was transferred to [facility] for acute inpatient rehabilitation.         Patient participated in daily therapies and made good functional gains.  Rehab course notable for right soleal DVT (not requiring anticoagulation).  Keppra discontinued.  Patient was thrombocytopenic to low 30s.  He was seen by hematology, who found no evidence of lymphoproliferative disease or hemolysis.  He was not transfused, started on B12 injections, daily for 5 days then followed by weekly  Platelets stabilized in mid-40s.  Patient instructed to follow up with Dr. Hanna after discharge.         Rehab course also notable for increased agitation and aggression.  He required xyprexa twice, but after adjusting timing of medication administration he was managed effectively with seroquel.  He also required 1:1 observation during the night for impulsivity for several nights, but was downgraded to enhanced supervision prior to discharge.  His agitation and impulsivity improved during admission.          CT head was repeated at the initiation of worsening agitation and was stable from prior.  Second CT within 24 hours also showed stability.         Patient seen and examined on day of discharge.  Medications, medication side effects, and discharge instructions were reviewed with the patient, who expressed understanding of all information.  Patient was medically and functionally optimized and cleared for discharge.  Recommend 24 hour supervision in community, caregivers aware and arranged Pt is an 88 y/o Male with PMHx of HTN,. Afib on coumadin, DVT, and past thyroidectomy, who was brought to [facility] for increasing confusion and bruises on arms and legs x 2 weeks on 10/5/19.  In the ED, CT head was suspicious for left frontal/parietal acute/subacute SDH. Neurosurgery was consulted and recommended no acute surgical intervention, started on keppra for seizure prophylaxis. Patient was also found to be thrombocytopenic (21), and transfused platelets x 2. Repeat CT head was stable.        Patient was also found to have UTI and started on Keflex which is due to be completed on 10/12. Heme/onc was consulted for thrombocytopenia. They believe that DIC was unlikely, and attributed the thrombocytopenia to likely infection from UTI vs B12 deficiency vs MDS. He was also found to have non occlusive DVT on the right  soleal vein. Interventional radiology was consulted for possible IVC filter, but they felt that since the risk of PE from DVT below the knee was low, the risk of procedure outweighed the benefit. During hospital course, patient also found to be intermittently bradycardic to the 30s with Afib with slow ventricular rate and pauses. Electrophysiology was consulted and recommended to dc digoxin and they will follow up as outpatient. Pt was evaluated by PT, OT, and PM&R and was recommended for acute inpatient rehabilitation. Pt was deemed stable for discharge on 10/11/19 and he was transferred to [facility] for acute inpatient rehabilitation.         Patient participated in daily therapies and made good functional gains.  Rehab course notable for right soleal DVT (not requiring anticoagulation).  Keppra discontinued.  Patient was thrombocytopenic to low 30s.  He was seen by hematology, who found no evidence of lymphoproliferative disease or hemolysis.  He was not transfused, started on B12 injections, daily for 5 days then followed by weekly  Platelets stabilized in mid-40s.  Patient instructed to follow up with Dr. Hanna after discharge.         Rehab course also notable for increased agitation and aggression.  He required xyprexa twice, but after adjusting timing of medication administration he was managed effectively with seroquel.  He also required 1:1 observation during the night for impulsivity for several nights, but was downgraded to enhanced supervision prior to discharge.  His agitation and impulsivity improved during admission. he remained on dysphagia 3 (soft) diet with thin liquids.          CT head was repeated at the initiation of worsening agitation and was stable from prior.  Second CT within 24 hours also showed stability.         Patient seen and examined on day of discharge.  Medications, medication side effects, and discharge instructions were reviewed with the patient, who expressed understanding of all information.  Patient was medically and functionally optimized and cleared for discharge.  Recommend 24 hour supervision in community, caregivers aware and arranged

## 2019-10-23 NOTE — PROGRESS NOTE ADULT - GENERAL COMMENTS
Patient eating breakfast. this morning looks much more attentive, increased alertness and appropriate, oriented. Denies difficulty sleeping, no further paranoid ideation, knows he is hospital and year. Denies H/A, dizziness, N/V

## 2019-10-23 NOTE — DISCHARGE NOTE PROVIDER - PROVIDER TOKENS
PROVIDER:[TOKEN:[[id number]],FOLLOWUP:[1 week]],PROVIDER:[TOKEN:[[id number]],FOLLOWUP:[2 weeks]],PROVIDER:[TOKEN:[[id number]],FOLLOWUP:[1 week]] PROVIDER:[TOKEN:[[id number]],FOLLOWUP:[1 week]],PROVIDER:[TOKEN:[[id number]],FOLLOWUP:[2 weeks]],PROVIDER:[TOKEN:[[id number]],FOLLOWUP:[1 week]],PROVIDER:[TOKEN:[[id number]],FOLLOWUP:[1 month]] PROVIDER:[TOKEN:[[id number]],FOLLOWUP:[1 week]],PROVIDER:[TOKEN:[[id number]],FOLLOWUP:[2 weeks]],PROVIDER:[TOKEN:[[id number]],FOLLOWUP:[1 week]],PROVIDER:[TOKEN:[[id number]],FOLLOWUP:[1 month]],PROVIDER:[TOKEN:[[id number]],FOLLOWUP:[1 week]]

## 2019-10-23 NOTE — DISCHARGE NOTE PROVIDER - CARE PROVIDERS DIRECT ADDRESSES
,[email or web address] ,[email or web address] ,[email or web address]

## 2019-10-23 NOTE — DISCHARGE NOTE PROVIDER - NSDCCPCAREPLAN_GEN_ALL_CORE_FT
PRINCIPAL DISCHARGE DIAGNOSIS  Diagnosis: SDH (subdural hematoma)  Assessment and Plan of Treatment: Continue medications as instructed. Take care to avoid falls or head trauma.  Do not resume anticoagulation (coumadin).      SECONDARY DISCHARGE DIAGNOSES  Diagnosis: Atrial fibrillation  Assessment and Plan of Treatment: Follow up with Dr. Schumacher (cardiologist). Do not resume anticoagulation/blood thinners.

## 2019-10-23 NOTE — PROGRESS NOTE ADULT - ASSESSMENT
Pt is an 80 y/o M with PMHx of HTN, Afib and DVT who presented with AMS secondary to acute on subacute frontal/parietal  SDH managed non operatively. Hospital course also significant for right soleal DVT and bradycardia, thrombocytopenia requiring transfusion platelets.     #SDH. Keppra dc 10/14, Head CT 10/18 x 2 STABLE, no new bleed or mass effect.   - Continue neuro checks given thrombocytopenia, low threshold for rescanning given platelet count  - off anticoagulation. Patient and family do NOT want  AC for Afib, understand benefits and risks  - continue comprehensive rehab program PT/OT/SLP  avoid benzodiazepines  -continue seroquel at 5 PM. Patient with improved sleep, alertness, orientation this morning.  -enhanced supervison 7 AM-3PM, 1:1 from 3 PM-7 AM    #Thrombocytopenia, s/p transfusion platelets x 2. Differential diagnosis includes UTI, B12 deficiency and MDS   - platelets decreased from 69  to 43,--> 36 10/16 --> 35 10/17 --> 35 10/18--> 38 10/21-->42 10/22  - Hematology consult appreciated: Vitamin B12 injections 1000mcg daily for 5 days (completed) and then weekly for 6 weeks, will need hematology follow up as outpatient, continue to follow, repeated hemolysis work up  -no recommendation for platelet transfusion at this time. Reviewed with hospitalist this morning, also discussed recommendations with family 10/22  -CBC 10/24    #hypokalemia 10/21 - pt refused K supplementation. K 3.6 10/22.   -CMP 10/24    #AFib  - AC (coumadin) held due to bleed and family will continue to hold due to bleeding risk  - continue to hold digoxin due to episodes of bradycardia. Currently rate controlled    #DVT:  right soleal DVT  - no AC due to bleed. thrombocytopenic. Seen by IR, IVCF not recommended  - Stockings, mobility, continue to monitor. no swelling Le or calf TTP  - no SCDs    #Diet  - Soft, low sodium      #Case discussed in IDT rounds 10/23:  Patient imrpoved today, increased alertness, ambulation distance and attention  -will work on weaning from 1:1, continue overnight, enhanced supervision during day  -target dc home 10/25/19 with home PT, OT, SLP and 24 hour supervision    Labs:  enhanced supervision 7AM-3PM/1:1 3PM-7AM  CBC BMP 10/24 Pt is an 80 y/o M with PMHx of HTN, Afib and DVT who presented with AMS secondary to acute on subacute frontal/parietal  SDH managed non operatively. Hospital course also significant for right soleal DVT and bradycardia, thrombocytopenia requiring transfusion platelets.     #SDH. Keppra dc 10/14, Head CT 10/18 x 2 STABLE, no new bleed or mass effect.   - Continue neuro checks given thrombocytopenia, low threshold for rescanning given platelet count  - off anticoagulation. Patient and family do NOT want  AC for Afib, understand benefits and risks  - continue comprehensive rehab program PT/OT/SLP  avoid benzodiazepines  -continue seroquel at 5 PM. Patient with improved sleep, alertness, orientation this morning.  -enhanced supervison 7 AM-3PM, 1:1 from 3 PM-7 AM    #Thrombocytopenia, s/p transfusion platelets x 2. Differential diagnosis includes UTI, B12 deficiency and MDS   - platelets decreased from 69  to 43,--> 36 10/16 --> 35 10/17 --> 35 10/18--> 38 10/21-->42 10/22  - Hematology consult appreciated: Vitamin B12 injections 1000mcg daily for 5 days (completed) and then weekly for 6 weeks, will need hematology follow up as outpatient, continue to follow, repeated hemolysis work up  -no recommendation for platelet transfusion at this time. Reviewed with hospitalist this morning, also discussed recommendations with family 10/22  -CBC 10/24    #hypokalemia 10/21 - pt refused K supplementation. K 3.6 10/22.   -CMP 10/24    #AFib  - AC (coumadin) held due to bleed and family will continue to hold due to bleeding risk  - continue to hold digoxin due to episodes of bradycardia. Currently rate controlled    #DVT:  right soleal DVT  - no AC due to bleed. thrombocytopenic. Seen by IR, IVCF not recommended  - Stockings, mobility, continue to monitor. no swelling Le or calf TTP  - no SCDs    #Diet  - Soft, low sodium      #Case discussed in IDT rounds 10/23:  Patient imrpoved today, increased alertness, ambulation distance and attention  -will work on weaning from 1:1, continue overnight, enhanced supervision during day  -target dc home 10/25/19 with home PT, OT, SLP and 24 hour supervision  -discussed recommendations with niece 10/23 who is in the process of interviewing home care agencies    Labs:  enhanced supervision 7AM-3PM/1:1 3PM-7AM  CBC BMP 10/24

## 2019-10-23 NOTE — PROGRESS NOTE ADULT - ASSESSMENT
87M admitted to acute rehab s/p left frontal parietal SDH.     #SDH  most recent CT head reviewed - stable  PT/OT    #Agitation   -Seroquel   -Enhanced supervision     #Chronic A-fib  -Currently not requiring any medication for rate control  -no AC per family due to bleeding risk with thrombocytopenia     #Right soleal DVT  -no AC per family due to bleeding risk with thrombocytopenia     #Thrombocytopenia  -Heme consult reviewed  -Likely from B12 deficiency  -Getting B12 supplementation    DVT ppx: Holding on blood thinner in setting of thrombocytopenia, no SCD to Right Lower Extremity due to DVT, ? consider SCD to Left Lower Extremity

## 2019-10-24 NOTE — PROGRESS NOTE ADULT - ADDITIONAL PE
BUe and LE normal tone and ROm  Hf, quad 5.5 ankle PF and DF 5/5  bialteral grasp 5.5 BUe and LE normal tone and ROm  Hf, quad 5.5 ankle PF and DF 5/5  bilateral grasp 5.5

## 2019-10-24 NOTE — PROGRESS NOTE ADULT - SUBJECTIVE AND OBJECTIVE BOX
Patient is a 87y old  Male who presents with a chief complaint of left frontal-parietal SDH diagnosed 10/5/19 (23 Oct 2019 17:05)      HPI:  Pt is an 88 y/o Male with PMHx of HTN,. Afib on coumadin, DVT, and past thyroidectomy, who was brought to [facility] for increasing confusion and bruises on arms and legs x 2 weeks on 10/5/19.  In the ED, CT head was suspicious for left frontal/parietal acute/subacute SDH. Neurosurgery was consulted and recommended no acute surgical intervention, started on keppra for seizure prophylaxis. Patient was also found to be thrombocytopenic (21), and transfused platelets x 2. Repeat CT head was stable.    Patient was also found to have UTI and started on Keflex which is due to be completed on 10/12. Heme/onc was consulted for thrombocytopenia. They believe that DIC was unlikely, and attributed the thrombocytopenia to likely infection from UTI vs B12 deficiency vs MDS. He was also found to have non occlusive DVT on the right  soleal vein. Interventional radiology was consulted for possible IVC filter, but they felt that since the risk of PE from DVT below the knee was low, the risk of procedure outweighed the benefit. During hospital course, patient also found to be intermittently bradycardic to the 30s with Afib with slow ventricular rate and pauses. Electrophysiology was consulted and recommended to dc digoxin and they will follow up as outpatient. Pt was evaluated by PT, OT, and PM&R and was recommended for acute inpatient rehabilitation. Pt was deemed stable for discharge on 10/11/19 and he was transferred to [facility] for acute inpatient rehabilitation. (11 Oct 2019 13:22)        SUBJECTIVE:      PAST MEDICAL & SURGICAL HISTORY:  Lung nodules  SVC syndrome  Acute DVT (deep venous thrombosis)  Essential hypertension  Atrial fibrillation, unspecified type  H/O thyroidectomy      MEDICATIONS  (STANDING):  BACItracin   Ointment 1 Application(s) Topical two times a day  cyanocobalamin Injectable 1000 MICROGram(s) IntraMuscular <User Schedule>  melatonin 6 milliGRAM(s) Oral at bedtime  nystatin Powder 1 Application(s) Topical two times a day  pantoprazole    Tablet 40 milliGRAM(s) Oral before breakfast  QUEtiapine 12.5 milliGRAM(s) Oral <User Schedule>    MEDICATIONS  (PRN):  ondansetron    Tablet 4 milliGRAM(s) Oral two times a day PRN Nausea      Allergies    No Known Allergies    Intolerances          VITALS  87y  Vital Signs Last 24 Hrs  T(C): 36.6 (24 Oct 2019 09:57), Max: 37.2 (23 Oct 2019 20:25)  T(F): 97.8 (24 Oct 2019 09:57), Max: 98.9 (23 Oct 2019 20:25)  HR: 74 (24 Oct 2019 09:57) (72 - 74)  BP: 122/78 (24 Oct 2019 09:57) (109/67 - 122/78)  RR: 15 (24 Oct 2019 09:57) (15 - 15)  SpO2: 99% (24 Oct 2019 09:57) (99% - 99%)  Daily             RECENT LABS:                          10.5   5.59  )-----------( 35       ( 24 Oct 2019 06:20 )             33.0     10-24    143  |  107  |  24<H>  ----------------------------<  90  3.8   |  26  |  0.88    Ca    8.8      24 Oct 2019 06:20    TPro  6.9  /  Alb  2.9<L>  /  TBili  0.5  /  DBili  x   /  AST  24  /  ALT  14  /  AlkPhos  80  10-24    LIVER FUNCTIONS - ( 24 Oct 2019 06:20 )  Alb: 2.9 g/dL / Pro: 6.9 g/dL / ALK PHOS: 80 U/L / ALT: 14 U/L / AST: 24 U/L / GGT: x                   CAPILLARY BLOOD GLUCOSE Patient is a 87y old  Male who presents with a chief complaint of left frontal-parietal SDH diagnosed 10/5/19 (23 Oct 2019 17:05)      HPI:  Pt is an 86 y/o Male with PMHx of HTN,. Afib on coumadin, DVT, and past thyroidectomy, who was brought to [facility] for increasing confusion and bruises on arms and legs x 2 weeks on 10/5/19.  In the ED, CT head was suspicious for left frontal/parietal acute/subacute SDH. Neurosurgery was consulted and recommended no acute surgical intervention, started on keppra for seizure prophylaxis. Patient was also found to be thrombocytopenic (21), and transfused platelets x 2. Repeat CT head was stable.    Patient was also found to have UTI and started on Keflex which is due to be completed on 10/12. Heme/onc was consulted for thrombocytopenia. They believe that DIC was unlikely, and attributed the thrombocytopenia to likely infection from UTI vs B12 deficiency vs MDS. He was also found to have non occlusive DVT on the right  soleal vein. Interventional radiology was consulted for possible IVC filter, but they felt that since the risk of PE from DVT below the knee was low, the risk of procedure outweighed the benefit. During hospital course, patient also found to be intermittently bradycardic to the 30s with Afib with slow ventricular rate and pauses. Electrophysiology was consulted and recommended to dc digoxin and they will follow up as outpatient. Pt was evaluated by PT, OT, and PM&R and was recommended for acute inpatient rehabilitation. Pt was deemed stable for discharge on 10/11/19 and he was transferred to [facility] for acute inpatient rehabilitation. (11 Oct 2019 13:22)        SUBJECTIVE: Patient eating breakfast. Much more awake and alert.  Knows we are in a hospital.  Notes some new lower back pain since yesterday without inciting event. Denies H/A, dizziness, N/V      PAST MEDICAL & SURGICAL HISTORY:  Lung nodules  SVC syndrome  Acute DVT (deep venous thrombosis)  Essential hypertension  Atrial fibrillation, unspecified type  H/O thyroidectomy      MEDICATIONS  (STANDING):  BACItracin   Ointment 1 Application(s) Topical two times a day  cyanocobalamin Injectable 1000 MICROGram(s) IntraMuscular <User Schedule>  melatonin 6 milliGRAM(s) Oral at bedtime  nystatin Powder 1 Application(s) Topical two times a day  pantoprazole    Tablet 40 milliGRAM(s) Oral before breakfast  QUEtiapine 12.5 milliGRAM(s) Oral <User Schedule>    MEDICATIONS  (PRN):  ondansetron    Tablet 4 milliGRAM(s) Oral two times a day PRN Nausea      Allergies    No Known Allergies    Intolerances          VITALS  87y  Vital Signs Last 24 Hrs  T(C): 36.6 (24 Oct 2019 09:57), Max: 37.2 (23 Oct 2019 20:25)  T(F): 97.8 (24 Oct 2019 09:57), Max: 98.9 (23 Oct 2019 20:25)  HR: 74 (24 Oct 2019 09:57) (72 - 74)  BP: 122/78 (24 Oct 2019 09:57) (109/67 - 122/78)  RR: 15 (24 Oct 2019 09:57) (15 - 15)  SpO2: 99% (24 Oct 2019 09:57) (99% - 99%)  Daily             RECENT LABS:                          10.5   5.59  )-----------( 35       ( 24 Oct 2019 06:20 )             33.0     10-24    143  |  107  |  24<H>  ----------------------------<  90  3.8   |  26  |  0.88    Ca    8.8      24 Oct 2019 06:20    TPro  6.9  /  Alb  2.9<L>  /  TBili  0.5  /  DBili  x   /  AST  24  /  ALT  14  /  AlkPhos  80  10-24    LIVER FUNCTIONS - ( 24 Oct 2019 06:20 )  Alb: 2.9 g/dL / Pro: 6.9 g/dL / ALK PHOS: 80 U/L / ALT: 14 U/L / AST: 24 U/L / GGT: x                   CAPILLARY BLOOD GLUCOSE Patient is a 87y old  Male who presents with a chief complaint of left frontal-parietal SDH diagnosed 10/5/19 (23 Oct 2019 17:05)      HPI:  Pt is an 86 y/o Male with PMHx of HTN,. Afib on coumadin, DVT, and past thyroidectomy, who was brought to [facility] for increasing confusion and bruises on arms and legs x 2 weeks on 10/5/19.  In the ED, CT head was suspicious for left frontal/parietal acute/subacute SDH. Neurosurgery was consulted and recommended no acute surgical intervention, started on keppra for seizure prophylaxis. Patient was also found to be thrombocytopenic (21), and transfused platelets x 2. Repeat CT head was stable.    Patient was also found to have UTI and started on Keflex which is due to be completed on 10/12. Heme/onc was consulted for thrombocytopenia. They believe that DIC was unlikely, and attributed the thrombocytopenia to likely infection from UTI vs B12 deficiency vs MDS. He was also found to have non occlusive DVT on the right  soleal vein. Interventional radiology was consulted for possible IVC filter, but they felt that since the risk of PE from DVT below the knee was low, the risk of procedure outweighed the benefit. During hospital course, patient also found to be intermittently bradycardic to the 30s with Afib with slow ventricular rate and pauses. Electrophysiology was consulted and recommended to dc digoxin and they will follow up as outpatient. Pt was evaluated by PT, OT, and PM&R and was recommended for acute inpatient rehabilitation. Pt was deemed stable for discharge on 10/11/19 and he was transferred to [facility] for acute inpatient rehabilitation. (11 Oct 2019 13:22)        SUBJECTIVE: Patient eating breakfast. Much more awake and alert.  Knows we are in a hospital.  Notes some new lower back pain since yesterday without inciting event. Denies H/A, dizziness, N/V      PAST MEDICAL & SURGICAL HISTORY:  Lung nodules  SVC syndrome  Acute DVT (deep venous thrombosis)  Essential hypertension  Atrial fibrillation, unspecified type  H/O thyroidectomy      MEDICATIONS  (STANDING):  BACItracin   Ointment 1 Application(s) Topical two times a day  cyanocobalamin Injectable 1000 MICROGram(s) IntraMuscular <User Schedule>  melatonin 6 milliGRAM(s) Oral at bedtime  nystatin Powder 1 Application(s) Topical two times a day  pantoprazole    Tablet 40 milliGRAM(s) Oral before breakfast  QUEtiapine 12.5 milliGRAM(s) Oral <User Schedule>    MEDICATIONS  (PRN):  ondansetron    Tablet 4 milliGRAM(s) Oral two times a day PRN Nausea      Allergies    No Known Allergies    Intolerances          VITALS  87y  Vital Signs Last 24 Hrs  T(C): 36.6 (24 Oct 2019 09:57), Max: 37.2 (23 Oct 2019 20:25)  T(F): 97.8 (24 Oct 2019 09:57), Max: 98.9 (23 Oct 2019 20:25)  HR: 74 (24 Oct 2019 09:57) (72 - 74)  BP: 122/78 (24 Oct 2019 09:57) (109/67 - 122/78)  RR: 15 (24 Oct 2019 09:57) (15 - 15)  SpO2: 99% (24 Oct 2019 09:57) (99% - 99%)  Daily             RECENT LABS:                          10.5   5.59  )-----------( 35       ( 24 Oct 2019 06:20 )             33.0     10-24    143  |  107  |  24<H>  ----------------------------<  90  3.8   |  26  |  0.88    Ca    8.8      24 Oct 2019 06:20    TPro  6.9  /  Alb  2.9<L>  /  TBili  0.5  /  DBili  x   /  AST  24  /  ALT  14  /  AlkPhos  80  10-24    LIVER FUNCTIONS - ( 24 Oct 2019 06:20 )  Alb: 2.9 g/dL / Pro: 6.9 g/dL / ALK PHOS: 80 U/L / ALT: 14 U/L / AST: 24 U/L / GGT: x                   CAPILLARY BLOOD GLUCOSE

## 2019-10-24 NOTE — PROGRESS NOTE ADULT - ASSESSMENT
Pt is an 82 y/o M with PMHx of HTN, Afib and DVT who presented with AMS secondary to acute on subacute frontal/parietal  SDH managed non operatively. Hospital course also significant for right soleal DVT and bradycardia, thrombocytopenia requiring transfusion platelets.     #SDH. Keppra dc 10/14, Head CT 10/18 x 2 STABLE, no new bleed or mass effect.   - Continue neuro checks given thrombocytopenia, low threshold for rescanning given platelet count  - off anticoagulation. Patient and family do NOT want  AC for Afib, understand benefits and risks  - continue comprehensive rehab program PT/OT/SLP  avoid benzodiazepines  -continue seroquel at 5 PM. Patient with improved sleep, alertness, orientation this morning.  -enhanced supervison 7 AM-3PM, 1:1 from 3 PM-7 AM    #Thrombocytopenia, s/p transfusion platelets x 2. Differential diagnosis includes UTI, B12 deficiency and MDS  - platelets decreased from 69  to 43 --> 36 10/16 --> 35 10/17 --> 35 10/18--> 38 10/21-->42 10/22 --> 35 10/24  - Hematology consult appreciated: Vitamin B12 injections 1000mcg daily for 5 days (completed) and then weekly for 6 weeks, will need hematology follow up as outpatient, continue to follow, repeated hemolysis work up  - no recommendation for platelet transfusion at this time. Reviewed with hospitalist this morning, also discussed recommendations with family 10/22  - CBC 10/25    #hypokalemia 10/21 - pt refused K supplementation. K 3.6 10/22.   -resolved    #AFib  - AC (coumadin) held due to bleed and family will continue to hold due to bleeding risk  - continue to hold digoxin due to episodes of bradycardia. Currently rate controlled    #DVT:  right soleal DVT  - no AC due to bleed. thrombocytopenic. Seen by IR, IVCF not recommended  - Stockings, mobility, continue to monitor. no swelling Le or calf TTP  - no SCDs    #Diet  - Soft, low sodium      #Case discussed in IDT rounds 10/23:  Patient improved today, increased alertness, ambulation distance and attention  -will work on weaning from 1:1, continue overnight, enhanced supervision during day  -target dc home 10/25/19 with home PT, OT, SLP and 24 hour supervision  -discussed recommendations with jihan 10/23 who is in the process of interviewing home care agencies    Labs:  enhanced supervision 7AM-3PM/1:1 3PM-7AM  CBC 10/25 Pt is an 80 y/o M with PMHx of HTN, Afib and DVT who presented with AMS secondary to acute on subacute frontal/parietal  SDH managed non operatively. Hospital course also significant for right soleal DVT and bradycardia, thrombocytopenia requiring transfusion platelets.     #SDH. Keppra dc 10/14, Head CT 10/18 x 2 STABLE, no new bleed or mass effect.   - Continue neuro checks given thrombocytopenia, low threshold for rescanning given platelet count  - off anticoagulation. Patient and family do NOT want  AC for Afib, understand benefits and risks  - continue comprehensive rehab program PT/OT/SLP  avoid benzodiazepines  -continue seroquel at 5 PM. Patient with improved sleep, alertness, orientation this morning.  -enhanced supervison 7 AM-3PM, 1:1 from 3 PM-7 AM    #Thrombocytopenia, s/p transfusion platelets x 2. Differential diagnosis includes UTI, B12 deficiency and MDS  - platelets decreased from 69  to 43 --> 36 10/16 --> 35 10/17 --> 35 10/18--> 38 10/21-->42 10/22 --> 35 10/24  - Hematology consult appreciated: Vitamin B12 injections 1000mcg daily for 5 days (completed) and then weekly for 6 weeks, will need hematology follow up as outpatient, continue to follow, repeated hemolysis work up  - no recommendation for platelet transfusion at this time. Reviewed with hospitalist this morning, also discussed recommendations with family 10/22  - CBC 10/25    #hypokalemia 10/21 - pt refused K supplementation. K 3.6 10/22.   -resolved    #AFib  - AC (coumadin) held due to bleed and family will continue to hold due to bleeding risk  - continue to hold digoxin due to episodes of bradycardia. Currently rate controlled    #DVT:  right soleal DVT  - no AC due to bleed. thrombocytopenic. Seen by IR, IVCF not recommended  - Stockings, mobility, continue to monitor. no swelling Le or calf TTP  - no SCDs    #Lower Back Pain  - likely positional vs. from pressure associated with sitting in wheelchair  - pt not requesting pain medications at this time   - monitor    #Diet  - Soft, low sodium      #Case discussed in IDT rounds 10/23:  Patient improved today, increased alertness, ambulation distance and attention  -will work on weaning from 1:1, continue overnight, enhanced supervision during day  -target dc home 10/25/19 with home PT, OT, SLP and 24 hour supervision  -discussed recommendations with jihan 10/23 who is in the process of interviewing home care agencies    Labs:  enhanced supervision 7AM-3PM/1:1 3PM-7AM  CBC 10/25 Pt is an 82 y/o M with PMHx of HTN, Afib and DVT who presented with AMS secondary to acute on subacute frontal/parietal  SDH managed non operatively. Hospital course also significant for right soleal DVT and bradycardia, thrombocytopenia requiring transfusion platelets.     #SDH. Keppra dc 10/14, Head CT 10/18 x 2 STABLE, no new bleed or mass effect.   - Continue neuro checks given thrombocytopenia, low threshold for rescanning given platelet count  - off anticoagulation. Patient and family do NOT want  AC for Afib, understand benefits and risks  - continue comprehensive rehab program PT/OT/SLP  avoid benzodiazepines  -continue seroquel at 5 PM. Patient with improved sleep, alertness, orientation this morning.  -enhanced supervison 7 AM-3PM, 1:1 from 3 PM-7 AM    #Thrombocytopenia, s/p transfusion platelets x 2. Differential diagnosis includes UTI, B12 deficiency and MDS  - platelets decreased from 69  to 43 --> 36 10/16 --> 35 10/17 --> 35 10/18--> 38 10/21-->42 10/22 --> 35 10/24  - Hematology consult appreciated: Vitamin B12 injections 1000mcg daily for 5 days (completed) and then weekly for 6 weeks, will need hematology follow up as outpatient, continue to follow, repeated hemolysis work up  - no recommendation for platelet transfusion at this time. Reviewed with hospitalist this morning, also discussed recommendations with family 10/22  - CBC 10/25. Heme follow up if drops again especially as patient preparing for dc home    #hypokalemia 10/21 - pt refused K supplementation. K 3.6 10/22.   -resolved    #AFib  - AC (coumadin) held due to bleed and family will continue to hold due to bleeding risk  - continue to hold digoxin due to episodes of bradycardia. Currently rate controlled    #DVT:  right soleal DVT  - no AC due to bleed. thrombocytopenic. Seen by IR, IVCF not recommended  - Stockings, mobility, continue to monitor. no swelling Le or calf TTP  - no SCDs    #Lower Back Pain  - likely positional vs. from pressure associated with sitting in wheelchair  - pt not requesting pain medications at this time   - monitor    #Diet  - Soft, low sodium      #Case discussed in IDT rounds 10/23:  Patient improved today, increased alertness, ambulation distance and attention  -will work on weaning from 1:1, continue overnight, enhanced supervision during day  -target dc home 10/25/19 with home PT, OT, SLP and 24 hour supervision. Patient aware, looks forward to dc  -discussed recommendations with jihan 10/23 who is in the process of interviewing home care agencies    Labs:  enhanced supervision 7AM-3PM/1:1 3PM-7AM  [facility] 10/25 Pt is an 82 y/o M with PMHx of HTN, Afib and DVT who presented with AMS secondary to acute on subacute frontal/parietal  SDH managed non operatively. Hospital course also significant for right soleal DVT and bradycardia, thrombocytopenia requiring transfusion platelets.     #SDH. Keppra dc 10/14, Head CT 10/18 x 2 STABLE, no new bleed or mass effect.   - Continue neuro checks given thrombocytopenia, low threshold for rescanning given platelet count  - off anticoagulation. Patient and family do NOT want  AC for Afib, understand benefits and risks  - continue comprehensive rehab program PT/OT/SLP  avoid benzodiazepines  -continue seroquel at 5 PM. Patient with improved sleep, alertness, orientation this morning.  -enhanced supervison 7 AM-3PM, 1:1 from 3 PM-7 AM    #Thrombocytopenia, s/p transfusion platelets x 2. Differential diagnosis includes UTI, B12 deficiency and MDS  - platelets decreased from 69  to 43 --> 36 10/16 --> 35 10/17 --> 35 10/18--> 38 10/21-->42 10/22 --> 35 10/24  - Hematology consult appreciated: Vitamin B12 injections 1000mcg daily for 5 days (completed) and then weekly for 6 weeks, will need hematology follow up as outpatient, continue to follow, repeated hemolysis work up  - no recommendation for platelet transfusion at this time. Reviewed with hospitalist this morning, also discussed recommendations with family 10/22  - CBC 10/25. Heme follow up if drops again especially as patient preparing for dc home    #hypokalemia 10/21 - pt refused K supplementation. K 3.6 10/22.   -resolved    #AFib  - AC (coumadin) held due to bleed and family will continue to hold due to bleeding risk  - continue to hold digoxin due to episodes of bradycardia. Currently rate controlled    #DVT:  right soleal DVT  - no AC due to bleed. thrombocytopenic. Seen by IR, IVCF not recommended  - Stockings, mobility, continue to monitor. no swelling Le or calf TTP  - no SCDs    #Lower Back Pain  - likely positional vs. from pressure associated with sitting in wheelchair  - pt not requesting pain medications at this time   - monitor    #Diet  - Soft, low sodium      #Case discussed in IDT rounds 10/23:  Patient improved today, increased alertness, ambulation distance and attention  -will work on weaning from 1:1, continue overnight, enhanced supervision during day  -target dc home 10/25/19 with home PT, OT, SLP and 24 hour supervision. Patient aware, looks forward to dc  -discussed recommendations with jihan 10/23 who is in the process of interviewing home care agencies    Labs:  enhanced supervision 7AM-3PM/1:1 3PM-7AM  CBC 10/25  CBC BMP 10/28

## 2019-10-24 NOTE — PROGRESS NOTE ADULT - NSHPATTENDINGPLANDISCUSS_GEN_ALL_CORE
Octavio Nowak DO; patient; PCA; RN
Octavio Nowak DO; patient; family
Octavio Nowak DO; patient
Octavio Nowak, ; patient; family; hospitalist
Triny Garcia DO;  RN; family
Triny Garcia DO; patient

## 2019-10-24 NOTE — PROGRESS NOTE ADULT - GENERAL COMMENTS
Patient eating breakfast. Much more awake and alert.  Knows we are in a hospital.  Notes some new lower back pain since yesterday. Denies H/A, dizziness, N/V Patient doing well, eating breakfast, improved attention and alertness, answering simple questions appropriately.  grossly O x 3 but misses date ("21 instead of "24")

## 2019-10-24 NOTE — PROGRESS NOTE ADULT - [facility] PE MLT D E PC
Irregular rhythm, regular rate
regular rate and rhythm
Irregular rhythm, regular rate
no murmur/no rub
regular rate and rhythm
no murmur/no rub
2

## 2019-10-25 NOTE — PROGRESS NOTE ADULT - SUBJECTIVE AND OBJECTIVE BOX
Patient is a 87y old  Male who presents with a chief complaint of left frontal-parietal SDH diagnosed 10/5/19 (24 Oct 2019 12:11)      HPI:  Pt is an 88 y/o Male with PMHx of HTN,. Afib on coumadin, DVT, and past thyroidectomy, who was brought to [facility] for increasing confusion and bruises on arms and legs x 2 weeks on 10/5/19.  In the ED, CT head was suspicious for left frontal/parietal acute/subacute SDH. Neurosurgery was consulted and recommended no acute surgical intervention, started on keppra for seizure prophylaxis. Patient was also found to be thrombocytopenic (21), and transfused platelets x 2. Repeat CT head was stable.    Patient was also found to have UTI and started on Keflex which is due to be completed on 10/12. Heme/onc was consulted for thrombocytopenia. They believe that DIC was unlikely, and attributed the thrombocytopenia to likely infection from UTI vs B12 deficiency vs MDS. He was also found to have non occlusive DVT on the right  soleal vein. Interventional radiology was consulted for possible IVC filter, but they felt that since the risk of PE from DVT below the knee was low, the risk of procedure outweighed the benefit. During hospital course, patient also found to be intermittently bradycardic to the 30s with Afib with slow ventricular rate and pauses. Electrophysiology was consulted and recommended to dc digoxin and they will follow up as outpatient. Pt was evaluated by PT, OT, and PM&R and was recommended for acute inpatient rehabilitation. Pt was deemed stable for discharge on 10/11/19 and he was transferred to [facility] for acute inpatient rehabilitation. (11 Oct 2019 13:22)      PAST MEDICAL & SURGICAL HISTORY:  Lung nodules  SVC syndrome  Acute DVT (deep venous thrombosis)  Essential hypertension  Atrial fibrillation, unspecified type  H/O thyroidectomy      MEDICATIONS  (STANDING):  BACItracin   Ointment 1 Application(s) Topical two times a day  cyanocobalamin Injectable 1000 MICROGram(s) IntraMuscular <User Schedule>  melatonin 6 milliGRAM(s) Oral at bedtime  nystatin Powder 1 Application(s) Topical two times a day  pantoprazole    Tablet 40 milliGRAM(s) Oral before breakfast  QUEtiapine 12.5 milliGRAM(s) Oral <User Schedule>    MEDICATIONS  (PRN):  ondansetron    Tablet 4 milliGRAM(s) Oral two times a day PRN Nausea      Allergies    No Known Allergies    Intolerances          VITALS  87y  Vital Signs Last 24 Hrs  T(C): 36.7 (25 Oct 2019 09:23), Max: 36.7 (24 Oct 2019 21:29)  T(F): 98 (25 Oct 2019 09:23), Max: 98 (24 Oct 2019 21:29)  HR: 80 (25 Oct 2019 09:23) (76 - 80)  BP: 107/69 (25 Oct 2019 09:23) (100/61 - 107/69)  BP(mean): --  RR: 14 (25 Oct 2019 09:23) (14 - 14)  SpO2: 95% (25 Oct 2019 09:23) (94% - 95%)  Daily     Daily         RECENT LABS:                          11.0   4.79  )-----------( 37       ( 25 Oct 2019 06:00 )             34.2     10-24    143  |  107  |  24<H>  ----------------------------<  90  3.8   |  26  |  0.88    Ca    8.8      24 Oct 2019 06:20    TPro  6.9  /  Alb  2.9<L>  /  TBili  0.5  /  DBili  x   /  AST  24  /  ALT  14  /  AlkPhos  80  10-24    LIVER FUNCTIONS - ( 24 Oct 2019 06:20 )  Alb: 2.9 g/dL / Pro: 6.9 g/dL / ALK PHOS: 80 U/L / ALT: 14 U/L / AST: 24 U/L / GGT: x                   CAPILLARY BLOOD GLUCOSE

## 2019-10-25 NOTE — PROGRESS NOTE ADULT - ASSESSMENT
Pt is an 80 y/o M with PMHx of HTN, Afib and DVT who presented with AMS secondary to acute on subacute frontal/parietal  SDH managed non operatively. Hospital course also significant for right soleal DVT and bradycardia, thrombocytopenia requiring transfusion platelets.     #SDH. Keppra dc 10/14, Head CT 10/18 x 2 STABLE, no new bleed or mass effect.   - Continue neuro checks given thrombocytopenia, low threshold for rescanning given platelet count  - off anticoagulation. Patient and family do NOT want  AC for Afib, understand benefits and risks  - continue comprehensive rehab program PT/OT/SLP  -continue seroquel , will change from 5 PM--> 4 PM as patient already increasingly confused by 5 PM 10/25  -enhanced supervison 7 AM-3PM, 1:1 from 3 PM-7 AM    #Thrombocytopenia, s/p transfusion platelets x 2. Differential diagnosis includes UTI, B12 deficiency and MDS  - platelets decreased from 69  to 43 --> 36 10/16 --> 35 10/17 --> 35 10/18--> 38 10/21-->42 10/22 --> 35 10/24--> 37 10/25  - Hematology consult appreciated: Vitamin B12 injections 1000mcg daily for 5 days (completed) and then weekly for 6 weeks, will need hematology follow up as outpatient,  - no recommendation for platelet transfusion at this time. Reviewed with hospitalist this morning, also discussed recommendations with family 10/22  - CBC 10/28, discussed with patient      #AFib  - AC (coumadin) held due to bleed and family will continue to hold due to bleeding risk  - continue to hold digoxin due to episodes of bradycardia. Currently rate controlled  -PCp f/u on dc    #DVT:  right soleal DVT  - no AC due to bleed. thrombocytopenic. Seen by IR, IVCF not recommended  - Stockings, mobility, continue to monitor. no swelling Le or calf TTP  - no SCDs    #Lower Back Pain  - likely positional vs. from pressure associated with sitting in wheelchair  - pt not requesting pain medications at this time   - monitor    #Diet  - Soft, low sodium      #Case discussed in IDT rounds 10/23:  Patient improved today, increased alertness, ambulation distance and attention  -will work on weaning from 1:1, continue overnight, enhanced supervision during day  -target dc home 10/28/19 with home PT, OT, SLP and 24 hour supervision. Patient aware, looks forward to dc  -discussed recommendations with jihan 10/23 who is in the process of interviewing home care agencies    Labs:  enhanced supervision 7AM-3PM/1:1 3PM-7AM  CBC BMP 10/28 Pt is an 80 y/o M with PMHx of HTN, Afib and DVT who presented with AMS secondary to acute on subacute frontal/parietal  SDH managed non operatively. Hospital course also significant for right soleal DVT and bradycardia, thrombocytopenia requiring transfusion platelets.     #SDH. Keppra dc 10/14, Head CT 10/18 x 2 STABLE, no new bleed or mass effect.   - Continue neuro checks given thrombocytopenia, low threshold for rescanning given platelet count  - off anticoagulation. Patient and family do NOT want  AC for Afib, understand benefits and risks  - continue comprehensive rehab program PT/OT/SLP  -continue seroquel , will change from 5 PM--> 4 PM as patient already increasingly confused by 5 PM 10/25  -downgraded to enhanced supervision    #Thrombocytopenia, s/p transfusion platelets x 2. Differential diagnosis includes UTI, B12 deficiency and MDS  - platelets decreased from 69  to 43 --> 36 10/16 --> 35 10/17 --> 35 10/18--> 38 10/21-->42 10/22 --> 35 10/24--> 37 10/25  - Hematology consult appreciated: Vitamin B12 injections 1000mcg daily for 5 days (completed) and then weekly for 6 weeks, will need hematology follow up as outpatient,  - no recommendation for platelet transfusion at this time. Reviewed with hospitalist this morning, also discussed recommendations with family 10/22  - CBC 10/28, discussed with patient      #AFib  - AC (coumadin) held due to bleed and family will continue to hold due to bleeding risk  - continue to hold digoxin due to episodes of bradycardia. Currently rate controlled  -PCp f/u on dc    #DVT:  right soleal DVT  - no AC due to bleed. thrombocytopenic. Seen by IR, IVCF not recommended  - Stockings, mobility, continue to monitor. no swelling Le or calf TTP  - no SCDs    #Lower Back Pain  - likely positional vs. from pressure associated with sitting in wheelchair  - pt not requesting pain medications at this time   - monitor    #Diet  - Soft, low sodium      #Case discussed in IDT rounds 10/23:  Patient improved today, increased alertness, ambulation distance and attention  -will work on weaning from 1:1, continue overnight, enhanced supervision during day  -target dc home 10/28/19 with home PT, OT, SLP and 24 hour supervision. Patient aware, looks forward to dc  -discussed recommendations with niece 10/23 who is in the process of interviewing home care agencies    Labs:  enhanced supervision   ELISABETH FONTANA 10/28

## 2019-10-26 NOTE — PROGRESS NOTE ADULT - GASTROINTESTINAL DETAILS
no distention/nontender/bowel sounds normal/soft
nontender/no distention/soft/bowel sounds normal
bowel sounds normal/nontender/soft/no distention
soft/bowel sounds normal/no distention/nontender
soft/nontender/no distention/bowel sounds normal
nontender/normal/soft
nontender/soft/bowel sounds normal
normal/soft/nontender
soft/nontender/normal
bowel sounds normal/soft
soft/nontender/bowel sounds normal/normal

## 2019-10-26 NOTE — PROVIDER CONTACT NOTE (OTHER) - RECOMMENDATIONS
Will re-educate and ome back in short period to see if care can be given.
Chayito Null was called with prompt response from team
constant observation

## 2019-10-26 NOTE — PROVIDER CONTACT NOTE (OTHER) - SITUATION
Upon Rn entering  Pt. room to complete assessment, vitals and medication administration. PT refused all care.
pt restless/combative/non-compliant, unable to keep pt in enhanced supervision room
pt with confusion, agitation, got up oob, walking in room and hallway; became combative to PCA;

## 2019-10-26 NOTE — PROGRESS NOTE ADULT - GENERAL COMMENTS
Patient required change from enhanced supervision to 1:1 overnight, this morning ate breakfast completely. Denies H/A, dizziness, difficulty sleeping. Not agitated, although continues to become more confused, restless late afternoon

## 2019-10-26 NOTE — PROGRESS NOTE ADULT - SUBJECTIVE AND OBJECTIVE BOX
Patient seen and examined at bedside. Patient extremely agitated refused exam and discussion with me. Per nursing patient has been agitated overnight.    ALLERGIES:  No Known Allergies    MEDICATIONS  (STANDING):  BACItracin   Ointment 1 Application(s) Topical two times a day  cyanocobalamin Injectable 1000 MICROGram(s) IntraMuscular <User Schedule>  melatonin 6 milliGRAM(s) Oral at bedtime  nystatin Powder 1 Application(s) Topical two times a day  pantoprazole    Tablet 40 milliGRAM(s) Oral before breakfast  QUEtiapine 12.5 milliGRAM(s) Oral <User Schedule>    MEDICATIONS  (PRN):  ondansetron    Tablet 4 milliGRAM(s) Oral two times a day PRN Nausea    Vital Signs Last 24 Hrs  T(F): 98.2 (25 Oct 2019 22:18), Max: 98.2 (25 Oct 2019 22:18)  HR: 80 (25 Oct 2019 22:18) (80 - 80)  BP: 100/62 (25 Oct 2019 22:18) (100/62 - 107/69)  RR: 14 (25 Oct 2019 22:18) (14 - 14)  SpO2: 99% (25 Oct 2019 22:18) (95% - 99%)  I&O's Summary      PHYSICAL EXAM: (limited due to patient's refusal)  Gen: agigtated, attempting to get out of bed, resting in bed  Neuro: aaox3, no apparent deficits but again exam limited  Heent: not performed due to patient agitation   Pulm:  not performed due to patient agitation   CV:  not performed due to patient agitation   Ab:  not performed due to patient agitation   Extrem:  not performed due to patient agitation       LABS:                        11.0   4.79  )-----------( 37       ( 25 Oct 2019 06:00 )             34.2       10-24    143  |  107  |  24  ----------------------------<  90  3.8   |  26  |  0.88    Ca    8.8      24 Oct 2019 06:20    TPro  6.9  /  Alb  2.9  /  TBili  0.5  /  DBili  x   /  AST  24  /  ALT  14  /  AlkPhos  80  10-24     eGFR if Non African American: 77 mL/min/1.73M2 (10-24-19 @ 06:20)  eGFR if : 89 mL/min/1.73M2 (10-24-19 @ 06:20)                                     RADIOLOGY & ADDITIONAL TESTS:    Care Discussed with Consultants/Other Providers:

## 2019-10-26 NOTE — PROVIDER CONTACT NOTE (OTHER) - REASON
agitation, combative, confusion
pt restless/combative/non-compliant, unable to keep pt in enhanced supervision room
Pt refusal care & medications.

## 2019-10-26 NOTE — PROGRESS NOTE ADULT - NEGATIVE GASTROINTESTINAL SYMPTOMS
no abdominal pain/no melena/no nausea/no vomiting
Bm yesterday 10/24/no constipation
no constipation/Bm10/24
no nausea/no vomiting/no abdominal pain

## 2019-10-26 NOTE — PROVIDER CONTACT NOTE (OTHER) - ASSESSMENT
Pt had stated, he does not want care and wants to go home.
pt alert but confused; speaking clearly
pt restless/combative/non-compliant, unable to keep pt in enhanced supervision room, refusing AM care/medications

## 2019-10-26 NOTE — PROGRESS NOTE ADULT - ASSESSMENT
Pt is an 82 y/o M with PMHx of HTN, Afib and DVT who presented with AMS secondary to acute on subacute frontal/parietal  SDH managed non operatively. Hospital course also significant for right soleal DVT and bradycardia, thrombocytopenia requiring transfusion platelets.     #SDH. Keppra dc 10/14, Head CT 10/18 x 2 STABLE, no new bleed or mass effect.   - Continue neuro checks given thrombocytopenia, low threshold for rescanning given platelet count  - off anticoagulation. Patient and family do NOT want  AC for Afib, understand benefits and risks  - continue comprehensive rehab program PT/OT/SLP  -continue seroquel , will change from 5 PM--> 4 PM as patient already increasingly confused by 5 PM 10/25  -upgraded from enhanced supervision to 1:1 observation overnight 10/26    #Thrombocytopenia, s/p transfusion platelets x 2. Differential diagnosis includes UTI, B12 deficiency and MDS  - platelets decreased from 69  to 43 --> 36 10/16 --> 35 10/17 --> 35 10/18--> 38 10/21-->42 10/22 --> 35 10/24--> 37 10/25  - Hematology consult appreciated: Vitamin B12 injections 1000mcg daily for 5 days (completed) and then weekly for 6 weeks, will need hematology follow up as outpatient,  - no recommendation for platelet transfusion at this time. Reviewed with hospitalist this morning, also discussed recommendations with family 10/22  - CBC 10/28, discussed with patient      #AFib  - AC (coumadin) held due to bleed and family will continue to hold due to bleeding risk  - continue to hold digoxin due to episodes of bradycardia. Currently rate controlled  -PCp f/u on dc    #DVT:  right soleal DVT  - no AC due to bleed. thrombocytopenic. Seen by IR, IVCF not recommended  - Stockings, mobility, continue to monitor. no swelling Le or calf TTP  - no SCDs    #Lower Back Pain  - likely positional vs. from pressure associated with sitting in wheelchair  - pt not requesting pain medications at this time   - monitor    #Diet  - Soft, low sodium      #Case discussed in IDT rounds 10/23:  Patient improved today, increased alertness, ambulation distance and attention  -will work on weaning from 1:1, continue overnight, enhanced supervision during day  -target dc home 10/28/19 with home PT, OT, SLP and 24 hour supervision. Patient aware, looks forward to dc  -discussed recommendations with jihan 10/23 who is in the process of interviewing home care agencies    Labs:  1:1 observation  CBC BMP 10/28

## 2019-10-26 NOTE — PROVIDER CONTACT NOTE (OTHER) - ACTION/TREATMENT ORDERED:
LIP made aware will continue to monitor.
MD made aware, orders constant observation for safety, pt safety maintained, will continue to monitor
constant observation; pt was assisted back to bed; pt given 5 mg Zyprexa IM to left thigh; bed alarm in use; calm environment promoted.

## 2019-10-26 NOTE — PROGRESS NOTE ADULT - MENTAL STATUS
alert, calm, responds to questions, but then perseverates that "this is all his fault," but is unable to elaborate further.

## 2019-10-26 NOTE — PROGRESS NOTE ADULT - ASSESSMENT
Mr Barger is an 86 y/o male that was admitted to acute rehab s/p left frontal parietal SDH.     Neurology  Subdural Hematoma  - most recent CT head reviewed - stable  - PT/OT per primary team  Agitation   - Seroquel, consider increasing dose - would obtain ECG for QTc prior to dose increase  - Enhanced supervision (1 to 1)    Cardiovascular  Chronic AF  - Currently not requiring rate control   - per chart review, no AC per family due to bleeding risk with thrombocytopenia   Right soleal DVT  - per chart review, No AC per family due to bleeding risk with thrombocytopenia also DVT unlikely to need AC unless propagation is shown    Gastroenterology  Gastritis vs GERD  - consider trial off protonix as no clear indication for its benefit    Hematology  Thrombocytopenia  - Likely from B12 deficiency  - Getting B12 supplementation    DVT ppx: Holding on blood thinner in setting of thrombocytopenia, no SCD to Right Lower Extremity due to DVT, can  consider SCD to Left Lower Extremity  case discussed with PMR resident  if a clinical question should arise regarding this patient, please do not hesitate to contact me at [phone] until 5pm on 10/26/2019

## 2019-10-27 NOTE — PROGRESS NOTE ADULT - SUBJECTIVE AND OBJECTIVE BOX
Patient is a 87y old  Male who presents with a chief complaint of left frontal-parietal SDH diagnosed 10/5/19 (24 Oct 2019 12:11)      HPI:  Pt is an 86 y/o Male with PMHx of HTN,. Afib on coumadin, DVT, and past thyroidectomy, who was brought to [facility] for increasing confusion and bruises on arms and legs x 2 weeks on 10/5/19.  In the ED, CT head was suspicious for left frontal/parietal acute/subacute SDH. Neurosurgery was consulted and recommended no acute surgical intervention, started on keppra for seizure prophylaxis. Patient was also found to be thrombocytopenic (21), and transfused platelets x 2. Repeat CT head was stable.    Patient was also found to have UTI and started on Keflex which is due to be completed on 10/12. Heme/onc was consulted for thrombocytopenia. They believe that DIC was unlikely, and attributed the thrombocytopenia to likely infection from UTI vs B12 deficiency vs MDS. He was also found to have non occlusive DVT on the right  soleal vein. Interventional radiology was consulted for possible IVC filter, but they felt that since the risk of PE from DVT below the knee was low, the risk of procedure outweighed the benefit. During hospital course, patient also found to be intermittently bradycardic to the 30s with Afib with slow ventricular rate and pauses. Electrophysiology was consulted and recommended to dc digoxin and they will follow up as outpatient. Pt was evaluated by PT, OT, and PM&R and was recommended for acute inpatient rehabilitation. Pt was deemed stable for discharge on 10/11/19 and he was transferred to [facility] for acute inpatient rehabilitation. (11 Oct 2019 13:22)    SUBJECTIVE: Patient seen and examined. Patient on 1:1, no issues overnight, slight agitation this morning resolved with walking with PCA. Patient pleasantly confused this morning. States he feels well and denies complaints or pain.     PAST MEDICAL & SURGICAL HISTORY:  Lung nodules  SVC syndrome  Acute DVT (deep venous thrombosis)  Essential hypertension  Atrial fibrillation, unspecified type  H/O thyroidectomy      MEDICATIONS  (STANDING):  BACItracin   Ointment 1 Application(s) Topical two times a day  cyanocobalamin Injectable 1000 MICROGram(s) IntraMuscular <User Schedule>  melatonin 6 milliGRAM(s) Oral at bedtime  nystatin Powder 1 Application(s) Topical two times a day  pantoprazole    Tablet 40 milliGRAM(s) Oral before breakfast  QUEtiapine 12.5 milliGRAM(s) Oral <User Schedule>    MEDICATIONS  (PRN):  ondansetron    Tablet 4 milliGRAM(s) Oral two times a day PRN Nausea        Allergies    No Known Allergies    Intolerances      Vital Signs Last 24 Hrs  T(C): 36.6 (27 Oct 2019 09:24), Max: 36.8 (26 Oct 2019 22:25)  T(F): 97.9 (27 Oct 2019 09:24), Max: 98.2 (26 Oct 2019 22:25)  HR: 80 (27 Oct 2019 09:24) (80 - 88)  BP: 95/65 (27 Oct 2019 09:24) (95/65 - 106/60)  BP(mean): --  RR: 14 (27 Oct 2019 09:24) (14 - 15)  SpO2: 96% (27 Oct 2019 09:24) (95% - 96%)        RECENT LABS:                          11.0   4.79  )-----------( 37       ( 25 Oct 2019 06:00 )             34.2     10-24    143  |  107  |  24<H>  ----------------------------<  90  3.8   |  26  |  0.88    Ca    8.8      24 Oct 2019 06:20    TPro  6.9  /  Alb  2.9<L>  /  TBili  0.5  /  DBili  x   /  AST  24  /  ALT  14  /  AlkPhos  80  10-24    LIVER FUNCTIONS - ( 24 Oct 2019 06:20 )  Alb: 2.9 g/dL / Pro: 6.9 g/dL / ALK PHOS: 80 U/L / ALT: 14 U/L / AST: 24 U/L / GGT: x                   CAPILLARY BLOOD GLUCOSE      Review of Systems:   · General Comments	Patient remains on 1:1, this morning walked with PCA to relieve some agitation. Denies H/A, dizziness, difficulty sleeping. Not agitated, although continues to become more confused, restless late afternoon; oriented to self and hospital this morning and to year- not to date or month	  · Cardiovascular	negative	  · Negative Gastrointestinal Symptoms	no constipation;	  · Genitourinary	negative	    Physical Exam:   · Constitutional	detailed exam	  · Constitutional Details	no distress	  · Respiratory	detailed exam	  · Respiratory Details	breath sounds equal; good air movement; respirations non-labored; clear to auscultation bilaterally	  · Cardiovascular	detailed exam	  · Cardiovascular Details	irregular rate and rhythm	  · Gastrointestinal	detailed exam	  · GI Normal	soft; nontender; no distention; bowel sounds normal	  · Extremities	detailed exam	  · Extremities Details	no pedal edema	  · Neurological	detailed exam	  · Mental Status	alert, calm, responds to questions,

## 2019-10-27 NOTE — PROGRESS NOTE ADULT - ATTENDING COMMENTS
Pt. seen this AM.  Agree with documentation above as per resident. Patient medically stable.     Cont. 1:1 supervision for impulsivity and fall risk and agitation
Pt. seen with resident.  Agree with documentation above as per resident . Patient medically stable. Making progress towards rehab goals.     Pt. impulsive and confused-- Needs 1:1 supervision for safety and redirection

## 2019-10-28 NOTE — PROGRESS NOTE ADULT - RS GEN PE MLT RESP DETAILS PC
breath sounds equal/clear to auscultation bilaterally/no rales/no rhonchi/no wheezes
respirations non-labored
breath sounds equal/clear to auscultation bilaterally/no rhonchi/no wheezes/no rales
good air movement/clear to auscultation bilaterally/no wheezes/breath sounds equal/respirations non-labored
clear to auscultation bilaterally/respirations non-labored/good air movement
good air movement/breath sounds equal/clear to auscultation bilaterally/respirations non-labored
no wheezes/no rhonchi/no rales/airway patent/breath sounds equal
no wheezes/respirations non-labored/clear to auscultation bilaterally/breath sounds equal/good air movement
respirations non-labored/clear to auscultation bilaterally
clear to auscultation bilaterally/respirations non-labored/breath sounds equal
no rhonchi/no wheezes/breath sounds equal/no rales
respirations non-labored

## 2019-10-28 NOTE — PROGRESS NOTE ADULT - ASSESSMENT
Pt is an 80 y/o M with PMHx of HTN, Afib and DVT who presented with AMS secondary to acute on subacute frontal/parietal  SDH managed non operatively. Hospital course also significant for right soleal DVT and bradycardia, thrombocytopenia requiring transfusion platelets.     #SDH. Keppra dc 10/14, Head CT 10/18 x 2 STABLE, no new bleed or mass effect.  - off anticoagulation. Patient and family do NOT want  AC for Afib, understand benefits and risks  -continue seroquel    #Thrombocytopenia, s/p transfusion platelets x 2. Differential diagnosis includes UTI, B12 deficiency and MDS  - platelets decreased from 69  to 43 --> 36 10/16 --> 35 10/17 --> 35 10/18--> 38 10/21-->42 10/22 --> 35 10/24--> 37 10/25  - Hematology consult appreciated: Vitamin B12 injections 1000mcg daily for 5 days (completed) and then weekly for 6 weeks, will need hematology follow up as outpatient, Family aware. they have stated they will not administer/do not want to administer injection at home. at this time, given possible advantage Im versus oral, recommend following with hematology in community for next IM dose and then evaluating for possible switch .   -refused blood draw today      #AFib  - AC (coumadin) held due to bleed and family will continue to hold due to bleeding risk  - continue to hold digoxin due to episodes of bradycardia. Currently rate controlled  -PCp f/u on dc    #DVT:  right soleal DVT  - no AC due to bleed. thrombocytopenic. Seen by IR, IVCF not recommended  - Stockings, mobility, continue to monitor. no swelling Le or calf TTP  - no SCDs      #Diet  - Soft, low sodium    For discharge home today 10/28 with 24 hour supervision due to cogntiive and behavioral deficits, with referral for home care and home Pt, OT, SLP. Caregiver training has been performed

## 2019-10-28 NOTE — PROGRESS NOTE ADULT - SUBJECTIVE AND OBJECTIVE BOX
Patient is a 87y old  Male who presents with a chief complaint of left frontal-parietal SDH diagnosed 10/5/19 (27 Oct 2019 10:00)      HPI:  Pt is an 86 y/o Male with PMHx of HTN,. Afib on coumadin, DVT, and past thyroidectomy, who was brought to [facility] for increasing confusion and bruises on arms and legs x 2 weeks on 10/5/19.  In the ED, CT head was suspicious for left frontal/parietal acute/subacute SDH. Neurosurgery was consulted and recommended no acute surgical intervention, started on keppra for seizure prophylaxis. Patient was also found to be thrombocytopenic (21), and transfused platelets x 2. Repeat CT head was stable.    Patient was also found to have UTI and started on Keflex which is due to be completed on 10/12. Heme/onc was consulted for thrombocytopenia. They believe that DIC was unlikely, and attributed the thrombocytopenia to likely infection from UTI vs B12 deficiency vs MDS. He was also found to have non occlusive DVT on the right  soleal vein. Interventional radiology was consulted for possible IVC filter, but they felt that since the risk of PE from DVT below the knee was low, the risk of procedure outweighed the benefit. During hospital course, patient also found to be intermittently bradycardic to the 30s with Afib with slow ventricular rate and pauses. Electrophysiology was consulted and recommended to dc digoxin and they will follow up as outpatient. Pt was evaluated by PT, OT, and PM&R and was recommended for acute inpatient rehabilitation. Pt was deemed stable for discharge on 10/11/19 and he was transferred to [facility] for acute inpatient rehabilitation. (11 Oct 2019 13:22)      PAST MEDICAL & SURGICAL HISTORY:  Lung nodules  SVC syndrome  Acute DVT (deep venous thrombosis)  Essential hypertension  Atrial fibrillation, unspecified type  H/O thyroidectomy      MEDICATIONS  (STANDING):  BACItracin   Ointment 1 Application(s) Topical two times a day  cyanocobalamin Injectable 1000 MICROGram(s) IntraMuscular <User Schedule>  melatonin 6 milliGRAM(s) Oral at bedtime  nystatin Powder 1 Application(s) Topical two times a day  pantoprazole    Tablet 40 milliGRAM(s) Oral before breakfast  QUEtiapine 12.5 milliGRAM(s) Oral <User Schedule>    MEDICATIONS  (PRN):  ondansetron    Tablet 4 milliGRAM(s) Oral two times a day PRN Nausea      Allergies    No Known Allergies    Intolerances          VITALS  87y  Vital Signs Last 24 Hrs  T(C): 37 (27 Oct 2019 20:53), Max: 37 (27 Oct 2019 20:53)  T(F): 98.6 (27 Oct 2019 20:53), Max: 98.6 (27 Oct 2019 20:53)  HR: 88 (27 Oct 2019 20:53) (88 - 88)  BP: 116/63 (27 Oct 2019 20:53) (116/63 - 116/63)  BP(mean): --  RR: 14 (27 Oct 2019 20:53) (14 - 14)  SpO2: 97% (27 Oct 2019 20:53) (97% - 97%)  Daily     Daily         RECENT LABS:                refused blood draw 10/28      CAPILLARY BLOOD GLUCOSE

## 2019-10-28 NOTE — PROGRESS NOTE ADULT - CARDIOVASCULAR DETAILS
irregular rate and rhythm
positive S2/positive S1
irregular rate and rhythm

## 2019-10-28 NOTE — PROGRESS NOTE ADULT - PROVIDER SPECIALTY LIST ADULT
Hospitalist
Neurology
Neurology
Physiatry
Rehab Medicine
Physiatry

## 2019-10-28 NOTE — PROGRESS NOTE ADULT - CONSTITUTIONAL COMMENTS
alert, eyes open spontaneously, communicating /verbalizing appropriately and "no" "yes" seem reliable but poor insight and easily frustrated. no attempts at physical altercation

## 2019-10-28 NOTE — PROGRESS NOTE ADULT - REASON FOR ADMISSION
left frontal-parietal SDH diagnosed 10/5/19

## 2019-10-28 NOTE — PROGRESS NOTE ADULT - GENERAL COMMENTS
Patient was increasingly restless this weekend, wanting to go home, non compliant with medications. Today, seen walking hallways, with therapist and 1:1. Does not look distressed but did not want to talk. ambulating without assistive device/with supervision, reduced stride length but no LOB noted. Became angry when tried to encourage to go back to room, perks up at thought he is going home today

## 2019-10-28 NOTE — DISCHARGE NOTE NURSING/CASE MANAGEMENT/SOCIAL WORK - PATIENT PORTAL LINK FT
You can access the FollowMyHealth Patient Portal offered by [facility] by registering at the following website: [email or web address]. By joining [facility]’s FollowMyHealth portal, you will also be able to view your health information using other applications (apps) compatible with our system.

## 2019-11-05 NOTE — ASSESSMENT
[FreeTextEntry1] : This is an 87 year old man with a history of 2 unprovoked DVT, one to the right arm in 2016 treated with Coumadin for 3 years, and another DVT in the right soleal vein (below the knee) upon admission to the hospital when he was found to have the subdural hematoma.  Patient had refused IVC filter previously and instantly refuses today upon mention.  Given history of thrombocytopenia and SDH would not anticoagulate now, though given the history of 2 spontaneous DVT, one apparently quite chronic and severe in the right arm causing massive collaterals also has a high risk of VTE as well.  Would perform hypercoagulable workup given 2nd unprovoked DVT.  Check Activated Protein C resistance, Prothrombin gene mutation, Homocysteine, DRVVT, Antiphospholipid antibodies, AT III activity, given recent coumadin treatment would not yet check protein C and S may be low for a month even after discontinuation, also tired to limit blood draws.  \par \par Informed him that while the thrombocytopenia is a bleeding disorder, this does not protect him from having a blood clot.  \par \par Patient has a normocytic anemia, would normally check a ferritin, however patient declines a larger blood workup so will not evaluate this at this time.\par \par Discussed that a bone marrow biopsy may be warranted if he proves refractor to the B12 that was previously being given at the hospital.  Will decide on a later date, though family suggested that patient would not even want to talk about it.  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  Check Hep B and C, B12 already noted to be low which can cause this. Patient has B12 injections prescribed to him but with no syringe and needle there was no way to administer. Patient has a visiting nurse who will  be administering the medication this upcoming week, I have ordered the b12 syringes and needles sent to their pharmacy on the off chance that the nursing visit does not fall through.  \par \par Given the multiple hematologic problems that we are simultaneously evaluating, warned him that there would be a large number of tubes of blood drawn at the lab, however this would be just one stick and we would be only needing this ti be done once. Patient refused blood draw.  Pleaded with him several times and reviewed and re-reviewed the rational in doing so. Patient was undecided at that point.  As it had already been well over an hour that we have spent asked him to check out at the counter with a return in 3 months and he could decide on having the bloodwork drawn along the way if that is what he chooses. Informed him that I would not be able to inform \par \par Spent > 90 minutes in direct patient care and and addressed all questions and concerns.  >50% of this time was in direct face to face contact with patient during exam and counseling. \par \par 12:01 Initiated encounter. 13:33 Ended encounter. \par \par Spent > 90 minutes in direct patient care and and addressed all questions and concerns.  >50% of this time was in direct face to face contact with patient during exam and counseling.

## 2019-11-05 NOTE — HISTORY OF PRESENT ILLNESS
[de-identified] : This is an 87 year old man with a history of chronic thrombocytopenia, atrial fibrillation, Right arm unprovoked DVT 3 years ago treated with long term Coumadin. He had been on this for 3 years.  Was previously offered and IVC filter but declined.   Patient was then admitted to [facility] on 10/5/19, CT scan showed a sub dural hematoma in the setting of worsening thrombocytopenia, platelet count 20, and a subtherapeutic INR 1.23.  Patient did receive vitamin K  2.5mg and not restarted.  \par Prior to [facility] a was on digoxin, Coumadin and Triamterene HCTZ, now off of all of these though the triamterene was about to be restarted.  He was discharged on a B12 injection along with Protonix and quetiapine, none of which he is currently taking.  Patient did not have the needles to take the B12 injections with.  \par Of note on 10/6 while at the hospital, a right leg soleal DVT was also noted.  Spontaneous DVT again.  Patient was discharged to rehab, after spending several days at [facility].  THen discharged from  rehab on 10/24/19.\par Hx of chemical exposures.  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  \par DVT Right had was enlarged like a "salami" Was on Coumadin for 3 years until the recent Was an unprovoked DVT to right arm\par \par B12 was 195 was discharged with a B12 injection but he could not take it.   shavings

## 2019-11-05 NOTE — PHYSICAL EXAM
[Normal] : full range of motion and no deformities appreciated [de-identified] : Elderly 87 year old man apepars stated age.   [de-identified] : Severe colateral vein formation over the rightclavicle, right EJ, and veins superficial to the right axilla and right median anticubital vein.   [de-identified] : multiple ecchymosis, now in process of resolving particularly over the right arm.

## 2019-11-05 NOTE — REASON FOR VISIT
[Initial Consultation] : an initial consultation for [Other: _____] : [unfilled] [FreeTextEntry2] : Thrombocytopenia, DVT X 2 anemia.

## 2020-01-01 ENCOUNTER — APPOINTMENT (OUTPATIENT)
Dept: HEMATOLOGY ONCOLOGY | Facility: CLINIC | Age: 85
End: 2020-01-01

## 2020-01-01 ENCOUNTER — INPATIENT (INPATIENT)
Facility: HOSPITAL | Age: 85
LOS: 0 days | End: 2020-06-17
Attending: INTERNAL MEDICINE | Admitting: INTERNAL MEDICINE

## 2020-01-01 ENCOUNTER — TRANSCRIPTION ENCOUNTER (OUTPATIENT)
Age: 85
End: 2020-01-01

## 2020-01-01 ENCOUNTER — INPATIENT (INPATIENT)
Facility: HOSPITAL | Age: 85
LOS: 9 days | Discharge: HOSPICE | End: 2020-06-17
Attending: INTERNAL MEDICINE | Admitting: INTERNAL MEDICINE
Payer: MEDICARE

## 2020-01-01 ENCOUNTER — APPOINTMENT (OUTPATIENT)
Dept: INTERNAL MEDICINE | Facility: CLINIC | Age: 85
End: 2020-01-01

## 2020-01-01 ENCOUNTER — OUTPATIENT (OUTPATIENT)
Dept: OUTPATIENT SERVICES | Facility: HOSPITAL | Age: 85
LOS: 1 days | Discharge: HOME | End: 2020-01-01

## 2020-01-01 VITALS
RESPIRATION RATE: 22 BRPM | HEART RATE: 39 BPM | SYSTOLIC BLOOD PRESSURE: 81 MMHG | TEMPERATURE: 99 F | OXYGEN SATURATION: 100 % | DIASTOLIC BLOOD PRESSURE: 32 MMHG

## 2020-01-01 VITALS
SYSTOLIC BLOOD PRESSURE: 105 MMHG | WEIGHT: 169.98 LBS | HEIGHT: 70 IN | DIASTOLIC BLOOD PRESSURE: 66 MMHG | RESPIRATION RATE: 16 BRPM | TEMPERATURE: 98 F | OXYGEN SATURATION: 91 % | HEART RATE: 91 BPM

## 2020-01-01 DIAGNOSIS — R45.1 RESTLESSNESS AND AGITATION: ICD-10-CM
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
## 2020-01-01 DIAGNOSIS — M62.82 RHABDOMYOLYSIS: ICD-10-CM

## 2020-01-01 DIAGNOSIS — Z51.5 ENCOUNTER FOR PALLIATIVE CARE: ICD-10-CM

## 2020-01-01 DIAGNOSIS — Z71.89 OTHER SPECIFIED COUNSELING: ICD-10-CM

## 2020-01-01 DIAGNOSIS — E89.0 POSTPROCEDURAL HYPOTHYROIDISM: Chronic | ICD-10-CM

## 2020-01-01 DIAGNOSIS — I48.91 UNSPECIFIED ATRIAL FIBRILLATION: ICD-10-CM

## 2020-01-01 DIAGNOSIS — J93.9 PNEUMOTHORAX, UNSPECIFIED: ICD-10-CM

## 2020-01-01 DIAGNOSIS — Z29.9 ENCOUNTER FOR PROPHYLACTIC MEASURES, UNSPECIFIED: ICD-10-CM

## 2020-01-01 DIAGNOSIS — E04.9 NONTOXIC GOITER, UNSPECIFIED: ICD-10-CM

## 2020-01-01 DIAGNOSIS — R06.02 SHORTNESS OF BREATH: ICD-10-CM

## 2020-01-01 DIAGNOSIS — R13.10 DYSPHAGIA, UNSPECIFIED: ICD-10-CM

## 2020-01-01 DIAGNOSIS — S32.009A UNSPECIFIED FRACTURE OF UNSPECIFIED LUMBAR VERTEBRA, INITIAL ENCOUNTER FOR CLOSED FRACTURE: ICD-10-CM

## 2020-01-01 DIAGNOSIS — D69.6 THROMBOCYTOPENIA, UNSPECIFIED: ICD-10-CM

## 2020-01-01 LAB
ALBUMIN SERPL ELPH-MCNC: 2.4 G/DL — LOW (ref 3.3–5)
ALBUMIN SERPL ELPH-MCNC: 2.4 G/DL — LOW (ref 3.3–5)
ALBUMIN SERPL ELPH-MCNC: 2.5 G/DL — LOW (ref 3.3–5)
ALBUMIN SERPL ELPH-MCNC: 2.6 G/DL — LOW (ref 3.3–5)
ALBUMIN SERPL ELPH-MCNC: 2.8 G/DL — LOW (ref 3.3–5)
ALBUMIN SERPL ELPH-MCNC: 2.8 G/DL — LOW (ref 3.3–5)
ALBUMIN SERPL ELPH-MCNC: 3.2 G/DL — LOW (ref 3.3–5)
ALBUMIN SERPL ELPH-MCNC: 3.3 G/DL — SIGNIFICANT CHANGE UP (ref 3.3–5)
ALP SERPL-CCNC: 51 U/L — SIGNIFICANT CHANGE UP (ref 40–120)
ALP SERPL-CCNC: 54 U/L — SIGNIFICANT CHANGE UP (ref 40–120)
ALP SERPL-CCNC: 54 U/L — SIGNIFICANT CHANGE UP (ref 40–120)
ALP SERPL-CCNC: 56 U/L — SIGNIFICANT CHANGE UP (ref 40–120)
ALP SERPL-CCNC: 59 U/L — SIGNIFICANT CHANGE UP (ref 40–120)
ALP SERPL-CCNC: 59 U/L — SIGNIFICANT CHANGE UP (ref 40–120)
ALP SERPL-CCNC: 61 U/L — SIGNIFICANT CHANGE UP (ref 40–120)
ALP SERPL-CCNC: 64 U/L — SIGNIFICANT CHANGE UP (ref 40–120)
ALP SERPL-CCNC: 66 U/L — SIGNIFICANT CHANGE UP (ref 40–120)
ALP SERPL-CCNC: 67 U/L — SIGNIFICANT CHANGE UP (ref 40–120)
ALP SERPL-CCNC: 76 U/L — SIGNIFICANT CHANGE UP (ref 40–120)
ALT FLD-CCNC: 37 U/L — SIGNIFICANT CHANGE UP (ref 12–78)
ALT FLD-CCNC: 39 U/L — SIGNIFICANT CHANGE UP (ref 12–78)
ALT FLD-CCNC: 41 U/L — SIGNIFICANT CHANGE UP (ref 12–78)
ALT FLD-CCNC: 47 U/L — SIGNIFICANT CHANGE UP (ref 12–78)
ALT FLD-CCNC: 48 U/L — SIGNIFICANT CHANGE UP (ref 12–78)
ALT FLD-CCNC: 48 U/L — SIGNIFICANT CHANGE UP (ref 12–78)
ALT FLD-CCNC: 50 U/L — SIGNIFICANT CHANGE UP (ref 12–78)
ALT FLD-CCNC: 56 U/L — SIGNIFICANT CHANGE UP (ref 12–78)
ALT FLD-CCNC: 58 U/L — SIGNIFICANT CHANGE UP (ref 12–78)
ALT FLD-CCNC: 58 U/L — SIGNIFICANT CHANGE UP (ref 12–78)
ALT FLD-CCNC: 62 U/L — SIGNIFICANT CHANGE UP (ref 12–78)
ANION GAP SERPL CALC-SCNC: 10 MMOL/L — SIGNIFICANT CHANGE UP (ref 5–17)
ANION GAP SERPL CALC-SCNC: 11 MMOL/L — SIGNIFICANT CHANGE UP (ref 5–17)
ANION GAP SERPL CALC-SCNC: 12 MMOL/L — SIGNIFICANT CHANGE UP (ref 5–17)
ANION GAP SERPL CALC-SCNC: 3 MMOL/L — LOW (ref 5–17)
ANION GAP SERPL CALC-SCNC: 6 MMOL/L — SIGNIFICANT CHANGE UP (ref 5–17)
ANION GAP SERPL CALC-SCNC: 7 MMOL/L — SIGNIFICANT CHANGE UP (ref 5–17)
ANION GAP SERPL CALC-SCNC: 8 MMOL/L — SIGNIFICANT CHANGE UP (ref 5–17)
ANION GAP SERPL CALC-SCNC: 8 MMOL/L — SIGNIFICANT CHANGE UP (ref 5–17)
ANION GAP SERPL CALC-SCNC: 9 MMOL/L — SIGNIFICANT CHANGE UP (ref 5–17)
APPEARANCE UR: ABNORMAL
APTT BLD: 24.6 SEC — LOW (ref 27.5–36.3)
APTT BLD: 29.1 SEC — SIGNIFICANT CHANGE UP (ref 27.5–36.3)
AST SERPL-CCNC: 103 U/L — HIGH (ref 15–37)
AST SERPL-CCNC: 108 U/L — HIGH (ref 15–37)
AST SERPL-CCNC: 131 U/L — HIGH (ref 15–37)
AST SERPL-CCNC: 36 U/L — SIGNIFICANT CHANGE UP (ref 15–37)
AST SERPL-CCNC: 43 U/L — HIGH (ref 15–37)
AST SERPL-CCNC: 48 U/L — HIGH (ref 15–37)
AST SERPL-CCNC: 61 U/L — HIGH (ref 15–37)
AST SERPL-CCNC: 63 U/L — HIGH (ref 15–37)
AST SERPL-CCNC: 64 U/L — HIGH (ref 15–37)
AST SERPL-CCNC: 68 U/L — HIGH (ref 15–37)
AST SERPL-CCNC: 71 U/L — HIGH (ref 15–37)
BACTERIA # UR AUTO: ABNORMAL
BASE EXCESS BLDA CALC-SCNC: 2 MMOL/L — SIGNIFICANT CHANGE UP (ref -2–2)
BASE EXCESS BLDA CALC-SCNC: 2.6 MMOL/L — HIGH (ref -2–2)
BASE EXCESS BLDA CALC-SCNC: 5.8 MMOL/L — HIGH (ref -2–2)
BASE EXCESS BLDA CALC-SCNC: 7.2 MMOL/L — HIGH (ref -2–2)
BASOPHILS # BLD AUTO: 0.01 K/UL — SIGNIFICANT CHANGE UP (ref 0–0.2)
BASOPHILS # BLD AUTO: 0.01 K/UL — SIGNIFICANT CHANGE UP (ref 0–0.2)
BASOPHILS NFR BLD AUTO: 0.1 % — SIGNIFICANT CHANGE UP (ref 0–2)
BASOPHILS NFR BLD AUTO: 0.1 % — SIGNIFICANT CHANGE UP (ref 0–2)
BILIRUB SERPL-MCNC: 0.9 MG/DL — SIGNIFICANT CHANGE UP (ref 0.2–1.2)
BILIRUB SERPL-MCNC: 1.1 MG/DL — SIGNIFICANT CHANGE UP (ref 0.2–1.2)
BILIRUB SERPL-MCNC: 1.3 MG/DL — HIGH (ref 0.2–1.2)
BILIRUB SERPL-MCNC: 1.4 MG/DL — HIGH (ref 0.2–1.2)
BILIRUB SERPL-MCNC: 1.4 MG/DL — HIGH (ref 0.2–1.2)
BILIRUB SERPL-MCNC: 1.5 MG/DL — HIGH (ref 0.2–1.2)
BILIRUB SERPL-MCNC: 1.7 MG/DL — HIGH (ref 0.2–1.2)
BILIRUB SERPL-MCNC: 1.7 MG/DL — HIGH (ref 0.2–1.2)
BILIRUB SERPL-MCNC: 2.8 MG/DL — HIGH (ref 0.2–1.2)
BILIRUB UR-MCNC: NEGATIVE — SIGNIFICANT CHANGE UP
BLOOD GAS COMMENTS: SIGNIFICANT CHANGE UP
BLOOD GAS SOURCE: SIGNIFICANT CHANGE UP
BUN SERPL-MCNC: 22 MG/DL — SIGNIFICANT CHANGE UP (ref 7–23)
BUN SERPL-MCNC: 25 MG/DL — HIGH (ref 7–23)
BUN SERPL-MCNC: 28 MG/DL — HIGH (ref 7–23)
BUN SERPL-MCNC: 30 MG/DL — HIGH (ref 7–23)
BUN SERPL-MCNC: 30 MG/DL — HIGH (ref 7–23)
BUN SERPL-MCNC: 31 MG/DL — HIGH (ref 7–23)
BUN SERPL-MCNC: 32 MG/DL — HIGH (ref 7–23)
BUN SERPL-MCNC: 35 MG/DL — HIGH (ref 7–23)
BUN SERPL-MCNC: 41 MG/DL — HIGH (ref 7–23)
BUN SERPL-MCNC: 42 MG/DL — HIGH (ref 7–23)
BUN SERPL-MCNC: 56 MG/DL — HIGH (ref 7–23)
CALCIUM SERPL-MCNC: 8.3 MG/DL — LOW (ref 8.5–10.1)
CALCIUM SERPL-MCNC: 8.4 MG/DL — LOW (ref 8.5–10.1)
CALCIUM SERPL-MCNC: 8.4 MG/DL — LOW (ref 8.5–10.1)
CALCIUM SERPL-MCNC: 8.5 MG/DL — SIGNIFICANT CHANGE UP (ref 8.5–10.1)
CALCIUM SERPL-MCNC: 8.5 MG/DL — SIGNIFICANT CHANGE UP (ref 8.5–10.1)
CALCIUM SERPL-MCNC: 8.7 MG/DL — SIGNIFICANT CHANGE UP (ref 8.5–10.1)
CALCIUM SERPL-MCNC: 8.8 MG/DL — SIGNIFICANT CHANGE UP (ref 8.5–10.1)
CALCIUM SERPL-MCNC: 8.8 MG/DL — SIGNIFICANT CHANGE UP (ref 8.5–10.1)
CALCIUM SERPL-MCNC: 9.1 MG/DL — SIGNIFICANT CHANGE UP (ref 8.5–10.1)
CHLORIDE SERPL-SCNC: 100 MMOL/L — SIGNIFICANT CHANGE UP (ref 96–108)
CHLORIDE SERPL-SCNC: 102 MMOL/L — SIGNIFICANT CHANGE UP (ref 96–108)
CHLORIDE SERPL-SCNC: 102 MMOL/L — SIGNIFICANT CHANGE UP (ref 96–108)
CHLORIDE SERPL-SCNC: 103 MMOL/L — SIGNIFICANT CHANGE UP (ref 96–108)
CHLORIDE SERPL-SCNC: 104 MMOL/L — SIGNIFICANT CHANGE UP (ref 96–108)
CHLORIDE SERPL-SCNC: 105 MMOL/L — SIGNIFICANT CHANGE UP (ref 96–108)
CHLORIDE SERPL-SCNC: 105 MMOL/L — SIGNIFICANT CHANGE UP (ref 96–108)
CHLORIDE SERPL-SCNC: 106 MMOL/L — SIGNIFICANT CHANGE UP (ref 96–108)
CHLORIDE SERPL-SCNC: 97 MMOL/L — SIGNIFICANT CHANGE UP (ref 96–108)
CK MB BLD-MCNC: 1.6 % — SIGNIFICANT CHANGE UP (ref 0–3.5)
CK MB BLD-MCNC: 5.7 % — HIGH (ref 0–3.5)
CK MB CFR SERPL CALC: 44.1 NG/ML — HIGH (ref 0.5–3.6)
CK MB CFR SERPL CALC: 9.6 NG/ML — HIGH (ref 0.5–3.6)
CK SERPL-CCNC: 108 U/L — SIGNIFICANT CHANGE UP (ref 26–308)
CK SERPL-CCNC: 167 U/L — SIGNIFICANT CHANGE UP (ref 26–308)
CK SERPL-CCNC: 226 U/L — SIGNIFICANT CHANGE UP (ref 26–308)
CK SERPL-CCNC: 2687 U/L — HIGH (ref 26–308)
CK SERPL-CCNC: 358 U/L — HIGH (ref 26–308)
CK SERPL-CCNC: 4520 U/L — HIGH (ref 26–308)
CK SERPL-CCNC: 885 U/L — HIGH (ref 26–308)
CK SERPL-CCNC: 986 U/L — HIGH (ref 26–308)
CO2 SERPL-SCNC: 26 MMOL/L — SIGNIFICANT CHANGE UP (ref 22–31)
CO2 SERPL-SCNC: 28 MMOL/L — SIGNIFICANT CHANGE UP (ref 22–31)
CO2 SERPL-SCNC: 28 MMOL/L — SIGNIFICANT CHANGE UP (ref 22–31)
CO2 SERPL-SCNC: 29 MMOL/L — SIGNIFICANT CHANGE UP (ref 22–31)
CO2 SERPL-SCNC: 30 MMOL/L — SIGNIFICANT CHANGE UP (ref 22–31)
CO2 SERPL-SCNC: 31 MMOL/L — SIGNIFICANT CHANGE UP (ref 22–31)
CO2 SERPL-SCNC: 32 MMOL/L — HIGH (ref 22–31)
CO2 SERPL-SCNC: 32 MMOL/L — HIGH (ref 22–31)
CO2 SERPL-SCNC: 33 MMOL/L — HIGH (ref 22–31)
CO2 SERPL-SCNC: 34 MMOL/L — HIGH (ref 22–31)
CO2 SERPL-SCNC: 34 MMOL/L — HIGH (ref 22–31)
COLOR SPEC: YELLOW — SIGNIFICANT CHANGE UP
CREAT SERPL-MCNC: 0.62 MG/DL — SIGNIFICANT CHANGE UP (ref 0.5–1.3)
CREAT SERPL-MCNC: 0.64 MG/DL — SIGNIFICANT CHANGE UP (ref 0.5–1.3)
CREAT SERPL-MCNC: 0.7 MG/DL — SIGNIFICANT CHANGE UP (ref 0.5–1.3)
CREAT SERPL-MCNC: 0.78 MG/DL — SIGNIFICANT CHANGE UP (ref 0.5–1.3)
CREAT SERPL-MCNC: 0.78 MG/DL — SIGNIFICANT CHANGE UP (ref 0.5–1.3)
CREAT SERPL-MCNC: 0.82 MG/DL — SIGNIFICANT CHANGE UP (ref 0.5–1.3)
CREAT SERPL-MCNC: 0.96 MG/DL — SIGNIFICANT CHANGE UP (ref 0.5–1.3)
CREAT SERPL-MCNC: 1.5 MG/DL — HIGH (ref 0.5–1.3)
CREAT SERPL-MCNC: 1.61 MG/DL — HIGH (ref 0.5–1.3)
CRP SERPL-MCNC: 7.53 MG/DL — HIGH (ref 0–0.4)
CULTURE RESULTS: NO GROWTH — SIGNIFICANT CHANGE UP
CULTURE RESULTS: SIGNIFICANT CHANGE UP
CULTURE RESULTS: SIGNIFICANT CHANGE UP
D DIMER BLD IA.RAPID-MCNC: 7468 NG/ML DDU — HIGH
DIFF PNL FLD: ABNORMAL
EOSINOPHIL # BLD AUTO: 0 K/UL — SIGNIFICANT CHANGE UP (ref 0–0.5)
EOSINOPHIL # BLD AUTO: 0.03 K/UL — SIGNIFICANT CHANGE UP (ref 0–0.5)
EOSINOPHIL NFR BLD AUTO: 0 % — SIGNIFICANT CHANGE UP (ref 0–6)
EOSINOPHIL NFR BLD AUTO: 0.3 % — SIGNIFICANT CHANGE UP (ref 0–6)
EPI CELLS # UR: SIGNIFICANT CHANGE UP
FERRITIN SERPL-MCNC: 1648 NG/ML — HIGH (ref 30–400)
GLUCOSE BLDC GLUCOMTR-MCNC: 100 MG/DL — HIGH (ref 70–99)
GLUCOSE BLDC GLUCOMTR-MCNC: 99 MG/DL — SIGNIFICANT CHANGE UP (ref 70–99)
GLUCOSE SERPL-MCNC: 106 MG/DL — HIGH (ref 70–99)
GLUCOSE SERPL-MCNC: 106 MG/DL — HIGH (ref 70–99)
GLUCOSE SERPL-MCNC: 111 MG/DL — HIGH (ref 70–99)
GLUCOSE SERPL-MCNC: 116 MG/DL — HIGH (ref 70–99)
GLUCOSE SERPL-MCNC: 125 MG/DL — HIGH (ref 70–99)
GLUCOSE SERPL-MCNC: 137 MG/DL — HIGH (ref 70–99)
GLUCOSE SERPL-MCNC: 138 MG/DL — HIGH (ref 70–99)
GLUCOSE SERPL-MCNC: 87 MG/DL — SIGNIFICANT CHANGE UP (ref 70–99)
GLUCOSE SERPL-MCNC: 93 MG/DL — SIGNIFICANT CHANGE UP (ref 70–99)
GLUCOSE SERPL-MCNC: 94 MG/DL — SIGNIFICANT CHANGE UP (ref 70–99)
GLUCOSE SERPL-MCNC: 96 MG/DL — SIGNIFICANT CHANGE UP (ref 70–99)
GLUCOSE UR QL: NEGATIVE MG/DL — SIGNIFICANT CHANGE UP
GRAN CASTS # UR COMP ASSIST: ABNORMAL /LPF
HCO3 BLDA-SCNC: 26 MMOL/L — SIGNIFICANT CHANGE UP (ref 21–29)
HCO3 BLDA-SCNC: 31 MMOL/L — HIGH (ref 21–29)
HCO3 BLDA-SCNC: 32 MMOL/L — HIGH (ref 21–29)
HCO3 BLDA-SCNC: 33 MMOL/L — HIGH (ref 21–29)
HCT VFR BLD CALC: 34.2 % — LOW (ref 39–50)
HCT VFR BLD CALC: 34.5 % — LOW (ref 39–50)
HCT VFR BLD CALC: 36.5 % — LOW (ref 39–50)
HCT VFR BLD CALC: 37.7 % — LOW (ref 39–50)
HCT VFR BLD CALC: 38.2 % — LOW (ref 39–50)
HCT VFR BLD CALC: 38.3 % — LOW (ref 39–50)
HCT VFR BLD CALC: 39 % — SIGNIFICANT CHANGE UP (ref 39–50)
HCT VFR BLD CALC: 39.8 % — SIGNIFICANT CHANGE UP (ref 39–50)
HCT VFR BLD CALC: 41.1 % — SIGNIFICANT CHANGE UP (ref 39–50)
HCT VFR BLD CALC: 41.8 % — SIGNIFICANT CHANGE UP (ref 39–50)
HGB BLD-MCNC: 11.2 G/DL — LOW (ref 13–17)
HGB BLD-MCNC: 11.4 G/DL — LOW (ref 13–17)
HGB BLD-MCNC: 11.7 G/DL — LOW (ref 13–17)
HGB BLD-MCNC: 12 G/DL — LOW (ref 13–17)
HGB BLD-MCNC: 12.2 G/DL — LOW (ref 13–17)
HGB BLD-MCNC: 12.3 G/DL — LOW (ref 13–17)
HGB BLD-MCNC: 12.5 G/DL — LOW (ref 13–17)
HGB BLD-MCNC: 12.6 G/DL — LOW (ref 13–17)
HGB BLD-MCNC: 13 G/DL — SIGNIFICANT CHANGE UP (ref 13–17)
HGB BLD-MCNC: 13.1 G/DL — SIGNIFICANT CHANGE UP (ref 13–17)
HOROWITZ INDEX BLDA+IHG-RTO: 0.28 — SIGNIFICANT CHANGE UP
HOROWITZ INDEX BLDA+IHG-RTO: 100 — SIGNIFICANT CHANGE UP
HOROWITZ INDEX BLDA+IHG-RTO: 100 — SIGNIFICANT CHANGE UP
HOROWITZ INDEX BLDA+IHG-RTO: 28 — SIGNIFICANT CHANGE UP
HOROWITZ INDEX BLDA+IHG-RTO: 50 — SIGNIFICANT CHANGE UP
HOROWITZ INDEX BLDA+IHG-RTO: 50 — SIGNIFICANT CHANGE UP
HYALINE CASTS # UR AUTO: ABNORMAL /LPF
IMM GRANULOCYTES NFR BLD AUTO: 0.5 % — SIGNIFICANT CHANGE UP (ref 0–1.5)
IMM GRANULOCYTES NFR BLD AUTO: 0.8 % — SIGNIFICANT CHANGE UP (ref 0–1.5)
INR BLD: 1.32 RATIO — HIGH (ref 0.88–1.16)
INR BLD: 1.53 RATIO — HIGH (ref 0.88–1.16)
KETONES UR-MCNC: ABNORMAL
LACTATE SERPL-SCNC: 1.9 MMOL/L — SIGNIFICANT CHANGE UP (ref 0.7–2)
LACTATE SERPL-SCNC: 2.2 MMOL/L — HIGH (ref 0.7–2)
LACTATE SERPL-SCNC: 2.4 MMOL/L — HIGH (ref 0.7–2)
LEUKOCYTE ESTERASE UR-ACNC: ABNORMAL
LYMPHOCYTES # BLD AUTO: 0.51 K/UL — LOW (ref 1–3.3)
LYMPHOCYTES # BLD AUTO: 0.69 K/UL — LOW (ref 1–3.3)
LYMPHOCYTES # BLD AUTO: 3.3 % — LOW (ref 13–44)
LYMPHOCYTES # BLD AUTO: 6.5 % — LOW (ref 13–44)
MAGNESIUM SERPL-MCNC: 2 MG/DL — SIGNIFICANT CHANGE UP (ref 1.6–2.6)
MAGNESIUM SERPL-MCNC: 2.1 MG/DL — SIGNIFICANT CHANGE UP (ref 1.6–2.6)
MAGNESIUM SERPL-MCNC: 2.1 MG/DL — SIGNIFICANT CHANGE UP (ref 1.6–2.6)
MAGNESIUM SERPL-MCNC: 2.3 MG/DL — SIGNIFICANT CHANGE UP (ref 1.6–2.6)
MAGNESIUM SERPL-MCNC: 2.4 MG/DL — SIGNIFICANT CHANGE UP (ref 1.6–2.6)
MCHC RBC-ENTMCNC: 30.7 GM/DL — LOW (ref 32–36)
MCHC RBC-ENTMCNC: 31.3 GM/DL — LOW (ref 32–36)
MCHC RBC-ENTMCNC: 31.4 GM/DL — LOW (ref 32–36)
MCHC RBC-ENTMCNC: 31.4 PG — SIGNIFICANT CHANGE UP (ref 27–34)
MCHC RBC-ENTMCNC: 31.5 GM/DL — LOW (ref 32–36)
MCHC RBC-ENTMCNC: 31.5 PG — SIGNIFICANT CHANGE UP (ref 27–34)
MCHC RBC-ENTMCNC: 31.6 PG — SIGNIFICANT CHANGE UP (ref 27–34)
MCHC RBC-ENTMCNC: 32 PG — SIGNIFICANT CHANGE UP (ref 27–34)
MCHC RBC-ENTMCNC: 32 PG — SIGNIFICANT CHANGE UP (ref 27–34)
MCHC RBC-ENTMCNC: 32.1 GM/DL — SIGNIFICANT CHANGE UP (ref 32–36)
MCHC RBC-ENTMCNC: 32.1 PG — SIGNIFICANT CHANGE UP (ref 27–34)
MCHC RBC-ENTMCNC: 32.2 PG — SIGNIFICANT CHANGE UP (ref 27–34)
MCHC RBC-ENTMCNC: 32.4 GM/DL — SIGNIFICANT CHANGE UP (ref 32–36)
MCHC RBC-ENTMCNC: 32.5 GM/DL — SIGNIFICANT CHANGE UP (ref 32–36)
MCHC RBC-ENTMCNC: 32.6 GM/DL — SIGNIFICANT CHANGE UP (ref 32–36)
MCHC RBC-ENTMCNC: 32.7 GM/DL — SIGNIFICANT CHANGE UP (ref 32–36)
MCHC RBC-ENTMCNC: 33.3 GM/DL — SIGNIFICANT CHANGE UP (ref 32–36)
MCV RBC AUTO: 100.3 FL — HIGH (ref 80–100)
MCV RBC AUTO: 102.1 FL — HIGH (ref 80–100)
MCV RBC AUTO: 102.7 FL — HIGH (ref 80–100)
MCV RBC AUTO: 104.3 FL — HIGH (ref 80–100)
MCV RBC AUTO: 96.6 FL — SIGNIFICANT CHANGE UP (ref 80–100)
MCV RBC AUTO: 97.5 FL — SIGNIFICANT CHANGE UP (ref 80–100)
MCV RBC AUTO: 97.9 FL — SIGNIFICANT CHANGE UP (ref 80–100)
MCV RBC AUTO: 98 FL — SIGNIFICANT CHANGE UP (ref 80–100)
MCV RBC AUTO: 98.5 FL — SIGNIFICANT CHANGE UP (ref 80–100)
MCV RBC AUTO: 99.5 FL — SIGNIFICANT CHANGE UP (ref 80–100)
MONOCYTES # BLD AUTO: 1.21 K/UL — HIGH (ref 0–0.9)
MONOCYTES # BLD AUTO: 1.31 K/UL — HIGH (ref 0–0.9)
MONOCYTES NFR BLD AUTO: 11.4 % — SIGNIFICANT CHANGE UP (ref 2–14)
MONOCYTES NFR BLD AUTO: 8.5 % — SIGNIFICANT CHANGE UP (ref 2–14)
NEUTROPHILS # BLD AUTO: 13.51 K/UL — HIGH (ref 1.8–7.4)
NEUTROPHILS # BLD AUTO: 8.58 K/UL — HIGH (ref 1.8–7.4)
NEUTROPHILS NFR BLD AUTO: 80.9 % — HIGH (ref 43–77)
NEUTROPHILS NFR BLD AUTO: 87.6 % — HIGH (ref 43–77)
NITRITE UR-MCNC: NEGATIVE — SIGNIFICANT CHANGE UP
NRBC # BLD: 0 /100 WBCS — SIGNIFICANT CHANGE UP (ref 0–0)
NT-PROBNP SERPL-SCNC: HIGH PG/ML (ref 0–450)
PCO2 BLDA: 39 MMHG — SIGNIFICANT CHANGE UP (ref 32–46)
PCO2 BLDA: 56 MMHG — HIGH (ref 32–46)
PCO2 BLDA: 57 MMHG — HIGH (ref 32–46)
PCO2 BLDA: 71 MMHG — CRITICAL HIGH (ref 32–46)
PCO2 BLDA: >106 MMHG — CRITICAL HIGH (ref 32–46)
PCO2 BLDA: >97 MMHG — CRITICAL HIGH (ref 32–46)
PH BLD: 7.05 — CRITICAL LOW (ref 7.35–7.45)
PH BLD: 7.06 — CRITICAL LOW (ref 7.35–7.45)
PH BLD: 7.26 — LOW (ref 7.35–7.45)
PH BLD: 7.37 — SIGNIFICANT CHANGE UP (ref 7.35–7.45)
PH BLD: 7.39 — SIGNIFICANT CHANGE UP (ref 7.35–7.45)
PH BLD: 7.44 — SIGNIFICANT CHANGE UP (ref 7.35–7.45)
PH UR: 5 — SIGNIFICANT CHANGE UP (ref 5–8)
PHOSPHATE SERPL-MCNC: 1.4 MG/DL — LOW (ref 2.5–4.5)
PHOSPHATE SERPL-MCNC: 3.4 MG/DL — SIGNIFICANT CHANGE UP (ref 2.5–4.5)
PHOSPHATE SERPL-MCNC: 6.4 MG/DL — HIGH (ref 2.5–4.5)
PLATELET # BLD AUTO: 101 K/UL — LOW (ref 150–400)
PLATELET # BLD AUTO: 107 K/UL — LOW (ref 150–400)
PLATELET # BLD AUTO: 110 K/UL — LOW (ref 150–400)
PLATELET # BLD AUTO: 74 K/UL — LOW (ref 150–400)
PLATELET # BLD AUTO: 81 K/UL — LOW (ref 150–400)
PLATELET # BLD AUTO: 86 K/UL — LOW (ref 150–400)
PLATELET # BLD AUTO: 88 K/UL — LOW (ref 150–400)
PLATELET # BLD AUTO: 90 K/UL — LOW (ref 150–400)
PLATELET # BLD AUTO: 91 K/UL — LOW (ref 150–400)
PLATELET # BLD AUTO: 98 K/UL — LOW (ref 150–400)
PO2 BLDA: 107 MMHG — SIGNIFICANT CHANGE UP (ref 74–108)
PO2 BLDA: 136 MMHG — HIGH (ref 74–108)
PO2 BLDA: 247 MMHG — HIGH (ref 74–108)
PO2 BLDA: 51 MMHG — LOW (ref 74–108)
PO2 BLDA: 80 MMHG — SIGNIFICANT CHANGE UP (ref 74–108)
PO2 BLDA: 80 MMHG — SIGNIFICANT CHANGE UP (ref 74–108)
POTASSIUM SERPL-MCNC: 2.9 MMOL/L — CRITICAL LOW (ref 3.5–5.3)
POTASSIUM SERPL-MCNC: 3.1 MMOL/L — LOW (ref 3.5–5.3)
POTASSIUM SERPL-MCNC: 3.2 MMOL/L — LOW (ref 3.5–5.3)
POTASSIUM SERPL-MCNC: 3.4 MMOL/L — LOW (ref 3.5–5.3)
POTASSIUM SERPL-MCNC: 3.5 MMOL/L — SIGNIFICANT CHANGE UP (ref 3.5–5.3)
POTASSIUM SERPL-MCNC: 3.6 MMOL/L — SIGNIFICANT CHANGE UP (ref 3.5–5.3)
POTASSIUM SERPL-MCNC: 3.8 MMOL/L — SIGNIFICANT CHANGE UP (ref 3.5–5.3)
POTASSIUM SERPL-MCNC: 4.1 MMOL/L — SIGNIFICANT CHANGE UP (ref 3.5–5.3)
POTASSIUM SERPL-MCNC: 4.2 MMOL/L — SIGNIFICANT CHANGE UP (ref 3.5–5.3)
POTASSIUM SERPL-MCNC: 4.9 MMOL/L — SIGNIFICANT CHANGE UP (ref 3.5–5.3)
POTASSIUM SERPL-SCNC: 2.9 MMOL/L — CRITICAL LOW (ref 3.5–5.3)
POTASSIUM SERPL-SCNC: 3.1 MMOL/L — LOW (ref 3.5–5.3)
POTASSIUM SERPL-SCNC: 3.2 MMOL/L — LOW (ref 3.5–5.3)
POTASSIUM SERPL-SCNC: 3.4 MMOL/L — LOW (ref 3.5–5.3)
POTASSIUM SERPL-SCNC: 3.5 MMOL/L — SIGNIFICANT CHANGE UP (ref 3.5–5.3)
POTASSIUM SERPL-SCNC: 3.6 MMOL/L — SIGNIFICANT CHANGE UP (ref 3.5–5.3)
POTASSIUM SERPL-SCNC: 3.8 MMOL/L — SIGNIFICANT CHANGE UP (ref 3.5–5.3)
POTASSIUM SERPL-SCNC: 4.1 MMOL/L — SIGNIFICANT CHANGE UP (ref 3.5–5.3)
POTASSIUM SERPL-SCNC: 4.2 MMOL/L — SIGNIFICANT CHANGE UP (ref 3.5–5.3)
POTASSIUM SERPL-SCNC: 4.9 MMOL/L — SIGNIFICANT CHANGE UP (ref 3.5–5.3)
PROCALCITONIN SERPL-MCNC: 0.14 NG/ML — HIGH (ref 0.02–0.1)
PROCALCITONIN SERPL-MCNC: 0.14 NG/ML — HIGH (ref 0.02–0.1)
PROCALCITONIN SERPL-MCNC: 0.47 NG/ML — HIGH (ref 0.02–0.1)
PROT SERPL-MCNC: 6.5 GM/DL — SIGNIFICANT CHANGE UP (ref 6–8.3)
PROT SERPL-MCNC: 6.8 GM/DL — SIGNIFICANT CHANGE UP (ref 6–8.3)
PROT SERPL-MCNC: 6.9 GM/DL — SIGNIFICANT CHANGE UP (ref 6–8.3)
PROT SERPL-MCNC: 6.9 GM/DL — SIGNIFICANT CHANGE UP (ref 6–8.3)
PROT SERPL-MCNC: 7.1 GM/DL — SIGNIFICANT CHANGE UP (ref 6–8.3)
PROT SERPL-MCNC: 7.1 GM/DL — SIGNIFICANT CHANGE UP (ref 6–8.3)
PROT SERPL-MCNC: 7.2 GM/DL — SIGNIFICANT CHANGE UP (ref 6–8.3)
PROT SERPL-MCNC: 7.3 GM/DL — SIGNIFICANT CHANGE UP (ref 6–8.3)
PROT SERPL-MCNC: 7.4 GM/DL — SIGNIFICANT CHANGE UP (ref 6–8.3)
PROT SERPL-MCNC: 7.5 GM/DL — SIGNIFICANT CHANGE UP (ref 6–8.3)
PROT SERPL-MCNC: 8.2 GM/DL — SIGNIFICANT CHANGE UP (ref 6–8.3)
PROT UR-MCNC: 100 MG/DL
PROTHROM AB SERPL-ACNC: 14.9 SEC — HIGH (ref 10–12.9)
PROTHROM AB SERPL-ACNC: 17.4 SEC — HIGH (ref 10–12.9)
RBC # BLD: 3.54 M/UL — LOW (ref 4.2–5.8)
RBC # BLD: 3.54 M/UL — LOW (ref 4.2–5.8)
RBC # BLD: 3.73 M/UL — LOW (ref 4.2–5.8)
RBC # BLD: 3.79 M/UL — LOW (ref 4.2–5.8)
RBC # BLD: 3.81 M/UL — LOW (ref 4.2–5.8)
RBC # BLD: 3.82 M/UL — LOW (ref 4.2–5.8)
RBC # BLD: 3.89 M/UL — LOW (ref 4.2–5.8)
RBC # BLD: 3.94 M/UL — LOW (ref 4.2–5.8)
RBC # BLD: 4.06 M/UL — LOW (ref 4.2–5.8)
RBC # BLD: 4.07 M/UL — LOW (ref 4.2–5.8)
RBC # FLD: 13.9 % — SIGNIFICANT CHANGE UP (ref 10.3–14.5)
RBC # FLD: 14 % — SIGNIFICANT CHANGE UP (ref 10.3–14.5)
RBC # FLD: 14.1 % — SIGNIFICANT CHANGE UP (ref 10.3–14.5)
RBC # FLD: 14.1 % — SIGNIFICANT CHANGE UP (ref 10.3–14.5)
RBC # FLD: 14.3 % — SIGNIFICANT CHANGE UP (ref 10.3–14.5)
RBC # FLD: 14.4 % — SIGNIFICANT CHANGE UP (ref 10.3–14.5)
RBC # FLD: 14.5 % — SIGNIFICANT CHANGE UP (ref 10.3–14.5)
RBC # FLD: 14.6 % — HIGH (ref 10.3–14.5)
RBC CASTS # UR COMP ASSIST: ABNORMAL /HPF (ref 0–4)
SAO2 % BLDA: 85 % — LOW (ref 92–96)
SAO2 % BLDA: 88 % — LOW (ref 92–96)
SAO2 % BLDA: 96 % — SIGNIFICANT CHANGE UP (ref 92–96)
SAO2 % BLDA: 98 % — HIGH (ref 92–96)
SAO2 % BLDA: 99 % — HIGH (ref 92–96)
SAO2 % BLDA: 99 % — HIGH (ref 92–96)
SARS-COV-2 RNA SPEC QL NAA+PROBE: SIGNIFICANT CHANGE UP
SODIUM SERPL-SCNC: 137 MMOL/L — SIGNIFICANT CHANGE UP (ref 135–145)
SODIUM SERPL-SCNC: 138 MMOL/L — SIGNIFICANT CHANGE UP (ref 135–145)
SODIUM SERPL-SCNC: 138 MMOL/L — SIGNIFICANT CHANGE UP (ref 135–145)
SODIUM SERPL-SCNC: 141 MMOL/L — SIGNIFICANT CHANGE UP (ref 135–145)
SODIUM SERPL-SCNC: 142 MMOL/L — SIGNIFICANT CHANGE UP (ref 135–145)
SODIUM SERPL-SCNC: 142 MMOL/L — SIGNIFICANT CHANGE UP (ref 135–145)
SODIUM SERPL-SCNC: 143 MMOL/L — SIGNIFICANT CHANGE UP (ref 135–145)
SODIUM SERPL-SCNC: 143 MMOL/L — SIGNIFICANT CHANGE UP (ref 135–145)
SODIUM SERPL-SCNC: 145 MMOL/L — SIGNIFICANT CHANGE UP (ref 135–145)
SODIUM SERPL-SCNC: 146 MMOL/L — HIGH (ref 135–145)
SODIUM SERPL-SCNC: 146 MMOL/L — HIGH (ref 135–145)
SP GR SPEC: 1.02 — SIGNIFICANT CHANGE UP (ref 1.01–1.02)
SPECIMEN SOURCE: SIGNIFICANT CHANGE UP
T3 SERPL-MCNC: 84 NG/DL — SIGNIFICANT CHANGE UP (ref 80–200)
T4 FREE SERPL-MCNC: 1.7 NG/DL — SIGNIFICANT CHANGE UP (ref 0.9–1.8)
TROPONIN I SERPL-MCNC: 0.03 NG/ML — SIGNIFICANT CHANGE UP (ref 0.01–0.04)
TROPONIN I SERPL-MCNC: 0.37 NG/ML — HIGH (ref 0.01–0.04)
TROPONIN I SERPL-MCNC: 0.49 NG/ML — HIGH (ref 0.01–0.04)
TSH SERPL-MCNC: 0.32 UIU/ML — LOW (ref 0.36–3.74)
UROBILINOGEN FLD QL: 1 MG/DL
WBC # BLD: 10.6 K/UL — HIGH (ref 3.8–10.5)
WBC # BLD: 10.8 K/UL — HIGH (ref 3.8–10.5)
WBC # BLD: 11.74 K/UL — HIGH (ref 3.8–10.5)
WBC # BLD: 13.33 K/UL — HIGH (ref 3.8–10.5)
WBC # BLD: 15.42 K/UL — HIGH (ref 3.8–10.5)
WBC # BLD: 16.82 K/UL — HIGH (ref 3.8–10.5)
WBC # BLD: 7.78 K/UL — SIGNIFICANT CHANGE UP (ref 3.8–10.5)
WBC # BLD: 9.05 K/UL — SIGNIFICANT CHANGE UP (ref 3.8–10.5)
WBC # BLD: 9.49 K/UL — SIGNIFICANT CHANGE UP (ref 3.8–10.5)
WBC # BLD: 9.51 K/UL — SIGNIFICANT CHANGE UP (ref 3.8–10.5)
WBC # FLD AUTO: 10.6 K/UL — HIGH (ref 3.8–10.5)
WBC # FLD AUTO: 10.8 K/UL — HIGH (ref 3.8–10.5)
WBC # FLD AUTO: 11.74 K/UL — HIGH (ref 3.8–10.5)
WBC # FLD AUTO: 13.33 K/UL — HIGH (ref 3.8–10.5)
WBC # FLD AUTO: 15.42 K/UL — HIGH (ref 3.8–10.5)
WBC # FLD AUTO: 16.82 K/UL — HIGH (ref 3.8–10.5)
WBC # FLD AUTO: 7.78 K/UL — SIGNIFICANT CHANGE UP (ref 3.8–10.5)
WBC # FLD AUTO: 9.05 K/UL — SIGNIFICANT CHANGE UP (ref 3.8–10.5)
WBC # FLD AUTO: 9.49 K/UL — SIGNIFICANT CHANGE UP (ref 3.8–10.5)
WBC # FLD AUTO: 9.51 K/UL — SIGNIFICANT CHANGE UP (ref 3.8–10.5)
WBC UR QL: SIGNIFICANT CHANGE UP

## 2020-01-01 PROCEDURE — 99221 1ST HOSP IP/OBS SF/LOW 40: CPT

## 2020-01-01 PROCEDURE — 99285 EMERGENCY DEPT VISIT HI MDM: CPT | Mod: CS

## 2020-01-01 PROCEDURE — 76700 US EXAM ABDOM COMPLETE: CPT | Mod: 26

## 2020-01-01 PROCEDURE — 99233 SBSQ HOSP IP/OBS HIGH 50: CPT

## 2020-01-01 PROCEDURE — 93010 ELECTROCARDIOGRAM REPORT: CPT

## 2020-01-01 PROCEDURE — 72070 X-RAY EXAM THORAC SPINE 2VWS: CPT | Mod: 26

## 2020-01-01 PROCEDURE — 74174 CTA ABD&PLVS W/CONTRAST: CPT | Mod: 26

## 2020-01-01 PROCEDURE — 99497 ADVNCD CARE PLAN 30 MIN: CPT | Mod: 25

## 2020-01-01 PROCEDURE — 93306 TTE W/DOPPLER COMPLETE: CPT | Mod: 26

## 2020-01-01 PROCEDURE — 71275 CT ANGIOGRAPHY CHEST: CPT | Mod: 26

## 2020-01-01 PROCEDURE — 99223 1ST HOSP IP/OBS HIGH 75: CPT

## 2020-01-01 PROCEDURE — 99223 1ST HOSP IP/OBS HIGH 75: CPT | Mod: AI

## 2020-01-01 PROCEDURE — 72148 MRI LUMBAR SPINE W/O DYE: CPT | Mod: 26

## 2020-01-01 PROCEDURE — 99291 CRITICAL CARE FIRST HOUR: CPT

## 2020-01-01 PROCEDURE — 70371 SPEECH EVALUATION COMPLEX: CPT | Mod: 26

## 2020-01-01 PROCEDURE — 72170 X-RAY EXAM OF PELVIS: CPT | Mod: 26

## 2020-01-01 PROCEDURE — 71045 X-RAY EXAM CHEST 1 VIEW: CPT | Mod: 26

## 2020-01-01 PROCEDURE — 72125 CT NECK SPINE W/O DYE: CPT | Mod: 26

## 2020-01-01 PROCEDURE — 12345: CPT | Mod: NC

## 2020-01-01 PROCEDURE — 70450 CT HEAD/BRAIN W/O DYE: CPT | Mod: 26

## 2020-01-01 PROCEDURE — 99239 HOSP IP/OBS DSCHRG MGMT >30: CPT

## 2020-01-01 PROCEDURE — 78226 HEPATOBILIARY SYSTEM IMAGING: CPT | Mod: 26

## 2020-01-01 PROCEDURE — 73522 X-RAY EXAM HIPS BI 3-4 VIEWS: CPT | Mod: 26,LT

## 2020-01-01 PROCEDURE — 72100 X-RAY EXAM L-S SPINE 2/3 VWS: CPT | Mod: 26

## 2020-01-01 RX ORDER — POTASSIUM CHLORIDE 20 MEQ
10 PACKET (EA) ORAL ONCE
Refills: 0 | Status: COMPLETED | OUTPATIENT
Start: 2020-01-01 | End: 2020-01-01

## 2020-01-01 RX ORDER — FUROSEMIDE 40 MG
40 TABLET ORAL ONCE
Refills: 0 | Status: COMPLETED | OUTPATIENT
Start: 2020-01-01 | End: 2020-01-01

## 2020-01-01 RX ORDER — POTASSIUM CHLORIDE 20 MEQ
10 PACKET (EA) ORAL EVERY 4 HOURS
Refills: 0 | Status: COMPLETED | OUTPATIENT
Start: 2020-01-01 | End: 2020-01-01

## 2020-01-01 RX ORDER — POTASSIUM CHLORIDE 20 MEQ
20 PACKET (EA) ORAL
Refills: 0 | Status: DISCONTINUED | OUTPATIENT
Start: 2020-01-01 | End: 2020-01-01

## 2020-01-01 RX ORDER — CEFEPIME 1 G/1
INJECTION, POWDER, FOR SOLUTION INTRAMUSCULAR; INTRAVENOUS
Refills: 0 | Status: DISCONTINUED | OUTPATIENT
Start: 2020-01-01 | End: 2020-01-01

## 2020-01-01 RX ORDER — ASPIRIN/CALCIUM CARB/MAGNESIUM 324 MG
325 TABLET ORAL DAILY
Refills: 0 | Status: DISCONTINUED | OUTPATIENT
Start: 2020-01-01 | End: 2020-01-01

## 2020-01-01 RX ORDER — SODIUM CHLORIDE 9 MG/ML
1000 INJECTION, SOLUTION INTRAVENOUS
Refills: 0 | Status: COMPLETED | OUTPATIENT
Start: 2020-01-01 | End: 2020-01-01

## 2020-01-01 RX ORDER — METOPROLOL TARTRATE 50 MG
5 TABLET ORAL EVERY 4 HOURS
Refills: 0 | Status: DISCONTINUED | OUTPATIENT
Start: 2020-01-01 | End: 2020-01-01

## 2020-01-01 RX ORDER — SODIUM CHLORIDE 9 MG/ML
1000 INJECTION, SOLUTION INTRAVENOUS
Refills: 0 | Status: DISCONTINUED | OUTPATIENT
Start: 2020-01-01 | End: 2020-01-01

## 2020-01-01 RX ORDER — CEFTRIAXONE 500 MG/1
1000 INJECTION, POWDER, FOR SOLUTION INTRAMUSCULAR; INTRAVENOUS ONCE
Refills: 0 | Status: COMPLETED | OUTPATIENT
Start: 2020-01-01 | End: 2020-01-01

## 2020-01-01 RX ORDER — DILTIAZEM HCL 120 MG
10 CAPSULE, EXT RELEASE 24 HR ORAL EVERY 8 HOURS
Refills: 0 | Status: COMPLETED | OUTPATIENT
Start: 2020-01-01 | End: 2020-01-01

## 2020-01-01 RX ORDER — HALOPERIDOL DECANOATE 100 MG/ML
5 INJECTION INTRAMUSCULAR EVERY 6 HOURS
Refills: 0 | Status: DISCONTINUED | OUTPATIENT
Start: 2020-01-01 | End: 2020-01-01

## 2020-01-01 RX ORDER — FUROSEMIDE 40 MG
40 TABLET ORAL
Refills: 0 | Status: DISCONTINUED | OUTPATIENT
Start: 2020-01-01 | End: 2020-01-01

## 2020-01-01 RX ORDER — FUROSEMIDE 40 MG
40 TABLET ORAL DAILY
Refills: 0 | Status: DISCONTINUED | OUTPATIENT
Start: 2020-01-01 | End: 2020-01-01

## 2020-01-01 RX ORDER — POTASSIUM CHLORIDE 20 MEQ
40 PACKET (EA) ORAL ONCE
Refills: 0 | Status: COMPLETED | OUTPATIENT
Start: 2020-01-01 | End: 2020-01-01

## 2020-01-01 RX ORDER — PANTOPRAZOLE SODIUM 20 MG/1
40 TABLET, DELAYED RELEASE ORAL DAILY
Refills: 0 | Status: DISCONTINUED | OUTPATIENT
Start: 2020-01-01 | End: 2020-01-01

## 2020-01-01 RX ORDER — CEFEPIME 1 G/1
2000 INJECTION, POWDER, FOR SOLUTION INTRAMUSCULAR; INTRAVENOUS ONCE
Refills: 0 | Status: COMPLETED | OUTPATIENT
Start: 2020-01-01 | End: 2020-01-01

## 2020-01-01 RX ORDER — CEFEPIME 1 G/1
2000 INJECTION, POWDER, FOR SOLUTION INTRAMUSCULAR; INTRAVENOUS EVERY 8 HOURS
Refills: 0 | Status: DISCONTINUED | OUTPATIENT
Start: 2020-01-01 | End: 2020-01-01

## 2020-01-01 RX ORDER — PIPERACILLIN AND TAZOBACTAM 4; .5 G/20ML; G/20ML
3.38 INJECTION, POWDER, LYOPHILIZED, FOR SOLUTION INTRAVENOUS ONCE
Refills: 0 | Status: COMPLETED | OUTPATIENT
Start: 2020-01-01 | End: 2020-01-01

## 2020-01-01 RX ORDER — POTASSIUM CHLORIDE 20 MEQ
20 PACKET (EA) ORAL DAILY
Refills: 0 | Status: DISCONTINUED | OUTPATIENT
Start: 2020-01-01 | End: 2020-01-01

## 2020-01-01 RX ORDER — MORPHINE SULFATE 50 MG/1
2 CAPSULE, EXTENDED RELEASE ORAL EVERY 4 HOURS
Refills: 0 | Status: DISCONTINUED | OUTPATIENT
Start: 2020-01-01 | End: 2020-01-01

## 2020-01-01 RX ORDER — OLANZAPINE 15 MG/1
5 TABLET, FILM COATED ORAL AT BEDTIME
Refills: 0 | Status: DISCONTINUED | OUTPATIENT
Start: 2020-01-01 | End: 2020-01-01

## 2020-01-01 RX ORDER — PIPERACILLIN AND TAZOBACTAM 4; .5 G/20ML; G/20ML
3.38 INJECTION, POWDER, LYOPHILIZED, FOR SOLUTION INTRAVENOUS EVERY 8 HOURS
Refills: 0 | Status: DISCONTINUED | OUTPATIENT
Start: 2020-01-01 | End: 2020-01-01

## 2020-01-01 RX ORDER — POTASSIUM CHLORIDE 20 MEQ
10 PACKET (EA) ORAL
Refills: 0 | Status: COMPLETED | OUTPATIENT
Start: 2020-01-01 | End: 2020-01-01

## 2020-01-01 RX ORDER — POTASSIUM CHLORIDE 20 MEQ
40 PACKET (EA) ORAL ONCE
Refills: 0 | Status: DISCONTINUED | OUTPATIENT
Start: 2020-01-01 | End: 2020-01-01

## 2020-01-01 RX ORDER — CEFEPIME 1 G/1
1000 INJECTION, POWDER, FOR SOLUTION INTRAMUSCULAR; INTRAVENOUS EVERY 12 HOURS
Refills: 0 | Status: DISCONTINUED | OUTPATIENT
Start: 2020-01-01 | End: 2020-01-01

## 2020-01-01 RX ORDER — SODIUM CHLORIDE 9 MG/ML
1000 INJECTION INTRAMUSCULAR; INTRAVENOUS; SUBCUTANEOUS
Refills: 0 | Status: DISCONTINUED | OUTPATIENT
Start: 2020-01-01 | End: 2020-01-01

## 2020-01-01 RX ORDER — PANTOPRAZOLE SODIUM 20 MG/1
40 TABLET, DELAYED RELEASE ORAL
Qty: 0 | Refills: 0 | DISCHARGE
Start: 2020-01-01

## 2020-01-01 RX ORDER — NALOXONE HYDROCHLORIDE 4 MG/.1ML
1 SPRAY NASAL ONCE
Refills: 0 | Status: DISCONTINUED | OUTPATIENT
Start: 2020-01-01 | End: 2020-01-01

## 2020-01-01 RX ORDER — DEXTROSE MONOHYDRATE, SODIUM CHLORIDE, AND POTASSIUM CHLORIDE 50; .745; 4.5 G/1000ML; G/1000ML; G/1000ML
1000 INJECTION, SOLUTION INTRAVENOUS
Refills: 0 | Status: DISCONTINUED | OUTPATIENT
Start: 2020-01-01 | End: 2020-01-01

## 2020-01-01 RX ORDER — SODIUM CHLORIDE 9 MG/ML
1000 INJECTION INTRAMUSCULAR; INTRAVENOUS; SUBCUTANEOUS ONCE
Refills: 0 | Status: COMPLETED | OUTPATIENT
Start: 2020-01-01 | End: 2020-01-01

## 2020-01-01 RX ORDER — QUETIAPINE FUMARATE 200 MG/1
12.5 TABLET, FILM COATED ORAL AT BEDTIME
Refills: 0 | Status: DISCONTINUED | OUTPATIENT
Start: 2020-01-01 | End: 2020-01-01

## 2020-01-01 RX ORDER — HALOPERIDOL DECANOATE 100 MG/ML
1 INJECTION INTRAMUSCULAR ONCE
Refills: 0 | Status: COMPLETED | OUTPATIENT
Start: 2020-01-01 | End: 2020-01-01

## 2020-01-01 RX ORDER — VANCOMYCIN HCL 1 G
1000 VIAL (EA) INTRAVENOUS ONCE
Refills: 0 | Status: COMPLETED | OUTPATIENT
Start: 2020-01-01 | End: 2020-01-01

## 2020-01-01 RX ORDER — DILTIAZEM HCL 120 MG
10 CAPSULE, EXT RELEASE 24 HR ORAL ONCE
Refills: 0 | Status: COMPLETED | OUTPATIENT
Start: 2020-01-01 | End: 2020-01-01

## 2020-01-01 RX ORDER — CEFEPIME 1 G/1
1000 INJECTION, POWDER, FOR SOLUTION INTRAMUSCULAR; INTRAVENOUS ONCE
Refills: 0 | Status: COMPLETED | OUTPATIENT
Start: 2020-01-01 | End: 2020-01-01

## 2020-01-01 RX ORDER — PANTOPRAZOLE SODIUM 20 MG/1
40 TABLET, DELAYED RELEASE ORAL
Refills: 0 | Status: DISCONTINUED | OUTPATIENT
Start: 2020-01-01 | End: 2020-01-01

## 2020-01-01 RX ORDER — CEFEPIME 1 G/1
1000 INJECTION, POWDER, FOR SOLUTION INTRAMUSCULAR; INTRAVENOUS EVERY 24 HOURS
Refills: 0 | Status: DISCONTINUED | OUTPATIENT
Start: 2020-01-01 | End: 2020-01-01

## 2020-01-01 RX ADMIN — Medication 325 MILLIGRAM(S): at 17:41

## 2020-01-01 RX ADMIN — CEFTRIAXONE 100 MILLIGRAM(S): 500 INJECTION, POWDER, FOR SOLUTION INTRAMUSCULAR; INTRAVENOUS at 03:30

## 2020-01-01 RX ADMIN — Medication 40 MILLIGRAM(S): at 06:50

## 2020-01-01 RX ADMIN — QUETIAPINE FUMARATE 12.5 MILLIGRAM(S): 200 TABLET, FILM COATED ORAL at 21:38

## 2020-01-01 RX ADMIN — PANTOPRAZOLE SODIUM 40 MILLIGRAM(S): 20 TABLET, DELAYED RELEASE ORAL at 11:21

## 2020-01-01 RX ADMIN — Medication 2 MILLIGRAM(S): at 13:34

## 2020-01-01 RX ADMIN — Medication 10 MILLIGRAM(S): at 21:38

## 2020-01-01 RX ADMIN — Medication 40 MILLIGRAM(S): at 05:13

## 2020-01-01 RX ADMIN — PANTOPRAZOLE SODIUM 40 MILLIGRAM(S): 20 TABLET, DELAYED RELEASE ORAL at 11:28

## 2020-01-01 RX ADMIN — PANTOPRAZOLE SODIUM 40 MILLIGRAM(S): 20 TABLET, DELAYED RELEASE ORAL at 11:10

## 2020-01-01 RX ADMIN — Medication 40 MILLIGRAM(S): at 05:14

## 2020-01-01 RX ADMIN — MORPHINE SULFATE 2 MILLIGRAM(S): 50 CAPSULE, EXTENDED RELEASE ORAL at 14:01

## 2020-01-01 RX ADMIN — PIPERACILLIN AND TAZOBACTAM 25 GRAM(S): 4; .5 INJECTION, POWDER, LYOPHILIZED, FOR SOLUTION INTRAVENOUS at 12:33

## 2020-01-01 RX ADMIN — Medication 100 MILLIEQUIVALENT(S): at 12:57

## 2020-01-01 RX ADMIN — Medication 10 MILLIGRAM(S): at 14:08

## 2020-01-01 RX ADMIN — Medication 5 MILLIGRAM(S): at 22:21

## 2020-01-01 RX ADMIN — PIPERACILLIN AND TAZOBACTAM 25 GRAM(S): 4; .5 INJECTION, POWDER, LYOPHILIZED, FOR SOLUTION INTRAVENOUS at 21:25

## 2020-01-01 RX ADMIN — SODIUM CHLORIDE 75 MILLILITER(S): 9 INJECTION, SOLUTION INTRAVENOUS at 11:23

## 2020-01-01 RX ADMIN — CEFEPIME 100 MILLIGRAM(S): 1 INJECTION, POWDER, FOR SOLUTION INTRAMUSCULAR; INTRAVENOUS at 17:45

## 2020-01-01 RX ADMIN — QUETIAPINE FUMARATE 12.5 MILLIGRAM(S): 200 TABLET, FILM COATED ORAL at 21:06

## 2020-01-01 RX ADMIN — Medication 40 MILLIGRAM(S): at 05:19

## 2020-01-01 RX ADMIN — CEFEPIME 100 MILLIGRAM(S): 1 INJECTION, POWDER, FOR SOLUTION INTRAMUSCULAR; INTRAVENOUS at 06:27

## 2020-01-01 RX ADMIN — Medication 40 MILLIGRAM(S): at 06:32

## 2020-01-01 RX ADMIN — QUETIAPINE FUMARATE 12.5 MILLIGRAM(S): 200 TABLET, FILM COATED ORAL at 21:25

## 2020-01-01 RX ADMIN — Medication 40 MILLIGRAM(S): at 20:12

## 2020-01-01 RX ADMIN — Medication 100 MILLIEQUIVALENT(S): at 14:00

## 2020-01-01 RX ADMIN — Medication 325 MILLIGRAM(S): at 11:12

## 2020-01-01 RX ADMIN — PANTOPRAZOLE SODIUM 40 MILLIGRAM(S): 20 TABLET, DELAYED RELEASE ORAL at 12:26

## 2020-01-01 RX ADMIN — SODIUM CHLORIDE 60 MILLILITER(S): 9 INJECTION, SOLUTION INTRAVENOUS at 13:27

## 2020-01-01 RX ADMIN — SODIUM CHLORIDE 1000 MILLILITER(S): 9 INJECTION INTRAMUSCULAR; INTRAVENOUS; SUBCUTANEOUS at 02:24

## 2020-01-01 RX ADMIN — SODIUM CHLORIDE 80 MILLILITER(S): 9 INJECTION, SOLUTION INTRAVENOUS at 12:26

## 2020-01-01 RX ADMIN — PANTOPRAZOLE SODIUM 40 MILLIGRAM(S): 20 TABLET, DELAYED RELEASE ORAL at 11:41

## 2020-01-01 RX ADMIN — HALOPERIDOL DECANOATE 5 MILLIGRAM(S): 100 INJECTION INTRAMUSCULAR at 10:41

## 2020-01-01 RX ADMIN — PANTOPRAZOLE SODIUM 40 MILLIGRAM(S): 20 TABLET, DELAYED RELEASE ORAL at 11:09

## 2020-01-01 RX ADMIN — SODIUM CHLORIDE 75 MILLILITER(S): 9 INJECTION, SOLUTION INTRAVENOUS at 05:13

## 2020-01-01 RX ADMIN — Medication 10 MILLIGRAM(S): at 05:44

## 2020-01-01 RX ADMIN — Medication 100 MILLIEQUIVALENT(S): at 18:50

## 2020-01-01 RX ADMIN — Medication 100 MILLIEQUIVALENT(S): at 12:31

## 2020-01-01 RX ADMIN — SODIUM CHLORIDE 100 MILLILITER(S): 9 INJECTION, SOLUTION INTRAVENOUS at 06:59

## 2020-01-01 RX ADMIN — SODIUM CHLORIDE 100 MILLILITER(S): 9 INJECTION, SOLUTION INTRAVENOUS at 05:06

## 2020-01-01 RX ADMIN — Medication 10 MILLIGRAM(S): at 13:36

## 2020-01-01 RX ADMIN — Medication 100 MILLIEQUIVALENT(S): at 09:40

## 2020-01-01 RX ADMIN — PIPERACILLIN AND TAZOBACTAM 25 GRAM(S): 4; .5 INJECTION, POWDER, LYOPHILIZED, FOR SOLUTION INTRAVENOUS at 21:51

## 2020-01-01 RX ADMIN — SODIUM CHLORIDE 60 MILLILITER(S): 9 INJECTION, SOLUTION INTRAVENOUS at 11:09

## 2020-01-01 RX ADMIN — Medication 250 MILLIGRAM(S): at 04:22

## 2020-01-01 RX ADMIN — MORPHINE SULFATE 2 MILLIGRAM(S): 50 CAPSULE, EXTENDED RELEASE ORAL at 22:27

## 2020-01-01 RX ADMIN — Medication 100 MILLIEQUIVALENT(S): at 13:55

## 2020-01-01 RX ADMIN — SODIUM CHLORIDE 1000 MILLILITER(S): 9 INJECTION INTRAMUSCULAR; INTRAVENOUS; SUBCUTANEOUS at 22:51

## 2020-01-01 RX ADMIN — CEFEPIME 100 MILLIGRAM(S): 1 INJECTION, POWDER, FOR SOLUTION INTRAMUSCULAR; INTRAVENOUS at 22:13

## 2020-01-01 RX ADMIN — PANTOPRAZOLE SODIUM 40 MILLIGRAM(S): 20 TABLET, DELAYED RELEASE ORAL at 12:22

## 2020-01-01 RX ADMIN — SODIUM CHLORIDE 100 MILLILITER(S): 9 INJECTION, SOLUTION INTRAVENOUS at 18:50

## 2020-01-01 RX ADMIN — PIPERACILLIN AND TAZOBACTAM 25 GRAM(S): 4; .5 INJECTION, POWDER, LYOPHILIZED, FOR SOLUTION INTRAVENOUS at 05:20

## 2020-01-01 RX ADMIN — SODIUM CHLORIDE 1000 MILLILITER(S): 9 INJECTION INTRAMUSCULAR; INTRAVENOUS; SUBCUTANEOUS at 11:27

## 2020-01-01 RX ADMIN — PANTOPRAZOLE SODIUM 40 MILLIGRAM(S): 20 TABLET, DELAYED RELEASE ORAL at 12:31

## 2020-01-01 RX ADMIN — CEFTRIAXONE 1000 MILLIGRAM(S): 500 INJECTION, POWDER, FOR SOLUTION INTRAMUSCULAR; INTRAVENOUS at 04:05

## 2020-01-01 RX ADMIN — SODIUM CHLORIDE 75 MILLILITER(S): 9 INJECTION, SOLUTION INTRAVENOUS at 17:40

## 2020-01-01 RX ADMIN — QUETIAPINE FUMARATE 12.5 MILLIGRAM(S): 200 TABLET, FILM COATED ORAL at 21:51

## 2020-01-01 RX ADMIN — Medication 100 MILLIEQUIVALENT(S): at 08:34

## 2020-01-01 RX ADMIN — PANTOPRAZOLE SODIUM 40 MILLIGRAM(S): 20 TABLET, DELAYED RELEASE ORAL at 11:40

## 2020-01-01 RX ADMIN — CEFEPIME 100 MILLIGRAM(S): 1 INJECTION, POWDER, FOR SOLUTION INTRAMUSCULAR; INTRAVENOUS at 14:09

## 2020-01-01 RX ADMIN — PIPERACILLIN AND TAZOBACTAM 25 GRAM(S): 4; .5 INJECTION, POWDER, LYOPHILIZED, FOR SOLUTION INTRAVENOUS at 12:26

## 2020-01-01 RX ADMIN — PIPERACILLIN AND TAZOBACTAM 25 GRAM(S): 4; .5 INJECTION, POWDER, LYOPHILIZED, FOR SOLUTION INTRAVENOUS at 21:39

## 2020-01-01 RX ADMIN — PIPERACILLIN AND TAZOBACTAM 200 GRAM(S): 4; .5 INJECTION, POWDER, LYOPHILIZED, FOR SOLUTION INTRAVENOUS at 18:25

## 2020-01-01 RX ADMIN — HALOPERIDOL DECANOATE 5 MILLIGRAM(S): 100 INJECTION INTRAMUSCULAR at 05:44

## 2020-01-01 RX ADMIN — MORPHINE SULFATE 2 MILLIGRAM(S): 50 CAPSULE, EXTENDED RELEASE ORAL at 13:35

## 2020-01-01 RX ADMIN — Medication 100 MILLIEQUIVALENT(S): at 21:59

## 2020-01-01 RX ADMIN — SODIUM CHLORIDE 75 MILLILITER(S): 9 INJECTION, SOLUTION INTRAVENOUS at 21:55

## 2020-01-01 RX ADMIN — OLANZAPINE 5 MILLIGRAM(S): 15 TABLET, FILM COATED ORAL at 00:11

## 2020-01-01 RX ADMIN — HALOPERIDOL DECANOATE 1 MILLIGRAM(S): 100 INJECTION INTRAMUSCULAR at 04:34

## 2020-01-01 RX ADMIN — Medication 10 MILLIGRAM(S): at 21:55

## 2020-01-01 RX ADMIN — PIPERACILLIN AND TAZOBACTAM 25 GRAM(S): 4; .5 INJECTION, POWDER, LYOPHILIZED, FOR SOLUTION INTRAVENOUS at 05:15

## 2020-01-01 RX ADMIN — PIPERACILLIN AND TAZOBACTAM 25 GRAM(S): 4; .5 INJECTION, POWDER, LYOPHILIZED, FOR SOLUTION INTRAVENOUS at 13:48

## 2020-01-01 RX ADMIN — Medication 100 MILLIEQUIVALENT(S): at 20:11

## 2020-01-01 RX ADMIN — Medication 100 MILLIEQUIVALENT(S): at 10:24

## 2020-01-01 RX ADMIN — SODIUM CHLORIDE 80 MILLILITER(S): 9 INJECTION INTRAMUSCULAR; INTRAVENOUS; SUBCUTANEOUS at 20:28

## 2020-01-01 RX ADMIN — CEFEPIME 100 MILLIGRAM(S): 1 INJECTION, POWDER, FOR SOLUTION INTRAMUSCULAR; INTRAVENOUS at 10:02

## 2020-01-01 RX ADMIN — SODIUM CHLORIDE 80 MILLILITER(S): 9 INJECTION, SOLUTION INTRAVENOUS at 18:27

## 2020-01-01 RX ADMIN — SODIUM CHLORIDE 80 MILLILITER(S): 9 INJECTION, SOLUTION INTRAVENOUS at 13:48

## 2020-01-01 RX ADMIN — DEXTROSE MONOHYDRATE, SODIUM CHLORIDE, AND POTASSIUM CHLORIDE 30 MILLILITER(S): 50; .745; 4.5 INJECTION, SOLUTION INTRAVENOUS at 05:15

## 2020-01-01 RX ADMIN — PANTOPRAZOLE SODIUM 40 MILLIGRAM(S): 20 TABLET, DELAYED RELEASE ORAL at 10:25

## 2020-01-01 RX ADMIN — PIPERACILLIN AND TAZOBACTAM 25 GRAM(S): 4; .5 INJECTION, POWDER, LYOPHILIZED, FOR SOLUTION INTRAVENOUS at 05:04

## 2020-01-01 RX ADMIN — HALOPERIDOL DECANOATE 5 MILLIGRAM(S): 100 INJECTION INTRAMUSCULAR at 16:07

## 2020-01-01 RX ADMIN — Medication 10 MILLIGRAM(S): at 16:48

## 2020-01-01 RX ADMIN — Medication 10 MILLIGRAM(S): at 05:13

## 2020-01-01 RX ADMIN — Medication 100 MILLIEQUIVALENT(S): at 12:48

## 2020-01-01 RX ADMIN — Medication 10 MILLIGRAM(S): at 05:05

## 2020-01-01 RX ADMIN — CEFEPIME 100 MILLIGRAM(S): 1 INJECTION, POWDER, FOR SOLUTION INTRAMUSCULAR; INTRAVENOUS at 14:19

## 2020-02-03 NOTE — PROVIDER CONTACT NOTE (OTHER) - SITUATION
bp 86/51 Picato Counseling:  I discussed with the patient the risks of Picato including but not limited to erythema, scaling, itching, weeping, crusting, and pain.

## 2020-06-07 NOTE — ED PROVIDER NOTE - OBJECTIVE STATEMENT
Nurse Triage Notes: "As per EMS pt found on floor in supine position. Pt complains of pain to back, unable to scale or scale. Pt takes ASA."    Pertinent PMH/PSH/FHx/SHx and Review of Systems contained within:     Pt is an 88 year old male w/PMH of an LUE DVT on coumadin but discontinued by hematologist few months ago, atrial fibrillation, HTN, lung nodules, SVC syndrome who presents to the ED today s/p fall. Pt c/o epigastric pain back pain and was found in supine position. Pt is difficult to understand in ER however does seems to speak english, and states that whenever he eats he builds the sensation of vomit. At triage pt had a saturation of 91. Spoke w/family in waiting room, and nephew says pt resides alone without a home health aide. Pt is checked on a once a day and was not responding to door or picking up phone today. While being found in supine position he did not have any LOC at home. Pt's family reports a gradual decline over the past few weeks w/labored breathing and increased confusion. No aggravating or relieving factors. Denies fevers, vomiting, or diarrhea. Patient denies EtOH/tobacco/illicit substance use.     No fever/chills, No photophobia/eye pain/changes in vision, No ear pain/sore throat/dysphagia, No chest pain/palpitations, no SOB/cough/wheeze/stridor, No abdominal pain, No N/V/D, no dysuria/frequency/discharge, No neck/back pain, no rash, no changes in neurological status/function, + syncope, + labored breathing, + increased confusion Nurse Triage Notes: "As per EMS pt found on floor in supine position. Pt complains of pain to back, unable to scale or scale. Pt takes ASA."    Pertinent PMH/PSH/FHx/SHx and Review of Systems contained within:     Pt is an 88 year old male w/PMH of an LUE DVT on coumadin (discontinued by hematologist few months ago), atrial fibrillation, HTN, lung nodules, SVC syndrome who presents to the ED today s/p fall. Pt c/o epigastric pain back pain and was found in supine position at home. Pt is difficult to understand in ER however does seems to speak english, and states that whenever he eats he has the sensation of vomit. At triage pt had a saturation of 91. Spoke w/family in waiting room, and nephew says pt resides alone without a home health aide. Pt is checked on a once a day and was not responding to door or picking up phone today. While being found in supine position he did not have any LOC at home. Pt's family reports a gradual decline over the past few weeks w/labored breathing and increased confusion.  Family denies fevers, vomiting, or diarrhea. Patient denies EtOH/tobacco/illicit substance use.

## 2020-06-07 NOTE — ED ADULT NURSE NOTE - CHIEF COMPLAINT QUOTE
As per EMS pt found on floor in supine position. Pt complains of pain to back, unable to scale or scale. Pt takes ASA.

## 2020-06-07 NOTE — ED PROVIDER NOTE - PMH
Acute DVT (deep venous thrombosis)    Atrial fibrillation, unspecified type    Essential hypertension    Lung nodules    SVC syndrome

## 2020-06-07 NOTE — ED ADULT NURSE NOTE - OBJECTIVE STATEMENT
pt BIB family post fall, pt  has incoherent speech , abd distended and firm , pt has multiple skin nodules on face , hand bilat bluish with delayed capillary refill, pt placed on NC @2L, 20G LAC

## 2020-06-07 NOTE — ED PROVIDER NOTE - PHYSICAL EXAMINATION
Gen: Alert, responsive  Head: NC, AT, PERRL, EOMI, normal lids/conjunctiva  ENT: hard of hearing, patent oropharynx without erythema/exudate, uvula midline  Neck: +supple, no tenderness/meningismus/JVD, +Trachea midline  Pulm: Bilateral BS, increased resp effort, +diffuse crackles  CV: RRR, no M/R/G, +dist pulses  Abd: soft, reducible ventral hernia, +mild tenderness to palp, Negative [place] signs, +BS  Mskel: no edema/erythema, +ecchymosis of extremities  Skin: no rash, warm/dry  Neuro: AAOx3, no apparent sensory/motor deficits, coordination intact

## 2020-06-07 NOTE — ED ADULT NURSE NOTE - NSIMPLEMENTINTERV_GEN_ALL_ED
Implemented All Fall with Harm Risk Interventions:  [place] to call system. Call bell, personal items and telephone within reach. Instruct patient to call for assistance. Room bathroom lighting operational. Non-slip footwear when patient is off stretcher. Physically safe environment: no spills, clutter or unnecessary equipment. Stretcher in lowest position, wheels locked, appropriate side rails in place. Provide visual cue, wrist band, yellow gown, etc. Monitor gait and stability. Monitor for mental status changes and reorient to person, place, and time. Review medications for side effects contributing to fall risk. Reinforce activity limits and safety measures with patient and family. Provide visual clues: red socks.

## 2020-06-07 NOTE — ED PROVIDER NOTE - CARE PLAN
Principal Discharge DX:	Cholecystitis  Secondary Diagnosis:	Non-traumatic rhabdomyolysis  Secondary Diagnosis:	Fracture of lumbar spine  Secondary Diagnosis:	Pneumothorax, unspecified type

## 2020-06-07 NOTE — ED PROVIDER NOTE - CLINICAL SUMMARY MEDICAL DECISION MAKING FREE TEXT BOX
Patient with recent decline, abdominal and back pain, found on ER work up with multiple issues.  CT findings identify L5 compression fx, likely gall bladder disease, probable developing sacral ulcer, and small pneumo.  Patient stable, receiving antibiotics. US abd pending.  Trop elevated, likely from hypoxic demand, EKG with known afib.  Case d/w ortho and gen surg, both following.  Patient is to be admitted to the hospital and the case was discussed with the admitting physician.  Any changes in plan, additional imaging/labs, and further work up will be at the discretion of the admitting physician.

## 2020-06-07 NOTE — ED ADULT NURSE NOTE - ED STAT RN HANDOFF DETAILS 2
Report endorsed to hold RN Rehana. Safety checks completed this shift. Safety rounds completed hourly.  IV sites checked Q2+remains WDL. Medications administered as ordered with no signs/symptoms of adverse reactions. Fall & skin precautions in place. Any issues endorsed to hold RN for follow up. Awaiting bed assignment. on cardiac monitor at bedside. pt on 2L nasal cannula

## 2020-06-07 NOTE — ED ADULT NURSE NOTE - ED STAT RN HANDOFF DETAILS
Report received from MELINDA Harrington at 11pm. Assessment available on KB. will continue to monitor. on cardiac monitor at bedside. pt on 2L O2 nasal cannula. awaiting EKG and radiology Report received from MELINDA Harrington at 11pm. Assessment available on [facility]. will continue to monitor. on cardiac monitor at bedside. pt on 2L O2 nasal cannula. awaiting EKG and radiology. IVF infusing. skin tear noted on pt's left hip

## 2020-06-08 NOTE — H&P ADULT - ASSESSMENT
89 y/o Male with PMHx of HTN,. Afib , SDH in past, DVT in past no longer on ac, thyroidectomy and thrombocytopenia comes to ED today s/p fall w/?new compression fx and rhabdo

## 2020-06-08 NOTE — CHART NOTE - NSCHARTNOTEFT_GEN_A_CORE
Patient seen and examined at bedside with Dr. Salmeron.   Patient in no acute distress, cervical collar in place.   Nonverbal at time of exam.    Exam:  Gen: Awake, nonverbal, NAD  CV: S1S2 RRR  Abd: Obese, unable to determine tenderness, but no rebound/guarding/rigidity    Imaging reviewed.   CT Angio Abdomen and Pelvis w/ IV Cont (06.08.20 @ 01:57)   IMPRESSION:  1. Small right pleural effusion and tiny left pleural effusion and/or pleural thickening. Associated bibasilar patchy groundglass opacities/stranding may represent atelectasis and/or infiltrates.  2. There is an extremely tiny pneumothorax of the right anterior middle lobe, seen on axial images 83-93.  3.There is suggestion of a recent fracture of T10 with mild loss of vertebral body height, however no significant bony retropulsion. Recommend clinical correlation.  4. Mild aneurysmal dilatation of the ascending thoracic aorta, measures 4.2 cm and is grossly stable. Negative fordissection.  5. Limited evaluation of the pulmonary arteries due to suboptimal opacification with contrast, however no definite pulmonary embolus seen within the main pulmonary arterial segment, right main pulmonary artery or the left main pulmonary artery only.  6. Stable bilateral multiple small pulmonary nodules, measuring up to 4 mm.  7. Grossly stable appearance of an enlarged multinodular heterogeneous thyroid gland, consistent with goiter with leftward displacement/compression of the trachea.  CT ABDOMEN AND PELVIS: The liver, spleen, adrenal glands and pancreas are grossly unremarkable. The gallbladder is distended with small gallstones. Stomach is underdistended for adequate evaluation. Calcific atherosclerosis of the abdominal aorta without gross aneurysmal dilatation or dissection. Kidneys enhance symmetrically without hydronephrosis. Small low-attenuation renal lesions again seen, too small to characterize. Ureters are not dilated. The bladder is partially collapsed, withoutbladder wall thickening. Prostate gland are grossly enlarged. Seminal vesicles are symmetric.  There is presacral edema and perirectal fatty stranding. Limited evaluation of the rectum is limited without oral contrast, however mild proctitis cannotbe excluded. Colonic diverticulosis without definite acute diverticulitis. She was colon. No definite evidence for bowel obstruction. Gas distended right colon. No free air, significant ascites or gross lymphadenopathyThere. Severe compression fracture deformity of L2 again seen. There is a recent mild compression fracture deformity of L5 with horizontal linear lucency seen no significant bony retropulsion.  IMPRESSION:  1. Distended gallbladder with gallstones. There is clinical concern for gallbladder disease, follow-up with right upper quadrant sonogram.  2. There is presacral edema and perirectal fatty stranding. Limited evaluation of the rectum at this level, however underlying proctitis cannot be excluded. Recommend clinical correlation.  3. Suggestion of a recent mild compression fracture deformity of L5 without significant bony retropulsion.    Plan:  As discussed with Dr. Salmeron, no acute surgical intervention at this time. Patient is not a surgical candidate. If suspicion for acute cholecystitis arises, recommended HIDA scan and IR consult for perc vu.   Recommend thoracic/CT surgery consult.

## 2020-06-08 NOTE — CONSULT NOTE ADULT - SUBJECTIVE AND OBJECTIVE BOX
Patient is a 88y old  Male who presents with a chief complaint of fall (2020 05:43)      HPI:  Pt is an 89 y/o Male with PMHx of HTN,. Afib , SDH in past, DVT in past no longer on ac, thyroidectomy and thrombocytopenia comes to ED today s/p fall, no loc found down  by family. Pt c/o epigastric pain, back pain and was found in supine position at home. Pt is difficult to understand in ER , and states that whenever he eats he has the sensation of vomit. At triage pt had a saturation of 91. Spoke w/family in waiting room, and nephew says pt resides alone without a home health aide. Pt is checked on a once a day and was not responding to door or picking up phone today.  Pt's family reports a gradual decline over the past few weeks w/labored breathing and increased confusion.  No reported fever, chills, cp, palpitations, mild nausea/-v/-d/-c no travels or sick contacts.  in ed ct w/  1. Distended gallbladder with gallstones. There is clinical concern for gallbladder disease, follow-up with right upper quadrant sonogram.  2. There is presacral edema and perirectal fatty stranding. Limited evaluation of the rectum at this level, however underlying proctitis cannot be excluded. Recommend clinical correlation.  3. Suggestion of a recent mild compression fracture deformity of L5 without significant bony retropulsion. (2020 05:43)    Pt is an 88 year old male w/PMH of an LUE DVT on coumadin (discontinued by hematologist few months ago), atrial fibrillation, HTN, lung nodules, SVC syndrome who presents to the ED today s/p fall. Pt c/o epigastric pain back pain and was found in supine position at home. Pt is difficult to understand in ER however does seems to speak english, and states that whenever he eats he has the sensation of vomit. At triage pt had a saturation of 91. Spoke w/family in waiting room, and nephew says pt resides alone without a home health aide. Pt is checked on a once a day and was not responding to door or picking up phone today. While being found in supine position he did not have any LOC at home.    Pt's family reports a gradual decline over the past few weeks w/labored breathing and increased confusion.  Family denies fevers, vomiting, or diarrhea. Patient denies EtOH/tobacco/illicit substance use.    Pt was seen in ED, unable to contribute to history. Information was obtained from pt's family.    PAST MEDICAL & SURGICAL HISTORY:  Lung nodules  SVC syndrome  Acute DVT (deep venous thrombosis)  Essential hypertension  Atrial fibrillation, unspecified type  H/O thyroidectomy      Review of Systems:  as above    MEDICATIONS  (STANDING):  lactated ringers. 1000 milliLiter(s) (100 mL/Hr) IV Continuous <Continuous>  pantoprazole    Tablet 40 milliGRAM(s) Oral before breakfast  potassium chloride    Tablet ER 20 milliEquivalent(s) Oral every 2 hours  QUEtiapine 12.5 milliGRAM(s) Oral at bedtime    MEDICATIONS  (PRN):      Allergies  No Known Allergies    SOCIAL HISTORY lives alone with HHA          FAMILY HISTORY:  No pertinent family history in first degree relatives      Vital Signs Last 24 Hrs  T(C): 37.4 (2020 04:24), Max: 37.5 (2020 00:12)  T(F): 99.3 (2020 04:24), Max: 99.5 (2020 00:12)  HR: 87 (2020 04:24) (87 - 91)  BP: 106/64 (2020 04:24) (105/66 - 106/64)  BP(mean): --  RR: 26 (2020 04:24) (16 - 26)  SpO2: 96% (2020 04:24) (91% - 100%)    Physical Exam:  General: awake, alert  HENT:  WNL  Chest:  clear breath sounds b/l  Cardiovascular:  Regular rate & rhythm  Abdomen:  soft, nontender, distended upper abdomen tympanic to percussion, active bowel sounds, no guarding or rebound  Extremities:  no pedal edema. Venous stasis lower extremity skin changes  Neuro/Psych: disoriented    LABS:                        12.5   15.42 )-----------( 81       ( 2020 22:53 )             38.3     06-07    138  |  100  |  30<H>  ----------------------------<  111<H>  3.4<L>   |  28  |  0.78    Ca    9.1      2020 22:53  Mg     2.1         TPro  8.2  /  Alb  3.3  /  TBili  2.8<H>  /  DBili  x   /  AST  131<H>  /  ALT  41  /  AlkPhos  76      PT/INR - ( 2020 22:53 )   PT: 14.9 sec;   INR: 1.32 ratio         PTT - ( 2020 22:53 )  PTT:29.1 sec  Urinalysis Basic - ( 2020 00:17 )    Color: Yellow / Appearance: Slightly Turbid / S.025 / pH: x  Gluc: x / Ketone: Moderate  / Bili: Negative / Urobili: 1 mg/dL   Blood: x / Protein: 100 mg/dL / Nitrite: Negative   Leuk Esterase: Trace / RBC: 3-5 /HPF / WBC 0-2   Sq Epi: x / Non Sq Epi: Few / Bacteria: Few        RADIOLOGY & ADDITIONAL STUDIES: < from: CT Angio Abdomen and Pelvis w/ IV Cont (20 @ 01:57) >  IMPRESSION:  1. Small right pleural effusion and tiny left pleural effusion and/or pleural thickening. Associated bibasilar patchy groundglass opacities/stranding may represent atelectasis and/or infiltrates.  2. There is an extremely tiny pneumothorax of the right anterior middle lobe, seen on axial images 83-93.  3.There is suggestion of a recent fracture of T10 with mild loss of vertebral body height, however no significant bony retropulsion. Recommend clinical correlation.  4. Mild aneurysmal dilatation of the ascending thoracic aorta, measures 4.2 cm and is grossly stable. Negative fordissection.  5. Limited evaluation of the pulmonary arteries due to suboptimal opacification with contrast, however no definite pulmonary embolus seen within the main pulmonary arterial segment, right main pulmonary artery or the left main pulmonary artery only.  6. Stable bilateral multiple small pulmonary nodules, measuring up to 4 mm.  7. Grossly stable appearance of an enlarged multinodular heterogeneous thyroid gland, consistent with goiter with leftward displacement/compression of the trachea.    CT ABDOMEN AND PELVIS: The liver, spleen, adrenal glands and pancreas are grossly unremarkable. The gallbladder is distended with small gallstones. Stomach is underdistended for adequate evaluation. Calcific atherosclerosis of the abdominal aorta without gross aneurysmal dilatation or dissection. Kidneys enhance symmetrically without hydronephrosis. Small low-attenuation renal lesions again seen, too small to characterize. Ureters are not dilated. The bladder is partially collapsed, withoutbladder wall thickening. Prostate gland are grossly enlarged. Seminal vesicles are symmetric.    There is presacral edema and perirectal fatty stranding. Limited evaluation of the rectum is limited without oral contrast, however mild proctitis cannotbe excluded. Colonic diverticulosis without definite acute diverticulitis. She was colon. No definite evidence for bowel obstruction. Gas distended right colon. No free air, significant ascites or gross lymphadenopathyThere. Severe compression fracture deformity of L2 again seen. There is a recent mild compression fracture deformity of L5 with horizontal linear lucency seen no significant bony retropulsion.    IMPRESSION:  1. Distended gallbladder with gallstones. There is clinical concern for gallbladder disease, follow-up with right upper quadrant sonogram.  2. There is presacral edema and perirectal fatty stranding. Limited evaluation of the rectum at this level, however underlying proctitis cannot be excluded. Recommend clinical correlation.  3. Suggestion of a recent mild compression fracture deformity of L5 without significant bony retropulsion.      < end of copied text >      Impression/Plan: 88M PMH HTN, Afib, SDH and DVT in past no longer on ac, thyroidectomy and thrombocytopenia admitted s/p fall with rhabodmyolysis  CT suggests cholecystitis  as discussed with Dr Salmeron, patient is a poor surgical candidate. F/u results of US and if acute cholecystitis is suggested, would recommend IR perc cholecystostomy  c/w medical management and IV abx, NPO, IVF.

## 2020-06-08 NOTE — CHART NOTE - NSCHARTNOTEFT_GEN_A_CORE
89 y/o Male with PMHx of HTN,. Afib , SDH in past, DVT in past no longer on ac, thyroidectomy and thrombocytopenia comes to ED today s/p fall w/?new compression fx and rhabdo     Problem/Plan - 1:  ·  Problem: Non-traumatic rhabdomyolysis.  Plan: admit to tele  iv hydration  trend ck.      Problem/Plan - 2:  ·  Problem: Fracture of lumbar spine.  Plan: appreciated ortho consult.      Problem/Plan - 3:  ·  Problem: Pneumothorax, unspecified type.  Plan: monitor.      Problem/Plan - 4:  ·  Problem: Atrial fibrillation, unspecified type.  Plan: pt off dig, had mare and was dc'd  monitor.      Problem/Plan - 5:  ·  Problem: acute Cholecystitis.  Plan: patient is a poor surgical candidate. IV abx, NPO, IVF

## 2020-06-08 NOTE — H&P ADULT - NSHPPHYSICALEXAM_GEN_ALL_CORE
Vital Signs Last 24 Hrs  T(C): 37.4 (08 Jun 2020 04:24), Max: 37.5 (08 Jun 2020 00:12)  T(F): 99.3 (08 Jun 2020 04:24), Max: 99.5 (08 Jun 2020 00:12)  HR: 87 (08 Jun 2020 04:24) (87 - 91)  BP: 106/64 (08 Jun 2020 04:24) (105/66 - 106/64)  BP(mean): --  RR: 26 (08 Jun 2020 04:24) (16 - 26)  SpO2: 96% (08 Jun 2020 04:24) (91% - 100%)    PHYSICAL EXAM:    GENERAL: NAD, elderly male  HEAD:  Atraumatic, Normocephalic  EYES: EOMI, PERRLA, conjunctiva and sclera clear  ENMT: No tonsillar erythema, exudates, or enlargement; Moist mucous membranes, No lesions  NECK: Supple, No JVD, Normal thyroid  NERVOUS SYSTEM:  Alert & Oriented X3 Motor Strength 5/5 B/L upper and lower extremities; DTRs 2+ intact and symmetric  CHEST/LUNG: Clear to percussion bilaterally; No rales, rhonchi, wheezing, or rubs  HEART: Regular rate and rhythm; No rubs, or gallops, +S1,S2  ABDOMEN: Soft, Nontender, Nondistended; Bowel sounds present  EXTREMITIES:  2+ Peripheral Pulses, No clubbing, cyanosis, or edema  LYMPH: No cervical adenopathy  RECTAL: deferred  BREAST: No palpatble masses, skin no lesions   : deferred  SKIN: No rashes or lesions    IMPROVE VTE Individual Risk Assessment        RISK                                                          Points  [  ] Previous VTE                                                3  [  ] Thrombophilia                                             2  [  ] Lower limb paralysis                                    2        (unable to hold up >15 seconds)    [  ] Current Cancer                                             2         (within 6 months)  [  x] Immobilization > 24 hrs                              1  [  ] ICU/CCU stay > 24 hours                            1  [ x ] Age > 60                                                    1  IMPROVE VTE Score ____2_____

## 2020-06-08 NOTE — PROGRESS NOTE ADULT - SUBJECTIVE AND OBJECTIVE BOX
88y Male presents with back pain 2/2 fall. Admits to Headstrike. Denies LOC/other orthopedic injuries at this time. Denies acute pain/injury elsewhere. Denies numbness/tingling/paresthesias. Denies saddle anesthesia. Denies changes in bowel/bladder function. Denies fevers/chills. No other complaints at this time. Pt mumbles and can be hard to understand at times.       PAST MEDICAL & SURGICAL HISTORY:  Lung nodules  SVC syndrome  Acute DVT (deep venous thrombosis)  Essential hypertension  Atrial fibrillation, unspecified type  H/O thyroidectomy    Home Medications:  melatonin 3 mg oral tablet: 2 tab(s) orally once a day (at bedtime) (25 Oct 2019 15:32)    Allergies    No Known Allergies    Intolerances                              12.5   15.42 )-----------( 81       ( 2020 22:53 )             38.3     06-07    138  |  100  |  30<H>  ----------------------------<  111<H>  3.4<L>   |  28  |  0.78    Ca    9.1      2020 22:53  Mg     2.1     06-07    TPro  8.2  /  Alb  3.3  /  TBili  2.8<H>  /  DBili  x   /  AST  131<H>  /  ALT  41  /  AlkPhos  76  06-07    PT/INR - ( 2020 22:53 )   PT: 14.9 sec;   INR: 1.32 ratio         PTT - ( 2020 22:53 )  PTT:29.1 sec  Urinalysis Basic - ( 2020 00:17 )    Color: Yellow / Appearance: Slightly Turbid / S.025 / pH: x  Gluc: x / Ketone: Moderate  / Bili: Negative / Urobili: 1 mg/dL   Blood: x / Protein: 100 mg/dL / Nitrite: Negative   Leuk Esterase: Trace / RBC: 3-5 /HPF / WBC 0-2   Sq Epi: x / Non Sq Epi: Few / Bacteria: Few        Vital Signs Last 24 Hrs  T(C): 37.5 (2020 00:12), Max: 37.5 (2020 00:12)  T(F): 99.5 (2020 00:12), Max: 99.5 (2020 00:12)  HR: 88 (2020 00:12) (88 - 91)  BP: 106/61 (2020 00:12) (105/66 - 106/61)  BP(mean): --  RR: 22 (2020 00:12) (16 - 22)  SpO2: 100% (2020 00:12) (91% - 100%)    PHYSICAL EXAM:  GEN: NAD, AAOx3    Spine:  Skin intact  No gross deformity  TTP over the upper/middle thoracic spine  No midline TTP C/L/S spine  No bony step-offs  No paraspinal muscle ttp/hypertonicity   Negative clonus  Negative babinski  Negative rider      MOTOR:           Deltoid        Bicep       Tricep      Wrist Ext      Finger Abd  R         5/5            5/5            5/5            5/5                 3/5	             L          5/5            5/5            5/5            5/5                 3/5                                Hip Flex       Quad      Ankle DF      Ankle PF       Toe Ext         R           4+/5           4+/5         4+/5             5/5                 5/5	                  L            4+/5           4+/5           5/5             5/5                 5/5                     SENSORY:              C5         C6         C7      C8       T1        (0=absent, 1=impaired, 2=normal, NT=not testable)  R         2            2           2        2         2  L          2            2           2        2         2               L2          L3         L4      L5       S1         (0=absent, 1=impaired, 2=normal, NT=not testable)  R         2            2            2        2        2  L          2            2           2        2         2      SECONDARY EXAM: Benign, Skin intact, SILT throughout, motor grossly intact throughout, no other orthopedic injuries at this time, compartments soft and compressible    RUE: Skin intact, no erythema, ecchymosis, edema, gross deformity, NTTP over the bony prominences of the shoulder/elbow/wrist/hand, painless passive/active ROM of the shoulder/elbow/wrist/hand, C5-T1 SILT, motor grossly intact throughout axillary/musculocutaneous/radial/median/ulnar nerves, + radial pulse    LUE: Skin intact, no erythema, ecchymosis, edema, gross deformity, NTTP over the bony prominences of the shoulder/elbow/wrist/hand, painless passive/active ROM of the shoulder/elbow/wrist/hand, C5-T1 SILT, motor grossly intact throughout axillary/musculocutaneous/radial/median/ulnar nerves, + radial pulse    RLE: Skin intact, no erythema, ecchymosis, edema, gross deformity, NTTP over the bony prominences of the hip/knee/ankle/foot, painless passive/active ROM of the hip/knee/ankle/foot, L2-S1 SILT, motor grossly intact throughout Hip Flexors/Quadriceps/Hamstrings/TA/EHL/FHL/GSC, + DP/PT pulses, no pain with log roll, no pain on axial loading, compartments soft and compressible, calf nontender    LLE: Skin intact, no erythema, ecchymosis, edema, gross deformity, NTTP over the bony prominences of the hip/knee/ankle/foot, painless passive/active ROM of the hip/knee/ankle/foot, L2-S1 SILT, motor grossly intact throughout Hip Flexors/Quadriceps/Hamstrings/TA/EHL/FHL/GSC, + DP/PT pulses, no pain with log roll, no pain on axial loading, compartments soft and compressible, calf nontender      Imaging:   XR T/L Spine: Ordered  CT Chest: Mild compression deformity of T10, severe VCF 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 Admits to Headstrike. Denies LOC/other orthopedic injuries at this time. Denies acute pain/injury elsewhere. Denies numbness/tingling/paresthesias. Denies saddle anesthesia. Denies changes in bowel/bladder function. Denies fevers/chills. No other complaints at this time. Pt speaks English and Yakut. Understands English but can be hard to understand at times when he mumbles. Understands most questions at 1st address but collateral info does report some increased confusion over the last few weeks per family.      PAST MEDICAL & SURGICAL HISTORY:  Lung nodules  SVC syndrome  Acute DVT (deep venous thrombosis)  Essential hypertension  Atrial fibrillation, unspecified type  H/O thyroidectomy    Home Medications:  melatonin 3 mg oral tablet: 2 tab(s) orally once a day (at bedtime) (25 Oct 2019 15:32)    Allergies    No Known Allergies    Intolerances                              12.5   15.42 )-----------( 81       ( 2020 22:53 )             38.3     06-07    138  |  100  |  30<H>  ----------------------------<  111<H>  3.4<L>   |  28  |  0.78    Ca    9.1      2020 22:53  Mg     2.1     06-07    TPro  8.2  /  Alb  3.3  /  TBili  2.8<H>  /  DBili  x   /  AST  131<H>  /  ALT  41  /  AlkPhos  76  06-07    PT/INR - ( 2020 22:53 )   PT: 14.9 sec;   INR: 1.32 ratio         PTT - ( 2020 22:53 )  PTT:29.1 sec  Urinalysis Basic - ( 2020 00:17 )    Color: Yellow / Appearance: Slightly Turbid / S.025 / pH: x  Gluc: x / Ketone: Moderate  / Bili: Negative / Urobili: 1 mg/dL   Blood: x / Protein: 100 mg/dL / Nitrite: Negative   Leuk Esterase: Trace / RBC: 3-5 /HPF / WBC 0-2   Sq Epi: x / Non Sq Epi: Few / Bacteria: Few        Vital Signs Last 24 Hrs  T(C): 37.5 (2020 00:12), Max: 37.5 (2020 00:12)  T(F): 99.5 (2020 00:12), Max: 99.5 (2020 00:12)  HR: 88 (2020 00:12) (88 - 91)  BP: 106/61 (2020 00:12) (105/66 - 106/61)  BP(mean): --  RR: 22 (2020 00:12) (16 - 22)  SpO2: 100% (2020 00:12) (91% - 100%)    PHYSICAL EXAM:  GEN: NAD, AAOx3    Spine:  Skin intact  No gross deformity  TTP over the upper/middle thoracic spine  No midline TTP C/L/S spine  No bony step-offs  No paraspinal muscle ttp/hypertonicity   Negative clonus  Negative babinski  Negative rider      MOTOR:           Deltoid        Bicep       Tricep      Wrist Ext      Finger Abd  R         5/5            5/5            5/5            5/5                 3/5	             L          5/5            5/5            5/5            5/5                 3/5                                Hip Flex       Quad      Ankle DF      Ankle PF       Toe Ext         R           4+/5           4+/5         4+/5             5/5                 5/5	                  L            4+/5           4+/5           5/5             5/5                 5/5                     SENSORY:              C5         C6         C7      C8       T1        (0=absent, 1=impaired, 2=normal, NT=not testable)  R         2            2           2        2         2  L          2            2           2        2         2               L2          L3         L4      L5       S1         (0=absent, 1=impaired, 2=normal, NT=not testable)  R         2            2            2        2        2  L          2            2           2        2         2      SECONDARY EXAM: Benign, Skin intact, SILT throughout, motor grossly intact throughout, no other orthopedic injuries at this time, compartments soft and compressible    RUE: Skin intact, no erythema, ecchymosis, edema, gross deformity, NTTP over the bony prominences of the shoulder/elbow/wrist/hand, painless passive/active ROM of the shoulder/elbow/wrist/hand, C5-T1 SILT, motor grossly intact throughout axillary/musculocutaneous/radial/median/ulnar nerves, + radial pulse    LUE: Skin intact, no erythema, ecchymosis, edema, gross deformity, NTTP over the bony prominences of the shoulder/elbow/wrist/hand, painless passive/active ROM of the shoulder/elbow/wrist/hand, C5-T1 SILT, motor grossly intact throughout axillary/musculocutaneous/radial/median/ulnar nerves, + radial pulse    RLE: Skin intact, no erythema, ecchymosis, edema, gross deformity, NTTP over the bony prominences of the hip/knee/ankle/foot, painless passive/active ROM of the hip/knee/ankle/foot, L2-S1 SILT, motor grossly intact throughout Hip Flexors/Quadriceps/Hamstrings/TA/EHL/FHL/GSC, + DP/PT pulses, no pain with log roll, no pain on axial loading, compartments soft and compressible, calf nontender    LLE: Skin intact, no erythema, ecchymosis, edema, gross deformity, NTTP over the bony prominences of the hip/knee/ankle/foot, painless passive/active ROM of the hip/knee/ankle/foot, L2-S1 SILT, motor grossly intact throughout Hip Flexors/Quadriceps/Hamstrings/TA/EHL/FHL/GSC, + DP/PT pulses, no pain with log roll, no pain on axial loading, compartments soft and compressible, calf nontender      Imaging:   XR T/L Spine: Ordered  CT Chest: Mild compression deformity of T10, severe VCF 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 Admits to Headstrike. Denies LOC/other orthopedic injuries at this time. Denies acute pain/injury elsewhere. Denies numbness/tingling/paresthesias. Denies saddle anesthesia. Denies changes in bowel/bladder function. Denies fevers/chills. No other complaints at this time. WAlks with cane at baseline. Pt speaks English and Green[place]ic. Understands English but can be hard to understand at times when he mumbles. Understands most questions at 1st address. Collateral info does report some increased confusion over the last few weeks per family.      PAST MEDICAL & SURGICAL HISTORY:  Lung nodules  SVC syndrome  Acute DVT (deep venous thrombosis)  Essential hypertension  Atrial fibrillation, unspecified type  H/O thyroidectomy    Home Medications:  melatonin 3 mg oral tablet: 2 tab(s) orally once a day (at bedtime) (25 Oct 2019 15:32)    Allergies    No Known Allergies    Intolerances                              12.5   15.42 )-----------( 81       ( 2020 22:53 )             38.3     06-07    138  |  100  |  30<H>  ----------------------------<  111<H>  3.4<L>   |  28  |  0.78    Ca    9.1      2020 22:53  Mg     2.1     06-07    TPro  8.2  /  Alb  3.3  /  TBili  2.8<H>  /  DBili  x   /  AST  131<H>  /  ALT  41  /  AlkPhos  76  06-07    PT/INR - ( 2020 22:53 )   PT: 14.9 sec;   INR: 1.32 ratio         PTT - ( 2020 22:53 )  PTT:29.1 sec  Urinalysis Basic - ( 2020 00:17 )    Color: Yellow / Appearance: Slightly Turbid / S.025 / pH: x  Gluc: x / Ketone: Moderate  / Bili: Negative / Urobili: 1 mg/dL   Blood: x / Protein: 100 mg/dL / Nitrite: Negative   Leuk Esterase: Trace / RBC: 3-5 /HPF / WBC 0-2   Sq Epi: x / Non Sq Epi: Few / Bacteria: Few        Vital Signs Last 24 Hrs  T(C): 37.5 (2020 00:12), Max: 37.5 (2020 00:12)  T(F): 99.5 (2020 00:12), Max: 99.5 (2020 00:12)  HR: 88 (2020 00:12) (88 - 91)  BP: 106/61 (2020 00:12) (105/66 - 106/61)  BP(mean): --  RR: 22 (2020 00:12) (16 - 22)  SpO2: 100% (2020 00:12) (91% - 100%)    PHYSICAL EXAM:  GEN: NAD, AAOx3    Spine:  Skin intact  No gross deformity  TTP over the upper/middle thoracic spine  No midline TTP C/L/S spine  No bony step-offs  No paraspinal muscle ttp/hypertonicity   Negative clonus  Negative babinski  Negative rider      MOTOR:           Deltoid        Bicep       Tricep      Wrist Ext      Finger Abd  R         5/5            5/5            5/5            5/5                 3/5	             L          5/5            5/5            5/5            5/5                 3/5                                Hip Flex       Quad      Ankle DF      Ankle PF       Toe Ext         R           5/5           5/5           5/5             5/5                 5/5	                  L            5/5           5/5           5/5             5/5                 5/5                     SENSORY:              C5         C6         C7      C8       T1        (0=absent, 1=impaired, 2=normal, NT=not testable)  R         2            2           2        2         2  L          2            2           2        2         2               L2          L3         L4      L5       S1         (0=absent, 1=impaired, 2=normal, NT=not testable)  R         2            2            2        2        2  L          2            2           2        2         2      SECONDARY EXAM: Benign, Skin intact, SILT throughout, motor grossly intact throughout, no other orthopedic injuries at this time, compartments soft and compressible    RUE: Skin intact, no erythema, ecchymosis, edema, gross deformity, NTTP over the bony prominences of the shoulder/elbow/wrist/hand, painless passive/active ROM of the shoulder/elbow/wrist/hand, C5-T1 SILT, motor grossly intact throughout axillary/musculocutaneous/radial/median/ulnar nerves, + radial pulse    LUE: Skin intact, no erythema, ecchymosis, edema, gross deformity, NTTP over the bony prominences of the shoulder/elbow/wrist/hand, painless passive/active ROM of the shoulder/elbow/wrist/hand, C5-T1 SILT, motor grossly intact throughout axillary/musculocutaneous/radial/median/ulnar nerves, + radial pulse    RLE: Skin intact, no erythema, ecchymosis, edema, gross deformity, NTTP over the bony prominences of the hip/knee/ankle/foot, painless passive/active ROM of the hip/knee/ankle/foot, L2-S1 SILT, motor grossly intact throughout Hip Flexors/Quadriceps/Hamstrings/TA/EHL/FHL/GSC, + DP/PT pulses, no pain with log roll, no pain on axial loading, compartments soft and compressible, calf nontender    LLE: Skin intact, no erythema, ecchymosis, edema, gross deformity, NTTP over the bony prominences of the hip/knee/ankle/foot, painless passive/active ROM of the hip/knee/ankle/foot, L2-S1 SILT, motor grossly intact throughout Hip Flexors/Quadriceps/Hamstrings/TA/EHL/FHL/GSC, + DP/PT pulses, no pain with log roll, no pain on axial loading, compartments soft and compressible, calf nontender      Imaging:   XR T/L Spine: Ordered  CT Chest: Mild compression deformity of T10, severe VCF at L2, mild-moderate compression deformity L5

## 2020-06-08 NOTE — ED ADULT NURSE REASSESSMENT NOTE - NS ED NURSE REASSESS COMMENT FT1
xray unable to be performed as per xray tech as pt is moving too much. pt is calm and cooperative in bed at this time. pt resting in bed 13. on cardiac monitor at bedside xray unable to be performed as per xray tech as pt is moving too much. pt is calm and cooperative in bed at this time. pt resting in bed 13. on cardiac monitor at bedside. orthopedist made aware

## 2020-06-08 NOTE — SWALLOW BEDSIDE ASSESSMENT ADULT - ORAL PREPARATORY PHASE
Refuses to accept bolus into oral cavity/Within functional limits Refuses to accept bolus into oral cavity

## 2020-06-08 NOTE — CONSULT NOTE ADULT - ASSESSMENT
PI:  Pt is an 89 y/o Male with PMHx of HTN,. Afib , SDH in past, DVT in past no longer on ac, thyroidectomy and thrombocytopenia comes to ED today s/p fall, no loc found down  by family. Pt c/o epigastric pain, back pain and was found in supine position at home. Pt is difficult to understand in ER , and states that whenever he eats he has the sensation of vomit. At triage pt had a saturation of 91. Spoke w/family in waiting room, and nephew says pt resides alone without a home health aide. Pt is checked on a once a day and was not responding to door or picking up phone today.  Pt's family reports a gradual decline over the past few weeks w/labored breathing and increased confusion.  No reported fever, chills, cp, palpitations, mild nausea/-v/-d/-c no travels or sick contacts.  in ed ct w/  1. Distended gallbladder with gallstones. There is clinical concern for gallbladder disease, follow-up with right upper quadrant sonogram.  2. There is presacral edema and perirectal fatty stranding. Limited evaluation of the rectum at this level, however underlying proctitis cannot be excluded. Recommend clinical correlation.  3. Suggestion of a recent mild compression fracture deformity of L5 without significant bony retropulsion. (08 Jun 2020 05:43)  ---------------- As Above --------------------------  Patient is a poor historian. History obtained from nephew. Was sent to the hospital because he had fallen and could not gt up.  Nephew states patient has been having difficulty eating ( would spit it out ) over the past few weeks.  Consult for abdominal pain and abnormal ultrasound / CT Scan.   See sonogram / CT scan    Abdominal pain /// Tenderness /// Abnormal sono/CT scan  - 1) NPO  2) Broad spectrum antibiotics 3) HIDA 4) ? EGD 5) f/u CRP

## 2020-06-08 NOTE — PROGRESS NOTE ADULT - ASSESSMENT
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

## 2020-06-08 NOTE — CONSULT NOTE ADULT - SUBJECTIVE AND OBJECTIVE BOX
HPI:  Pt is an 89 y/o Male with PMHx of HTN,. Afib , SDH in past, DVT in past no longer on ac, thyroidectomy and thrombocytopenia comes to ED today s/p fall, no loc found down  by family. Pt c/o epigastric pain, back pain and was found in supine position at home. Pt is difficult to understand in ER , and states that whenever he eats he has the sensation of vomit. At triage pt had a saturation of 91. Spoke w/family in waiting room, and nephew says pt resides alone without a home health aide. Pt is checked on a once a day and was not responding to door or picking up phone today.  Pt's family reports a gradual decline over the past few weeks w/labored breathing and increased confusion.  No reported fever, chills, cp, palpitations, mild nausea/-v/-d/-c no travels or sick contacts.  in ed ct w/  1. Distended gallbladder with gallstones. There is clinical concern for gallbladder disease, follow-up with right upper quadrant sonogram.  2. There is presacral edema and perirectal fatty stranding. Limited evaluation of the rectum at this level, however underlying proctitis cannot be excluded. Recommend clinical correlation.  3. Suggestion of a recent mild compression fracture deformity of L5 without significant bony retropulsion. (2020 05:43)  ---------------- As Above --------------------------  Patient is a poor historian. History obtained from nephew. Was sent to the hospital because he had fallen and could not gt up.  Nephew states patient has been having difficulty eating ( would spit it out ) over the past few weeks.  Consult for abdominal pain and abnormal ultrasound / CT Scan.   See sonogram / CT scan      PAST MEDICAL & SURGICAL HISTORY:  Lung nodules  SVC syndrome  Acute DVT (deep venous thrombosis)  Essential hypertension  Atrial fibrillation, unspecified type  H/O thyroidectomy      MEDICATIONS  (STANDING):  furosemide    Tablet 40 milliGRAM(s) Oral daily  lactated ringers. 1000 milliLiter(s) (80 mL/Hr) IV Continuous <Continuous>  pantoprazole  Injectable 40 milliGRAM(s) IV Push daily  piperacillin/tazobactam IVPB. 3.375 Gram(s) IV Intermittent once  piperacillin/tazobactam IVPB.. 3.375 Gram(s) IV Intermittent every 8 hours  QUEtiapine 12.5 milliGRAM(s) Oral at bedtime    MEDICATIONS  (PRN):      Allergies    No Known Allergies    Intolerances        FAMILY HISTORY:  No pertinent family history in first degree relatives      REVIEW OF SYSTEMS:    CONSTITUTIONAL: No fever, weight loss,   EYES: No eye pain, visual disturbances, or discharge  ENMT:  No difficulty hearing, tinnitus, vertigo; No sinus or throat pain  NECK: No pain or stiffness  BREASTS: No pain, masses, or nipple discharge  RESPIRATORY: No cough, wheezing, chills or hemoptysis; No shortness of breath  CARDIOVASCULAR: No chest pain, palpitations, dizziness, or leg swelling  GASTROINTESTINAL: See above  GENITOURINARY: No dysuria, frequency, hematuria, or incontinence  NEUROLOGICAL: No headaches,  numbness, or tremors  SKIN: No itching, burning, rashes, or lesions   LYMPH NODES: No enlarged glands  ENDOCRINE: No heat or cold intolerance; No hair loss  MUSCULOSKELETAL: No joint pain or swelling; No muscle, back, or extremity pain  PSYCHIATRIC: No depression, anxiety, mood swings, or difficulty sleeping  HEME/LYMPH: No easy bruising, or bleeding gums  ALLERGY AND IMMUNOLOGIC: No hives or eczema          SOCIAL HISTORY:    FAMILY HISTORY:  No pertinent family history in first degree relatives      Vital Signs Last 24 Hrs  T(C): 36.7 (2020 15:00), Max: 37.5 (2020 00:12)  T(F): 98 (2020 15:00), Max: 99.5 (2020 00:12)  HR: 88 (2020 15:00) (87 - 94)  BP: 124/64 (2020 15:00) (101/55 - 124/64)  BP(mean): --  RR: 22 (2020 15:00) (16 - 26)  SpO2: 98% (2020 15:00) (91% - 100%)    PHYSICAL EXAM:    GENERAL: NAD, well-groomed, well-developed  HEAD:  Atraumatic, Normocephalic  EYES: EOMI, PERRLA, conjunctiva and sclera clear  NECK: Supple, No JVD, Normal thyroid  NERVOUS SYSTEM:  Alert & Oriented X3, Good concentration;   CHEST/LUNG: Clear to percussion bilaterally; No rales, rhonchi, wheezing, or rubs  HEART: Regular rate and rhythm; No murmurs, rubs, or gallops  ABDOMEN: Soft, RUQ / mid epigatsric tenderness , Distended (+) ; Bowel sounds present  EXTREMITIES:  2+ Peripheral Pulses, No clubbing, cyanosis, or edema  LYMPH: No lymphadenopathy noted   RECTAL:  Deferred   SKIN: No rashes or lesions    LABS:                        12.5   15.42 )-----------( 81       ( 2020 22:53 )             38.3       CBC:   @ 22:53  WBC  15.42  HGB 12.5  HCT 38.3 Plate 81  MCV 98.5           2020 22:53    138    |  100    |  30     ----------------------------<  111    3.4     |  28     |  0.78     Ca    9.1        2020 22:53  Mg     2.1       2020 22:53    TPro  8.2    /  Alb  3.3    /  TBili  2.8    /  DBili  x      /  AST  131    /  ALT  41     /  AlkPhos  76     2020 22:53    PT/INR - ( 2020 22:53 )   PT: 14.9 sec;   INR: 1.32 ratio         PTT - ( 2020 22:53 )  PTT:29.1 sec  Urinalysis Basic - ( 2020 00:17 )    Color: Yellow / Appearance: Slightly Turbid / S.025 / pH: x  Gluc: x / Ketone: Moderate  / Bili: Negative / Urobili: 1 mg/dL   Blood: x / Protein: 100 mg/dL / Nitrite: Negative   Leuk Esterase: Trace / RBC: 3-5 /HPF / WBC 0-2   Sq Epi: x / Non Sq Epi: Few / Bacteria: Few      C-Reactive Protein, Serum: 7.53 mg/dL ( @ 07:44)      RADIOLOGY & ADDITIONAL STUDIES:  < from: CT Angio Abdomen and Pelvis w/ IV Cont (20 @ 01:57) >    EXAM:  CT ANGIO ABD PELV (W)AW IC                          EXAM:  CT ANGIO CHEST (W)AW IC                            PROCEDURE DATE:  2020          INTERPRETATION:  CLINICAL INFORMATION:  hypoxia, epigastric pain radiating to lower back    COMPARISON: CT chest, abdomen and pelvis 2020..    PROCEDURE:   Axial CT images were acquired through the chest without intravenous contrast.  Axial CTA images were acquired through the chest, abdomen and pelvis following the administration of intravenous contrast.  Intravenous contrast: 96 cc of Omnipaque-350 mg/ml were administered; 4 cc were discarded.  Oral contrast: None.  Two-dimensional and MIP reformats were generated. Three-D reformatted images reviewed on an independent workstation.    FINDINGS:  CHEST: Large, substernal and heterogeneous thyroid goiter again demonstrated with left lateral displacement/compression of the trachea. Heart is enlarged without significant pericardial effusion or pericardial thickening. Coronary artery calcifications are seen. No enlarged axillary, mediastinal or hilar lymph nodes. Esophagus is collapsed. Calcific atherosclerosis of the thoracic aorta which demonstrates mild aneurysmal dilatation of the ascending thoracic aorta measures 4.2 cm. No evidence for pulmonary embolus within the main pulmonary arterial segment, right interpolar artery and left main pulmonary artery, the remaining pulmonary arteries are suboptimally opacified for adequate evaluation. Small right pleural effusion. Small patchy groundglass opacities with stranding of the lung bases, left greater than right likely atelectasis, however developing infiltrate cannot be excluded. Suggestion of a tiny left pleural effusion. Negative for pneumothorax. Stable multiple bilateral small pulmonary nodules. Horizontal linear lucency through T10 suggests mild compression fracture deformity without significant bony retropulsion.        IMPRESSION:  1. Small right pleural effusion and tiny left pleural effusion and/or pleural thickening. Associated bibasilar patchy groundglass opacities/stranding may represent atelectasis and/or infiltrates.  2. There is an extremely tiny pneumothorax of the right anterior middle lobe, seen on axial images 83-93.  3.There is suggestion of a recent fracture of T10 with mild loss of vertebral body height, however no significant bony retropulsion. Recommend clinical correlation.  4. Mild aneurysmal dilatation of the ascending thoracic aorta, measures 4.2 cm and is grossly stable. Negative fordissection.  5. Limited evaluation of the pulmonary arteries due to suboptimal opacification with contrast, however no definite pulmonary embolus seen within the main pulmonary arterial segment, right main pulmonary artery or the left main pulmonary artery only.  6. Stable bilateral multiple small pulmonary nodules, measuring up to 4 mm.  7. Grossly stable appearance of an enlarged multinodular heterogeneous thyroid gland, consistent with goiter with leftward displacement/compression of the trachea.    CT ABDOMEN AND PELVIS: The liver, spleen, adrenal glands and pancreas are grossly unremarkable. The gallbladder is distended with small gallstones. Stomach is underdistended for adequate evaluation. Calcific atherosclerosis of the abdominal aorta without gross aneurysmal dilatation or dissection. Kidneys enhance symmetrically without hydronephrosis. Small low-attenuation renal lesions again seen, too small to characterize. Ureters are not dilated. The bladder is partially collapsed, withoutbladder wall thickening. Prostate gland are grossly enlarged. Seminal vesicles are symmetric.    There is presacral edema and perirectal fatty stranding. Limited evaluation of the rectum is limited without oral contrast, however mild proctitis cannotbe excluded. Colonic diverticulosis without definite acute diverticulitis. She was colon. No definite evidence for bowel obstruction. Gas distended right colon. No free air, significant ascites or gross lymphadenopathyThere. Severe compression fracture deformity of L2 again seen. There is a recent mild compression fracture deformity of L5 with horizontal linear lucency seen no significant bony retropulsion.    IMPRESSION:  1. Distended gallbladder with gallstones. There is clinical concern for gallbladder disease, follow-up with right upper quadrant sonogram.  2. There is presacral edema and perirectal fatty stranding. Limited evaluation of the rectum at this level, however underlying proctitis cannot be excluded. Recommend clinical correlation.  3. Suggestion of a recent mild compression fracture deformity of L5 without significant bony retropulsion.    Dr. Bonilla notified 2020 at 2:40amEST.                JOSEFA BROOKS M.D., ATTENDING RADIOLOGIST  This document has been electronically signed. 2020  2:41AM                < end of copied text >  < from: US Abdomen Complete (20 @ 09:44) >    EXAM:  US ABDOMINAL COMPLETE                            PROCEDURE DATE:  2020          INTERPRETATION:  CLINICAL INFORMATION: Abdominal pain    COMPARISON: CT dated 2020    TECHNIQUE: Sonography of the abdomen.     FINDINGS:  Liver: Heterogeneous echotexture.  Bile ducts: Normal caliber. Common bile duct measures 3.8 mm.   Gallbladder: Cholelithiasis. Distended. No wall thickening. Negative sonographic Alcazar's sign.        Pancreas: Visualized portions are within normal limits.  Spleen: 7.8 cm. Within normal limits.    Right kidney: 9.3 cm. No hydronephrosis.  Left kidney: 9.7 cm.  No hydronephrosis. 1.6 x 1.4 x 1.4 cm cyst.    Ascites: None.  Aorta and IVC: Poorly visualized.    IMPRESSION:   Cholelithiasis and distended gallbladder.  No biliary ductal dilatation.                  KARLENE BLOCK M.D., ATTENDING RADIOLOGIST  This document has been electronically signed. 2020  9:50AM                < end of copied text >

## 2020-06-08 NOTE — H&P ADULT - HISTORY OF PRESENT ILLNESS
Pt is an 87 y/o Male with PMHx of HTN,. Afib , SDH in past, DVT in past no longer on ac, thyroidectomy and thrombocytopenia comes to ED today s/p fall, no loc found down  by family. Pt c/o epigastric pain, back pain and was found in supine position at home. Pt is difficult to understand in ER , and states that whenever he eats he has the sensation of vomit. At triage pt had a saturation of 91. Spoke w/family in waiting room, and nephew says pt resides alone without a home health aide. Pt is checked on a once a day and was not responding to door or picking up phone today.  Pt's family reports a gradual decline over the past few weeks w/labored breathing and increased confusion.  No reported fever, chills, cp, palpitations, mild nausea/-v/-d/-c no travels or sick contacts.  in ed ct w/  1. Distended gallbladder with gallstones. There is clinical concern for gallbladder disease, follow-up with right upper quadrant sonogram.  2. There is presacral edema and perirectal fatty stranding. Limited evaluation of the rectum at this level, however underlying proctitis cannot be excluded. Recommend clinical correlation.  3. Suggestion of a recent mild compression fracture deformity of L5 without significant bony retropulsion.

## 2020-06-08 NOTE — H&P ADULT - NSHPSOCIALHISTORY_GEN_ALL_CORE
SOCIAL HISTORY  - Smoking: denied  - Alcohol: denied  - Drug Use: denied    FUNCTIONAL HISTORY  Pt lives in a private home alone. The house has 3 steps to enter to rail and no stairs inside. family checks daily

## 2020-06-08 NOTE — H&P ADULT - NSHPLABSRESULTS_GEN_ALL_CORE
LABS:                        12.5   15.42 )-----------( 81       ( 2020 22:53 )             38.3     06-07    138  |  100  |  30<H>  ----------------------------<  111<H>  3.4<L>   |  28  |  0.78    Ca    9.1      2020 22:53  Mg     2.1     06-07    TPro  8.2  /  Alb  3.3  /  TBili  2.8<H>  /  DBili  x   /  AST  131<H>  /  ALT  41  /  AlkPhos  76  06-07    PT/INR - ( 2020 22:53 )   PT: 14.9 sec;   INR: 1.32 ratio         PTT - ( 2020 22:53 )  PTT:29.1 sec  Urinalysis Basic - ( 2020 00:17 )    Color: Yellow / Appearance: Slightly Turbid / S.025 / pH: x  Gluc: x / Ketone: Moderate  / Bili: Negative / Urobili: 1 mg/dL   Blood: x / Protein: 100 mg/dL / Nitrite: Negative   Leuk Esterase: Trace / RBC: 3-5 /HPF / WBC 0-2   Sq Epi: x / Non Sq Epi: Few / Bacteria: Few      CAPILLARY BLOOD GLUCOSE            RADIOLOGY & ADDITIONAL TESTS:    Imaging Personally Reviewed:  [ X] YES  [ ] NO

## 2020-06-09 NOTE — PROGRESS NOTE ADULT - SUBJECTIVE AND OBJECTIVE BOX
Patient is a 88y old  Male who presents with a chief complaint of fall (2020 17:27)      OVERNIGHT EVENTS:  none     REVIEW OF SYSTEMS: poor historian    MEDICATIONS  (STANDING):  furosemide    Tablet 40 milliGRAM(s) Oral daily  lactated ringers. 1000 milliLiter(s) (80 mL/Hr) IV Continuous <Continuous>  pantoprazole  Injectable 40 milliGRAM(s) IV Push daily  piperacillin/tazobactam IVPB.. 3.375 Gram(s) IV Intermittent every 8 hours  QUEtiapine 12.5 milliGRAM(s) Oral at bedtime    MEDICATIONS  (PRN):      Allergies    No Known Allergies    Intolerances        T(F): 97.6 (20 @ 04:56), Max: 98.8 (20 @ 12:08)  HR: 102 (20 @ 04:56) (88 - 107)  BP: 117/62 (20 @ 04:56) (101/51 - 134/74)  RR: 20 (20 @ 04:56) (18 - 24)  SpO2: 97% (20 @ 04:56) (97% - 99%)  Wt(kg): --    PHYSICAL EXAM:  GENERAL: NAD  HEAD:  Atraumatic, Normocephalic  EYES: PERRLA, conjunctiva and sclera clear  ENMT: Moist mucous membranes  NECK: Supple, No JVD, Normal thyroid  NERVOUS SYSTEM:  Alert & Awake  CHEST/LUNG: Clear to percussion bilaterally;   HEART: Regular rate and rhythm;   ABDOMEN: Soft, Nontender, Nondistended; Bowel sounds present  EXTREMITIES:  no edema BL LE  SKIN: moist    LABS:                        11.2   10.80 )-----------( 74       ( 2020 06:23 )             34.5     06-09    138  |  104  |  35<H>  ----------------------------<  106<H>  3.1<L>   |  26  |  0.62    Ca    8.4<L>      2020 06:23  Mg     2.1     06-07    TPro  6.8  /  Alb  2.6<L>  /  TBili  1.7<H>  /  DBili  x   /  AST  103<H>  /  ALT  47  /  AlkPhos  56  06-09    PT/INR - ( 2020 22:53 )   PT: 14.9 sec;   INR: 1.32 ratio         PTT - ( 2020 22:53 )  PTT:29.1 sec  Urinalysis Basic - ( 2020 00:17 )    Color: Yellow / Appearance: Slightly Turbid / S.025 / pH: x  Gluc: x / Ketone: Moderate  / Bili: Negative / Urobili: 1 mg/dL   Blood: x / Protein: 100 mg/dL / Nitrite: Negative   Leuk Esterase: Trace / RBC: 3-5 /HPF / WBC 0-2   Sq Epi: x / Non Sq Epi: Few / Bacteria: Few      Cultures;   CAPILLARY BLOOD GLUCOSE        Lipid panel:     CARDIAC MARKERS ( 2020 06:23 )  x     / x     / 986 U/L / x     / x      CARDIAC MARKERS ( 2020 07:05 )  .372 ng/mL / x     / 2687 U/L / x     / 44.1 ng/mL  CARDIAC MARKERS ( 2020 22:53 )  .488 ng/mL / x     / 4520 U/L / x     / x            RADIOLOGY & ADDITIONAL TESTS:    Imaging Personally Reviewed:  [x ] YES      Consultant(s) Notes Reviewed:  [x ] YES     Care Discussed with [x ] Consultants [X ] Patient [ ] Family  [x ]    [x ]  Other; RN

## 2020-06-09 NOTE — CHART NOTE - NSCHARTNOTEFT_GEN_A_CORE
Case and images reviewed with Dr. Ball. MRI demonstrates chronic L2, L3, and L4 VCFx's and acute T10, L1, L5 VCFx's.     -No acute orthopedic surgical intervention needed at this time  -XRs in chart for follow up  -Discussed with attending who agrees with plan  -Ortho stable   -Please reconsult as needed

## 2020-06-09 NOTE — PROGRESS NOTE ADULT - ASSESSMENT
PI:  Pt is an 87 y/o Male with PMHx of HTN,. Afib , SDH in past, DVT in past no longer on ac, thyroidectomy and thrombocytopenia comes to ED today s/p fall, no loc found down  by family. Pt c/o epigastric pain, back pain and was found in supine position at home. Pt is difficult to understand in ER , and states that whenever he eats he has the sensation of vomit. At triage pt had a saturation of 91. Spoke w/family in waiting room, and nephew says pt resides alone without a home health aide. Pt is checked on a once a day and was not responding to door or picking up phone today.  Pt's family reports a gradual decline over the past few weeks w/labored breathing and increased confusion.  No reported fever, chills, cp, palpitations, mild nausea/-v/-d/-c no travels or sick contacts.  in ed ct w/  1. Distended gallbladder with gallstones. There is clinical concern for gallbladder disease, follow-up with right upper quadrant sonogram.  2. There is presacral edema and perirectal fatty stranding. Limited evaluation of the rectum at this level, however underlying proctitis cannot be excluded. Recommend clinical correlation.  3. Suggestion of a recent mild compression fracture deformity of L5 without significant bony retropulsion. (08 Jun 2020 05:43)  ---------------- As Above --------------------------  Patient is a poor historian. History obtained from nephew. Was sent to the hospital because he had fallen and could not gt up.  Nephew states patient has been having difficulty eating ( would spit it out ) over the past few weeks.  Consult for abdominal pain and abnormal ultrasound / CT Scan.   See sonogram / CT scan    6/9/20  --------  a) Abdominal pain /// Tenderness /// Abnormal sono/CT scan .. CRP 7.53 The HIDA SCAN was negative. Elevated LFTs that are resolving  R/O CBD stone - 1) Clear liquid diet   2) Broad spectrum antibiotics 3) MRCP 4) ? EGD 5) f/u CRP  b) ? Dysphagia - see speech evaluation , Trachea deviation from a large goiter - 1) MBS

## 2020-06-09 NOTE — PROGRESS NOTE ADULT - ASSESSMENT
89 y/o Male with PMHx of HTN,. Afib , SDH in past, DVT in past no longer on ac, thyroidectomy and thrombocytopenia comes to ED today s/p fall w/?new compression fx and rhabdo. Pt c/o epigastric pain, back pain and was found in supine position at home. Pt is difficult to understand in ER , and states that whenever he eats he has the sensation of vomit. At triage pt had a saturation of 91. Pt's family reports a gradual decline over the past few weeks w/labored breathing and increased confusion.  No reported fever, chills, cp, palpitations, mild nausea/-v/-d/-c no travels or sick contacts.    Non-traumatic rhabdomyolysis, s/p fall- cont on telemetry, iv hydration, trend ck  Fracture of lumbar spine- ortho on board. MRI demonstrates chronic L2, L3, and L4 VCFx's and acute T10, L1, L5 VCFx's. No acute orthopedic surgical intervention needed at this time per ortho.   Tiny Pneumothorax, unspecified type- monitor  Acute abdominal pain-   -CT abd- Distended gallbladder with gallstones. presacral edema and perirectal fatty stranding.   -US abd- Cholelithiasis and distended gallbladder. No biliary ductal dilatation.   -Seen by Sx/Dr. Salmeron.; no acute surgical intervention at this time. Patient is not a surgical candidate. If suspicion for acute cholecystitis arises, recommended HIDA scan and IR consult for perc vu.   -Seen by GI/Dr. Renae ; NPO, Broad spectrum antibiotics, HIDA, poss EGD, pain mgmt   Hypokalemia- lyte replaced, monitor  Elev trop; likely demand ischemia s/p fall  Acute metabolic encephalopathy/dementia- monitor mental status   Chronic AFIB- not on AC per spouse hx of SDH. pt off dig, had mare and was dc'd, monitor  Preventive measure- IPC    spouse- [phone]

## 2020-06-09 NOTE — PROGRESS NOTE ADULT - SUBJECTIVE AND OBJECTIVE BOX
Patient is a 88y old  Male who presents with a chief complaint of fall (2020 10:50)      HPI:  Pt is an 89 y/o Male with PMHx of HTN,. Afib , SDH in past, DVT in past no longer on ac, thyroidectomy and thrombocytopenia comes to ED today s/p fall, no loc found down  by family. Pt c/o epigastric pain, back pain and was found in supine position at home. Pt is difficult to understand in ER , and states that whenever he eats he has the sensation of vomit. At triage pt had a saturation of 91. Spoke w/family in waiting room, and nephew says pt resides alone without a home health aide. Pt is checked on a once a day and was not responding to door or picking up phone today.  Pt's family reports a gradual decline over the past few weeks w/labored breathing and increased confusion.  No reported fever, chills, cp, palpitations, mild nausea/-v/-d/-c no travels or sick contacts.  in ed ct w/  1. Distended gallbladder with gallstones. There is clinical concern for gallbladder disease, follow-up with right upper quadrant sonogram.  2. There is presacral edema and perirectal fatty stranding. Limited evaluation of the rectum at this level, however underlying proctitis cannot be excluded. Recommend clinical correlation.  3. Suggestion of a recent mild compression fracture deformity of L5 without significant bony retropulsion. (2020 05:43)      INTERVAL HPI/OVERNIGHT EVENTS: Patient seen earlier today  The patient /nurse denies melena, hematochezia, hematemesis, nausea, vomiting, abdominal pain, constipation, diarrhea, or change in bowel movements Nurse states that the patient had trouble swallowing    MEDICATIONS  (STANDING):  furosemide    Tablet 40 milliGRAM(s) Oral daily  lactated ringers. 1000 milliLiter(s) (80 mL/Hr) IV Continuous <Continuous>  pantoprazole  Injectable 40 milliGRAM(s) IV Push daily  piperacillin/tazobactam IVPB.. 3.375 Gram(s) IV Intermittent every 8 hours  QUEtiapine 12.5 milliGRAM(s) Oral at bedtime    MEDICATIONS  (PRN):      FAMILY HISTORY:  No pertinent family history in first degree relatives      Allergies    No Known Allergies    Intolerances        PMH/PSH:  Lung nodules  SVC syndrome  Acute DVT (deep venous thrombosis)  Essential hypertension  Atrial fibrillation, unspecified type  Cardiac complaint  H/O thyroidectomy  No significant past surgical history        REVIEW OF SYSTEMS:  CONSTITUTIONAL: No fever, weight loss, or fatigue  EYES: No eye pain, visual disturbances, or discharge  ENMT:  No difficulty hearing, tinnitus, vertigo; No sinus or throat pain  NECK: No pain or stiffness  BREASTS: No pain, masses, or nipple discharge  RESPIRATORY: No cough, wheezing, chills or hemoptysis; No shortness of breath  CARDIOVASCULAR: No chest pain, palpitations, dizziness, or leg swelling  GASTROINTESTINAL:  see above  GENITOURINARY: No dysuria, frequency, hematuria, or incontinence  NEUROLOGICAL: No headaches, memory loss, loss of strength, numbness, or tremors  SKIN: No itching, burning, rashes, or lesions   LYMPH NODES: No enlarged glands  ENDOCRINE: No heat or cold intolerance; No hair loss  MUSCULOSKELETAL: No joint pain or swelling; No muscle, back, or extremity pain  PSYCHIATRIC: No depression, anxiety, mood swings, or difficulty sleeping  HEME/LYMPH: No easy bruising, or bleeding gums  ALLERGY AND IMMUNOLOGIC: No hives or eczema    Vital Signs Last 24 Hrs  T(C): 36.5 (2020 12:42), Max: 37.1 (2020 23:58)  T(F): 97.7 (2020 12:42), Max: 98.7 (2020 23:58)  HR: 112 (2020 12:42) (88 - 112)  BP: 105/58 (2020 12:42) (101/51 - 134/74)  BP(mean): --  RR: 18 (2020 12:42) (18 - 22)  SpO2: 99% (2020 12:42) (97% - 99%)    PHYSICAL EXAM:  GENERAL: NAD, well-groomed, well-developed  HEAD:  Atraumatic, Normocephalic  EYES: EOMI, PERRLA, conjunctiva and sclera clear  NECK: Supple, No JVD, Normal thyroid  NERVOUS SYSTEM:  Alert & Oriented X3, Good concentration; Motor Strength 5/5 B/L upper and lower extremities;   CHEST/LUNG: Clear to percussion bilaterally; No rales, rhonchi, wheezing, or rubs  HEART: Regular rate and rhythm; No murmurs, rubs, or gallops  ABDOMEN: Soft, Nontender, Nondistended; Bowel sounds present  EXTREMITIES:  2+ Peripheral Pulses, No clubbing, cyanosis, or edema  LYMPH: No lymphadenopathy noted  SKIN: No rashes or lesions    LAB                          11.2   10.80 )-----------( 74       ( 2020 06:23 )             34.5       CBC:   06:23  WBC 10.80   Hgb 11.2   Hct 34.5   Plts 74  MCV 97.5   @ 22:53  WBC 15.42   Hgb 12.5   Hct 38.3   Plts 81  MCV 98.5      Chemistry:   @ 06:23  Na+ 138  K+ 3.1  Cl- 104  CO2 26  BUN 35  Cr 0.62      @ 22:53  Na+ 138  K+ 3.4  Cl- 100  CO2 28  BUN 30  Cr 0.78         Glucose, Serum: 106 mg/dL ( @ 06:23)  Glucose, Serum: 111 mg/dL ( @ 22:53)      2020 06:23    138    |  104    |  35     ----------------------------<  106    3.1     |  26     |  0.62   2020 22:53    138    |  100    |  30     ----------------------------<  111    3.4     |  28     |  0.78     Ca    8.4        2020 06:23  Ca    9.1        2020 22:53  Mg     2.1       2020 22:53    TPro  6.8    /  Alb  2.6    /  TBili  1.7    /  DBili  x      /  AST  103    /  ALT  47     /  AlkPhos  56     2020 06:23  TPro  8.2    /  Alb  3.3    /  TBili  2.8    /  DBili  x      /  AST  131    /  ALT  41     /  AlkPhos  76     2020 22:53      PT/INR - ( 2020 22:53 )   PT: 14.9 sec;   INR: 1.32 ratio         PTT - ( 2020 22:53 )  PTT:29.1 sec    Urinalysis Basic - ( 2020 00:17 )    Color: Yellow / Appearance: Slightly Turbid / S.025 / pH: x  Gluc: x / Ketone: Moderate  / Bili: Negative / Urobili: 1 mg/dL   Blood: x / Protein: 100 mg/dL / Nitrite: Negative   Leuk Esterase: Trace / RBC: 3-5 /HPF / WBC 0-2   Sq Epi: x / Non Sq Epi: Few / Bacteria: Few        CAPILLARY BLOOD GLUCOSE          C-Reactive Protein, Serum: 7.53 mg/dL ( @ 07:44)      RADIOLOGY & ADDITIONAL TESTS:    Imaging Personally Reviewed:  [ ] YES  [ ] NO    Consultant(s) Notes Reviewed:  [ ] YES  [ ] NO    Care Discussed with Consultants/Other Providers [ ] YES  [ ] NO

## 2020-06-10 NOTE — PHYSICAL THERAPY INITIAL EVALUATION ADULT - DID THE PATIENT HAVE SURGERY?
n/a/pt was admitted to [facility] 2o Cholecystitis, Fx lumbar spinea s/p fall: T10, L1, L5 vertebral fractures.

## 2020-06-10 NOTE — PROGRESS NOTE ADULT - SUBJECTIVE AND OBJECTIVE BOX
Patient is a 88y old  Male who presents with a chief complaint of fall (10 [place] 2020 11:36)      OVERNIGHT EVENTS:  none     REVIEW OF SYSTEMS: denies chest pain/SOB, diaphoresis, no F/C, cough, dizziness, headache, blurry vision, nausea, vomiting, abdominal pain. All others review of systems negative     MEDICATIONS  (STANDING):  aspirin 325 milliGRAM(s) Oral daily  furosemide    Tablet 40 milliGRAM(s) Oral daily  pantoprazole  Injectable 40 milliGRAM(s) IV Push daily  piperacillin/tazobactam IVPB.. 3.375 Gram(s) IV Intermittent every 8 hours  QUEtiapine 12.5 milliGRAM(s) Oral at bedtime    MEDICATIONS  (PRN):      Allergies    No Known Allergies    Intolerances        T(F): 97.6 (06-10-20 @ 07:00), Max: 98 (06-09-20 @ 17:55)  HR: 109 (06-10-20 @ 07:00) (90 - 109)  BP: 114/65 (06-10-20 @ 07:00) (105/51 - 114/65)  RR: 18 (06-10-20 @ 07:00) (18 - 18)  SpO2: 97% (06-10-20 @ 07:00) (95% - 98%)  Wt(kg): --    PHYSICAL EXAM:  GENERAL: NAD  HEAD:  Atraumatic, Normocephalic  EYES: PERRLA, conjunctiva and sclera clear  ENMT: Moist mucous membranes  NECK: Supple, No JVD, Normal thyroid  NERVOUS SYSTEM:  Alert & Awake  CHEST/LUNG: Clear to percussion bilaterally;   HEART: Regular rate and rhythm;   ABDOMEN: Soft, Nontender, Nondistended; Bowel sounds present  EXTREMITIES:  no edema BL LE  SKIN: moist    LABS:                        11.7   9.05  )-----------( 90       ( 10 [place] 2020 06:41 )             36.5     06-10    141  |  102  |  32<H>  ----------------------------<  93  2.9<LL>   |  28  |  0.70    Ca    8.4<L>      10 [place] 2020 06:41  Mg     2.1     06-10    TPro  7.1  /  Alb  2.6<L>  /  TBili  1.7<H>  /  DBili  x   /  AST  108<H>  /  ALT  58  /  AlkPhos  54  06-10        Cultures;   CAPILLARY BLOOD GLUCOSE        Lipid panel:     CARDIAC MARKERS ( 10 [place] 2020 06:41 )  x     / x     / 885 U/L / x     / x      CARDIAC MARKERS ( 09 Jun 2020 06:23 )  x     / x     / 986 U/L / x     / x            RADIOLOGY & ADDITIONAL TESTS:    Imaging Personally Reviewed:  [x ] YES      Consultant(s) Notes Reviewed:  [x ] YES     Care Discussed with [x ] Consultants [X ] Patient [ ] Family  [x ]    [x ]  Other; RN

## 2020-06-10 NOTE — PHYSICAL THERAPY INITIAL EVALUATION ADULT - CRITERIA FOR SKILLED THERAPEUTIC INTERVENTIONS
anticipated discharge recommendation/Subacute Rehab/functional limitations in following categories/predicted duration of therapy intervention/therapy frequency/risk reduction/prevention/rehab potential/impairments found

## 2020-06-10 NOTE — SWALLOW VFSS/MBS ASSESSMENT ADULT - ADDITIONAL INFORMATION
noted increased tissue or bulging of posterior pharyngeal wall at the ?C3-5 - across from epiglottis noted increased tissue or bulging of posterior pharyngeal wall at ?C3-5 - across from epiglottis

## 2020-06-10 NOTE — PROGRESS NOTE ADULT - ASSESSMENT
89 y/o Male with PMHx of HTN,. Afib , SDH in past, DVT in past no longer on ac, thyroidectomy and thrombocytopenia comes to ED today s/p fall w/?new compression fx and rhabdo. Pt c/o epigastric pain, back pain and was found in supine position at home. Pt is difficult to understand in ER , and states that whenever he eats he has the sensation of vomit. At triage pt had a saturation of 91. Pt's family reports a gradual decline over the past few weeks w/labored breathing and increased confusion.  No reported fever, chills, cp, palpitations, mild nausea/-v/-d/-c no travels or sick contacts.    Non-traumatic rhabdomyolysis, s/p fall- cont on telemetry, iv hydration, trend ck  Fracture of lumbar spine- ortho on board. MRI demonstrates chronic L2, L3, and L4 VCFx's and acute T10, L1, L5 VCFx's. No acute orthopedic surgical intervention needed at this time per ortho.   Tiny Pneumothorax, unspecified type- monitor  Acute abdominal pain-   -CT abd- Distended gallbladder with gallstones. presacral edema and perirectal fatty stranding.   -US abd- Cholelithiasis and distended gallbladder. No biliary ductal dilatation.   -Seen by Sx/Dr. Salmeron.; no acute surgical intervention at this time. Patient is not a surgical candidate.  -Seen by GI/Dr. Renae ; NPO, Broad spectrum antibiotics, HIDA neg, MBS- Incomplete epiglottic deflection. F/UP Sx. poss EGD, pain mgmt   Hypokalemia- lyte replaced, monitor  Elev trop; likely demand ischemia s/p fall  Acute metabolic encephalopathy/dementia- monitor mental status   Chronic AFIB- not on AC per spouse hx of SDH. pt off dig, had mare and was dc'd, monitor  Preventive measure- IPC    spouse- [phone]

## 2020-06-10 NOTE — PHYSICAL THERAPY INITIAL EVALUATION ADULT - ADDITIONAL COMMENTS
as per niece, Ms Anderson, pt lives alone in a , pt has 3-4 steps to negotiate with 2 HR's, inside the house everything else is on one level. has a basement but he does not access the basement. pt's has been independent using a cane and rolling walker, was in Rehab on [place] for 2 weeks and a walker was provided. Pt's niece and nephew assist patient on shopping, cleaning the house, laundry.

## 2020-06-10 NOTE — PHYSICAL THERAPY INITIAL EVALUATION ADULT - BED MOBILITY TRAINING, PT EVAL
Pt will independently perform all aspects of bed mobility to help prevent pressure ulcers, by 3-4 weeks

## 2020-06-10 NOTE — SWALLOW VFSS/MBS ASSESSMENT ADULT - ORAL PHASE
Laryngeal penetration before swallow - silent/Uncontrolled bolus / spillover in hypopharynx/Residue in oral cavity Residue in oral cavity/Uncontrolled bolus / spillover in hypopharynx/Delayed oral transit time Residue in oral cavity/Delayed oral transit time

## 2020-06-10 NOTE — PHYSICAL THERAPY INITIAL EVALUATION ADULT - PERTINENT HX OF CURRENT PROBLEM, REHAB EVAL
pt was admitted to [facility] 2o Cholecystitis, Fx lumbar spinea s/p fall: T10, L1, L5 vertebral fractures.

## 2020-06-10 NOTE — PHYSICAL THERAPY INITIAL EVALUATION ADULT - MD/RN NOTIFIED
Pt's dad states they were told someone would call them with the test results from his blood work and let them know if they were going to start his son on an antibiotic, advised that the lab results have not been reviewed by a physician yet per chart review currently. He will call back tomorrow morning if he has not heard back yet with the results.    Florence Morel RN FNA       Reason for Disposition    Caller requesting lab results(Timing: use nursing judgment to determine urgency of PCP contact)    Protocols used: [facility]       yes

## 2020-06-10 NOTE — SWALLOW VFSS/MBS ASSESSMENT ADULT - SPECIFY REASON(S)
pt with increased congestion/secretions and c/o difficulty swallowing - GI asked for objective testing.

## 2020-06-10 NOTE — PROGRESS NOTE ADULT - ASSESSMENT
PI:  Pt is an 87 y/o Male with PMHx of HTN,. Afib , SDH in past, DVT in past no longer on ac, thyroidectomy and thrombocytopenia comes to ED today s/p fall, no loc found down  by family. Pt c/o epigastric pain, back pain and was found in supine position at home. Pt is difficult to understand in ER , and states that whenever he eats he has the sensation of vomit. At triage pt had a saturation of 91. Spoke w/family in waiting room, and nephew says pt resides alone without a home health aide. Pt is checked on a once a day and was not responding to door or picking up phone today.  Pt's family reports a gradual decline over the past few weeks w/labored breathing and increased confusion.  No reported fever, chills, cp, palpitations, mild nausea/-v/-d/-c no travels or sick contacts.  in ed ct w/  1. Distended gallbladder with gallstones. There is clinical concern for gallbladder disease, follow-up with right upper quadrant sonogram.  2. There is presacral edema and perirectal fatty stranding. Limited evaluation of the rectum at this level, however underlying proctitis cannot be excluded. Recommend clinical correlation.  3. Suggestion of a recent mild compression fracture deformity of L5 without significant bony retropulsion. (08 Jun 2020 05:43)  ---------------- As Above --------------------------  Patient is a poor historian. History obtained from nephew. Was sent to the hospital because he had fallen and could not gt up.  Nephew states patient has been having difficulty eating ( would spit it out ) over the past few weeks.  Consult for abdominal pain and abnormal ultrasound / CT Scan.   See sonogram / CT scan    6/9/20  --------  a) Abdominal pain ( resolved )  /// Tenderness ( resolved )  /// Abnormal sono/CT scan .. CRP 7.53 The HIDA SCAN was negative. Elevated LFTs that were  resolving  2.8 / 131 / 41 / 76 --> 1.7 / 103 / 47 / 56 --> 1.7 / 108 / 58 / 54 R/O CBD stone - 1) Clear liquid diet   2) Broad spectrum antibiotics 3) MRCP ( patient refused )  4) ? EGD 5) f/u CRP  b) ? Dysphagia - see speech evaluation , Trachea deviation from a large goiter - 1) MBS today

## 2020-06-10 NOTE — SWALLOW VFSS/MBS ASSESSMENT ADULT - COMMENTS
CT angio chest with IVcontrastIMPRESSION:  1. Small right pleural effusion and tiny left pleural effusion and/or pleural thickening. Associated bibasilar patchy groundglass opacities/stranding may represent atelectasis and/or infiltrates.  2. There is an extremely tiny pneumothorax of the right anterior middle lobe, seen on axial images 83-93.  3.There is suggestion of a recent fracture of T10 with mild loss of vertebral body height, however no significant bony retropulsion. Recommend clinical correlation.  4. Mild aneurysmal dilatation of the ascending thoracic aorta, measures 4.2 cm and is grossly stable. Negative for dissection.  5. Limited evaluation of the pulmonary arteries due to suboptimal opacification with contrast, however no definite pulmonary embolus seen within the main pulmonary arterial segment, right main pulmonary artery or the left main pulmonary artery only.  6. Stable bilateral multiple small pulmonary nodules, measuring up to 4 mm.  7. Grossly stable appearance of an enlarged multinodular heterogeneous thyroid gland, consistent with goiter with leftward displacement/compression of the trachea.    CT cervical spine 6/2020 INTERPRETATION:  CT cervical spine without contrastHistory injury  There is no fracture or acute subluxation or prevertebral soft tissue widening. There is ankylosis at C2-3. There is moderately severe multilevel degenerative disc change without significant spinal stenosis.  Note is made of a large goiter which moderately compresses trachea at the level of the thoracic inlet with leftward displacement. The lumen measures roughly 6 x 11 mm axially at its narrowest point.  IMPRESSION:  No acute traumatic findings. Goiter with tracheal compression.

## 2020-06-10 NOTE — SWALLOW VFSS/MBS ASSESSMENT ADULT - DIAGNOSTIC IMPRESSIONS
pt presented oropharyngeal dysphagia marked by reduced lingual coordination/strength causing decreased bolus formation with oral residue and premature spill into hypopharynx, penetration into the laryngeal vestibule before the swallow with thin liquids, delay in swallow trigger with decreased laryngeal closure causing aspiration during the swallow; decreased epiglottic deflection with reduced BOT retraction causing mild to severe residue in the valleculae depending on the consistency- all may be 2/2 bulging posterior pharyngeal wall, reduced laryngeal elevation vs CP opening causing mild to mod residue in the PFS with subsequent aspiration after the swallow of residue material. pt unable to clear aspiration.

## 2020-06-10 NOTE — SWALLOW VFSS/MBS ASSESSMENT ADULT - RESIDUE IN VALLECULAE
Moderate Severe/Moderate Moderate/?2/2 posterior pharyngeal wall Moderate/?2/2 posterior pharyngeal wall/Severe

## 2020-06-10 NOTE — PHYSICAL THERAPY INITIAL EVALUATION ADULT - PATIENT/FAMILY AGREES WITH PLAN
Subacute Rehab, explained to Pt's niece Ms Anderson via phone, pt's niece wants him home with home PT./no

## 2020-06-10 NOTE — SWALLOW VFSS/MBS ASSESSMENT ADULT - SLP PERTINENT HISTORY OF CURRENT PROBLEM
clinical swallow eval completed 6/8/2020- puree with honey thick liquids was recommended at the time. see report for full details. pt with large goiter which moderately compresses trachea at the level of the thoracic inlet with leftward displacement

## 2020-06-10 NOTE — SWALLOW VFSS/MBS ASSESSMENT ADULT - SLP GENERAL OBSERVATIONS
pt seen sitting in the hausted chair alert and accepted po trials/cooperative for exam. pt with difficulty executing directions

## 2020-06-10 NOTE — SWALLOW VFSS/MBS ASSESSMENT ADULT - H & P REVIEW
Pt is an 89 y/o Male with PMHx of HTN,. Afib , SDH in past, DVT in past no longer on ac, thyroidectomy and thrombocytopenia comes to ED today s/p fall, no loc found down  by family. Pt c/o epigastric pain, back pain and was found in supine position at home. Pt is difficult to understand in ER , and states that whenever he eats he has the sensation of vomit. At triage pt had a saturation of 91. Spoke w/family in waiting room, and nephew says pt resides alone without a home health aide. Pt is checked on a once a day and was not responding to door or picking up phone today.  Pt's family reports a gradual decline over the past few weeks w/labored breathing and increased confusion.  No reported fever, chills, cp, palpitations, mild nausea/-v/-d/-c no travels or sick contacts./yes

## 2020-06-10 NOTE — PHYSICAL THERAPY INITIAL EVALUATION ADULT - BALANCE TRAINING, PT EVAL
Pt will increase static/dynamic standing balance to WFL to perform all functional mobility without LOB, by 3-4 weeks

## 2020-06-11 NOTE — PROGRESS NOTE ADULT - SUBJECTIVE AND OBJECTIVE BOX
Patient is a 88y old  Male who presents with a chief complaint of fall (11 Jun 2020 12:23)      OVERNIGHT EVENTS:  none     REVIEW OF SYSTEMS: poor historian    MEDICATIONS  (STANDING):  aspirin 325 milliGRAM(s) Oral daily  dextrose 5% + sodium chloride 0.45% 1000 milliLiter(s) (60 mL/Hr) IV Continuous <Continuous>  furosemide    Tablet 40 milliGRAM(s) Oral daily  pantoprazole  Injectable 40 milliGRAM(s) IV Push daily  piperacillin/tazobactam IVPB.. 3.375 Gram(s) IV Intermittent every 8 hours  QUEtiapine 12.5 milliGRAM(s) Oral at bedtime    MEDICATIONS  (PRN):      Allergies    No Known Allergies    Intolerances        T(F): 98.2 (06-11-20 @ 11:19), Max: 99.6 (06-11-20 @ 04:54)  HR: 100 (06-11-20 @ 11:19) (98 - 106)  BP: 96/49 (06-11-20 @ 11:19) (93/47 - 108/61)  RR: 20 (06-11-20 @ 11:19) (18 - 22)  SpO2: 92% (06-11-20 @ 11:19) (90% - 94%)  Wt(kg): --    PHYSICAL EXAM:  GENERAL: NAD  HEAD:  Atraumatic, Normocephalic  EYES: PERRLA, conjunctiva and sclera clear  ENMT: Moist mucous membranes  NECK: Supple, No JVD, Normal thyroid  NERVOUS SYSTEM:  Alert & Awake  CHEST/LUNG: Clear to percussion bilaterally;   HEART: Regular rate and rhythm;   ABDOMEN: Soft, Nontender, Nondistended; Bowel sounds present  EXTREMITIES:  no edema BL LE  SKIN: moist    LABS:                        11.4   7.78  )-----------( 88       ( 11 Jun 2020 06:36 )             34.2     06-11    142  |  103  |  31<H>  ----------------------------<  94  3.1<L>   |  32<H>  |  0.64    Ca    8.3<L>      11 Jun 2020 06:36  Mg     2.1     06-10    TPro  6.9  /  Alb  2.5<L>  /  TBili  1.5<H>  /  DBili  x   /  AST  71<H>  /  ALT  48  /  AlkPhos  51  06-11        Cultures;   CAPILLARY BLOOD GLUCOSE        Lipid panel:     CARDIAC MARKERS ( 11 Jun 2020 06:36 )  x     / x     / 226 U/L / x     / x      CARDIAC MARKERS ( 10 [place] 2020 06:41 )  x     / x     / 885 U/L / x     / x            RADIOLOGY & ADDITIONAL TESTS:    Imaging Personally Reviewed:  [x ] YES      Consultant(s) Notes Reviewed:  [x ] YES     Care Discussed with [x ] Consultants [X ] Patient [ ] Family  [x ]    [x ]  Other; RN

## 2020-06-11 NOTE — PROGRESS NOTE ADULT - SUBJECTIVE AND OBJECTIVE BOX
Patient is a 88y old  Male who presents with a chief complaint of fall (10 [place]onte 2020 15:01)      HPI:  Pt is an 89 y/o Male with PMHx of HTN,. Afib , SDH in past, DVT in past no longer on ac, thyroidectomy and thrombocytopenia comes to ED today s/p fall, no loc found down  by family. Pt c/o epigastric pain, back pain and was found in supine position at home. Pt is difficult to understand in ER , and states that whenever he eats he has the sensation of vomit. At triage pt had a saturation of 91. Spoke w/family in waiting room, and nephew says pt resides alone without a home health aide. Pt is checked on a once a day and was not responding to door or picking up phone today.  Pt's family reports a gradual decline over the past few weeks w/labored breathing and increased confusion.  No reported fever, chills, cp, palpitations, mild nausea/-v/-d/-c no travels or sick contacts.  in ed ct w/  1. Distended gallbladder with gallstones. There is clinical concern for gallbladder disease, follow-up with right upper quadrant sonogram.  2. There is presacral edema and perirectal fatty stranding. Limited evaluation of the rectum at this level, however underlying proctitis cannot be excluded. Recommend clinical correlation.  3. Suggestion of a recent mild compression fracture deformity of L5 without significant bony retropulsion. (08 Jun 2020 05:43)      INTERVAL HPI/OVERNIGHT EVENTS:  Patient still has difficulty swallowing . The patient / nurse denies melena, hematochezia, hematemesis, nausea, vomiting, abdominal pain, constipation, diarrhea, or change in bowel movements     MEDICATIONS  (STANDING):  aspirin 325 milliGRAM(s) Oral daily  furosemide    Tablet 40 milliGRAM(s) Oral daily  lactated ringers. 1000 milliLiter(s) (100 mL/Hr) IV Continuous <Continuous>  pantoprazole  Injectable 40 milliGRAM(s) IV Push daily  piperacillin/tazobactam IVPB.. 3.375 Gram(s) IV Intermittent every 8 hours  QUEtiapine 12.5 milliGRAM(s) Oral at bedtime    MEDICATIONS  (PRN):      FAMILY HISTORY:  No pertinent family history in first degree relatives      Allergies    No Known Allergies    Intolerances        PMH/PSH:  Lung nodules  SVC syndrome  Acute DVT (deep venous thrombosis)  Essential hypertension  Atrial fibrillation, unspecified type  Cardiac complaint  H/O thyroidectomy  No significant past surgical history        REVIEW OF SYSTEMS:  CONSTITUTIONAL: No fever, weight loss, or fatigue  EYES: No eye pain, visual disturbances, or discharge  ENMT:  No difficulty hearing, tinnitus, vertigo; No sinus or throat pain  NECK: No pain or stiffness  BREASTS: No pain, masses, or nipple discharge  RESPIRATORY: No cough, wheezing, chills or hemoptysis; No shortness of breath  CARDIOVASCULAR: No chest pain, palpitations, dizziness, or leg swelling  GASTROINTESTINAL:  See above   GENITOURINARY: No dysuria, frequency, hematuria, or incontinence  NEUROLOGICAL: No headaches, memory loss, loss of strength, numbness, or tremors  SKIN: No itching, burning, rashes, or lesions   LYMPH NODES: No enlarged glands  ENDOCRINE: No heat or cold intolerance; No hair loss  MUSCULOSKELETAL: No joint pain or swelling; No muscle, back, or extremity pain  PSYCHIATRIC: No depression, anxiety, mood swings, or difficulty sleeping  HEME/LYMPH: No easy bruising, or bleeding gums  ALLERGY AND IMMUNOLOGIC: No hives or eczema    Vital Signs Last 24 Hrs  T(C): 36.8 (11 Jun 2020 11:19), Max: 37.6 (11 Jun 2020 04:54)  T(F): 98.2 (11 Jun 2020 11:19), Max: 99.6 (11 Jun 2020 04:54)  HR: 100 (11 Jun 2020 11:19) (98 - 106)  BP: 96/49 (11 Jun 2020 11:19) (93/47 - 108/61)  BP(mean): --  RR: 20 (11 Jun 2020 11:19) (18 - 22)  SpO2: 92% (11 Jun 2020 11:19) (90% - 94%)    PHYSICAL EXAM:  GENERAL: NAD, well-groomed, well-developed  HEAD:  Atraumatic, Normocephalic  EYES: EOMI, PERRLA, conjunctiva and sclera clear  NECK: Supple, No JVD, Normal thyroid  NERVOUS SYSTEM:  Alert & Oriented X1, Good concentration; Motor Strength 5/5 B/L upper and lower extremities;   CHEST/LUNG: Clear to percussion bilaterally; No rales, rhonchi, wheezing, or rubs  HEART: Regular rate and rhythm; No murmurs, rubs, or gallops  ABDOMEN: Soft, Nontender, Nondistended; Bowel sounds present  EXTREMITIES:  2+ Peripheral Pulses, No clubbing, cyanosis, or edema  LYMPH: No lymphadenopathy noted  SKIN: No rashes or lesions    LAB                          11.4   7.78  )-----------( 88       ( 11 Jun 2020 06:36 )             34.2       CBC:  06-11 @ 06:36  WBC 7.78   Hgb 11.4   Hct 34.2   Plts 88  MCV 96.6  06-10 @ 06:41  WBC 9.05   Hgb 11.7   Hct 36.5   Plts 90  MCV 97.9  06-09 @ 06:23  WBC 10.80   Hgb 11.2   Hct 34.5   Plts 74  MCV 97.5  06-07 @ 22:53  WBC 15.42   Hgb 12.5   Hct 38.3   Plts 81  MCV 98.5      Chemistry:  06-11 @ 06:36  Na+ 142  K+ 3.1  Cl- 103  CO2 32  BUN 31  Cr 0.64     06-10 @ 17:12  Na+ --  K+ 3.1  Cl- --  CO2 --  BUN --  Cr --     06-10 @ 06:41  Na+ 141  K+ 2.9  Cl- 102  CO2 28  BUN 32  Cr 0.70     06-09 @ 06:23  Na+ 138  K+ 3.1  Cl- 104  CO2 26  BUN 35  Cr 0.62     06-07 @ 22:53  Na+ 138  K+ 3.4  Cl- 100  CO2 28  BUN 30  Cr 0.78         Glucose, Serum: 94 mg/dL (06-11 @ 06:36)  Glucose, Serum: 93 mg/dL (06-10 @ 06:41)  Glucose, Serum: 106 mg/dL (06-09 @ 06:23)  Glucose, Serum: 111 mg/dL (06-07 @ 22:53)      11 Jun 2020 06:36    142    |  103    |  31     ----------------------------<  94     3.1     |  32     |  0.64   10 [place] 2020 17:12    x      |  x      |  x      ----------------------------<  x      3.1     |  x      |  x      10 [place] 2020 06:41    141    |  102    |  32     ----------------------------<  93     2.9     |  28     |  0.70   09 Jun 2020 06:23    138    |  104    |  35     ----------------------------<  106    3.1     |  26     |  0.62   07 Jun 2020 22:53    138    |  100    |  30     ----------------------------<  111    3.4     |  28     |  0.78     Ca    8.3        11 Jun 2020 06:36  Ca    8.4        10 Jun 2020 06:41  Ca    8.4        09 Jun 2020 06:23  Ca    9.1        07 Jun 2020 22:53  Mg     2.1       10 Jun 2020 06:41  Mg     2.1       07 Jun 2020 22:53    TPro  6.9    /  Alb  2.5    /  TBili  1.5    /  DBili  x      /  AST  71     /  ALT  48     /  AlkPhos  51     11 Jun 2020 06:36  TPro  7.1    /  Alb  2.6    /  TBili  1.7    /  DBili  x      /  AST  108    /  ALT  58     /  AlkPhos  54     10 Jun 2020 06:41  TPro  6.8    /  Alb  2.6    /  TBili  1.7    /  DBili  x      /  AST  103    /  ALT  47     /  AlkPhos  56     09 Jun 2020 06:23  TPro  8.2    /  Alb  3.3    /  TBili  2.8    /  DBili  x      /  AST  131    /  ALT  41     /  AlkPhos  76     07 Jun 2020 22:53              CAPILLARY BLOOD GLUCOSE          C-Reactive Protein, Serum: 7.53 mg/dL (06-08 @ 07:44)      RADIOLOGY & ADDITIONAL TESTS:    Imaging Personally Reviewed:  [ ] YES  [ ] NO    Consultant(s) Notes Reviewed:  [ ] YES  [ ] NO    Care Discussed with Consultants/Other Providers [ ] YES  [ ] NO

## 2020-06-11 NOTE — PROGRESS NOTE ADULT - ASSESSMENT
PI:  Pt is an 89 y/o Male with PMHx of HTN,. Afib , SDH in past, DVT in past no longer on ac, thyroidectomy and thrombocytopenia comes to ED today s/p fall, no loc found down  by family. Pt c/o epigastric pain, back pain and was found in supine position at home. Pt is difficult to understand in ER , and states that whenever he eats he has the sensation of vomit. At triage pt had a saturation of 91. Spoke w/family in waiting room, and nephew says pt resides alone without a home health aide. Pt is checked on a once a day and was not responding to door or picking up phone today.  Pt's family reports a gradual decline over the past few weeks w/labored breathing and increased confusion.  No reported fever, chills, cp, palpitations, mild nausea/-v/-d/-c no travels or sick contacts.  in ed ct w/  1. Distended gallbladder with gallstones. There is clinical concern for gallbladder disease, follow-up with right upper quadrant sonogram.  2. There is presacral edema and perirectal fatty stranding. Limited evaluation of the rectum at this level, however underlying proctitis cannot be excluded. Recommend clinical correlation.  3. Suggestion of a recent mild compression fracture deformity of L5 without significant bony retropulsion. (08 Jun 2020 05:43)  ---------------- As Above --------------------------  Patient is a poor historian. History obtained from nephew. Was sent to the hospital because he had fallen and could not gt up.  Nephew states patient has been having difficulty eating ( would spit it out ) over the past few weeks.  Consult for abdominal pain and abnormal ultrasound / CT Scan.   See sonogram / CT scan    6/9/20  --------  a) Abdominal pain ( resolved )  /// Tenderness ( resolved )  /// Abnormal sono/CT scan .. CRP 7.53 The HIDA SCAN was negative. Elevated LFTs that were  resolving  2.8 / 131 / 41 / 76 --> 1.7 / 103 / 47 / 56 --> 1.7 / 108 / 58 / 54 R/O CBD stone - 1) Clear liquid diet   2) Broad spectrum antibiotics 3) MRCP ( patient refused )  4) ? EGD 5) f/u CRP  b) ? Dysphagia - see speech evaluation , Trachea deviation from a large goiter - 1) MBS today PI:  Pt is an 87 y/o Male with PMHx of HTN,. Afib , SDH in past, DVT in past no longer on ac, thyroidectomy and thrombocytopenia comes to ED today s/p fall, no loc found down  by family. Pt c/o epigastric pain, back pain and was found in supine position at home. Pt is difficult to understand in ER , and states that whenever he eats he has the sensation of vomit. At triage pt had a saturation of 91. Spoke w/family in waiting room, and nephew says pt resides alone without a home health aide. Pt is checked on a once a day and was not responding to door or picking up phone today.  Pt's family reports a gradual decline over the past few weeks w/labored breathing and increased confusion.  No reported fever, chills, cp, palpitations, mild nausea/-v/-d/-c no travels or sick contacts.  in ed ct w/  1. Distended gallbladder with gallstones. There is clinical concern for gallbladder disease, follow-up with right upper quadrant sonogram.  2. There is presacral edema and perirectal fatty stranding. Limited evaluation of the rectum at this level, however underlying proctitis cannot be excluded. Recommend clinical correlation.  3. Suggestion of a recent mild compression fracture deformity of L5 without significant bony retropulsion. (08 Jun 2020 05:43)  ---------------- As Above --------------------------  Patient is a poor historian. History obtained from nephew. Was sent to the hospital because he had fallen and could not gt up.  Nephew states patient has been having difficulty eating ( would spit it out ) over the past few weeks.  Consult for abdominal pain and abnormal ultrasound / CT Scan.   See sonogram / CT scan    6/11/20  --------  a) Abdominal pain ( resolved )  /// Tenderness ( resolved )  /// Abnormal sono/CT scan .. CRP 7.53 The HIDA SCAN was negative. Elevated LFTs that were  resolving  2.8 / 131 / 41 / 76 --> 1.7 / 103 / 47 / 56 --> 1.7 / 108 / 58 / 54 --> 1.5 / 71 / 48 / 51 R/O CBD stone - 1) NPO ( see below )  2) f/u labs  3) MRCP ( patient refused )    b) ? Dysphagia - see speech evaluation , Trachea deviation from a large goiter - causing distortion of pharyngeal posterior wall and epiglottis  does not function correctly - Endo / ENT consult

## 2020-06-11 NOTE — PROGRESS NOTE ADULT - ASSESSMENT
89 y/o Male with PMHx of HTN,. Afib , SDH in past, DVT in past no longer on ac, thyroidectomy and thrombocytopenia comes to ED today s/p fall w/?new compression fx and rhabdo. Pt c/o epigastric pain, back pain and was found in supine position at home. Pt is difficult to understand in ER , and states that whenever he eats he has the sensation of vomit. At triage pt had a saturation of 91. Pt's family reports a gradual decline over the past few weeks w/labored breathing and increased confusion.  No reported fever, chills, cp, palpitations, mild nausea/-v/-d/-c no travels or sick contacts.    Non-traumatic rhabdomyolysis, s/p fall- iv hydration, trend ck  Fracture of lumbar spine- ortho on board. MRI demonstrates chronic L2, L3, and L4 VCFx's and acute T10, L1, L5 VCFx's. No acute orthopedic surgical intervention needed at this time per ortho.   Tiny Pneumothorax, unspecified type- monitor  Acute abdominal pain-   -CT abd- Distended gallbladder with gallstones. presacral edema and perirectal fatty stranding.   -US abd- Cholelithiasis and distended gallbladder. No biliary ductal dilatation.   -Seen by Sx/Dr. Salmeron.; no acute surgical intervention at this time. Patient is not a surgical candidate.  -Seen by GI/Dr. Renae ; NPO, s/p  Broad spectrum antibiotics no evidence of acute cholicystitis, HIDA neg, MBS- Incomplete epiglottic deflection. F/UP Sx. ENT/Dr. Ramey (left  in office no [phone]) and Endo Dr. Crawford consulted. pain mgmt   Hypokalemia- lyte replaced, monitor  Elev trop; likely demand ischemia s/p fall  Acute metabolic encephalopathy/dementia- monitor mental status   Chronic AFIB- not on AC per spouse hx of SDH. pt off dig, had mare and was dc'd, monitor  Preventive measure- IPC    spouse/niece- [phone]

## 2020-06-12 NOTE — PROGRESS NOTE ADULT - SUBJECTIVE AND OBJECTIVE BOX
Patient is a 88y old  Male who presents with a chief complaint of fall (12 Jun 2020 09:50)      HPI:  Pt is an 87 y/o Male with PMHx of HTN,. Afib , SDH in past, DVT in past no longer on ac, thyroidectomy and thrombocytopenia comes to ED today s/p fall, no loc found down  by family. Pt c/o epigastric pain, back pain and was found in supine position at home. Pt is difficult to understand in ER , and states that whenever he eats he has the sensation of vomit. At triage pt had a saturation of 91. Spoke w/family in waiting room, and nephew says pt resides alone without a home health aide. Pt is checked on a once a day and was not responding to door or picking up phone today.  Pt's family reports a gradual decline over the past few weeks w/labored breathing and increased confusion.  No reported fever, chills, cp, palpitations, mild nausea/-v/-d/-c no travels or sick contacts.  in ed ct w/  1. Distended gallbladder with gallstones. There is clinical concern for gallbladder disease, follow-up with right upper quadrant sonogram.  2. There is presacral edema and perirectal fatty stranding. Limited evaluation of the rectum at this level, however underlying proctitis cannot be excluded. Recommend clinical correlation.  3. Suggestion of a recent mild compression fracture deformity of L5 without significant bony retropulsion. (08 Jun 2020 05:43)      INTERVAL HPI/OVERNIGHT EVENTS:  The patient denies melena, hematochezia, hematemesis, nausea, vomiting, abdominal pain, constipation, diarrhea, or change in bowel movements     MEDICATIONS  (STANDING):  aspirin 325 milliGRAM(s) Oral daily  dextrose 5% + sodium chloride 0.45% 1000 milliLiter(s) (60 mL/Hr) IV Continuous <Continuous>  furosemide    Tablet 40 milliGRAM(s) Oral daily  pantoprazole  Injectable 40 milliGRAM(s) IV Push daily  QUEtiapine 12.5 milliGRAM(s) Oral at bedtime    MEDICATIONS  (PRN):      FAMILY HISTORY:  No pertinent family history in first degree relatives      Allergies    No Known Allergies    Intolerances        PMH/PSH:  Lung nodules  SVC syndrome  Acute DVT (deep venous thrombosis)  Essential hypertension  Atrial fibrillation, unspecified type  Cardiac complaint  H/O thyroidectomy  No significant past surgical history        REVIEW OF SYSTEMS:  CONSTITUTIONAL: No fever, weight loss,   EYES: No eye pain, visual disturbances, or discharge  ENMT:  No difficulty hearing, tinnitus, vertigo; No sinus or throat pain  NECK: No pain or stiffness  BREASTS: No pain, masses, or nipple discharge  RESPIRATORY: No cough, wheezing, chills or hemoptysis; No shortness of breath  CARDIOVASCULAR: No chest pain, palpitations, dizziness, or leg swelling  GASTROINTESTINAL: See above  GENITOURINARY: No dysuria, frequency, hematuria, or incontinence  NEUROLOGICAL: No headaches, , numbness, or tremors  SKIN: No itching, burning, rashes, or lesions   LYMPH NODES: No enlarged glands  ENDOCRINE: No heat or cold intolerance; No hair loss  MUSCULOSKELETAL: No joint pain or swelling; No muscle, back, or extremity pain  PSYCHIATRIC: No depression, anxiety, mood swings, or difficulty sleeping  HEME/LYMPH: No easy bruising, or bleeding gums  ALLERGY AND IMMUNOLOGIC: No hives or eczema    Vital Signs Last 24 Hrs  T(C): 36.5 (12 Jun 2020 11:05), Max: 37 (11 Jun 2020 16:09)  T(F): 97.7 (12 Jun 2020 11:05), Max: 98.6 (11 Jun 2020 16:09)  HR: 124 (12 Jun 2020 11:05) (74 - 124)  BP: 110/61 (12 Jun 2020 11:05) (98/49 - 127/66)  BP(mean): 81 (12 Jun 2020 11:05) (81 - 81)  RR: 20 (12 Jun 2020 11:05) (20 - 23)  SpO2: 95% (12 Jun 2020 11:05) (94% - 97%)    PHYSICAL EXAM:  GENERAL: NAD, well-groomed, well-developed  HEAD:  Atraumatic, Normocephalic  EYES: EOMI, PERRLA, conjunctiva and sclera clear  NECK: Supple, No JVD, Normal thyroid  NERVOUS SYSTEM:  Alert & Oriented X 1, Good concentration;   CHEST/LUNG: Clear to percussion bilaterally; No rales, rhonchi, wheezing, or rubs  HEART: Regular rate and rhythm; No murmurs, rubs, or gallops  ABDOMEN: Soft, Nontender, Nondistended; Bowel sounds present  EXTREMITIES:  2+ Peripheral Pulses, No clubbing, cyanosis, or edema  LYMPH: No lymphadenopathy noted  SKIN: No rashes or lesions    LAB                          12.2   9.51  )-----------( 98       ( 12 Jun 2020 07:44 )             37.7       CBC:  06-12 @ 07:44  WBC 9.51   Hgb 12.2   Hct 37.7   Plts 98  MCV 99.5  06-11 @ 06:36  WBC 7.78   Hgb 11.4   Hct 34.2   Plts 88  MCV 96.6  06-10 @ 06:41  WBC 9.05   Hgb 11.7   Hct 36.5   Plts 90  MCV 97.9  06-09 @ 06:23  WBC 10.80   Hgb 11.2   Hct 34.5   Plts 74  MCV 97.5  06-07 @ 22:53  WBC 15.42   Hgb 12.5   Hct 38.3   Plts 81  MCV 98.5      Chemistry:  06-12 @ 07:44  Na+ 142  K+ 3.5  Cl- 102  CO2 30  BUN 25  Cr 0.64     06-11 @ 06:36  Na+ 142  K+ 3.1  Cl- 103  CO2 32  BUN 31  Cr 0.64     06-10 @ 17:12  Na+ --  K+ 3.1  Cl- --  CO2 --  BUN --  Cr --     06-10 @ 06:41  Na+ 141  K+ 2.9  Cl- 102  CO2 28  BUN 32  Cr 0.70     06-09 @ 06:23  Na+ 138  K+ 3.1  Cl- 104  CO2 26  BUN 35  Cr 0.62     06-07 @ 22:53  Na+ 138  K+ 3.4  Cl- 100  CO2 28  BUN 30  Cr 0.78         Glucose, Serum: 106 mg/dL (06-12 @ 07:44)  Glucose, Serum: 94 mg/dL (06-11 @ 06:36)  Glucose, Serum: 93 mg/dL (06-10 @ 06:41)  Glucose, Serum: 106 mg/dL (06-09 @ 06:23)  Glucose, Serum: 111 mg/dL (06-07 @ 22:53)      12 Jun 2020 07:44    142    |  102    |  25     ----------------------------<  106    3.5     |  30     |  0.64   11 Jun 2020 06:36    142    |  103    |  31     ----------------------------<  94     3.1     |  32     |  0.64   10 [place] 2020 17:12    x      |  x      |  x      ----------------------------<  x      3.1     |  x      |  x      10 [place] 2020 06:41    141    |  102    |  32     ----------------------------<  93     2.9     |  28     |  0.70   09 Jun 2020 06:23    138    |  104    |  35     ----------------------------<  106    3.1     |  26     |  0.62   07 Jun 2020 22:53    138    |  100    |  30     ----------------------------<  111    3.4     |  28     |  0.78     Ca    8.5        12 Jun 2020 07:44  Ca    8.3        11 Jun 2020 06:36  Ca    8.4        10 Jun 2020 06:41  Ca    8.4        09 Jun 2020 06:23  Ca    9.1        07 Jun 2020 22:53  Mg     2.1       10 Jun 2020 06:41  Mg     2.1       07 Jun 2020 22:53    TPro  7.3    /  Alb  2.8    /  TBili  1.3    /  DBili  x      /  AST  68     /  ALT  56     /  AlkPhos  54     12 Jun 2020 07:44  TPro  6.9    /  Alb  2.5    /  TBili  1.5    /  DBili  x      /  AST  71     /  ALT  48     /  AlkPhos  51     11 Jun 2020 06:36  TPro  7.1    /  Alb  2.6    /  TBili  1.7    /  DBili  x      /  AST  108    /  ALT  58     /  AlkPhos  54     10 Jun 2020 06:41  TPro  6.8    /  Alb  2.6    /  TBili  1.7    /  DBili  x      /  AST  103    /  ALT  47     /  AlkPhos  56     09 Jun 2020 06:23  TPro  8.2    /  Alb  3.3    /  TBili  2.8    /  DBili  x      /  AST  131    /  ALT  41     /  AlkPhos  76     07 Jun 2020 22:53              CAPILLARY BLOOD GLUCOSE          C-Reactive Protein, Serum: 7.53 mg/dL (06-08 @ 07:44)      RADIOLOGY & ADDITIONAL TESTS:    Imaging Personally Reviewed:  [ ] YES  [ ] NO    Consultant(s) Notes Reviewed:  [ ] YES  [ ] NO    Care Discussed with Consultants/Other Providers [ ] YES  [ ] NO

## 2020-06-12 NOTE — CHART NOTE - NSCHARTNOTEFT_GEN_A_CORE
Upon Nutritional Assessment by the Registered Dietitian your patient was determined to meet criteria / has evidence of the following diagnosis/diagnoses:          [ ]  Mild Protein Calorie Malnutrition        [ x]  Moderate Protein Calorie Malnutrition        [ ] Severe Protein Calorie Malnutrition        [ ] Unspecified Protein Calorie Malnutrition        [ ] Underweight / BMI <19        [ ] Morbid Obesity / BMI > 40      Findings as based on:  •  Comprehensive nutrition assessment and consultation  •  Calorie counts (nutrient intake analysis)  •  Food acceptance and intake status from observations by staff  •  Follow up  •  Patient education  •  Intervention secondary to interdisciplinary rounds  •   concerns      Treatment:    The following diet has been recommended:  Continue c NPO, consider PPN if c delay in diet due to gallbladder issues/while dysphagia w/u in progress  Recommend swallow re-evaluation/ENT consult.        PROVIDER Section:     By signing this assessment you are acknowledging and agree with the diagnosis/diagnoses assigned by the Registered Dietitian    Comments:

## 2020-06-12 NOTE — DIETITIAN INITIAL EVALUATION ADULT. - ENERGY NEEDS
Height (cm): 162.6 (06-12)  Weight (kg): 71.9 (06-08)  BMI (kg/m2): 27.2 (06-12)  IBW: 58.9 kg         % IBW:   119%          UBW: 77 kg             %UBW: 91%( as of 06/12/20), 1.5 kg/2.08% wt. loss noted since adm

## 2020-06-12 NOTE — PROGRESS NOTE ADULT - SUBJECTIVE AND OBJECTIVE BOX
HPI:  Pt is an 87 y/o Male with PMHx of HTN,. Afib , SDH in past, DVT in past no longer on ac, thyroidectomy and thrombocytopenia comes to ED today s/p fall, no loc found down  by family. Pt c/o epigastric pain, back pain and was found in supine position at home. Pt is difficult to understand in ER , and states that whenever he eats he has the sensation of vomit. At triage pt had a saturation of 91. Spoke w/family in waiting room, and nephew says pt resides alone without a home health aide. Pt is checked on a once a day and was not responding to door or picking up phone today.  Pt's family reports a gradual decline over the past few weeks w/labored breathing and increased confusion.  No reported fever, chills, cp, palpitations, mild nausea/-v/-d/-c no travels or sick contacts.  in ed ct w/  1. Distended gallbladder with gallstones. There is clinical concern for gallbladder disease, follow-up with right upper quadrant sonogram.  2. There is presacral edema and perirectal fatty stranding. Limited evaluation of the rectum at this level, however underlying proctitis cannot be excluded. Recommend clinical correlation.  3. Suggestion of a recent mild compression fracture deformity of L5 without significant bony retropulsion. (08 Jun 2020 05:43)      Patient is a 88y old  Male who presents with a chief complaint of fall (13 Jun 2020 09:36)      INTERVAL HPI/OVERNIGHT EVENTS: remains NPO     MEDICATIONS  (STANDING):  aspirin 325 milliGRAM(s) Oral daily  dextrose 5% + sodium chloride 0.45% with potassium chloride 20 mEq/L 1000 milliLiter(s) (30 mL/Hr) IV Continuous <Continuous>  furosemide    Tablet 40 milliGRAM(s) Oral daily  LORazepam   Injectable 2 milliGRAM(s) IV Push once  pantoprazole  Injectable 40 milliGRAM(s) IV Push daily  QUEtiapine 12.5 milliGRAM(s) Oral at bedtime    MEDICATIONS  (PRN):  haloperidol    Injectable 5 milliGRAM(s) IV Push every 6 hours PRN Agitation  metoprolol tartrate Injectable 5 milliGRAM(s) IV Push every 4 hours PRN for HR >110bpm  morphine  - Injectable 2 milliGRAM(s) IV Push every 4 hours PRN Moderate Pain (4 - 6)      Allergies    No Known Allergies    Intolerances        REVIEW OF SYSTEMS:  CONSTITUTIONAL: No fever, weight loss, or fatigue  EYES: No eye pain, visual disturbances, or discharge  ENMT:  No difficulty hearing, tinnitus, vertigo; No sinus or throat pain  NECK: No pain or stiffness  BREASTS: No pain, masses, or nipple discharge  RESPIRATORY: No cough, wheezing, chills or hemoptysis; No shortness of breath  CARDIOVASCULAR: No chest pain, palpitations, dizziness, or leg swelling  GASTROINTESTINAL: No abdominal or epigastric pain. No nausea, vomiting, or hematemesis; No diarrhea or constipation. No melena or hematochezia.  GENITOURINARY: No dysuria, frequency, hematuria, or incontinence  NEUROLOGICAL: confused SKIN: No itching, burning, rashes, or lesions   LYMPH NODES: No enlarged glands  ENDOCRINE: No heat or cold intolerance; No hair loss  MUSCULOSKELETAL: No joint pain or swelling; No muscle, back, or extremity pain  PSYCHIATRIC: No depression, anxiety, mood swings, or difficulty sleeping  HEME/LYMPH: No easy bruising, or bleeding gums  ALLERGY AND IMMUNOLOGIC: No hives or eczema    Vital Signs Last 24 Hrs  T(C): 36.9 (13 Jun 2020 11:11), Max: 37 (12 Jun 2020 16:35)  T(F): 98.4 (13 Jun 2020 11:11), Max: 98.6 (12 Jun 2020 16:35)  HR: 114 (13 Jun 2020 11:11) (101 - 153)  BP: 99/45 (13 Jun 2020 11:11) (99/45 - 138/75)  BP(mean): --  RR: 17 (13 Jun 2020 11:11) (17 - 22)  SpO2: 97% (13 Jun 2020 11:11) (89% - 98%)    PHYSICAL EXAM:  GENERAL: NAD, well-groomed, well-developed  HEAD:  Atraumatic, Normocephalic  EYES: EOMI, PERRLA, conjunctiva and sclera clear  ENMT: No tonsillar erythema, exudates, or enlargement; Moist mucous membranes, Good dentition, No lesions  NECK: Supple, No JVD, Normal thyroid  NERVOUS SYSTEM:  Alert & Oriented X3, Good concentration; Motor Strength 5/5 B/L upper and lower extremities; DTRs 2+ intact and symmetric  CHEST/LUNG: Clear to percussion bilaterally; No rales, rhonchi, wheezing, or rubs  HEART: Regular rate and rhythm; No murmurs, rubs, or gallops  ABDOMEN: Soft, Nontender, Nondistended; Bowel sounds present  EXTREMITIES:  2+ Peripheral Pulses, No clubbing, cyanosis, or edema  LYMPH: No lymphadenopathy noted  SKIN: No rashes or lesions    LABS:                        13.0   9.49  )-----------( 107      ( 13 Jun 2020 07:29 )             39.8     06-13    143  |  103  |  22  ----------------------------<  96  3.8   |  31  |  0.64    Ca    8.8      13 Jun 2020 07:29    TPro  7.4  /  Alb  2.8<L>  /  TBili  1.4<H>  /  DBili  x   /  AST  64<H>  /  ALT  58  /  AlkPhos  59  06-13        CAPILLARY BLOOD GLUCOSE          RADIOLOGY & ADDITIONAL TESTS:    Imaging Personally Reviewed:  [X ] YES  [ ] NO    Consultant(s) Notes Reviewed:  [ X] YES  [ ] NO    Care Discussed with Consultants/Other Providers [ ] YES  [ ] NO

## 2020-06-12 NOTE — PROGRESS NOTE ADULT - ASSESSMENT
89 y/o Male with PMHx of HTN,. Afib , SDH in past, DVT in past no longer on ac, thyroidectomy and thrombocytopenia comes to ED today s/p fall w/?new compression fx and rhabdo. Pt c/o epigastric pain, back pain and was found in supine position at home. Pt is difficult to understand in ER , and states that whenever he eats he has the sensation of vomit. At triage pt had a saturation of 91. Pt's family reports a gradual decline over the past few weeks w/labored breathing and increased confusion.  No reported fever, chills, cp, palpitations, mild nausea/-v/-d/-c no travels or sick contacts.    Non-traumatic rhabdomyolysis, s/p fall- iv hydration, trend ck  Fracture of lumbar spine- ortho on board. MRI demonstrates chronic L2, L3, and L4 VCFx's and acute T10, L1, L5 VCFx's. No acute orthopedic surgical intervention needed at this time per ortho.   Tiny Pneumothorax, unspecified type- monitor  Acute abdominal pain-   -CT abd- Distended gallbladder with gallstones. presacral edema and perirectal fatty stranding.   -US abd- Cholelithiasis and distended gallbladder. No biliary ductal dilatation.   -Seen by Sx/Dr. Salmeron.; no acute surgical intervention at this time. Patient is not a surgical candidate.  -Seen by GI/Dr. Renae ; NPO, s/p  Broad spectrum antibiotics no evidence of acute cholicystitis, HIDA neg, MBS- Incomplete epiglottic deflection. F/UP Sx. ENT/Dr. Ramey (left  in office no [phone]) and Endo Dr. Crawford consulted. pain mgmt   Hypokalemia- lyte replaced, monitor  Elev trop; likely demand ischemia s/p fall  Acute metabolic encephalopathy/dementia- monitor mental status   Chronic AFIB- not on AC per spouse hx of SDH. pt off dig, had mare and was dc'd, monitor  Preventive measure- IPC    spouse/niece- [phone]

## 2020-06-12 NOTE — DIETITIAN INITIAL EVALUATION ADULT. - PHYSICAL APPEARANCE
BMI=27.2 for height of 5'4" & wt. of 71.9 kg (06/08/20)/other (specify)/debilitated Pt c visible mildly depleted orbital, temple, clavicle, shoulder areas.

## 2020-06-12 NOTE — CONSULT NOTE ADULT - SUBJECTIVE AND OBJECTIVE BOX
Patient is a 88y old  Male who presents with a chief complaint of fall (11 Jun 2020 13:51)      HPI:  Pt is an 87 y/o Male with PMHx of HTN,. Afib , SDH in past, DVT in past no longer on ac, thyroidectomy??? and thrombocytopenia comes to ED today s/p fall, no loc found down  by family. Pt c/o epigastric pain, back pain and was found in supine position at home. Pt is difficult to understand in ER , and states that whenever he eats he has the sensation of vomit. At triage pt had a saturation of 91. Spoke w/family in waiting room, and nephew says pt resides alone without a home health aide. Pt is checked on a once a day and was not responding to door or picking up phone today.  Pt's family reports a gradual decline over the past few weeks w/labored breathing and increased confusion.  No reported fever, chills, cp, palpitations, mild nausea/-v/-d/-c no travels or sick contacts.  in ed ct w/  1. Distended gallbladder with gallstones. There is clinical concern for gallbladder disease, follow-up with right upper quadrant sonogram.  2. There is presacral edema and perirectal fatty stranding. Limited evaluation of the rectum at this level, however underlying proctitis cannot be excluded. Recommend clinical correlation.  3. Suggestion of a recent mild compression fracture deformity of L5 without significant bony retropulsion. (08 Jun 2020 05:43)    endocrine called for evaluation of goiter  pt is poor historian, unable to provide adequate hx  per records pt has prior hx of thyroidectomy, likely partial      PAST MEDICAL & SURGICAL HISTORY:  Lung nodules  SVC syndrome  Acute DVT (deep venous thrombosis)  Essential hypertension  Atrial fibrillation, unspecified type  H/O thyroidectomy    FAMILY HISTORY:  No pertinent family history in first degree relatives    Social History:    Allergies    No Known Allergies    Intolerances        MEDICATIONS  (STANDING):  aspirin 325 milliGRAM(s) Oral daily  dextrose 5% + sodium chloride 0.45% 1000 milliLiter(s) (60 mL/Hr) IV Continuous <Continuous>  furosemide    Tablet 40 milliGRAM(s) Oral daily  pantoprazole  Injectable 40 milliGRAM(s) IV Push daily  QUEtiapine 12.5 milliGRAM(s) Oral at bedtime    MEDICATIONS  (PRN):      REVIEW OF SYSTEMS:  CONSTITUTIONAL:  as per HPI  CARDIOVASCULAR:  No pressure, squeezing, strangling, tightness  RESPIRATORY:  No , shortness of breath  GASTROINTESTINAL:  No nausea, vomiting or diarrhea.      T(C): 36.6 (06-12-20 @ 04:57), Max: 37 (06-11-20 @ 16:09)  HR: 74 (06-12-20 @ 08:23) (74 - 114)  BP: 121/60 (06-12-20 @ 04:57) (96/49 - 127/66)  RR: 23 (06-12-20 @ 04:57) (20 - 23)  SpO2: 96% (06-12-20 @ 04:57) (92% - 97%)  Wt(kg): --    PHYSICAL EXAM:  GENERAL: NAD, well-groomed, well-developed  HEAD:  Atraumatic, Normocephalic  EYES:  conjunctiva and sclera clear  NECK: Supple, visible goiter  HEART: Regular rate   ABDOMEN: Soft, Nontender, Nondistended    CAPILLARY BLOOD GLUCOSE                                12.2   9.51  )-----------( 98       ( 12 Jun 2020 07:44 )             37.7       CMP:  06-12 @ 07:44  SGPT 56  Albumin 2.8   Alk Phos 54   Anion Gap 10   SGOT 68   Total Bili 1.3   BUN 25   Calcium Total 8.5   CO2 30   Chloride 102   Creatinine 0.64   eGFR if    eGFR if non AA 87   Glucose 106   Potassium 3.5   Protein 7.3   Sodium 142    Radiology:   Large, substernal and heterogeneous thyroid goiter again demonstrated with left lateral displacement/compression of the trachea

## 2020-06-12 NOTE — PROGRESS NOTE ADULT - ASSESSMENT
PI:  Pt is an 89 y/o Male with PMHx of HTN,. Afib , SDH in past, DVT in past no longer on ac, thyroidectomy and thrombocytopenia comes to ED today s/p fall, no loc found down  by family. Pt c/o epigastric pain, back pain and was found in supine position at home. Pt is difficult to understand in ER , and states that whenever he eats he has the sensation of vomit. At triage pt had a saturation of 91. Spoke w/family in waiting room, and nephew says pt resides alone without a home health aide. Pt is checked on a once a day and was not responding to door or picking up phone today.  Pt's family reports a gradual decline over the past few weeks w/labored breathing and increased confusion.  No reported fever, chills, cp, palpitations, mild nausea/-v/-d/-c no travels or sick contacts.  in ed ct w/  1. Distended gallbladder with gallstones. There is clinical concern for gallbladder disease, follow-up with right upper quadrant sonogram.  2. There is presacral edema and perirectal fatty stranding. Limited evaluation of the rectum at this level, however underlying proctitis cannot be excluded. Recommend clinical correlation.  3. Suggestion of a recent mild compression fracture deformity of L5 without significant bony retropulsion. (08 Jun 2020 05:43)  ---------------- As Above --------------------------  Patient is a poor historian. History obtained from nephew. Was sent to the hospital because he had fallen and could not gt up.  Nephew states patient has been having difficulty eating ( would spit it out ) over the past few weeks.  Consult for abdominal pain and abnormal ultrasound / CT Scan.   See sonogram / CT scan    6/11/20  --------  a) Abdominal pain ( resolved )  /// Tenderness ( resolved )  /// Abnormal sono/CT scan .. CRP 7.53 The HIDA SCAN was negative. Elevated LFTs that were  resolving  2.8 / 131 / 41 / 76 --> 1.7 / 103 / 47 / 56 --> 1.7 / 108 / 58 / 54 --> 1.5 / 71 / 48 / 51--> 1.3 / 68 / 56 / 54  R/O CBD stone - 1)  f/u labs  2) MRCP ( patient refused )    b) ? Dysphagia - see speech evaluation , Trachea deviation from a large goiter - causing distortion of pharyngeal posterior wall and epiglottis  does not function correctly - Endo note appreciated / ENT consult -- Feeding tube ?

## 2020-06-12 NOTE — DIETITIAN INITIAL EVALUATION ADULT. - FACTORS AFF FOOD INTAKE
pt was eating easy to chew soft food due to missing bottom teeth, has permanent teeth on the top(implants),06/10/20, swallow evaluation recommended NPO/difficulty chewing/difficulty swallowing

## 2020-06-12 NOTE — CONSULT NOTE ADULT - ASSESSMENT
Large, substernal and heterogeneous thyroid goiter again demonstrated with left lateral displacement/compression of the trachea

## 2020-06-12 NOTE — CONSULT NOTE ADULT - PROBLEM SELECTOR RECOMMENDATION 9
check tfts   ENT eval re: possible thyroidectomy in view of Trachea deviation from a large goiter - causing distortion of pharyngeal posterior wall and epiglottis  does not function correctly

## 2020-06-12 NOTE — DIETITIAN INITIAL EVALUATION ADULT. - OTHER INFO
Pt is confused, speaks little English, predominantly Kyrgyz speaking.  RD spoke c pt's niece Adina Taveras over the phone([phone]), who reported that pt was eating w/o swallowing difficulty PTA.  Niece or Nephew Nick Palladino oversee  pt's care, pt also had  care 1-2 days /week.  Pt lived alone, niece/nephew did the shopping/cooking as of October 2019, prior to that pt did own shopping/cooking.  Diet PTA: Low sodium.  As per previous RD note, pt was 74 kg/163.1 LBS on 10/11/19, c reported wt. loss from usual wt. of 77kg/170 LBS.  Height as per niece, 5'4"/ pt has never been higher than 5'6" & has lost some height after fall 7 years ago.  Pt c hx of goiter, Endocrine consult noted c recommendations for ENT consult.  Pt c distended gallbladder c gallstones, no plans for surgical intervention is noted.

## 2020-06-12 NOTE — DIETITIAN INITIAL EVALUATION ADULT. - ALTERNATE MEANS OF NUTRITION
PPN/if unable to progress diet secondary to gall bladder related issues if unable to progress diet secondary to gall bladder/dysphagia related issues/PPN

## 2020-06-12 NOTE — DIETITIAN INITIAL EVALUATION ADULT. - NS FNS REASON FOR WEIGHT CHANG
other (specify)/as per reported usual wt. & current wt., pt c wt. loss of ~ 15 LBS since October 2109.

## 2020-06-12 NOTE — DIETITIAN INITIAL EVALUATION ADULT. - PERTINENT MEDS FT
MEDICATIONS  (STANDING):  aspirin 325 milliGRAM(s) Oral daily  dextrose 5% + sodium chloride 0.45% 1000 milliLiter(s) (60 mL/Hr) IV Continuous <Continuous>  furosemide    Tablet 40 milliGRAM(s) Oral daily  pantoprazole  Injectable 40 milliGRAM(s) IV Push daily  QUEtiapine 12.5 milliGRAM(s) Oral at bedtime    MEDICATIONS  (PRN):

## 2020-06-13 NOTE — PROGRESS NOTE ADULT - ASSESSMENT
89 y/o Male with PMHx of HTN,. Afib , SDH in past, DVT in past no longer on ac, thyroidectomy and thrombocytopenia comes to ED today s/p fall w/?new compression fx and rhabdo. Pt c/o epigastric pain, back pain and was found in supine position at home. Pt is difficult to understand in ER , and states that whenever he eats he has the sensation of vomit. At triage pt had a saturation of 91. Pt's family reports a gradual decline over the past few weeks w/labored breathing and increased confusion.  No reported fever, chills, cp, palpitations, mild nausea/-v/-d/-c no travels or sick contacts.    Non-traumatic rhabdomyolysis, s/p fall- iv hydration, trend ck  Fracture of lumbar spine- ortho on board. MRI demonstrates chronic L2, L3, and L4 VCFx's and acute T10, L1, L5 VCFx's. No acute orthopedic surgical intervention needed at this time per ortho.   Tiny Pneumothorax, unspecified type- monitor  Acute abdominal pain-   -CT abd- Distended gallbladder with gallstones. presacral edema and perirectal fatty stranding.   -US abd- Cholelithiasis and distended gallbladder. No biliary ductal dilatation.   -Seen by Sx/Dr. Salmeron.; no acute surgical intervention at this time. Patient is not a surgical candidate.  -Seen by GI/Dr. Renae ; NPO, s/p  Broad spectrum antibiotics no evidence of acute cholicystitis, HIDA neg, MBS- Incomplete epiglottic deflection. F/UP Sx. ENT/Dr. Ramey (left  in office no [phone]) and Endo Dr. Crawford consulted. pain mgmt   Hypokalemia- lyte replaced, monitor  Elev trop; likely demand ischemia s/p fall  Acute metabolic encephalopathy/dementia- monitor mental status     Long discussion with niece was eating ok knows that he has been more confused wants him home   Chronic AFIB- not on AC per spouse hx of SDH. pt off dig, had mare and was dc'd, monitor  Preventive measure- IPC    spouse/niece- [phone]

## 2020-06-13 NOTE — CHART NOTE - NSCHARTNOTEFT_GEN_A_CORE
Called  for acute respiratory distress de saturation on oxygen     patient with hypercarbia on abg     plan was to place on bipap and follow     Proxy also spoke to next of kin maria fernanda Bhgaat (sister)  Confirms DNR DNI

## 2020-06-13 NOTE — PROGRESS NOTE ADULT - SUBJECTIVE AND OBJECTIVE BOX
Patient is a 88y old  Male who presents with a chief complaint of fall (12 Jun 2020 15:28)      INTERVAL HPI/OVERNIGHT EVENTS:  pt NAD  confused    MEDICATIONS  (STANDING):  aspirin 325 milliGRAM(s) Oral daily  dextrose 5% + sodium chloride 0.45% with potassium chloride 20 mEq/L 1000 milliLiter(s) (30 mL/Hr) IV Continuous <Continuous>  furosemide    Tablet 40 milliGRAM(s) Oral daily  pantoprazole  Injectable 40 milliGRAM(s) IV Push daily  QUEtiapine 12.5 milliGRAM(s) Oral at bedtime    MEDICATIONS  (PRN):  metoprolol tartrate Injectable 5 milliGRAM(s) IV Push every 4 hours PRN for HR >110bpm      REVIEW OF SYSTEMS:  unobtainable      Vital Signs Last 24 Hrs  T(C): 36.7 (13 Jun 2020 08:23), Max: 37 (12 Jun 2020 16:35)  T(F): 98 (13 Jun 2020 08:23), Max: 98.6 (12 Jun 2020 16:35)  HR: 105 (13 Jun 2020 08:23) (101 - 124)  BP: 129/80 (13 Jun 2020 08:23) (110/61 - 138/75)  BP(mean): 81 (12 Jun 2020 11:05) (81 - 81)  RR: 17 (13 Jun 2020 08:23) (17 - 22)  SpO2: 98% (13 Jun 2020 08:23) (89% - 98%)    PHYSICAL EXAM:  GENERAL: NAD, well-groomed, well-developed      LABS:                        13.0   9.49  )-----------( 107      ( 13 Jun 2020 07:29 )             39.8     06-13    143  |  103  |  22  ----------------------------<  96  3.8   |  31  |  0.64    Ca    8.8      13 Jun 2020 07:29    TPro  7.4  /  Alb  2.8<L>  /  TBili  1.4<H>  /  DBili  x   /  AST  64<H>  /  ALT  58  /  AlkPhos  59  06-13        CAPILLARY BLOOD GLUCOSE        Lipid panel:               RADIOLOGY & ADDITIONAL TESTS:

## 2020-06-13 NOTE — PROGRESS NOTE ADULT - PROBLEM SELECTOR PLAN 1
tsh mildly low, likely sick euthyroid, f/u ft4 and will check t3  ENT or endocrine surgeon eval re: possible thyroidectomy in view of Trachea deviation from a large goiter - causing distortion of pharyngeal posterior wall

## 2020-06-13 NOTE — PROGRESS NOTE ADULT - SUBJECTIVE AND OBJECTIVE BOX
HPI:  Pt is an 89 y/o Male with PMHx of HTN,. Afib , SDH in past, DVT in past no longer on ac, thyroidectomy and thrombocytopenia comes to ED today s/p fall, no loc found down  by family. Pt c/o epigastric pain, back pain and was found in supine position at home. Pt is difficult to understand in ER , and states that whenever he eats he has the sensation of vomit. At triage pt had a saturation of 91. Spoke w/family in waiting room, and nephew says pt resides alone without a home health aide. Pt is checked on a once a day and was not responding to door or picking up phone today.  Pt's family reports a gradual decline over the past few weeks w/labored breathing and increased confusion.  No reported fever, chills, cp, palpitations, mild nausea/-v/-d/-c no travels or sick contacts.  in ed ct w/  1. Distended gallbladder with gallstones. There is clinical concern for gallbladder disease, follow-up with right upper quadrant sonogram.  2. There is presacral edema and perirectal fatty stranding. Limited evaluation of the rectum at this level, however underlying proctitis cannot be excluded. Recommend clinical correlation.  3. Suggestion of a recent mild compression fracture deformity of L5 without significant bony retropulsion. (08 Jun 2020 05:43)    Patient is a 88y old  Male who presents with a chief complaint of fall (13 Jun 2020 09:36)      INTERVAL HPI/OVERNIGHT EVENTS: continues tro be with secretions and full code called to evaluate pattient     MEDICATIONS  (STANDING):  dextrose 5% + sodium chloride 0.45% with potassium chloride 20 mEq/L 1000 milliLiter(s) (30 mL/Hr) IV Continuous <Continuous>  furosemide   Injectable 40 milliGRAM(s) IV Push two times a day  OLANZapine Injectable 5 milliGRAM(s) IntraMuscular at bedtime  pantoprazole  Injectable 40 milliGRAM(s) IV Push daily    MEDICATIONS  (PRN):  haloperidol    Injectable 5 milliGRAM(s) IV Push every 6 hours PRN Agitation  metoprolol tartrate Injectable 5 milliGRAM(s) IV Push every 4 hours PRN for HR >110bpm  morphine  - Injectable 2 milliGRAM(s) IV Push every 4 hours PRN Moderate Pain (4 - 6)      Allergies    No Known Allergies    Intolerances        REVIEW OF SYSTEMS:  CONSTITUTIONAL: No fever, weight loss, or fatigue  EYES: No eye pain, visual disturbances, or discharge  ENMT:  No difficulty hearing, tinnitus, vertigo; No sinus or throat pain  NECK: No pain or stiffness  BREASTS: No pain, masses, or nipple discharge  RESPIRATORY: No cough, wheezing, chills or hemoptysis; No shortness of breath  CARDIOVASCULAR: No chest pain, palpitations, dizziness, or leg swelling  GASTROINTESTINAL: No abdominal or epigastric pain. No nausea, vomiting, or hematemesis; No diarrhea or constipation. No melena or hematochezia.  GENITOURINARY: No dysuria, frequency, hematuria, or incontinence  NEUROLOGICAL: No headaches, memory loss, loss of strength, numbness, or tremors  SKIN: No itching, burning, rashes, or lesions   LYMPH NODES: No enlarged glands  ENDOCRINE: No heat or cold intolerance; No hair loss  MUSCULOSKELETAL: No joint pain or swelling; No muscle, back, or extremity pain  PSYCHIATRIC: No depression, anxiety, mood swings, or difficulty sleeping  HEME/LYMPH: No easy bruising, or bleeding gums  ALLERGY AND IMMUNOLOGIC: No hives or eczema    Vital Signs Last 24 Hrs  T(C): 36.9 (13 Jun 2020 11:11), Max: 37 (12 Jun 2020 16:35)  T(F): 98.4 (13 Jun 2020 11:11), Max: 98.6 (12 Jun 2020 16:35)  HR: 114 (13 Jun 2020 11:11) (101 - 153)  BP: 99/45 (13 Jun 2020 11:11) (99/45 - 138/75)  BP(mean): --  RR: 17 (13 Jun 2020 11:11) (17 - 22)  SpO2: 97% (13 Jun 2020 11:11) (89% - 98%)    PHYSICAL EXAM:  GENERAL: NAD, HEAD:  Atraumatic, Normocephalic  EYES: EOMI, PERRLA, conjunctiva and sclera clear  ENMT: No tonsillar erythema, exudates, or enlargement; Moist mucous membranes, Good dentition, No lesions  NECK: Supple, No JVD, Normal thyroid  NERVOUS SYSTEM:  confused   CHEST/LUNG: Clear to percussion bilaterally; No rales, rhonchi, wheezing, or rubs  HEART: Regular rate and rhythm; No murmurs, rubs, or gallops  ABDOMEN: Soft, Nontender, Nondistended; Bowel sounds present  EXTREMITIES:  2+ Peripheral Pulses, No clubbing, cyanosis, or edema  LYMPH: No lymphadenopathy noted  SKIN: No rashes or lesions    LABS:                        13.0   9.49  )-----------( 107      ( 13 Jun 2020 07:29 )             39.8     06-13    143  |  103  |  22  ----------------------------<  96  3.8   |  31  |  0.64    Ca    8.8      13 Jun 2020 07:29    TPro  7.4  /  Alb  2.8<L>  /  TBili  1.4<H>  /  DBili  x   /  AST  64<H>  /  ALT  58  /  AlkPhos  59  06-13        CAPILLARY BLOOD GLUCOSE          RADIOLOGY & ADDITIONAL TESTS:    Imaging Personally Reviewed:  [X ] YES  [ ] NO    Consultant(s) Notes Reviewed:  [ X] YES  [ ] NO    Care Discussed with Consultants/Other Providers [X ] YES  [ ] NO

## 2020-06-14 NOTE — PROGRESS NOTE ADULT - SUBJECTIVE AND OBJECTIVE BOX
Patient is a 88y old  Male who presents with a chief complaint of fall (13 Jun 2020 13:58)      INTERVAL HPI/OVERNIGHT EVENTS:  rapid response yesterday with resp distress  now on BIPAP  NAD    MEDICATIONS  (STANDING):  cefepime   IVPB 2000 milliGRAM(s) IV Intermittent every 8 hours  cefepime   IVPB      furosemide   Injectable 40 milliGRAM(s) IV Push two times a day  OLANZapine Injectable 5 milliGRAM(s) IntraMuscular at bedtime  pantoprazole  Injectable 40 milliGRAM(s) IV Push daily    MEDICATIONS  (PRN):  haloperidol    Injectable 5 milliGRAM(s) IV Push every 6 hours PRN Agitation  metoprolol tartrate Injectable 5 milliGRAM(s) IV Push every 4 hours PRN for HR >110bpm  morphine  - Injectable 2 milliGRAM(s) IV Push every 4 hours PRN Moderate Pain (4 - 6)      REVIEW OF SYSTEMS:  unobtainable    Vital Signs Last 24 Hrs  T(C): 36.1 (14 Jun 2020 04:01), Max: 36.9 (13 Jun 2020 11:11)  T(F): 97 (14 Jun 2020 04:01), Max: 98.4 (13 Jun 2020 11:11)  HR: 108 (14 Jun 2020 08:33) (82 - 153)  BP: 80/39 (14 Jun 2020 04:01) (72/31 - 150/80)  BP(mean): --  RR: 20 (14 Jun 2020 04:01) (17 - 20)  SpO2: 100% (14 Jun 2020 08:33) (72% - 100%)    PHYSICAL EXAM:  GENERAL: NAD, well-groomed, well-developed      LABS:                        12.6   10.60 )-----------( x        ( 14 Jun 2020 07:53 )             41.1     06-14    146<H>  |  105  |  42<H>  ----------------------------<  87  4.9   |  29  |  1.50<H>    Ca    8.3<L>      14 Jun 2020 07:53  Phos  6.4     06-14  Mg     2.4     06-14    TPro  7.2  /  Alb  2.6<L>  /  TBili  0.9  /  DBili  x   /  AST  48<H>  /  ALT  50  /  AlkPhos  59  06-14    PT/INR - ( 13 Jun 2020 16:23 )   PT: 17.4 sec;   INR: 1.53 ratio         PTT - ( 13 Jun 2020 16:23 )  PTT:24.6 sec    CAPILLARY BLOOD GLUCOSE      POCT Blood Glucose.: 100 mg/dL (13 Jun 2020 15:32)  POCT Blood Glucose.: 99 mg/dL (13 Jun 2020 15:05)    Lipid panel:   CARDIAC MARKERS ( 13 Jun 2020 16:22 )  .027 ng/mL / x     / 167 U/L / x     / 9.6 ng/mL      ABG - ( 13 Jun 2020 22:20 )  pH, Arterial: x     pH, Blood: 7.37  /  pCO2: 57    /  pO2: 107   / HCO3: 32    / Base Excess: 5.8   /  SaO2: 98                      RADIOLOGY & ADDITIONAL TESTS:

## 2020-06-14 NOTE — PROGRESS NOTE ADULT - PROBLEM SELECTOR PLAN 1
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

## 2020-06-14 NOTE — PHARMACY COMMUNICATION NOTE - COMMENTS
s/w dr frazier - pt not taking anything PO and wants to give diltiazem 10mg ivp q8h (atc) x3 days; md will monitor

## 2020-06-14 NOTE — PROGRESS NOTE ADULT - ASSESSMENT
89 y/o Male with PMHx of HTN,. Afib , SDH in past, DVT in past no longer on ac, thyroidectomy and thrombocytopenia comes to ED today s/p fall w/?new compression fx and rhabdo. Pt c/o epigastric pain, back pain and was found in supine position at home. Pt is difficult to understand in ER , and states that whenever he eats he has the sensation of vomit. At triage pt had a saturation of 91. Pt's family reports a gradual decline over the past few weeks w/labored breathing and increased confusion.  No reported fever, chills, cp, palpitations, mild nausea/-v/-d/-c no travels or sick contacts.    Non-traumatic rhabdomyolysis, s/p fall- follow CK   Fracture of lumbar spine- ortho on board. MRI demonstrates chronic L2, L3, and L4 VCFx's and acute T10, L1, L5 VCFx's. No acute orthopedic surgical intervention needed at this time per ortho.   Tiny Pneumothorax, unspecified type- monitor  Acute abdominal pain-   -CT abd- Distended gallbladder with gallstones. presacral edema and perirectal fatty stranding.   -US abd- Cholelithiasis and distended gallbladder. No biliary ductal dilatation.   -Seen by Sx/Dr. Salmeron.; no acute surgical intervention at this time. Patient is not a surgical candidate.  -Seen by GI/Dr. Renae ; NPO, s/p  Broad spectrum antibiotics no evidence of acute cholicystitis, HIDA neg, MBS- Incomplete epiglottic deflection. F/UP Sx. ENT/Dr. Ramey (left vm in office no [phone]) and Endo Dr. Crawford consulted. pain mgmt   Hypokalemia- lyte replaced, monitor  Elev trop; likely demand ischemia s/p fall  Acute metabolic encephalopathy/dementia- monitor mental status     Long discussion with niece was eating ok knows that he has been more confused wants him home   Chronic AFIB- not on AC per spouse hx of SDH. pt off dig, had mare  on metoprolol and cardizem consider digoxin Preventive measure- IPC    spouse/niece- [phone]

## 2020-06-14 NOTE — PROGRESS NOTE ADULT - SUBJECTIVE AND OBJECTIVE BOX
Patient is a 88y old  Male who presents with a chief complaint of fall (14 Jun 2020 09:22)      HPI:  Pt is an 87 y/o Male with PMHx of HTN,. Afib , SDH in past, DVT in past no longer on ac, thyroidectomy and thrombocytopenia comes to ED today s/p fall, no loc found down  by family. Pt c/o epigastric pain, back pain and was found in supine position at home. Pt is difficult to understand in ER , and states that whenever he eats he has the sensation of vomit. At triage pt had a saturation of 91. Spoke w/family in waiting room, and nephew says pt resides alone without a home health aide. Pt is checked on a once a day and was not responding to door or picking up phone today.  Pt's family reports a gradual decline over the past few weeks w/labored breathing and increased confusion.  No reported fever, chills, cp, palpitations, mild nausea/-v/-d/-c no travels or sick contacts.  in ed ct w/  1. Distended gallbladder with gallstones. There is clinical concern for gallbladder disease, follow-up with right upper quadrant sonogram.  2. There is presacral edema and perirectal fatty stranding. Limited evaluation of the rectum at this level, however underlying proctitis cannot be excluded. Recommend clinical correlation.  3. Suggestion of a recent mild compression fracture deformity of L5 without significant bony retropulsion. (08 Jun 2020 05:43)      INTERVAL HPI/OVERNIGHT EVENTS:  The patient /nurse denies melena, hematochezia, hematemesis, nausea, vomiting, abdominal pain, constipation, diarrhea, or change in bowel movements     MEDICATIONS  (STANDING):  cefepime   IVPB 2000 milliGRAM(s) IV Intermittent every 8 hours  cefepime   IVPB      pantoprazole  Injectable 40 milliGRAM(s) IV Push daily    MEDICATIONS  (PRN):  haloperidol    Injectable 5 milliGRAM(s) IV Push every 6 hours PRN Agitation  metoprolol tartrate Injectable 5 milliGRAM(s) IV Push every 4 hours PRN for HR >110bpm  morphine  - Injectable 2 milliGRAM(s) IV Push every 4 hours PRN Moderate Pain (4 - 6)      FAMILY HISTORY:  No pertinent family history in first degree relatives      Allergies    No Known Allergies    Intolerances        PMH/PSH:  Lung nodules  SVC syndrome  Acute DVT (deep venous thrombosis)  Essential hypertension  Atrial fibrillation, unspecified type  Cardiac complaint  H/O thyroidectomy  No significant past surgical history        REVIEW OF SYSTEMS:  CONSTITUTIONAL: No fever, weight loss  EYES: No eye pain, visual disturbances, or discharge  ENMT:  No difficulty hearing, tinnitus, vertigo; No sinus or throat pain  NECK: No pain or stiffness  BREASTS: No pain, masses, or nipple discharge  RESPIRATORY: No cough, wheezing, chills or hemoptysis;   CARDIOVASCULAR: No chest pain, palpitations, dizziness, or leg swelling  GASTROINTESTINAL:  see above.  GENITOURINARY: No dysuria, frequency, hematuria, or incontinence  NEUROLOGICAL: No headaches, memory loss, loss of strength, numbness, or tremors  SKIN: No itching, burning, rashes, or lesions   LYMPH NODES: No enlarged glands  ENDOCRINE: No heat or cold intolerance; No hair loss  MUSCULOSKELETAL: No joint pain or swelling; No muscle, back, or extremity pain  PSYCHIATRIC: No depression, anxiety, mood swings, or difficulty sleeping  HEME/LYMPH: No easy bruising, or bleeding gums  ALLERGY AND IMMUNOLOGIC: No hives or eczema    Vital Signs Last 24 Hrs  T(C): 36.1 (14 Jun 2020 04:01), Max: 36.9 (13 Jun 2020 11:11)  T(F): 97 (14 Jun 2020 04:01), Max: 98.4 (13 Jun 2020 11:11)  HR: 108 (14 Jun 2020 08:33) (82 - 129)  BP: 80/39 (14 Jun 2020 04:01) (72/31 - 150/80)  BP(mean): --  RR: 20 (14 Jun 2020 04:01) (17 - 20)  SpO2: 100% (14 Jun 2020 08:33) (72% - 100%)    PHYSICAL EXAM:  GENERAL: NAD, well-groomed, well-developed  HEAD:  Atraumatic, Normocephalic  EYES: EOMI, PERRLA, conjunctiva and sclera clear  NECK: Supple, No JVD, Normal thyroid  NERVOUS SYSTEM:  Alert & Oriented X1 Fair concentration;   CHEST/LUNG: Clear to percussion bilaterally; No rales, rhonchi, wheezing, or rubs  HEART: Regular rate and rhythm; No murmurs, rubs, or gallops  ABDOMEN: Soft, Nontender, Nondistended; Bowel sounds present  EXTREMITIES:  2+ Peripheral Pulses, No clubbing, cyanosis, or edema  LYMPH: No lymphadenopathy noted  SKIN: No rashes or lesions    LAB                          12.6   10.60 )-----------( 86       ( 14 Jun 2020 07:53 )             41.1       CBC:  06-14 @ 07:53  WBC 10.60   Hgb 12.6   Hct 41.1   Plts 86  .3  06-13 @ 16:23  WBC 13.33   Hgb 13.1   Hct 41.8   Plts 110  .7  06-13 @ 07:29  WBC 9.49   Hgb 13.0   Hct 39.8   Plts 107  MCV 98.0  06-12 @ 07:44  WBC 9.51   Hgb 12.2   Hct 37.7   Plts 98  MCV 99.5  06-11 @ 06:36  WBC 7.78   Hgb 11.4   Hct 34.2   Plts 88  MCV 96.6  06-10 @ 06:41  WBC 9.05   Hgb 11.7   Hct 36.5   Plts 90  MCV 97.9  06-09 @ 06:23  WBC 10.80   Hgb 11.2   Hct 34.5   Plts 74  MCV 97.5  06-07 @ 22:53  WBC 15.42   Hgb 12.5   Hct 38.3   Plts 81  MCV 98.5      Chemistry:  06-14 @ 07:53  Na+ 146  K+ 4.9  Cl- 105  CO2 29  BUN 42  Cr 1.50     06-13 @ 16:23  Na+ 146  K+ 4.2  Cl- 103  CO2 33  BUN 28  Cr 0.96     06-13 @ 07:29  Na+ 143  K+ 3.8  Cl- 103  CO2 31  BUN 22  Cr 0.64     06-12 @ 07:44  Na+ 142  K+ 3.5  Cl- 102  CO2 30  BUN 25  Cr 0.64     06-11 @ 06:36  Na+ 142  K+ 3.1  Cl- 103  CO2 32  BUN 31  Cr 0.64     06-10 @ 17:12  Na+ --  K+ 3.1  Cl- --  CO2 --  BUN --  Cr --     06-10 @ 06:41  Na+ 141  K+ 2.9  Cl- 102  CO2 28  BUN 32  Cr 0.70     06-09 @ 06:23  Na+ 138  K+ 3.1  Cl- 104  CO2 26  BUN 35  Cr 0.62     06-07 @ 22:53  Na+ 138  K+ 3.4  Cl- 100  CO2 28  BUN 30  Cr 0.78         Glucose, Serum: 87 mg/dL (06-14 @ 07:53)  Glucose, Serum: 116 mg/dL (06-13 @ 16:23)  Glucose, Serum: 96 mg/dL (06-13 @ 07:29)  Glucose, Serum: 106 mg/dL (06-12 @ 07:44)  Glucose, Serum: 94 mg/dL (06-11 @ 06:36)  Glucose, Serum: 93 mg/dL (06-10 @ 06:41)  Glucose, Serum: 106 mg/dL (06-09 @ 06:23)  Glucose, Serum: 111 mg/dL (06-07 @ 22:53)      14 Jun 2020 07:53    146    |  105    |  42     ----------------------------<  87     4.9     |  29     |  1.50   13 Jun 2020 16:23    146    |  103    |  28     ----------------------------<  116    4.2     |  33     |  0.96   13 Jun 2020 07:29    143    |  103    |  22     ----------------------------<  96     3.8     |  31     |  0.64   12 Jun 2020 07:44    142    |  102    |  25     ----------------------------<  106    3.5     |  30     |  0.64   11 Jun 2020 06:36    142    |  103    |  31     ----------------------------<  94     3.1     |  32     |  0.64   10 [place] 2020 17:12    x      |  x      |  x      ----------------------------<  x      3.1     |  x      |  x      10 [place] 2020 06:41    141    |  102    |  32     ----------------------------<  93     2.9     |  28     |  0.70     Ca    8.3        14 Jun 2020 07:53  Ca    8.8        13 Jun 2020 16:23  Ca    8.8        13 Jun 2020 07:29  Ca    8.5        12 Jun 2020 07:44  Ca    8.3        11 Jun 2020 06:36  Ca    8.4        10 Jun 2020 06:41  Ca    8.4        09 Jun 2020 06:23  Phos  6.4       14 Jun 2020 07:53  Mg     2.4       14 Jun 2020 07:53  Mg     2.1       10 Jun 2020 06:41  Mg     2.1       07 Jun 2020 22:53    TPro  7.2    /  Alb  2.6    /  TBili  0.9    /  DBili  x      /  AST  48     /  ALT  50     /  AlkPhos  59     14 Jun 2020 07:53  TPro  7.5    /  Alb  3.2    /  TBili  1.1    /  DBili  x      /  AST  61     /  ALT  62     /  AlkPhos  67     13 Jun 2020 16:23  TPro  7.4    /  Alb  2.8    /  TBili  1.4    /  DBili  x      /  AST  64     /  ALT  58     /  AlkPhos  59     13 Jun 2020 07:29  TPro  7.3    /  Alb  2.8    /  TBili  1.3    /  DBili  x      /  AST  68     /  ALT  56     /  AlkPhos  54     12 Jun 2020 07:44  TPro  6.9    /  Alb  2.5    /  TBili  1.5    /  DBili  x      /  AST  71     /  ALT  48     /  AlkPhos  51     11 Jun 2020 06:36  TPro  7.1    /  Alb  2.6    /  TBili  1.7    /  DBili  x      /  AST  108    /  ALT  58     /  AlkPhos  54     10 Jun 2020 06:41  TPro  6.8    /  Alb  2.6    /  TBili  1.7    /  DBili  x      /  AST  103    /  ALT  47     /  AlkPhos  56     09 Jun 2020 06:23      PT/INR - ( 13 Jun 2020 16:23 )   PT: 17.4 sec;   INR: 1.53 ratio         PTT - ( 13 Jun 2020 16:23 )  PTT:24.6 sec        CAPILLARY BLOOD GLUCOSE      POCT Blood Glucose.: 100 mg/dL (13 Jun 2020 15:32)  POCT Blood Glucose.: 99 mg/dL (13 Jun 2020 15:05)      C-Reactive Protein, Serum: 7.53 mg/dL (06-08 @ 07:44)      RADIOLOGY & ADDITIONAL TESTS:    Imaging Personally Reviewed:  [ ] YES  [ ] NO    Consultant(s) Notes Reviewed:  [ ] YES  [ ] NO    Care Discussed with Consultants/Other Providers [ ] YES  [ ] NO

## 2020-06-14 NOTE — PROGRESS NOTE ADULT - ASSESSMENT
PI:  Pt is an 89 y/o Male with PMHx of HTN,. Afib , SDH in past, DVT in past no longer on ac, thyroidectomy and thrombocytopenia comes to ED today s/p fall, no loc found down  by family. Pt c/o epigastric pain, back pain and was found in supine position at home. Pt is difficult to understand in ER , and states that whenever he eats he has the sensation of vomit. At triage pt had a saturation of 91. Spoke w/family in waiting room, and nephew says pt resides alone without a home health aide. Pt is checked on a once a day and was not responding to door or picking up phone today.  Pt's family reports a gradual decline over the past few weeks w/labored breathing and increased confusion.  No reported fever, chills, cp, palpitations, mild nausea/-v/-d/-c no travels or sick contacts.  in ed ct w/  1. Distended gallbladder with gallstones. There is clinical concern for gallbladder disease, follow-up with right upper quadrant sonogram.  2. There is presacral edema and perirectal fatty stranding. Limited evaluation of the rectum at this level, however underlying proctitis cannot be excluded. Recommend clinical correlation.  3. Suggestion of a recent mild compression fracture deformity of L5 without significant bony retropulsion. (08 Jun 2020 05:43)  ---------------- As Above --------------------------  Patient is a poor historian. History obtained from nephew. Was sent to the hospital because he had fallen and could not gt up.  Nephew states patient has been having difficulty eating ( would spit it out ) over the past few weeks.  Consult for abdominal pain and abnormal ultrasound / CT Scan.   See sonogram / CT scan    6/14/20  --------  a) Abdominal pain ( resolved )  /// Tenderness ( resolved )  /// Abnormal sono/CT scan .. CRP 7.53 The HIDA SCAN was negative. Elevated LFTs that are resolving  2.8 / 131 / 41 / 76 --> 1.7 / 103 / 47 / 56 --> 1.7 / 108 / 58 / 54 --> 1.5 / 71 / 48 / 51--> 1.3 / 68 / 56 / 54--> 0.9 / 48 / 50 / 59   R/O CBD stone - 1)  f/u labs  2) MRCP ( patient refused )    b) ? 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

## 2020-06-14 NOTE — CONSULT NOTE ADULT - SUBJECTIVE AND OBJECTIVE BOX
Patient is a 88y old  Male who presents with a chief complaint of fall (14 Jun 2020 10:39)    HPI:  89 y/o Male with HTN,. Afib , SDH in past, DVT in past( no longer on ac), Thyroidectomy and Thrombocytopenia.   Brought  to ED s/p fall, no LOC,  found down  by family. Complained of epigastric pain, back pain and was found in supine position at home. Stated in ED that whenever he eats he has the sensation of vomiting. At triage pt had a saturation of 91%.  Family reported in ED that pt  resides alone without a home health aide. He is checked on a once a day and was not responding to door or picking up phone .  Family reported a gradual decline over the past few weeks w/labored breathing and increased confusion.  No reported fever, chills, cp, palpitations, mild nausea/-v/-d/-c no travels or sick contacts.  Admitted post fall, Rhabdomyolysis, Suspected Cholecystitis, A Fib and tiny pneumothorax, Found to have fracture of Lumbar spine.  Has enlarged thyroid(thyroidectomy in past) causing deviation of trachea for which family has refused surgical intervention. Also reported with swallowing difficulty for which family has refused peg per DR. Velasquez.  Hospital course complicated with restlessness, agitation requiring pain control and sedation. Then found to be in hypoxic hypercarbic Respiratory failure so was placed on BIPAP.  Not able to provide any details to history.     PAST MEDICAL & SURGICAL HISTORY:  Lung nodules  SVC syndrome  Acute DVT (deep venous thrombosis)  Essential hypertension  Atrial fibrillation, unspecified type  H/O thyroidectomy    FAMILY HISTORY:  No pertinent family history in first degree relatives    SOCIAL HISTORY: not available    Allergies  No Known Allergies    MEDICATIONS  (STANDING):  cefepime   IVPB 2000 milliGRAM(s) IV Intermittent every 8 hours  cefepime   IVPB      pantoprazole  Injectable 40 milliGRAM(s) IV Push daily    MEDICATIONS  (PRN):  haloperidol    Injectable 5 milliGRAM(s) IV Push every 6 hours PRN Agitation  metoprolol tartrate Injectable 5 milliGRAM(s) IV Push every 4 hours PRN for HR >110bpm  morphine  - Injectable 2 milliGRAM(s) IV Push every 4 hours PRN Moderate Pain (4 - 6)    REVIEW OF SYSTEMS:   not able to provide.    Vital Signs Last 24 Hrs  T(C): 36.1 (14 Jun 2020 04:01), Max: 36.3 (13 Jun 2020 16:00)  T(F): 97 (14 Jun 2020 04:01), Max: 97.4 (13 Jun 2020 16:00)  HR: 128 (14 Jun 2020 11:13) (82 - 128)  BP: 80/39 (14 Jun 2020 04:01) (72/31 - 150/80)  BP(mean): --  RR: 20 (14 Jun 2020 04:01) (20 - 20)  SpO2: 96% (14 Jun 2020 11:13) (93% - 100%)    PHYSICAL EXAM:  GEN:        Agitated, uncomfortable.  HEENT:    BIPAP    RESP:       decreased air entry.  CVS:         irregular rate and rhythm.   ABD:         Soft, non-tender, non-distended;   SKIN:           Warm and dry.  EXTR:            No clubbing, cyanosis or edema  CNS:             agitated  PSYCH:        agitated,    LABS:                        12.6   10.60 )-----------( 86       ( 14 Jun 2020 07:53 )             41.1     06-14    146<H>  |  105  |  42<H>  ----------------------------<  87  4.9   |  29  |  1.50<H>    Ca    8.3<L>      14 Jun 2020 07:53  Phos  6.4     06-14  Mg     2.4     06-14    TPro  7.2  /  Alb  2.6<L>  /  TBili  0.9  /  DBili  x   /  AST  48<H>  /  ALT  50  /  AlkPhos  59  06-14    PT/INR - ( 13 Jun 2020 16:23 )   PT: 17.4 sec;   INR: 1.53 ratio      PTT - ( 13 Jun 2020 16:23 )  PTT:24.6 sec  06-14 @ 10:07  pH: 7.26  pCO2: 71  pO2: 136  SaO2: 99  06-13 @ 22:20  pH: 7.37  pCO2: 57  pO2: 107  SaO2: 98  06-13 @ 17:21  pH: 7.05  pCO2: >106  pO2: 247  SaO2: 99  06-13 @ 15:47  pH: 7.06  pCO2: >97  pO2: 80  SaO2: 88  06-07 @ 23:43  pH: 7.44  pCO2: 39  pO2: 80  SaO2: 96    Culture - Urine (collected 06-08-20 @ 09:09)  Source: .Urine Clean Catch (Midstream)  Final Report (06-09-20 @ 08:16):    No growth    Culture - Blood (collected 06-08-20 @ 08:58)  Source: .Blood Blood-Peripheral  Final Report (06-13-20 @ 09:00):    No Growth Final    Culture - Blood (collected 06-08-20 @ 08:57)  Source: .Blood Blood-Peripheral  Final Report (06-13-20 @ 09:00):    No Growth Final    EKG:  A Fib    RADIOLOGY & ADDITIONAL STUDIES:  < from: Xray Chest 1 View-PORTABLE IMMEDIATE (06.13.20 @ 17:16) >  EXAM:  XR CHEST PORTABLE IMMED 1V                          PROCEDURE DATE:  06/13/2020      INTERPRETATION:  Chest one view    HISTORY: Shortness of breath    COMPARISON STUDY: 6/7/2020    Frontal expiratory view of the chest shows the heart to be similar in size. The lungs show similar pulmonary vascularity with progression of left effusion and small right effusion. There is no evidence of pneumothorax.    There may be narrowing of the trachea at the level of the thoracic inlet. Evidenceof enlarged thyroid is again noted.    IMPRESSION:  Narrowed trachea. Correlate clinically for possible stridor.    Thank you for the courtesy of this referral.    FERNIE CALHOUN M.D., ATTENDING RADIOLOGIST  This document has been electronically signed. Jun 13 2020  5:37PM      ASSESSMENT AND PLAN:  ·	Acute hypoxic hypercarbic Respiratory failure.  ·	Acute metabolic Encephalopathy.  ·	Agitation.  ·	A Fib with RVR.  ·	S/P Fall   ·	Multiple compression deformities of spine.  ·	Large goiter causing deviation of trachea.  ·	Renal Insuffiencey.  ·	DVT history,  ·	SDH history  ·	Dysphagia.    Tried on nasal O2 but did not do well, so back on BIPAP.  Per Dr. Velasquez, family has refused Thyroid surgery as well as peg.  Will continue BIPAP and repeat ABG,   On Empiric antibiotics.  Is DNR and DNI, as family does not want thyroid surgery or peg, consider palliative care/hospice evaluation.

## 2020-06-14 NOTE — PROGRESS NOTE ADULT - SUBJECTIVE AND OBJECTIVE BOX
HPI:  Pt is an 87 y/o Male with PMHx of HTN,. Afib , SDH in past, DVT in past no longer on ac, thyroidectomy and thrombocytopenia comes to ED today s/p fall, no loc found down  by family. Pt c/o epigastric pain, back pain and was found in supine position at home. Pt is difficult to understand in ER , and states that whenever he eats he has the sensation of vomit. At triage pt had a saturation of 91. Spoke w/family in waiting room, and nephew says pt resides alone without a home health aide. Pt is checked on a once a day and was not responding to door or picking up phone today.  Pt's family reports a gradual decline over the past few weeks w/labored breathing and increased confusion.  No reported fever, chills, cp, palpitations, mild nausea/-v/-d/-c no travels or sick contacts.  in ed ct w/  1. Distended gallbladder with gallstones. There is clinical concern for gallbladder disease, follow-up with right upper quadrant sonogram.  2. There is presacral edema and perirectal fatty stranding. Limited evaluation of the rectum at this level, however underlying proctitis cannot be excluded. Recommend clinical correlation.  3. Suggestion of a recent mild compression fracture deformity of L5 without significant bony retropulsion. (08 Jun 2020 05:43)    Patient is a 88y old  Male who presents with a chief complaint of fall (14 Jun 2020 15:53)      INTERVAL HPI/OVERNIGHT EVENTS: spent night on bipap now better on nasal canula     MEDICATIONS  (STANDING):  diltiazem Injectable 10 milliGRAM(s) IV Push every 8 hours  pantoprazole  Injectable 40 milliGRAM(s) IV Push daily    MEDICATIONS  (PRN):  haloperidol    Injectable 5 milliGRAM(s) IV Push every 6 hours PRN Agitation  metoprolol tartrate Injectable 5 milliGRAM(s) IV Push every 4 hours PRN for HR >110bpm  morphine  - Injectable 2 milliGRAM(s) IV Push every 4 hours PRN Moderate Pain (4 - 6)      Allergies    No Known Allergies    Intolerances        REVIEW OF SYSTEMS:  unable to provide secondary to bipap  Vital Signs Last 24 Hrs  T(C): 36.6 (14 Jun 2020 19:41), Max: 36.6 (14 Jun 2020 15:54)  T(F): 97.9 (14 Jun 2020 19:41), Max: 97.9 (14 Jun 2020 15:54)  HR: 130 (14 Jun 2020 19:41) (81 - 138)  BP: 83/48 (14 Jun 2020 19:41) (73/37 - 100/49)  BP(mean): 58 (14 Jun 2020 19:41) (58 - 66)  RR: 24 (14 Jun 2020 19:41) (20 - 24)  SpO2: 95% (14 Jun 2020 19:41) (95% - 100%)    PHYSICAL EXAM:  GENERAL: NAD, well-groomed, well-developed  HEAD:  Atraumatic, Normocephalic  EYES: EOMI, PERRLA, conjunctiva and sclera clear  ENMT: No tonsillar erythema, exudates, or enlargement; Moist mucous membranes, Good dentition, No lesions  NECK: Supple, No JVD, Normal thyroid  NERVOUS SYSTEM:  awake verbal   CHEST/LUNG: mechaneical breath sounds HEART: Regular rate and rhythm; No murmurs, rubs, or gallops  ABDOMEN: Soft, Nontender, Nondistended; Bowel sounds present  EXTREMITIES:  2+ Peripheral Pulses, No clubbing, cyanosis, or edema  LYMPH: No lymphadenopathy noted  SKIN: No rashes or lesions    LABS:                        12.6   10.60 )-----------( 86       ( 14 Jun 2020 07:53 )             41.1     06-14    146<H>  |  105  |  42<H>  ----------------------------<  87  4.9   |  29  |  1.50<H>    Ca    8.3<L>      14 Jun 2020 07:53  Phos  6.4     06-14  Mg     2.4     06-14    TPro  7.2  /  Alb  2.6<L>  /  TBili  0.9  /  DBili  x   /  AST  48<H>  /  ALT  50  /  AlkPhos  59  06-14    PT/INR - ( 13 Jun 2020 16:23 )   PT: 17.4 sec;   INR: 1.53 ratio         PTT - ( 13 Jun 2020 16:23 )  PTT:24.6 sec  Blood Gas Profile - Arterial (06.14.20 @ 10:07)    Blood Gas Source: Arterial    Blood Gas Comments: indicated.    pH, Blood: 7.26    pCO2, Arterial: 71 mmHg    pO2, Arterial: 136 mmHg    HCO3, Arterial: 31 mmol/L    Base Excess, Arterial: 2.6 mmol/L    Oxygen Saturation, Arterial: 99 %    FIO2, Arterial: 50              RADIOLOGY & ADDITIONAL TESTS:    Imaging Personally Reviewed:  [X ] YES  [ ] NO    Consultant(s) Notes Reviewed:  [X ] YES  [ ] NO    Care Discussed with Consultants/Other Providers [X ] YES  [ ] NO

## 2020-06-15 NOTE — CONSULT NOTE ADULT - PROBLEM SELECTOR RECOMMENDATION 4
Palliative care consulted today for GOC discussion in the setting of advanced age, multiple underlying comorbidities, and pt's family refusing PEG/thyroid surgery.   - Pt's niece is the HCP: Nano Taveras [phone]  - Pt remains a DNR/DNI, No feeding tube. MOLST in chart.   - Will follow up with family today regarding options moving forward. 20 minutes spent with patient and family discussing advanced care planning. Family updated on pt's current status and verbalized understanding. Pt remains a DNR/DNI, family does not want any aggressive measures such as surgical interventions or PEG tube placement done. Family educated on Hospice and verbalized understanding. Family interested in hospice in the home and said they will private hire so their Uncle can have 24/7 assistance while ay home. Hospice consult place. Primary team made aware.

## 2020-06-15 NOTE — CONSULT NOTE ADULT - PROBLEM SELECTOR RECOMMENDATION 5
Palliative care consulted today for GOC discussion in the setting of advanced age, multiple underlying comorbidities, and pt's family refusing PEG/thyroid surgery.   - Pt's niece is the HCP: Nano Taveras [phone]  - Pt remains a DNR/DNI, No feeding tube. MOLST in chart.   - Hospice consult placed-awaiting decision from family.

## 2020-06-15 NOTE — CONSULT NOTE ADULT - PROBLEM SELECTOR RECOMMENDATION 3
Pt has PMHx of thyroid goiter. Family refused any surgical interventions. Pt now p/w stridor and difficulty breathing. Remains on 2 L NC upon exam today. Pt failed speech and swallow exam on 6/10 and appears to be a high aspiration risk. Recommend to continue NPO status for now. Pt has PMHx of thyroid goiter. Family refused any surgical interventions. Pt now p/w stridor and difficulty breathing. Remains on 2 L NC upon exam today. Pt failed speech and swallow exam on 6/10 and appears to be a high aspiration risk. Recommend to continue NPO status for now. Placed order for repeat speech and swallow evaluation.

## 2020-06-15 NOTE — PROGRESS NOTE ADULT - ASSESSMENT
87 y/o Male with PMHx of HTN,. Afib , SDH in past, DVT in past no longer on ac, thyroidectomy and thrombocytopenia comes to ED today s/p fall w/?new compression fx and rhabdo. Pt c/o epigastric pain, back pain and was found in supine position at home. Pt is difficult to understand in ER , and states that whenever he eats he has the sensation of vomit. At triage pt had a saturation of 91. Pt's family reports a gradual decline over the past few weeks w/labored breathing and increased confusion.  No reported fever, chills, cp, palpitations, mild nausea/-v/-d/-c no travels or sick contacts.    Non-traumatic rhabdomyolysis, s/p fall- follow CK   Fracture of lumbar spine- ortho on board. MRI demonstrates chronic L2, L3, and L4 VCFx's and acute T10, L1, L5 VCFx's. No acute orthopedic surgical intervention needed at this time per ortho.   Tiny Pneumothorax, unspecified type- monitor  Acute abdominal pain-   -CT abd- Distended gallbladder with gallstones. presacral edema and perirectal fatty stranding.   -US abd- Cholelithiasis and distended gallbladder. No biliary ductal dilatation.   -Seen by Sx/Dr. Salmeron.; no acute surgical intervention at this time. Patient is not a surgical candidate.  -Seen by GI/Dr. Renae ; NPO, s/p  Broad spectrum antibiotics no evidence of acute cholicystitis, HIDA neg, MBS- Incomplete epiglottic deflection. F/UP Sx. ENT/Dr. Ramey (left vm in office no [phone]) and Endo Dr. Crawford consulted. pain mgmt   Hypokalemia- lyte replaced, monitor  Elev trop; likely demand ischemia s/p fall  Acute metabolic encephalopathy/dementia- monitor mental status     Long discussion with niece was eating ok knows that he has been more confused wants him home   Chronic AFIB- not on AC per spouse hx of SDH. pt off dig, had mare  on metoprolol and cardizem consider digoxin Preventive measure- IPC    spouse/niece- [phone]

## 2020-06-15 NOTE — PROGRESS NOTE ADULT - ASSESSMENT
PI:  Pt is an 87 y/o Male with PMHx of HTN,. Afib , SDH in past, DVT in past no longer on ac, thyroidectomy and thrombocytopenia comes to ED today s/p fall, no loc found down  by family. Pt c/o epigastric pain, back pain and was found in supine position at home. Pt is difficult to understand in ER , and states that whenever he eats he has the sensation of vomit. At triage pt had a saturation of 91. Spoke w/family in waiting room, and nephew says pt resides alone without a home health aide. Pt is checked on a once a day and was not responding to door or picking up phone today.  Pt's family reports a gradual decline over the past few weeks w/labored breathing and increased confusion.  No reported fever, chills, cp, palpitations, mild nausea/-v/-d/-c no travels or sick contacts.  in ed ct w/  1. Distended gallbladder with gallstones. There is clinical concern for gallbladder disease, follow-up with right upper quadrant sonogram.  2. There is presacral edema and perirectal fatty stranding. Limited evaluation of the rectum at this level, however underlying proctitis cannot be excluded. Recommend clinical correlation.  3. Suggestion of a recent mild compression fracture deformity of L5 without significant bony retropulsion. (08 Jun 2020 05:43)  ---------------- As Above --------------------------  Patient is a poor historian. History obtained from nephew. Was sent to the hospital because he had fallen and could not gt up.  Nephew states patient has been having difficulty eating ( would spit it out ) over the past few weeks.  Consult for abdominal pain and abnormal ultrasound / CT Scan.   See sonogram / CT scan    6/15/20  --------  a) Abdominal pain ( resolved )  /// Tenderness ( resolved )  /// Abnormal sono/CT scan .. CRP 7.53 The HIDA SCAN was negative. Elevated LFTs that are resolving  2.8 / 131 / 41 / 76 --> 1.7 / 103 / 47 / 56 --> 1.7 / 108 / 58 / 54 --> 1.5 / 71 / 48 / 51--> 1.3 / 68 / 56 / 54--> 0.9 / 48 / 50 / 59 -->   R/O CBD stone - 1)  f/u labs  2) MRCP ( patient refused )  3) see below   b) ? Dysphagia - see speech evaluation , Trachea deviation from a large goiter - causing distortion of pharyngeal posterior wall of esophagus and epiglottis  does not function correctly - Endo note appreciated / ENT consult -- As per PA, family wants patient on comfort foods and to discharge patient home - Supportive care for now

## 2020-06-15 NOTE — PROGRESS NOTE ADULT - SUBJECTIVE AND OBJECTIVE BOX
INTERVAL HPI:  89 y/o Male with HTN,. Afib , SDH in past, DVT in past( no longer on ac), Thyroidectomy and Thrombocytopenia.   Brought  to ED s/p fall, no LOC,  found down  by family. Complained of epigastric pain, back pain and was found in supine position at home. Stated in ED that whenever he eats he has the sensation of vomiting. At triage pt had a saturation of 91%.  Family reported in ED that pt  resides alone without a home health aide. He is checked on a once a day and was not responding to door or picking up phone .  Family reported a gradual decline over the past few weeks w/labored breathing and increased confusion.  No reported fever, chills, cp, palpitations, mild nausea/-v/-d/-c no travels or sick contacts.  Admitted post fall, Rhabdomyolysis, Suspected Cholecystitis, A Fib and tiny pneumothorax, Found to have fracture of Lumbar spine.  Has enlarged thyroid(thyroidectomy in past) causing deviation of trachea for which family has refused surgical intervention. Also reported with swallowing difficulty for which family has refused peg per DR. Velasquez.  Hospital course complicated with restlessness, agitation requiring pain control and sedation. Then found to be in hypoxic hypercarbic Respiratory failure so was placed on BIPAP.  Not able to provide any details to history.     OVERNIGHT EVENTS:  Lethargic, on nasal O2.    Vital Signs Last 24 Hrs  T(C): 36.8 (15 [place] 2020 16:08), Max: 37.3 (15 [place] 2020 00:44)  T(F): 98.3 (15 [place] 2020 16:08), Max: 99.1 (15 [place] 2020 00:44)  HR: 99 (15 [place] 2020 16:08) (87 - 133)  BP: 146/38 (15 [place] 2020 16:08) (83/48 - 146/38)  BP(mean): 58 (14 Jun 2020 19:41) (58 - 58)  RR: 20 (15 [place] 2020 16:08) (18 - 24)  SpO2: 92% (15 [place] 2020 16:08) (70% - 99%)    PHYSICAL EXAM:  GEN:        Lethargic, ,comfortable.  HEENT:    Normal.    RESP:       crackles.  CVS:          Regular rate and rhythm.   ABD:         Soft, non-tender, non-distended;     MEDICATIONS  (STANDING):  cefepime   IVPB 1000 milliGRAM(s) IV Intermittent every 24 hours  dextrose 5%. 1000 milliLiter(s) (75 mL/Hr) IV Continuous <Continuous>  diltiazem Injectable 10 milliGRAM(s) IV Push every 8 hours  pantoprazole  Injectable 40 milliGRAM(s) IV Push daily    MEDICATIONS  (PRN):  haloperidol    Injectable 5 milliGRAM(s) IV Push every 6 hours PRN Agitation  metoprolol tartrate Injectable 5 milliGRAM(s) IV Push every 4 hours PRN for HR >110bpm  morphine  - Injectable 2 milliGRAM(s) IV Push every 4 hours PRN Moderate Pain (4 - 6)    LABS:                        12.3   11.74 )-----------( 91       ( 15 [place] 2020 07:13 )             39.0     06-15    145  |  105  |  56<H>  ----------------------------<  125<H>  4.1   |  32<H>  |  1.61<H>    Ca    8.3<L>      15 [place] 2020 07:13  Phos  3.4     06-15  Mg     2.3     06-15    TPro  7.1  /  Alb  2.6<L>  /  TBili  1.1  /  DBili  x   /  AST  63<H>  /  ALT  48  /  AlkPhos  61  06-15    06-15 @ 10:48  pH: 7.39  pCO2: 56  pO2: 51  SaO2: 85  06-14 @ 10:07  pH: 7.26  pCO2: 71  pO2: 136  SaO2: 99  06-13 @ 22:20  pH: 7.37  pCO2: 57  pO2: 107  SaO2: 98  06-13 @ 17:21  pH: 7.05  pCO2: >106  pO2: 247  SaO2: 99  06-13 @ 15:47  pH: 7.06  pCO2: >97  pO2: 80  SaO2: 88    ASSESSMENT AND PLAN:  ·	Acute hypoxic hypercarbic Respiratory failure.  ·	Acute metabolic Encephalopathy.  ·	Agitation.  ·	A Fib with RVR.  ·	S/P Fall   ·	Multiple compression deformities of spine.  ·	Large goiter causing deviation of trachea.  ·	Renal Insuffiencey.  ·	DVT history,  ·	SDH history  ·	Dysphagia.    Continue O2 with as needed BIPAP.  Seen by palliative care.

## 2020-06-15 NOTE — CONSULT NOTE ADULT - PROBLEM SELECTOR RECOMMENDATION 2
Pt remains on 2L NC upon exam, mild SOB present. Pt denies any pain but does say "yes" when asked if he is having difficulty breathing. Pt remains a DNI.   - Recommend to continue Morphine 2 mg IVP q4hrs PRN for moderate pain or air hunger.  - Continue oxygen as needed for SOB.

## 2020-06-15 NOTE — CONSULT NOTE ADULT - ASSESSMENT
Pt is an 89 y/o Male with PMHx of HTN, Afib, SDH in past, DVT in past no longer on ac, thyroidectomy and thrombocytopenia comes to ED today s/p fall, no loc found down by family. Pt c/o epigastric pain, back pain and was found in supine position at home. Pt's family reports a gradual decline over the past few weeks w/labored breathing and increased confusion. Palliative care consulted for GOC discussion in the setting of advanced age, confusion, inability to eat, and multiple underlying comorbidities.

## 2020-06-15 NOTE — CONSULT NOTE ADULT - ATTENDING COMMENTS
Patient examined - abdomen is soft but distended, patient seemingly grimaces when palpated in epigastric/RUQ region, no rigidity/rebound suggestive of acute abdomen. Given the patient's comorbidity status, he is a poor candidate for surgical intervention, and would consider HIDA for suspicion of acute cholecystitis with IR drainage if showing obstruction of gallbladder. Patient would also benefit from completion imaging of c-spine and CT surgery consultation for chest findings and vascular surgery follow up for ascending thoracic aorta dilatation, and GI for possible proctitis as well as Ortho for L5 fracture. Also Endocrine followup for Thyroid nodule. Continue care as per primary team. No acute general surgical intervention at this time.
Agree with NP note.

## 2020-06-15 NOTE — PROGRESS NOTE ADULT - SUBJECTIVE AND OBJECTIVE BOX
HPI:  Pt is an 89 y/o Male with PMHx of HTN,. Afib , SDH in past, DVT in past no longer on ac, thyroidectomy and thrombocytopenia comes to ED today s/p fall, no loc found down  by family. Pt c/o epigastric pain, back pain and was found in supine position at home. Pt is difficult to understand in ER , and states that whenever he eats he has the sensation of vomit. At triage pt had a saturation of 91. Spoke w/family in waiting room, and nephew says pt resides alone without a home health aide. Pt is checked on a once a day and was not responding to door or picking up phone today.  Pt's family reports a gradual decline over the past few weeks w/labored breathing and increased confusion.  No reported fever, chills, cp, palpitations, mild nausea/-v/-d/-c no travels or sick contacts.  in ed ct w/  1. Distended gallbladder with gallstones. There is clinical concern for gallbladder disease, follow-up with right upper quadrant sonogram.  2. There is presacral edema and perirectal fatty stranding. Limited evaluation of the rectum at this level, however underlying proctitis cannot be excluded. Recommend clinical correlation.  3. Suggestion of a recent mild compression fracture deformity of L5 without significant bony retropulsion. (08 Jun 2020 05:43)    Patient is a 88y old  Male who presents with a chief complaint of fall (14 Jun 2020 15:53)      INTERVAL HPI/OVERNIGHT EVENTS: spent night on bipap now better on nasal canula     MEDICATIONS  (STANDING):  diltiazem Injectable 10 milliGRAM(s) IV Push every 8 hours  pantoprazole  Injectable 40 milliGRAM(s) IV Push daily    MEDICATIONS  (PRN):  haloperidol    Injectable 5 milliGRAM(s) IV Push every 6 hours PRN Agitation  metoprolol tartrate Injectable 5 milliGRAM(s) IV Push every 4 hours PRN for HR >110bpm  morphine  - Injectable 2 milliGRAM(s) IV Push every 4 hours PRN Moderate Pain (4 - 6)      Allergies    No Known Allergies    Intolerances        REVIEW OF SYSTEMS:  unable to provide secondary to bipap  Vital Signs Last 24 Hrs  T(C): 36.6 (14 Jun 2020 19:41), Max: 36.6 (14 Jun 2020 15:54)  T(F): 97.9 (14 Jun 2020 19:41), Max: 97.9 (14 Jun 2020 15:54)  HR: 130 (14 Jun 2020 19:41) (81 - 138)  BP: 83/48 (14 Jun 2020 19:41) (73/37 - 100/49)  BP(mean): 58 (14 Jun 2020 19:41) (58 - 66)  RR: 24 (14 Jun 2020 19:41) (20 - 24)  SpO2: 95% (14 Jun 2020 19:41) (95% - 100%)    PHYSICAL EXAM:  GENERAL: NAD, well-groomed, well-developed  HEAD:  Atraumatic, Normocephalic  EYES: EOMI, PERRLA, conjunctiva and sclera clear  ENMT: No tonsillar erythema, exudates, or enlargement; Moist mucous membranes, Good dentition, No lesions  NECK: Supple, No JVD, Normal thyroid  NERVOUS SYSTEM:  awake verbal   CHEST/LUNG: mechaneical breath sounds HEART: Regular rate and rhythm; No murmurs, rubs, or gallops  ABDOMEN: Soft, Nontender, Nondistended; Bowel sounds present  EXTREMITIES:  2+ Peripheral Pulses, No clubbing, cyanosis, or edema  LYMPH: No lymphadenopathy noted  SKIN: No rashes or lesions    LABS:                        12.6   10.60 )-----------( 86       ( 14 Jun 2020 07:53 )             41.1     06-14    146<H>  |  105  |  42<H>  ----------------------------<  87  4.9   |  29  |  1.50<H>    Ca    8.3<L>      14 Jun 2020 07:53  Phos  6.4     06-14  Mg     2.4     06-14    TPro  7.2  /  Alb  2.6<L>  /  TBili  0.9  /  DBili  x   /  AST  48<H>  /  ALT  50  /  AlkPhos  59  06-14    PT/INR - ( 13 Jun 2020 16:23 )   PT: 17.4 sec;   INR: 1.53 ratio         PTT - ( 13 Jun 2020 16:23 )  PTT:24.6 sec  Blood Gas Profile - Arterial (06.14.20 @ 10:07)    Blood Gas Source: Arterial    Blood Gas Comments: indicated.    pH, Blood: 7.26    pCO2, Arterial: 71 mmHg    pO2, Arterial: 136 mmHg    HCO3, Arterial: 31 mmol/L    Base Excess, Arterial: 2.6 mmol/L    Oxygen Saturation, Arterial: 99 %    FIO2, Arterial: 50              RADIOLOGY & ADDITIONAL TESTS:    Imaging Personally Reviewed:  [X ] YES  [ ] NO    Consultant(s) Notes Reviewed:  [X ] YES  [ ] NO    Care Discussed with Consultants/Other Providers [X ] YES  [ ] NO

## 2020-06-15 NOTE — GOALS OF CARE CONVERSATION - ADVANCED CARE PLANNING - CONVERSATION DETAILS
Spoke with pt's niece. Hospice POC and services was discussed for home. Niece has concerns and questions about pt's clinical condition. She is considering possibly going home with home care services first so pt can receive home PT services. She is also requesting a Swallow evaluation and also PT eval to be repeated. She states she will private hire an aide to provide pt's care at home. I explained I will speak with the covering attending and SW and request they give her a call. I left her with my contact info. Will cont to f/u as needed.       Padma Trinidad Rn

## 2020-06-15 NOTE — CONSULT NOTE ADULT - PROBLEM SELECTOR RECOMMENDATION 9
Pt a bit restless upon exam today. Recommend to continue close watch near nurses station and provide frequent reorientation as needed. Recommend to continue Haldol 5 mg IVP q6hrs PRN for agitation.

## 2020-06-15 NOTE — PROGRESS NOTE ADULT - SUBJECTIVE AND OBJECTIVE BOX
Patient is a 88y old  Male who presents with a chief complaint of fall (14 Jun 2020 21:25)      INTERVAL HPI/OVERNIGHT EVENTS:  pt NAD  no events overnight    MEDICATIONS  (STANDING):  cefepime   IVPB 1000 milliGRAM(s) IV Intermittent every 24 hours  dextrose 5%. 1000 milliLiter(s) (75 mL/Hr) IV Continuous <Continuous>  diltiazem Injectable 10 milliGRAM(s) IV Push every 8 hours  pantoprazole  Injectable 40 milliGRAM(s) IV Push daily    MEDICATIONS  (PRN):  haloperidol    Injectable 5 milliGRAM(s) IV Push every 6 hours PRN Agitation  metoprolol tartrate Injectable 5 milliGRAM(s) IV Push every 4 hours PRN for HR >110bpm  morphine  - Injectable 2 milliGRAM(s) IV Push every 4 hours PRN Moderate Pain (4 - 6)        Vital Signs Last 24 Hrs  T(C): 36.7 (15 [place]onte 2020 05:01), Max: 37.3 (15 Delonte 2020 00:44)  T(F): 98 (15 Delonte 2020 05:01), Max: 99.1 (15 Delonte 2020 00:44)  HR: 87 (15 Delonte 2020 06:00) (81 - 138)  BP: 97/38 (15 Delonte 2020 05:01) (83/48 - 100/49)  BP(mean): 58 (14 Jun 2020 19:41) (58 - 66)  RR: 24 (15 Delonte 2020 05:01) (20 - 24)  SpO2: 92% (15 Delonte 2020 06:00) (70% - 97%)    PHYSICAL EXAM:  GENERAL: NAD, well-groomed, well-developed      LABS:                        12.3   11.74 )-----------( 91       ( 15 Delonte 2020 07:13 )             39.0     06-15    145  |  105  |  56<H>  ----------------------------<  125<H>  4.1   |  32<H>  |  1.61<H>    Ca    8.3<L>      15 Delonte 2020 07:13  Phos  3.4     06-15  Mg     2.3     06-15    TPro  7.1  /  Alb  2.6<L>  /  TBili  1.1  /  DBili  x   /  AST  63<H>  /  ALT  48  /  AlkPhos  61  06-15    PT/INR - ( 13 Jun 2020 16:23 )   PT: 17.4 sec;   INR: 1.53 ratio         PTT - ( 13 Jun 2020 16:23 )  PTT:24.6 sec    CAPILLARY BLOOD GLUCOSE        Lipid panel:   CARDIAC MARKERS ( 15 Delonte 2020 07:13 )  x     / x     / 358 U/L / x     / x      CARDIAC MARKERS ( 13 Jun 2020 16:22 )  .027 ng/mL / x     / 167 U/L / x     / 9.6 ng/mL      ABG - ( 14 Jun 2020 10:07 )  pH, Arterial: x     pH, Blood: 7.26  /  pCO2: 71    /  pO2: 136   / HCO3: 31    / Base Excess: 2.6   /  SaO2: 99                      RADIOLOGY & ADDITIONAL TESTS:

## 2020-06-15 NOTE — PROGRESS NOTE ADULT - PROBLEM SELECTOR PLAN 1
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

## 2020-06-15 NOTE — CONSULT NOTE ADULT - SUBJECTIVE AND OBJECTIVE BOX
HPI:  Pt is an 87 y/o Male with PMHx of HTN,. Afib , SDH in past, DVT in past no longer on ac, thyroidectomy and thrombocytopenia comes to ED today s/p fall, no loc found down  by family. Pt c/o epigastric pain, back pain and was found in supine position at home. Pt is difficult to understand in ER , and states that whenever he eats he has the sensation of vomit. At triage pt had a saturation of 91. Spoke w/family in waiting room, and nephew says pt resides alone without a home health aide. Pt is checked on a once a day and was not responding to door or picking up phone today.  Pt's family reports a gradual decline over the past few weeks w/labored breathing and increased confusion.  No reported fever, chills, cp, palpitations, mild nausea/-v/-d/-c no travels or sick contacts.  in ed ct w/  1. Distended gallbladder with gallstones. There is clinical concern for gallbladder disease, follow-up with right upper quadrant sonogram.  2. There is presacral edema and perirectal fatty stranding. Limited evaluation of the rectum at this level, however underlying proctitis cannot be excluded. Recommend clinical correlation.  3. Suggestion of a recent mild compression fracture deformity of L5 without significant bony retropulsion. (08 Jun 2020 05:43)    Seen today for GOC discussion in the setting of advanced age, multiple underlying comorbidities, and pt's family refusing PEG/thyroid surgery.    PERTINENT PM/SXH:   Lung nodules  SVC syndrome  Acute DVT (deep venous thrombosis)  Essential hypertension  Atrial fibrillation, unspecified type  Cardiac complaint    H/O thyroidectomy  No significant past surgical history    FAMILY HISTORY:  No pertinent family history in first degree relatives    SOCIAL HISTORY:   Significant other/partner: Yes [ ]  No [ ] Children:  Yes [ ]  No [ ] Hoahaoism/Spirituality: Yazdanism  Substance hx: Yes[ ]  No [ ]   Tobacco hx:  Yes [ ] No [ ]   Alcohol hx: Yes [ ] No [ ]   Home Opioid hx:  Yes [ ] No [ ]  [ ] I-Stop Reference No:  Living Situation: [x]Home  [ ]Long term care  [ ]Rehab [ ]Other  With a     ADVANCE DIRECTIVES:    DNR  Yes  MOLST  Yes [x] No [ ]  Living Will  Yes [ ]  No [ ]     [ ] Health Care Proxy(s)  [ ] Surrogate(s)  [ ] Guardian           Name(s): Phone Number(s): Nano Taveras [phone]  Previous HCP Rehana Ryan does not wish to be contacted and now lives in [place]. Please call Adina Taveras (niece) .    BASELINE (I)ADL(s) (prior to admission):  Kingsley: [ ]Total  [x] Moderate [ ]Dependent    Allergies    No Known Allergies    Intolerances    MEDICATIONS  (STANDING):  cefepime   IVPB 1000 milliGRAM(s) IV Intermittent every 24 hours  dextrose 5%. 1000 milliLiter(s) (75 mL/Hr) IV Continuous <Continuous>  diltiazem Injectable 10 milliGRAM(s) IV Push every 8 hours  pantoprazole  Injectable 40 milliGRAM(s) IV Push daily    MEDICATIONS  (PRN):  haloperidol    Injectable 5 milliGRAM(s) IV Push every 6 hours PRN Agitation  metoprolol tartrate Injectable 5 milliGRAM(s) IV Push every 4 hours PRN for HR >110bpm  morphine  - Injectable 2 milliGRAM(s) IV Push every 4 hours PRN Moderate Pain (4 - 6)    PRESENT SYMPTOMS: [ ]Unable to obtain due to poor mentation   Source if other than patient:  [ ]Family   [ ]Team     Pain (Impact on QOL):  Denies  Location -   Severity -        Minimal acceptable level (0-10 scale):  Quality:   Onset:   Duration:                 Aggravating factors -  Relieving factors -  Radiation -    PAIN AD Score: 0    [email or web address] (press ctrl +  left click to view)    Dyspnea:  Yes [x] No [ ] - [ ]Mild [x]Moderate [ ]Severe  Anxiety:    Yes [ ] No [x] - [ ]Mild [ ]Moderate [ ]Severe  Fatigue:    Yes [ ] No [x] - [ ]Mild [ ]Moderate [ ]Severe  Nausea:    Yes [ ] No [x] - [ ]Mild [ ]Moderate [ ]Severe                         Loss of appetite: Yes [x] No [ ] - [x]Mild [ ]Moderate [ ]Severe             Constipation:  Yes [ ] No [x] - [ ]Mild [ ]Moderate [ ]Severe  Grief: Yes [ ] No [x]     Other Symptoms:  [x]All other review of systems negative     Karnofsky Performance Score/Palliative Performance Status Version 2:  30     %    http://palliative.info/resource_material/[email or web address].pdf    PHYSICAL EXAM:  Vital Signs Last 24 Hrs  T(C): 36.7 (15 [place]onte 2020 05:01), Max: 37.3 (15 Delonte 2020 00:44)  T(F): 98 (15 Delonte 2020 05:01), Max: 99.1 (15 Delonte 2020 00:44)  HR: 93 (15 Delonte 2020 10:11) (81 - 138)  BP: 97/38 (15 Delonte 2020 05:01) (83/48 - 100/49)  BP(mean): 58 (14 Jun 2020 19:41) (58 - 66)  RR: 24 (15 Delonte 2020 05:01) (20 - 24)  SpO2: 94% (15 Delonte 2020 10:11) (70% - 97%) I&O's Summary    14 Jun 2020 07:01  -  15 Delonte 2020 07:00  --------------------------------------------------------  IN: 675 mL / OUT: 0 mL / NET: 675 mL    GENERAL:  [x]Alert  [x]Oriented x2   [ ]Lethargic  [ ]Cachexia  [ ]Unarousable  [ ]Verbal  [ ]Non-Verbal  Behavioral:   [ ] Anxiety  [ ] Delirium [x] Agitation [ ] Other  HEENT:  [x]Normal   [ ]Dry mouth   [ ]ET Tube/Trach  [ ]Oral lesions  PULMONARY:   [x]Clear  [ ]Tachypnea  [ ]Audible excessive secretions   [ ]Rhonchi        [ ]Right [ ]Left [ ]Bilateral  [ ]Crackles        [ ]Right [ ]Left [ ]Bilateral  [ ]Wheezing     [ ]Right [ ]Left [ ]Bilateral  CARDIOVASCULAR:    [ ]Regular [x]Irregular [ ]Tachy  [ ]Carlos [ ]Murmur [ ]Other  GASTROINTESTINAL:  [x]Soft  [ ]Distended   [x]+BS  [ ]Non tender [ ]Tender  [ ]PEG [ ]OGT/ NGT  Last BM: 6/15-incontinent    GENITOURINARY:  [ ]Normal [x] Incontinent   [ ]Oliguria/Anuria   [ ]Harper  MUSCULOSKELETAL:   [ ]Normal   [ ]Weakness  [x] Bed/Wheelchair bound [ ]Edema  NEUROLOGIC:   [ ]No focal deficits  [x] Cognitive impairment  [x] Dysphagia [x]Dysarthria [ ] Paresis [ ]Other   SKIN:   [x]Normal   [ ]Pressure ulcer(s)  [ ]Rash    LABS:                        12.3   11.74 )-----------( 91       ( 15 Delonte 2020 07:13 )             39.0   06-15    145  |  105  |  56<H>  ----------------------------<  125<H>  4.1   |  32<H>  |  1.61<H>    Ca    8.3<L>      15 Delonte 2020 07:13  Phos  3.4     06-15  Mg     2.3     06-15    TPro  7.1  /  Alb  2.6<L>  /  TBili  1.1  /  DBili  x   /  AST  63<H>  /  ALT  48  /  AlkPhos  61  06-15  PT/INR - ( 13 Jun 2020 16:23 )   PT: 17.4 sec;   INR: 1.53 ratio      PTT - ( 13 Jun 2020 16:23 )  PTT:24.6 sec    RADIOLOGY & ADDITIONAL STUDIES:  < from: Xray Chest 1 View-PORTABLE IMMEDIATE (06.13.20 @ 17:16) >  IMPRESSION:  Narrowed trachea. Correlate clinically for possible stridor.    < from: Xray Pharynx & Speech w/ Cine/Video (06.10.20 @ 11:00) >  IMPRESSION:   Swallowing mechanism observed under fluoroscopy.    < from: NM Hepatobiliary Imaging (06.09.20 @ 11:28) >  IMPRESSION: Normal hepatobiliary scan.  No radionuclide evidence of acute cholecystitis.    < from: Xray Pelvis AP only (06.08.20 @ 16:28) >  IMPRESSION:   No acute radiographic osseous pathology..  If pain persist despite conservative therapy andpatient is unable to walk a follow-up CT/MRI scan recommended to exclude occult fractures or soft tissue injuries not evident on plain film radiography.    < from: Xray Hip 3-4 Views, Bilateral (06.08.20 @ 16:28) >  Impression: Excreted IV contrast in the urinary bladder obscures the superior pubic rami bilaterally. No evidence for an acute fracture in the visualized osseous structures.  No hip dislocation.  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   Ifpain persists, CT or MR may be pursued for further evaluation.    < from: CT Cervical Spine No Cont (06.08.20 @ 16:20) >  IMPRESSION:  No acute traumatic findings. Goiter with tracheal compression.    < from: MR Lumbar Spine No Cont (06.08.20 @ 15:51) >  IMPRESSION:    1. Multiple compression deformities both acute and chronic.  2. Acute compression fractures are noted of T10 L5 and possibly L1. Chronic compression deformities are noted of L2, L3, and L4. Nonspecific marrow heterogeneity and patchy fatty marrow conversion with no obvious destructive lesion.  3. Multiple disc bulges and degenerative changes. Small herniation L5-S1.    < from: US Abdomen Complete (06.08.20 @ 09:44) >  IMPRESSION:   Cholelithiasis and distended gallbladder.  No biliary ductal dilatation.    < from: Xray Lumbosacral Spine 4 View (06.08.20 @ 06:54) >  IMPRESSION:   1. No acute spinal malalignment.  2. L2 and L5 compression fractures as above.    < from: Xray Thoracic Spine 2 View (06.08.20 @ 06:54) >  Impression:   1. Limited study.  2. No discrete acute fracture or spinal malalignment.    < end of copied text >    PROTEIN CALORIE MALNUTRITION PRESENT: [x] Yes [ ] No  [ ] PPSV2 < or = to 30% [ ] significant weight loss  [x] poor nutritional intake [ ] catabolic state [ ] anasarca     Albumin, Serum: 2.6 g/dL (06-15-20 @ 07:13)      REFERRALS:   [ ]Chaplaincy  [ ] Hospice  [ ]Child Life  [ ]Social Work  [ ]Case management [ ]Holistic Therapy   Goals of Care Discussion Document:

## 2020-06-15 NOTE — PROGRESS NOTE ADULT - SUBJECTIVE AND OBJECTIVE BOX
Patient is a 88y old  Male who presents with a chief complaint of fall (15 [place]onte 2020 10:13)      HPI:  Pt is an 87 y/o Male with PMHx of HTN,. Afib , SDH in past, DVT in past no longer on ac, thyroidectomy and thrombocytopenia comes to ED today s/p fall, no loc found down  by family. Pt c/o epigastric pain, back pain and was found in supine position at home. Pt is difficult to understand in ER , and states that whenever he eats he has the sensation of vomit. At triage pt had a saturation of 91. Spoke w/family in waiting room, and nephew says pt resides alone without a home health aide. Pt is checked on a once a day and was not responding to door or picking up phone today.  Pt's family reports a gradual decline over the past few weeks w/labored breathing and increased confusion.  No reported fever, chills, cp, palpitations, mild nausea/-v/-d/-c no travels or sick contacts.  in ed ct w/  1. Distended gallbladder with gallstones. There is clinical concern for gallbladder disease, follow-up with right upper quadrant sonogram.  2. There is presacral edema and perirectal fatty stranding. Limited evaluation of the rectum at this level, however underlying proctitis cannot be excluded. Recommend clinical correlation.  3. Suggestion of a recent mild compression fracture deformity of L5 without significant bony retropulsion. (08 Jun 2020 05:43)      INTERVAL HPI/OVERNIGHT EVENTS:  The patient denies melena, hematochezia, hematemesis, nausea, vomiting, abdominal pain, constipation, diarrhea, or change in bowel movements     MEDICATIONS  (STANDING):  cefepime   IVPB 1000 milliGRAM(s) IV Intermittent every 24 hours  dextrose 5%. 1000 milliLiter(s) (75 mL/Hr) IV Continuous <Continuous>  diltiazem Injectable 10 milliGRAM(s) IV Push every 8 hours  pantoprazole  Injectable 40 milliGRAM(s) IV Push daily    MEDICATIONS  (PRN):  haloperidol    Injectable 5 milliGRAM(s) IV Push every 6 hours PRN Agitation  metoprolol tartrate Injectable 5 milliGRAM(s) IV Push every 4 hours PRN for HR >110bpm  morphine  - Injectable 2 milliGRAM(s) IV Push every 4 hours PRN Moderate Pain (4 - 6)      FAMILY HISTORY:  No pertinent family history in first degree relatives      Allergies    No Known Allergies    Intolerances        PMH/PSH:  Lung nodules  SVC syndrome  Acute DVT (deep venous thrombosis)  Essential hypertension  Atrial fibrillation, unspecified type  Cardiac complaint  H/O thyroidectomy  No significant past surgical history        REVIEW OF SYSTEMS:  CONSTITUTIONAL: No fever, weight loss,   EYES: No eye pain, visual disturbances, or discharge  ENMT:  No difficulty hearing, tinnitus, vertigo; No sinus or throat pain  NECK: No pain or stiffness  BREASTS: No pain, masses, or nipple discharge  RESPIRATORY: No cough, wheezing, chills or hemoptysis; No shortness of breath  CARDIOVASCULAR: No chest pain, palpitations, dizziness, or leg swelling  GASTROINTESTINAL: See above  GENITOURINARY: No dysuria, frequency, hematuria, or incontinence  NEUROLOGICAL: No headaches, memory loss, loss of strength, numbness, or tremors  SKIN: No itching, burning, rashes, or lesions   LYMPH NODES: No enlarged glands  ENDOCRINE: No heat or cold intolerance; No hair loss  MUSCULOSKELETAL: No joint pain or swelling; No muscle, back, or extremity pain  PSYCHIATRIC: No depression, anxiety, mood swings, or difficulty sleeping  HEME/LYMPH: No easy bruising, or bleeding gums  ALLERGY AND IMMUNOLOGIC: No hives or eczema    Vital Signs Last 24 Hrs  T(C): 36.7 (15 [place]onte 2020 10:44), Max: 37.3 (15 [place]onte 2020 00:44)  T(F): 98 (15 Delonte 2020 10:44), Max: 99.1 (15 [place]onte 2020 00:44)  HR: 111 (15 [place]onte 2020 14:05) (87 - 138)  BP: 115/53 (15 Delonte 2020 14:05) (83/48 - 121/59)  BP(mean): 58 (14 Jun 2020 19:41) (58 - 58)  RR: 18 (15 Delonte 2020 10:44) (18 - 24)  SpO2: 99% (15 Delonte 2020 10:44) (70% - 99%)    PHYSICAL EXAM:  GENERAL: NAD, well-groomed, well-developed  HEAD:  Atraumatic, Normocephalic  EYES: EOMI, PERRLA, conjunctiva and sclera clear  NECK: Supple, No JVD, Normal thyroid  NERVOUS SYSTEM:  Alert &confused. Fair concentration;   CHEST/LUNG: Clear to percussion bilaterally; No rales, rhonchi, wheezing, or rubs  HEART: Regular rate and rhythm; No murmurs, rubs, or gallops  ABDOMEN: Soft, Nontender, Nondistended; Bowel sounds present  EXTREMITIES:  2+ Peripheral Pulses, No clubbing, cyanosis, or edema  LYMPH: No lymphadenopathy noted  SKIN: No rashes or lesions    LAB                          12.3   11.74 )-----------( 91       ( 15 Delonte 2020 07:13 )             39.0       CBC:  06-15 @ 07:13  WBC 11.74   Hgb 12.3   Hct 39.0   Plts 91  .1  06-14 @ 07:53  WBC 10.60   Hgb 12.6   Hct 41.1   Plts 86  .3  06-13 @ 16:23  WBC 13.33   Hgb 13.1   Hct 41.8   Plts 110  .7  06-13 @ 07:29  WBC 9.49   Hgb 13.0   Hct 39.8   Plts 107  MCV 98.0  06-12 @ 07:44  WBC 9.51   Hgb 12.2   Hct 37.7   Plts 98  MCV 99.5  06-11 @ 06:36  WBC 7.78   Hgb 11.4   Hct 34.2   Plts 88  MCV 96.6  06-10 @ 06:41  WBC 9.05   Hgb 11.7   Hct 36.5   Plts 90  MCV 97.9  06-09 @ 06:23  WBC 10.80   Hgb 11.2   Hct 34.5   Plts 74  MCV 97.5      Chemistry:  06-15 @ 07:13  Na+ 145  K+ 4.1  Cl- 105  CO2 32  BUN 56  Cr 1.61     06-14 @ 07:53  Na+ 146  K+ 4.9  Cl- 105  CO2 29  BUN 42  Cr 1.50     06-13 @ 16:23  Na+ 146  K+ 4.2  Cl- 103  CO2 33  BUN 28  Cr 0.96     06-13 @ 07:29  Na+ 143  K+ 3.8  Cl- 103  CO2 31  BUN 22  Cr 0.64     06-12 @ 07:44  Na+ 142  K+ 3.5  Cl- 102  CO2 30  BUN 25  Cr 0.64     06-11 @ 06:36  Na+ 142  K+ 3.1  Cl- 103  CO2 32  BUN 31  Cr 0.64     06-10 @ 17:12  Na+ --  K+ 3.1  Cl- --  CO2 --  BUN --  Cr --     06-10 @ 06:41  Na+ 141  K+ 2.9  Cl- 102  CO2 28  BUN 32  Cr 0.70     06-09 @ 06:23  Na+ 138  K+ 3.1  Cl- 104  CO2 26  BUN 35  Cr 0.62         Glucose, Serum: 125 mg/dL (06-15 @ 07:13)  Glucose, Serum: 87 mg/dL (06-14 @ 07:53)  Glucose, Serum: 116 mg/dL (06-13 @ 16:23)  Glucose, Serum: 96 mg/dL (06-13 @ 07:29)  Glucose, Serum: 106 mg/dL (06-12 @ 07:44)  Glucose, Serum: 94 mg/dL (06-11 @ 06:36)  Glucose, Serum: 93 mg/dL (06-10 @ 06:41)  Glucose, Serum: 106 mg/dL (06-09 @ 06:23)      15 Jun 2020 07:13    145    |  105    |  56     ----------------------------<  125    4.1     |  32     |  1.61   14 Jun 2020 07:53    146    |  105    |  42     ----------------------------<  87     4.9     |  29     |  1.50   13 Jun 2020 16:23    146    |  103    |  28     ----------------------------<  116    4.2     |  33     |  0.96   13 Jun 2020 07:29    143    |  103    |  22     ----------------------------<  96     3.8     |  31     |  0.64   12 Jun 2020 07:44    142    |  102    |  25     ----------------------------<  106    3.5     |  30     |  0.64   11 Jun 2020 06:36    142    |  103    |  31     ----------------------------<  94     3.1     |  32     |  0.64   10 [place]onte 2020 17:12    x      |  x      |  x      ----------------------------<  x      3.1     |  x      |  x        Ca    8.3        15 Jun 2020 07:13  Ca    8.3        14 Jun 2020 07:53  Ca    8.8        13 Jun 2020 16:23  Ca    8.8        13 Jun 2020 07:29  Ca    8.5        12 Jun 2020 07:44  Ca    8.3        11 Jun 2020 06:36  Ca    8.4        10 Jun 2020 06:41  Phos  3.4       15 Jun 2020 07:13  Phos  6.4       14 Jun 2020 07:53  Mg     2.3       15 Jun 2020 07:13  Mg     2.4       14 Jun 2020 07:53  Mg     2.1       10 Jun 2020 06:41    TPro  7.1    /  Alb  2.6    /  TBili  1.1    /  DBili  x      /  AST  63     /  ALT  48     /  AlkPhos  61     15 Jun 2020 07:13  TPro  7.2    /  Alb  2.6    /  TBili  0.9    /  DBili  x      /  AST  48     /  ALT  50     /  AlkPhos  59     14 Jun 2020 07:53  TPro  7.5    /  Alb  3.2    /  TBili  1.1    /  DBili  x      /  AST  61     /  ALT  62     /  AlkPhos  67     13 Jun 2020 16:23  TPro  7.4    /  Alb  2.8    /  TBili  1.4    /  DBili  x      /  AST  64     /  ALT  58     /  AlkPhos  59     13 Jun 2020 07:29  TPro  7.3    /  Alb  2.8    /  TBili  1.3    /  DBili  x      /  AST  68     /  ALT  56     /  AlkPhos  54     12 Jun 2020 07:44  TPro  6.9    /  Alb  2.5    /  TBili  1.5    /  DBili  x      /  AST  71     /  ALT  48     /  AlkPhos  51     11 Jun 2020 06:36  TPro  7.1    /  Alb  2.6    /  TBili  1.7    /  DBili  x      /  AST  108    /  ALT  58     /  AlkPhos  54     10 Jun 2020 06:41      PT/INR - ( 13 Jun 2020 16:23 )   PT: 17.4 sec;   INR: 1.53 ratio         PTT - ( 13 Jun 2020 16:23 )  PTT:24.6 sec        CAPILLARY BLOOD GLUCOSE              RADIOLOGY & ADDITIONAL TESTS:    Imaging Personally Reviewed:  [ ] YES  [ ] NO    Consultant(s) Notes Reviewed:  [ ] YES  [ ] NO    Care Discussed with Consultants/Other Providers [ ] YES  [ ] NO

## 2020-06-16 NOTE — SWALLOW BEDSIDE ASSESSMENT ADULT - SLP GENERAL OBSERVATIONS
pt seen bedside, alert and oriented to self. pt inconsistently responded to simple want questions for eval, however fair to poor speech intelligibility - reduced articulation/decreased breath support and hoarse/raspy vocal quality . pt did not follow directions and noted fleeting eye contact with SLP. pt with audible respiration
pt seen bedside alert and oriented to self. pt confused /disoriented, and he did not follow directions [facility]. he engaged in conversation

## 2020-06-16 NOTE — SWALLOW BEDSIDE ASSESSMENT ADULT - SWALLOW EVAL: RECOMMENDED FEEDING/EATING TECHNIQUES
allow for swallow between intakes/small sips/bites/crush medication (when feasible)/maintain upright posture during/after eating for 30 mins
oral hygiene

## 2020-06-16 NOTE — PROGRESS NOTE ADULT - SUBJECTIVE AND OBJECTIVE BOX
Patient is a 88y old  Male who presents with a chief complaint of fall (16 Jun 2020 19:09)      Interval History: remains in same Endocrine status   sick euthyroid syndrome may be present   tracheal deviation from large goiter    MEDICATIONS  (STANDING):  dextrose 5%. 1000 milliLiter(s) (75 mL/Hr) IV Continuous <Continuous>  diltiazem Injectable 10 milliGRAM(s) IV Push every 8 hours  furosemide   Injectable 40 milliGRAM(s) IV Push once  pantoprazole  Injectable 40 milliGRAM(s) IV Push daily    MEDICATIONS  (PRN):  haloperidol    Injectable 5 milliGRAM(s) IV Push every 6 hours PRN Agitation  metoprolol tartrate Injectable 5 milliGRAM(s) IV Push every 4 hours PRN for HR >110bpm  morphine  - Injectable 2 milliGRAM(s) IV Push every 4 hours PRN Moderate Pain (4 - 6)      Allergies    No Known Allergies    Intolerances        REVIEW OF SYSTEMS:  CONSTITUTIONAL: no changes  T(F): 98.4 (16 Jun 2020 16:20), Max: 98.7 (16 Jun 2020 05:18)  HR: 83 (16 Jun 2020 16:20) (62 - 105)  BP: 104/66 (16 Jun 2020 16:20) (97/52 - 114/74)  BP(mean): 64 (16 Jun 2020 13:32) (64 - 64)  RR: 20 (16 Jun 2020 16:20) (20 - 24)  SpO2: 92% (16 Jun 2020 16:20) (92% - 98%)    PHYSICAL EXAM:  GENERAL: deferred     LABS:        CAPILLARY BLOOD GLUCOSE        Lipid panel:   CARDIAC MARKERS ( 15 [place] 2020 07:13 )  x     / x     / 358 U/L / x     / x          ABG - ( 15 [place] 2020 10:48 )  pH, Arterial: x     pH, Blood: 7.39  /  pCO2: 56    /  pO2: 51    / HCO3: 33    / Base Excess: 7.2   /  SaO2: 85                  Thyroid:  Diabetes Tests:  Parathyroid Panel:  Adrenals:  RADIOLOGY & ADDITIONAL TESTS:    Imaging Personally Reviewed:  [ ] YES  [ ] NO    Consultant(s) Notes Reviewed:  [ ] YES  [ ] NO    Care Discussed with Consultants/Other Providers [ ] YES  [ ] NO

## 2020-06-16 NOTE — CHART NOTE - NSCHARTNOTEFT_GEN_A_CORE
Assessment:  Pt c large goiter causing deviation of trachea, renal insufficiency,  fracture lumbar spined, ortho on board, no acute orthopedic surgical intervention at this time, distended gallbladder c gallstones, presacral edema and perirectal fatty stranding, no acute surgical intervention @ this timed, acute metabolic encephalopathy/dementia, chronic AFib, not on AC, DVT hx, SDH hx, s/p Hospice referral, family is considering possibly taking home pt c home care services, no PEG, pleasure feeds.      Factors impacting intake: [ ] none [ ] nausea  [ ] vomiting [ ] diarrhea [ ] constipation  [ ]chewing problems [ x] swallowing issues; s/p MBS c recommendation for NPO, 06/16/20; repeat swallow evaluation recommended NPO  [ ] other:     Diet Prescription: Diet, Dysphagia 1 Pureed-Honey Consistency Fluid (06-16-20 @ 09:42), diet advanced today after 7 days of being NPO, swallow evaluation recommended NPO    Intake: s/p 0% x 7 days due to NPO, diet advanced this am, Nursing Assistant just initiated to feed pt @ present, pt appears wanting to eat    Current Weight: 06/16/20, 61.5 kg, 06/10/20, 58 kg, c wt. gain of 3.5 kg   % Weight Change: 6.0%  06/11/20, 4+ edema of perineum noted   Pt c visible mildly depleted orbital, temple areas       Pertinent Medications: MEDICATIONS  (STANDING):  cefepime   IVPB      cefepime   IVPB 1000 milliGRAM(s) IV Intermittent every 12 hours  dextrose 5%. 1000 milliLiter(s) (75 mL/Hr) IV Continuous <Continuous>  diltiazem Injectable 10 milliGRAM(s) IV Push every 8 hours  pantoprazole  Injectable 40 milliGRAM(s) IV Push daily    MEDICATIONS  (PRN):  haloperidol    Injectable 5 milliGRAM(s) IV Push every 6 hours PRN Agitation  metoprolol tartrate Injectable 5 milliGRAM(s) IV Push every 4 hours PRN for HR >110bpm  morphine  - Injectable 2 milliGRAM(s) IV Push every 4 hours PRN Moderate Pain (4 - 6)    Pertinent Labs: 06-16 Na143 mmol/L Glu 137 mg/dL<H> K+ 3.6 mmol/L Cr  0.78 mg/dL BUN 41 mg/dL<H> 06-15 Phos 3.4 mg/dL 06-16 Alb 2.4 g/dL<L>     CAPILLARY BLOOD GLUCOSE        Skin: wound;   skin tears x 2, left & right hip  pressure ulcers x1  1. coccyx; stage 1    Estimated Needs:   [x ] no change since previous assessment ( 06/12/20)  [ ] recalculated:     Previous Nutrition Diagnosis:   Malnutrition moderate malnutrition in context of chronic illness.     Etiology inadequate protein-energy intake in setting of hx of goiter, cardiac hx.     Signs/Symptoms physical findings of mild muscle loss, 8% wt. loss in 8 months.     Goal/Expected Outcome pt will meet >75% protein-energy needs  via tolerated route; not met   New Goal: nutrition/hydration as preferred; met     Nutrition Diagnosis is [x ] ongoing  [ ] resolved [ ] not applicable       New Nutrition Diagnosis: [x ] not applicable       Interventions:   Recommend  [ ] Change Diet To:  [ ] Nutrition Supplement  [ ] Nutrition Support  [ X] Other: aspiration precautions     Monitoring and Evaluation:   [x ] PO intake [ x ] Tolerance to diet prescription [ x ] weights [ x ] labs[ x ] follow up per protocol  [ ] other: Assessment:  Pt c large goiter causing deviation of trachea, renal insufficiency,  fracture lumbar spine, ortho on board, no acute orthopedic surgical intervention at this time, distended gallbladder c gallstones, presacral edema and perirectal fatty stranding, no acute surgical intervention @ this timed, acute metabolic encephalopathy/dementia, chronic AFib, not on AC, DVT hx, SDH hx, s/p Hospice referral, family is considering possibly taking home pt c home care services, no PEG, pleasure feeds.      Factors impacting intake: [ ] none [ ] nausea  [ ] vomiting [ ] diarrhea [ ] constipation  [ ]chewing problems [ x] swallowing issues; s/p MBS c recommendation for NPO, 06/16/20; repeat swallow evaluation recommended NPO  [ ] other:     Diet Prescription: Diet, Dysphagia 1 Pureed-Honey Consistency Fluid (06-16-20 @ 09:42), diet advanced today after 7 days of being NPO, swallow evaluation recommended NPO    Intake: s/p 0% x 7 days due to NPO, diet advanced this am, Nursing Assistant just initiated to feed pt @ present, pt appears wanting to eat    Current Weight: 06/16/20, 68.7 kg, 06/08/20, 71.9 kg c wt. loss of 3.2 kg   % Weight Change: 4.45%  06/11/20, 1+ edema of ankles noted   Pt c visible mildly depleted orbital, temple areas       Pertinent Medications: MEDICATIONS  (STANDING):  cefepime   IVPB      cefepime   IVPB 1000 milliGRAM(s) IV Intermittent every 12 hours  dextrose 5%. 1000 milliLiter(s) (75 mL/Hr) IV Continuous <Continuous>  diltiazem Injectable 10 milliGRAM(s) IV Push every 8 hours  pantoprazole  Injectable 40 milliGRAM(s) IV Push daily    MEDICATIONS  (PRN):  haloperidol    Injectable 5 milliGRAM(s) IV Push every 6 hours PRN Agitation  metoprolol tartrate Injectable 5 milliGRAM(s) IV Push every 4 hours PRN for HR >110bpm  morphine  - Injectable 2 milliGRAM(s) IV Push every 4 hours PRN Moderate Pain (4 - 6)    Pertinent Labs: 06-16 Na143 mmol/L Glu 137 mg/dL<H> K+ 3.6 mmol/L Cr  0.78 mg/dL BUN 41 mg/dL<H> 06-15 Phos 3.4 mg/dL 06-16 Alb 2.4 g/dL<L>     CAPILLARY BLOOD GLUCOSE        Skin: wound;   skin tears x 2, left & right hip  pressure ulcers x1  1. coccyx; stage 1    Estimated Needs:   [x ] no change since previous assessment ( 06/12/20)  [ ] recalculated:     Previous Nutrition Diagnosis:   Malnutrition moderate malnutrition in context of chronic illness.     Etiology inadequate protein-energy intake in setting of hx of goiter, cardiac hx.     Signs/Symptoms physical findings of mild muscle loss, 8% wt. loss in 8 months.     Goal/Expected Outcome pt will meet >75% protein-energy needs  via tolerated route; not met     Nutrition Diagnosis is [ ] ongoing  [ ] resolved [ x] not applicable ( see new diagnosis below)      New Nutrition Diagnosis:   [x ] Malnutrition; severe malnutrition in context of acute illness     Related to: large goiter causing deviation of trachea c altered swallow    As evidenced by: < 50% nutrition needs > 5 days, 4.45% wt. loss in 1 week     Goal: nutrition/hydration as preferred       Interventions:   Recommend  [ ] Change Diet To:  [ ] Nutrition Supplement  [ ] Nutrition Support  [ X] Other: aspiration precautions     Monitoring and Evaluation:   [x ] PO intake [ x ] Tolerance to diet prescription [ x ] weights [ x ] labs[ x ] follow up per protocol  [ ] other:

## 2020-06-16 NOTE — PROGRESS NOTE ADULT - SUBJECTIVE AND OBJECTIVE BOX
HPI:  Pt is an 87 y/o Male with PMHx of HTN,. Afib , SDH in past, DVT in past no longer on ac, thyroidectomy and thrombocytopenia comes to ED today s/p fall, no loc found down  by family. Pt c/o epigastric pain, back pain and was found in supine position at home. Pt is difficult to understand in ER , and states that whenever he eats he has the sensation of vomit. At triage pt had a saturation of 91. Spoke w/family in waiting room, and nephew says pt resides alone without a home health aide. Pt is checked on a once a day and was not responding to door or picking up phone today.  Pt's family reports a gradual decline over the past few weeks w/labored breathing and increased confusion.  No reported fever, chills, cp, palpitations, mild nausea/-v/-d/-c no travels or sick contacts.  in ed ct w/  1. Distended gallbladder with gallstones. There is clinical concern for gallbladder disease, follow-up with right upper quadrant sonogram.  2. There is presacral edema and perirectal fatty stranding. Limited evaluation of the rectum at this level, however underlying proctitis cannot be excluded. Recommend clinical correlation.  3. Suggestion of a recent mild compression fracture deformity of L5 without significant bony retropulsion. (08 Jun 2020 05:43)      Patient is a 88y old  Male who presents with a chief complaint of fall (16 Jun 2020 17:40)      INTERVAL HPI/OVERNIGHT EVENTS: no acute event s required mittens overnight for agitation     MEDICATIONS  (STANDING):  cefepime   IVPB      cefepime   IVPB 1000 milliGRAM(s) IV Intermittent every 12 hours  dextrose 5%. 1000 milliLiter(s) (75 mL/Hr) IV Continuous <Continuous>  diltiazem Injectable 10 milliGRAM(s) IV Push every 8 hours  pantoprazole  Injectable 40 milliGRAM(s) IV Push daily    MEDICATIONS  (PRN):  haloperidol    Injectable 5 milliGRAM(s) IV Push every 6 hours PRN Agitation  metoprolol tartrate Injectable 5 milliGRAM(s) IV Push every 4 hours PRN for HR >110bpm  morphine  - Injectable 2 milliGRAM(s) IV Push every 4 hours PRN Moderate Pain (4 - 6)      Allergies    No Known Allergies    Intolerances        REVIEW OF SYSTEMS:  unable to provide or understand    Vital Signs Last 24 Hrs  T(C): 36.9 (16 Jun 2020 16:20), Max: 37.1 (16 Jun 2020 05:18)  T(F): 98.4 (16 Jun 2020 16:20), Max: 98.7 (16 Jun 2020 05:18)  HR: 83 (16 Jun 2020 16:20) (62 - 105)  BP: 104/66 (16 Jun 2020 16:20) (97/52 - 114/74)  BP(mean): 64 (16 Jun 2020 13:32) (64 - 64)  RR: 20 (16 Jun 2020 16:20) (20 - 24)  SpO2: 92% (16 Jun 2020 16:20) (92% - 98%)    PHYSICAL EXAM:  GENERAL: NAD, well-groomed, well-developed  HEAD:  Atraumatic, Normocephalic  EYES: EOMI, PERRLA, conjunctiva and sclera clear  ENMT: No tonsillar erythema, exudates, or enlargement; Moist mucous membranes, Good dentition, No lesions  NECK: Supple, No JVD, Normal thyroid  NERVOUS SYSTEM:  Awake oriented to name   CHEST/LUNG: Stridor breath sounds dullness on left   HEART: Irregular   ABDOMEN: Soft, Nontender, Nondistended; Bowel sounds present  EXTREMITIES:  2+ Peripheral Pulses, No clubbing, cyanosis, or edema  LYMPH: No lymphadenopathy noted  SKIN: No rashes or lesions    LABS:                        12.3   11.74 )-----------( 91       ( 15 [place] 2020 07:13 )             39.0     06-16    143  |  106  |  41<H>  ----------------------------<  137<H>  3.6   |  34<H>  |  0.78    Ca    8.5      16 Jun 2020 07:25  Phos  3.4     06-15  Mg     2.3     06-15    TPro  6.5  /  Alb  2.4<L>  /  TBili  1.1  /  DBili  x   /  AST  43<H>  /  ALT  39  /  AlkPhos  64  06-16        RADIOLOGY & ADDITIONAL TESTS:    Imaging Personally Reviewed:  [X ] YES  [ ] NO    Consultant(s) Notes Reviewed:  [ X] YES  [ ] NO    Care Discussed with Consultants/Other Providers [X ] YES  [ ] NO

## 2020-06-16 NOTE — PROGRESS NOTE ADULT - PROBLEM SELECTOR PLAN 1
poor surgical candidate   sick euthyroid syndrome may be present which may complicate the interpretation of thyroid function tests   also partial decompression may be needed in case obstructive emergencies

## 2020-06-16 NOTE — SWALLOW BEDSIDE ASSESSMENT ADULT - SWALLOW EVAL: DIAGNOSIS
eval limited 2/2 pt refusal and confusion however noted oropharyngeal dysphagia marked by baseline wet vocal quality, decreased po acceptance, slow oral transport- ?oral holding, suspect timely swallow with adequate laryngeal elevation. no overt signs of aspiration with consistencies trialed.
pt presented with oropharyngeal dysphagia marked by baseline congestion/upper airway rhonchi, overall weakness, decreased oral opening, anterior loss, increased AQUILES, delay in swallow trigger with reduced laryngeal elevation, multiple swallows and clinical signs of aspiration with po trials. suggest pt not safe for po diet at this time

## 2020-06-16 NOTE — SWALLOW BEDSIDE ASSESSMENT ADULT - SWALLOW EVAL: ORAL MUSCULATURE
generally intact/unable to assess due to poor participation/comprehension
unable to assess due to poor participation/comprehension

## 2020-06-16 NOTE — GOALS OF CARE CONVERSATION - ADVANCED CARE PLANNING - CONVERSATION DETAILS
Spoke with pt's niece. She states she spoke with Dr. Velasquez this afternoon and she will accept hospice services for home. She wants to coordinate 24 hr care. She will call an agency today. I explained hospice consents will need to be emailed or faxed to be reviewed and signed as well as equipment delivery. She verbalized her understanding. Will cont to f/u.     Padma Trinidad Rn

## 2020-06-16 NOTE — PROGRESS NOTE ADULT - ASSESSMENT
PI:  Pt is an 87 y/o Male with PMHx of HTN,. Afib , SDH in past, DVT in past no longer on ac, thyroidectomy and thrombocytopenia comes to ED today s/p fall, no loc found down  by family. Pt c/o epigastric pain, back pain and was found in supine position at home. Pt is difficult to understand in ER , and states that whenever he eats he has the sensation of vomit. At triage pt had a saturation of 91. Spoke w/family in waiting room, and nephew says pt resides alone without a home health aide. Pt is checked on a once a day and was not responding to door or picking up phone today.  Pt's family reports a gradual decline over the past few weeks w/labored breathing and increased confusion.  No reported fever, chills, cp, palpitations, mild nausea/-v/-d/-c no travels or sick contacts.  in ed ct w/  1. Distended gallbladder with gallstones. There is clinical concern for gallbladder disease, follow-up with right upper quadrant sonogram.  2. There is presacral edema and perirectal fatty stranding. Limited evaluation of the rectum at this level, however underlying proctitis cannot be excluded. Recommend clinical correlation.  3. Suggestion of a recent mild compression fracture deformity of L5 without significant bony retropulsion. (08 Jun 2020 05:43)  ---------------- As Above --------------------------  Patient is a poor historian. History obtained from nephew. Was sent to the hospital because he had fallen and could not gt up.  Nephew states patient has been having difficulty eating ( would spit it out ) over the past few weeks.  Consult for abdominal pain and abnormal ultrasound / CT Scan.   See sonogram / CT scan    6/16/20  --------  a) Abdominal pain ( resolved )  /// Tenderness ( resolved )  /// Abnormal sono/CT scan ..  The HIDA SCAN was negative. Elevated LFTs that are resolving  2.8 / 131 / 41 / 76 --> 1.7 / 103 / 47 / 56 --> 1.7 / 108 / 58 / 54 --> 1.5 / 71 / 48 / 51--> 1.3 / 68 / 56 / 54--> 0.9 / 43 / 50 / 59 --> 1.1/43/39/63  R/O CBD stone - 1)  f/u labs  2) MRCP ( patient refused )  3) see below   b) ? Dysphagia - see speech evaluation , Trachea deviation from a large goiter - causing distortion of pharyngeal posterior wall of esophagus and epiglottis  does not function correctly - Endo note appreciated / ENT consult -- As per PA, family wants patient on comfort foods and to discharge patient home - Supportive care for now  ---- Will follow on a PRN basis

## 2020-06-16 NOTE — SWALLOW BEDSIDE ASSESSMENT ADULT - SLP PERTINENT HISTORY OF CURRENT PROBLEM
clinical swallow eval completed on 6/8/2020 at which time puree with honey thick liquids-see report for details.  objective testing was requested by GI and a MBS study was completed on 6/10/ 2020 and NPO was recommended.

## 2020-06-16 NOTE — PROGRESS NOTE ADULT - SUBJECTIVE AND OBJECTIVE BOX
INTERVAL HPI:    89 y/o Male with HTN,. Afib , SDH in past, DVT in past( no longer on ac), Thyroidectomy and Thrombocytopenia.   Brought  to ED s/p fall, no LOC,  found down  by family. Complained of epigastric pain, back pain and was found in supine position at home. Stated in ED that whenever he eats he has the sensation of vomiting. At triage pt had a saturation of 91%.  Family reported in ED that pt  resides alone without a home health aide. He is checked on a once a day and was not responding to door or picking up phone .  Family reported a gradual decline over the past few weeks w/labored breathing and increased confusion.  No reported fever, chills, cp, palpitations, mild nausea/-v/-d/-c no travels or sick contacts.  Admitted post fall, Rhabdomyolysis, Suspected Cholecystitis, A Fib and tiny pneumothorax, Found to have fracture of Lumbar spine.  Has enlarged thyroid(thyroidectomy in past) causing deviation of trachea for which family has refused surgical intervention. Also reported with swallowing difficulty for which family has refused peg per DR. Velasquez.  Hospital course complicated with restlessness, agitation requiring pain control and sedation. Then found to be in hypoxic hypercarbic Respiratory failure so was placed on BIPAP.  Not able to provide any details to history.     OVERNIGHT EVENTS:  Remains lethargic, does not keep oxygen on.    Vital Signs Last 24 Hrs  T(C): 36.8 (16 Jun 2020 11:15), Max: 37.1 (16 Jun 2020 05:18)  T(F): 98.2 (16 Jun 2020 11:15), Max: 98.7 (16 Jun 2020 05:18)  HR: 62 (16 Jun 2020 13:32) (62 - 105)  BP: 97/52 (16 Jun 2020 13:32) (97/52 - 146/38)  BP(mean): 64 (16 Jun 2020 13:32) (64 - 64)  RR: 22 (16 Jun 2020 13:32) (20 - 24)  SpO2: 96% (16 Jun 2020 13:32) (92% - 98%)    PHYSICAL EXAM:  GEN:        Eyes open, non communicative.  HEENT:    Normal.    RESP:       crackles.  CVS:          Regular rate and rhythm.   ABD:         Soft, non-tender, non-distended;     MEDICATIONS  (STANDING):  cefepime   IVPB      cefepime   IVPB 1000 milliGRAM(s) IV Intermittent every 12 hours  dextrose 5%. 1000 milliLiter(s) (75 mL/Hr) IV Continuous <Continuous>  diltiazem Injectable 10 milliGRAM(s) IV Push every 8 hours  pantoprazole  Injectable 40 milliGRAM(s) IV Push daily    MEDICATIONS  (PRN):  haloperidol    Injectable 5 milliGRAM(s) IV Push every 6 hours PRN Agitation  metoprolol tartrate Injectable 5 milliGRAM(s) IV Push every 4 hours PRN for HR >110bpm  morphine  - Injectable 2 milliGRAM(s) IV Push every 4 hours PRN Moderate Pain (4 - 6)    LABS:                        12.3   11.74 )-----------( 91       ( 15 Delonte 2020 07:13 )             39.0     06-16    143  |  106  |  41<H>  ----------------------------<  137<H>  3.6   |  34<H>  |  0.78    Ca    8.5      16 Jun 2020 07:25  Phos  3.4     06-15  Mg     2.3     06-15    TPro  6.5  /  Alb  2.4<L>  /  TBili  1.1  /  DBili  x   /  AST  43<H>  /  ALT  39  /  AlkPhos  64  06-16    06-15 @ 10:48  pH: 7.39  pCO2: 56  pO2: 51  SaO2: 85  06-14 @ 10:07  pH: 7.26  pCO2: 71  pO2: 136  SaO2: 99  06-13 @ 22:20  pH: 7.37  pCO2: 57  pO2: 107  SaO2: 98  06-13 @ 17:21  pH: 7.05  pCO2: >106  pO2: 247  SaO2: 99  06-13 @ 15:47  pH: 7.06  pCO2: >97  pO2: 80  SaO2: 88    ASSESSMENT AND PLAN:  ·	Acute hypoxic hypercarbic Respiratory failure.  ·	Acute metabolic Encephalopathy.  ·	Agitation.  ·	A Fib with RVR.  ·	S/P Fall   ·	Multiple compression deformities of spine.  ·	Large goiter causing deviation of trachea.  ·	Renal Insuffiencey.  ·	DVT history,  ·	SDH history  ·	Dysphagia.    Supportive care with O2 and antibiotics.  Aspiration precautions.  Suction as needed.

## 2020-06-16 NOTE — SWALLOW BEDSIDE ASSESSMENT ADULT - H & P REVIEW
Pt is an 87 y/o Male with PMHx of HTN,. Afib , SDH in past, DVT in past no longer on ac, thyroidectomy and thrombocytopenia comes to ED today s/p fall, no loc found down  by family. Pt c/o epigastric pain, back pain and was found in supine position at home. Pt is difficult to understand in ER , and states that whenever he eats he has the sensation of vomit. At triage pt had a saturation of 91. Spoke w/family in waiting room, and nephew says pt resides alone without a home health aide. Pt is checked on a once a day and was not responding to door or picking up phone today.  Pt's family reports a gradual decline over the past few weeks w/labored breathing and increased confusion.  No reported fever, chills, cp, palpitations, mild nausea/-v/-d/-c no travels or sick contacts./yes
Pt is an 89 y/o Male with PMHx of HTN,. Afib , SDH in past, DVT in past no longer on ac, thyroidectomy and thrombocytopenia comes to ED today s/p fall, no loc found down  by family. Pt c/o epigastric pain, back pain and was found in supine position at home. Pt is difficult to understand in ER , and states that whenever he eats he has the sensation of vomit. At triage pt had a saturation of 91. Spoke w/family in waiting room, and nephew says pt resides alone without a home health aide. Pt is checked on a once a day and was not responding to door or picking up phone today.  Pt's family reports a gradual decline over the past few weeks w/labored breathing and increased confusion.  No reported fever, chills, cp, palpitations, mild nausea/-v/-d/-c no travels or sick contacts./yes

## 2020-06-16 NOTE — SWALLOW BEDSIDE ASSESSMENT ADULT - PHARYNGEAL PHASE
Wet vocal quality post oral intake/Delayed cough post oral intake/Delayed pharyngeal swallow/Decreased laryngeal elevation/Cough post oral intake/Multiple swallows
Within functional limits

## 2020-06-16 NOTE — CHART NOTE - NSCHARTNOTEFT_GEN_A_CORE
Upon Nutritional Assessment by the Registered Dietitian your patient was determined to meet criteria / has evidence of the following diagnosis/diagnoses:          [ ]  Mild Protein Calorie Malnutrition        [ ]  Moderate Protein Calorie Malnutrition        [x ] Severe Protein Calorie Malnutrition( acute)        [ ] Unspecified Protein Calorie Malnutrition        [ ] Underweight / BMI <19        [ ] Morbid Obesity / BMI > 40      Findings as based on:  •  Comprehensive nutrition assessment and consultation  •  Calorie counts (nutrient intake analysis)  •  Food acceptance and intake status from observations by staff  •  Follow up  •  Patient education  •  Intervention secondary to interdisciplinary rounds  •   concerns      Treatment:    The following diet has been recommended:  nutrition/hydration as preferred       PROVIDER Section:     By signing this assessment you are acknowledging and agree with the diagnosis/diagnoses assigned by the Registered Dietitian    Comments:

## 2020-06-16 NOTE — SWALLOW BEDSIDE ASSESSMENT ADULT - COMMENTS
CT cervical spine 6/8/2020INTERPRETATION:  CT cervical spine without contrast  History injury  There is no fracture or acute subluxation or prevertebral soft tissue widening. There is ankylosis at C2-3. There is moderately severe multilevel degenerative disc change without significant spinal stenosis.  Note is made of a large goiter which moderately compresses trachea at the level of the thoracic inlet with leftward displacement. The lumen measures roughly 6 x 11 mm axially at its narrowest point.  IMPRESSION:No acute traumatic findings. Goiter with tracheal compression.     CXR 6/13/2020INTERPRETATION:  Chest one view  HISTORY: Shortness of breath  COMPARISON STUDY: 6/7/2020  Frontal expiratory view of the chest shows the heart to be similar in size. The lungs show similar pulmonary vascularity with progression of lefteffusion and small right effusion. There is no evidence of pneumothorax.  There may be narrowing of the trachea at the level of the thoracic inlet. Evidence of enlarged thyroid is again noted.  IMPRESSION:Narrowed trachea. Correlate clinically for possible stridor.
CT angio chest with IV contrast 6/8/2020IMPRESSION:  1. Small right pleural effusion and tiny left pleural effusion and/or pleural thickening. Associated bibasilar patchy groundglass opacities/stranding may represent atelectasis and/or infiltrates.  2. There is an extremely tiny pneumothorax of the right anterior middle lobe, seen on axial images 83-93.  3.There is suggestion of a recent fracture of T10 with mild loss of vertebral body height, however no significant bony retropulsion. Recommend clinical correlation.  4. Mild aneurysmal dilatation of the ascending thoracic aorta, measures 4.2 cm and is grossly stable. Negative for dissection.  5. Limited evaluation of the pulmonary arteries due to suboptimal opacification with contrast, however no definite pulmonary embolus seen within the main pulmonary arterial segment, right main pulmonary artery or the left main pulmonary artery only.  6. Stable bilateral multiple small pulmonary nodules, measuring up to 4 mm.  7. Grossly stable appearance of an enlarged multinodular heterogeneous thyroid gland, consistent with goiter with leftward displacement/compression of the trachea.

## 2020-06-16 NOTE — PROGRESS NOTE ADULT - SUBJECTIVE AND OBJECTIVE BOX
Patient is a 88y old  Male who presents with a chief complaint of fall (16 Jun 2020 16:06)      HPI:  Pt is an 89 y/o Male with PMHx of HTN,. Afib , SDH in past, DVT in past no longer on ac, thyroidectomy and thrombocytopenia comes to ED today s/p fall, no loc found down  by family. Pt c/o epigastric pain, back pain and was found in supine position at home. Pt is difficult to understand in ER , and states that whenever he eats he has the sensation of vomit. At triage pt had a saturation of 91. Spoke w/family in waiting room, and nephew says pt resides alone without a home health aide. Pt is checked on a once a day and was not responding to door or picking up phone today.  Pt's family reports a gradual decline over the past few weeks w/labored breathing and increased confusion.  No reported fever, chills, cp, palpitations, mild nausea/-v/-d/-c no travels or sick contacts.  in ed ct w/  1. Distended gallbladder with gallstones. There is clinical concern for gallbladder disease, follow-up with right upper quadrant sonogram.  2. There is presacral edema and perirectal fatty stranding. Limited evaluation of the rectum at this level, however underlying proctitis cannot be excluded. Recommend clinical correlation.  3. Suggestion of a recent mild compression fracture deformity of L5 without significant bony retropulsion. (08 Jun 2020 05:43)      INTERVAL HPI/OVERNIGHT EVENTS:  The nurse denies melena, hematochezia, hematemesis, nausea, vomiting, abdominal pain, constipation, diarrhea, or change in bowel movements     MEDICATIONS  (STANDING):  cefepime   IVPB      cefepime   IVPB 1000 milliGRAM(s) IV Intermittent every 12 hours  dextrose 5%. 1000 milliLiter(s) (75 mL/Hr) IV Continuous <Continuous>  diltiazem Injectable 10 milliGRAM(s) IV Push every 8 hours  pantoprazole  Injectable 40 milliGRAM(s) IV Push daily    MEDICATIONS  (PRN):  haloperidol    Injectable 5 milliGRAM(s) IV Push every 6 hours PRN Agitation  metoprolol tartrate Injectable 5 milliGRAM(s) IV Push every 4 hours PRN for HR >110bpm  morphine  - Injectable 2 milliGRAM(s) IV Push every 4 hours PRN Moderate Pain (4 - 6)      FAMILY HISTORY:  No pertinent family history in first degree relatives      Allergies    No Known Allergies    Intolerances        PMH/PSH:  Lung nodules  SVC syndrome  Acute DVT (deep venous thrombosis)  Essential hypertension  Atrial fibrillation, unspecified type  Cardiac complaint  H/O thyroidectomy  No significant past surgical history        REVIEW OF SYSTEMS:  CONSTITUTIONAL: No fever, weight loss,   EYES: No eye pain, visual disturbances, or discharge  ENMT:  No difficulty hearing, tinnitus, vertigo; No sinus or throat pain  NECK: No pain or stiffness  BREASTS: No pain, masses, or nipple discharge  RESPIRATORY: No cough, wheezing, chills or hemoptysis; No shortness of breath  CARDIOVASCULAR: No chest pain, palpitations, dizziness, or leg swelling  GASTROINTESTINAL: See above  GENITOURINARY: No dysuria, frequency, hematuria, or incontinence  NEUROLOGICAL: No headaches, memory loss, loss of strength, numbness, or tremors  SKIN: No itching, burning, rashes, or lesions   LYMPH NODES: No enlarged glands  ENDOCRINE: No heat or cold intolerance; No hair loss  MUSCULOSKELETAL: No joint pain or swelling; No muscle, back, or extremity pain  PSYCHIATRIC: No depression, anxiety, mood swings, or difficulty sleeping  HEME/LYMPH: No easy bruising, or bleeding gums  ALLERGY AND IMMUNOLOGIC: No hives or eczema    Vital Signs Last 24 Hrs  T(C): 36.9 (16 Jun 2020 16:20), Max: 37.1 (16 Jun 2020 05:18)  T(F): 98.4 (16 Jun 2020 16:20), Max: 98.7 (16 Jun 2020 05:18)  HR: 83 (16 Jun 2020 16:20) (62 - 105)  BP: 104/66 (16 Jun 2020 16:20) (97/52 - 114/74)  BP(mean): 64 (16 Jun 2020 13:32) (64 - 64)  RR: 20 (16 Jun 2020 16:20) (20 - 24)  SpO2: 92% (16 Jun 2020 16:20) (92% - 98%)    PHYSICAL EXAM:  GENERAL: NAD, well-groomed, well-developed  HEAD:  Atraumatic, Normocephalic  EYES: EOMI, PERRLA, conjunctiva and sclera clear  NECK: Supple, No JVD, Normal thyroid  NERVOUS SYSTEM:  MS at baseline  CHEST/LUNG: Clear to percussion bilaterally; No rales, rhonchi, wheezing, or rubs  HEART: Regular rate and rhythm; No murmurs, rubs, or gallops  ABDOMEN: Soft, Nontender, Nondistended; Bowel sounds present  EXTREMITIES:  2+ Peripheral Pulses, No clubbing, cyanosis, or edema  LYMPH: No lymphadenopathy noted  SKIN: No rashes or lesions    LAB                          12.3   11.74 )-----------( 91       ( 15 Delonte 2020 07:13 )             39.0       CBC:  06-15 @ 07:13  WBC 11.74   Hgb 12.3   Hct 39.0   Plts 91  .1  06-14 @ 07:53  WBC 10.60   Hgb 12.6   Hct 41.1   Plts 86  .3  06-13 @ 16:23  WBC 13.33   Hgb 13.1   Hct 41.8   Plts 110  .7  06-13 @ 07:29  WBC 9.49   Hgb 13.0   Hct 39.8   Plts 107  MCV 98.0  06-12 @ 07:44  WBC 9.51   Hgb 12.2   Hct 37.7   Plts 98  MCV 99.5  06-11 @ 06:36  WBC 7.78   Hgb 11.4   Hct 34.2   Plts 88  MCV 96.6  06-10 @ 06:41  WBC 9.05   Hgb 11.7   Hct 36.5   Plts 90  MCV 97.9      Chemistry:  06-16 @ 07:25  Na+ 143  K+ 3.6  Cl- 106  CO2 34  BUN 41  Cr 0.78     06-15 @ 07:13  Na+ 145  K+ 4.1  Cl- 105  CO2 32  BUN 56  Cr 1.61     06-14 @ 07:53  Na+ 146  K+ 4.9  Cl- 105  CO2 29  BUN 42  Cr 1.50     06-13 @ 16:23  Na+ 146  K+ 4.2  Cl- 103  CO2 33  BUN 28  Cr 0.96     06-13 @ 07:29  Na+ 143  K+ 3.8  Cl- 103  CO2 31  BUN 22  Cr 0.64     06-12 @ 07:44  Na+ 142  K+ 3.5  Cl- 102  CO2 30  BUN 25  Cr 0.64     06-11 @ 06:36  Na+ 142  K+ 3.1  Cl- 103  CO2 32  BUN 31  Cr 0.64     06-10 @ 17:12  Na+ --  K+ 3.1  Cl- --  CO2 --  BUN --  Cr --     06-10 @ 06:41  Na+ 141  K+ 2.9  Cl- 102  CO2 28  BUN 32  Cr 0.70         Glucose, Serum: 137 mg/dL (06-16 @ 07:25)  Glucose, Serum: 125 mg/dL (06-15 @ 07:13)  Glucose, Serum: 87 mg/dL (06-14 @ 07:53)  Glucose, Serum: 116 mg/dL (06-13 @ 16:23)  Glucose, Serum: 96 mg/dL (06-13 @ 07:29)  Glucose, Serum: 106 mg/dL (06-12 @ 07:44)  Glucose, Serum: 94 mg/dL (06-11 @ 06:36)  Glucose, Serum: 93 mg/dL (06-10 @ 06:41)      16 Jun 2020 07:25    143    |  106    |  41     ----------------------------<  137    3.6     |  34     |  0.78   15 Delonte 2020 07:13    145    |  105    |  56     ----------------------------<  125    4.1     |  32     |  1.61   14 Jun 2020 07:53    146    |  105    |  42     ----------------------------<  87     4.9     |  29     |  1.50   13 Jun 2020 16:23    146    |  103    |  28     ----------------------------<  116    4.2     |  33     |  0.96   13 Jun 2020 07:29    143    |  103    |  22     ----------------------------<  96     3.8     |  31     |  0.64   12 Jun 2020 07:44    142    |  102    |  25     ----------------------------<  106    3.5     |  30     |  0.64   11 Jun 2020 06:36    142    |  103    |  31     ----------------------------<  94     3.1     |  32     |  0.64     Ca    8.5        16 Jun 2020 07:25  Ca    8.3        15 Jun 2020 07:13  Ca    8.3        14 Jun 2020 07:53  Ca    8.8        13 Jun 2020 16:23  Ca    8.8        13 Jun 2020 07:29  Ca    8.5        12 Jun 2020 07:44  Ca    8.3        11 Jun 2020 06:36  Phos  3.4       15 Jun 2020 07:13  Phos  6.4       14 Jun 2020 07:53  Mg     2.3       15 Jun 2020 07:13  Mg     2.4       14 Jun 2020 07:53  Mg     2.1       10 Jun 2020 06:41    TPro  6.5    /  Alb  2.4    /  TBili  1.1    /  DBili  x      /  AST  43     /  ALT  39     /  AlkPhos  64     16 Jun 2020 07:25  TPro  7.1    /  Alb  2.6    /  TBili  1.1    /  DBili  x      /  AST  63     /  ALT  48     /  AlkPhos  61     15 Jun 2020 07:13  TPro  7.2    /  Alb  2.6    /  TBili  0.9    /  DBili  x      /  AST  48     /  ALT  50     /  AlkPhos  59     14 Jun 2020 07:53  TPro  7.5    /  Alb  3.2    /  TBili  1.1    /  DBili  x      /  AST  61     /  ALT  62     /  AlkPhos  67     13 Jun 2020 16:23  TPro  7.4    /  Alb  2.8    /  TBili  1.4    /  DBili  x      /  AST  64     /  ALT  58     /  AlkPhos  59     13 Jun 2020 07:29  TPro  7.3    /  Alb  2.8    /  TBili  1.3    /  DBili  x      /  AST  68     /  ALT  56     /  AlkPhos  54     12 Jun 2020 07:44  TPro  6.9    /  Alb  2.5    /  TBili  1.5    /  DBili  x      /  AST  71     /  ALT  48     /  AlkPhos  51     11 Jun 2020 06:36              CAPILLARY BLOOD GLUCOSE              RADIOLOGY & ADDITIONAL TESTS:    Imaging Personally Reviewed:  [ ] YES  [ ] NO    Consultant(s) Notes Reviewed:  [ ] YES  [ ] NO    Care Discussed with Consultants/Other Providers [ ] YES  [ ] NO

## 2020-06-17 NOTE — PROGRESS NOTE ADULT - PROBLEM SELECTOR PLAN 4
Palliative care consulted today for GOC discussion in the setting of advanced age, poor prognosis, and multiple underlying comorbidities.  - Pt's niece is the HCP: Nano Chrisisabella [phone]  - Pt remains a DNR/DNI, No feeding tube. MOLST in chart.  - Hospice consult placed-Awaiting placement. Palliative care consulted today for GOC discussion in the setting of advanced age, poor prognosis, and multiple underlying comorbidities.  - Pt's niece is the HCP: Nano Taveras [phone]  - Pt remains a DNR/DNI, No feeding tube. MOLST in chart.  - Hospice consult placed-Niece stated she signed consents today and sent them back. Updated niece today on patients status-now requiring NRB/labile BP. Niece stated she hopes to get the hospital equipment she needs shipped to her house and have her Uncle home by Friday. She also stated she is in the process of finding 24/7 HHA. Would like to talk to  for arrangements for transportation home on Friday and details regarding times. Palliative care consulted today for GOC discussion in the setting of advanced age, poor prognosis, and multiple underlying comorbidities.  - Pt's niece is the HCP: Nicollesced Taveras [phone]  - Pt remains a DNR/DNI, No feeding tube. CHRISTOPHERST in chart.  - Hospice consult placed-Niece stated she signed consents today and sent them back. Updated niece today on patients status-now requiring NRB/labile BP/IV medications. Pt switched from home with hospice to inpatient hospice for uncontrolled symptoms. Niece will be here to visit later this evening.

## 2020-06-17 NOTE — GOALS OF CARE CONVERSATION - ADVANCED CARE PLANNING - CONVERSATION DETAILS
Spoke with pt's niece this afternoon. Equipment will be delivered tomorrow as per her request. She is also in the process of private hiring an aide. She is requesting pt be d/c on Friday if she can secure a 24 hr aide. Will cont to f/u.     Padma Trinidad Rn

## 2020-06-17 NOTE — GOALS OF CARE CONVERSATION - ADVANCED CARE PLANNING - CONVERSATION DETAILS
Spoke with PCT NP Tatum. She states pt has begun to decompensated and would benefit from In pt hospice services. Spoke with Dr. Beltran and pt approved for In pt hospice services. Spoke with pt's niece and she in agreement. Spoke with Dr. Nair and he will completed the discharge orders. Spoke with LÓPEZ Rosa and she is aware. Pt will remain at [facility] until a bed becomes available at [facility].     Padma Trinidad Rn

## 2020-06-17 NOTE — PROGRESS NOTE ADULT - PROBLEM SELECTOR PLAN 1
No agitation present upon exam today. Recommend to continue close watch near nurses station and provide frequent reorientation as needed. Recommend to continue Haldol 5 mg IVP q6hrs PRN for agitation. Pt agitated upon exam. Recommend to continue close watch near nurses station and provide frequent reorientation as needed. Recommend to continue Haldol 5 mg IVP q6hrs PRN for agitation.

## 2020-06-17 NOTE — PROGRESS NOTE ADULT - SUBJECTIVE AND OBJECTIVE BOX
Patient is a 88y old  Male who presents with a chief complaint of fall (17 Jun 2020 15:52)      Interval History: weak but stable  no dysphagia     MEDICATIONS  (STANDING):  dextrose 5%. 1000 milliLiter(s) (75 mL/Hr) IV Continuous <Continuous>  pantoprazole  Injectable 40 milliGRAM(s) IV Push daily    MEDICATIONS  (PRN):  haloperidol    Injectable 5 milliGRAM(s) IV Push every 6 hours PRN Agitation  metoprolol tartrate Injectable 5 milliGRAM(s) IV Push every 4 hours PRN for HR >110bpm  morphine  - Injectable 2 milliGRAM(s) IV Push every 4 hours PRN Moderate Pain (4 - 6)      Allergies    No Known Allergies    Intolerances        REVIEW OF SYSTEMS:  CONSTITUTIONAL: no changes    Vital Signs Last 24 Hrs  T(C): 36.8 (17 Jun 2020 12:27), Max: 38.1 (17 Jun 2020 11:23)  T(F): 98.3 (17 Jun 2020 12:27), Max: 100.6 (17 Jun 2020 11:23)  HR: 53 (17 Jun 2020 12:27) (53 - 106)  BP: 115/44 (17 Jun 2020 12:27) (95/47 - 136/72)  BP(mean): --  RR: 25 (17 Jun 2020 12:27) (20 - 25)  SpO2: 95% (17 Jun 2020 12:27) (81% - 100%)    PHYSICAL EXAM:  GENERAL: trachea deviated  HEAD: Atraumatic, Normocephalic  EYES: PERRLA, conjunctiva and sclera clear    LABS:        CAPILLARY BLOOD GLUCOSE        Lipid panel:   CARDIAC MARKERS ( 17 Jun 2020 08:01 )  x     / x     / 108 U/L / x     / x              Thyroid:  Diabetes Tests:  Parathyroid Panel:  Adrenals:  RADIOLOGY & ADDITIONAL TESTS:    Imaging Personally Reviewed:  [ ] YES  [ ] NO    Consultant(s) Notes Reviewed:  [ ] YES  [ ] NO    Care Discussed with Consultants/Other Providers [ ] YES  [ ] NO

## 2020-06-17 NOTE — PROGRESS NOTE ADULT - PROBLEM SELECTOR PLAN 2
Pt remains on 2L NC upon exam, mild SOB present. Pt denies any pain but does say "yes" when asked if he is having difficulty breathing. Pt remains a DNI.   - Recommend to continue Morphine 2 mg IVP q4hrs PRN for moderate pain or air hunger.  - Continue oxygen as needed for SOB. Pt remains on 2L NC upon exam, mild SOB present. Pt sleeping, appears to be in NAD. Pt remains a DNI.   - Recommend to continue Morphine 2 mg IVP q4hrs PRN for moderate pain or air hunger.  - Continue oxygen as needed for SOB. Pt remains on NRB-restless and extremely SOB. Pt remains a DNI.   - Recommend to continue Morphine 2 mg IVP q4hrs PRN for moderate pain or air hunger.  - Continue oxygen as needed for SOB.

## 2020-06-17 NOTE — PROGRESS NOTE ADULT - PROBLEM SELECTOR PLAN 3
Pt continues to have stridor and rhonchi upon exam today. Pt failed speech and swallow exam on 6/16 and is a high aspiration risk. Recommend to continue NPO status for now. Pt continues to have stridor and rhonchi upon exam today, remains on 2L NC. Pt failed speech and swallow exam on 6/16 and is a high aspiration risk. Recommend to continue NPO status for now. Pt continues to have stridor and rhonchi upon exam today, remains on NRB. Pt failed speech and swallow exam on 6/16 and is a high aspiration risk. Recommend to continue NPO status for now.

## 2020-06-17 NOTE — DISCHARGE NOTE PROVIDER - HOSPITAL COURSE
87 y/o Male with PMHx of HTN,. Afib , SDH in past, DVT in past no longer on ac, thyroidectomy and thrombocytopenia comes to ED today s/p fall w/?new compression fx and rhabdo. Pt c/o epigastric pain, back pain and was found in supine position at home. Pt is difficult to understand in ER , and states that whenever he eats he has the sensation of vomit. At triage pt had a saturation of 91. Pt's family reports a gradual decline over the past few weeks w/labored breathing and increased confusion.  No reported fever, chills, cp, palpitations, mild nausea/-v/-d/-c no travels or sick contacts.  He was admitted for fall, rabdho, abdominal pain.  He was evaluated by surgery, ortho, GI, palliative care team.    Family has requested pt be d/c on Friday if she can secure a 24 hr aide.  However, since his condition worsens and required O2 by NRM, he will be discharged to inpt hospice.        A/P    Non-traumatic rhabdomyolysis, s/p fall- follow CK  contuinues to be elvated holding ivf secondary to pleural effusion     Fracture of lumbar spine- ortho on board. MRI demonstrates chronic L2, L3, and L4 VCFx's and acute T10, L1, L5 VCFx's. No acute orthopedic surgical intervention needed at this time per ortho.     Tiny Pneumothorax, unspecified type- monitor    Acute abdominal pain-     -CT abd- Distended gallbladder with gallstones. presacral edema and perirectal fatty stranding.     -US abd- Cholelithiasis and distended gallbladder. No biliary ductal dilatation.     -Seen by Sx/Dr. Salmeron.; no acute surgical intervention at this time. Patient is not a surgical candidate.    -Seen by GI/Dr. Renae ; NPO, s/p  Broad spectrum antibiotics no evidence of acute cholicystitis, HIDA neg, MBS- Incomplete epiglottic deflection. F/UP Sx. ENT/Dr. Ramey (left  in office no [phone]) and Endo Dr. Crawford consulted. pain mgmt     Hypokalemia- resolved     Elev trop; likely demand ischemia s/p fall    Acute metabolic encephalopathy/dementia- monitor mental status         Chronic AFIB- not on AC per spouse hx of SDH. pt off dig, had mare  on metoprolol and cardizem consider digoxin Preventive measure- IPC        Palliative eval noted.    - Hospice Referral for home hospice.    - Equipment to be delivered tomorrow as per her family request.     - She is requesting pt be d/c on Friday if she can secure a 24 hr aide.  However, since his condition worsens and required O2 by NRM, he will be discharged to in hospice.         It took 35 minutes to discharge the patient.

## 2020-06-17 NOTE — PROGRESS NOTE ADULT - ASSESSMENT
89 y/o Male with PMHx of HTN,. Afib , SDH in past, DVT in past no longer on ac, thyroidectomy and thrombocytopenia comes to ED today s/p fall w/?new compression fx and rhabdo. Pt c/o epigastric pain, back pain and was found in supine position at home. Pt is difficult to understand in ER , and states that whenever he eats he has the sensation of vomit. At triage pt had a saturation of 91. Pt's family reports a gradual decline over the past few weeks w/labored breathing and increased confusion.  No reported fever, chills, cp, palpitations, mild nausea/-v/-d/-c no travels or sick contacts.    Non-traumatic rhabdomyolysis, s/p fall- follow CK  contuinues to be elvated holding ivf secondary to pleural effusion   Fracture of lumbar spine- ortho on board. MRI demonstrates chronic L2, L3, and L4 VCFx's and acute T10, L1, L5 VCFx's. No acute orthopedic surgical intervention needed at this time per ortho.   Tiny Pneumothorax, unspecified type- monitor  Acute abdominal pain-   -CT abd- Distended gallbladder with gallstones. presacral edema and perirectal fatty stranding.   -US abd- Cholelithiasis and distended gallbladder. No biliary ductal dilatation.   -Seen by Sx/Dr. Salmeron.; no acute surgical intervention at this time. Patient is not a surgical candidate.  -Seen by GI/Dr. Renae ; NPO, s/p  Broad spectrum antibiotics no evidence of acute cholicystitis, HIDA neg, MBS- Incomplete epiglottic deflection. F/UP Sx. ENT/Dr. Ramey (left  in office no [phone]) and Endo Dr. Crawford consulted. pain mgmt   Hypokalemia- resolved   Elev trop; likely demand ischemia s/p fall  Acute metabolic encephalopathy/dementia- monitor mental status     Chronic AFIB- not on AC per spouse hx of SDH. pt off dig, had mare  on metoprolol and cardizem consider digoxin Preventive measure- IPC    Palliative eval noted.  - Hospice Referral for home hospice.  - Equipment to be delivered tomorrow as per her family request.   - She is requesting pt be d/c on Friday if she can secure a 24 hr aide.      Discharge planning with home hospice.

## 2020-06-17 NOTE — PROGRESS NOTE ADULT - REASON FOR ADMISSION
fall

## 2020-06-17 NOTE — PROGRESS NOTE ADULT - SUBJECTIVE AND OBJECTIVE BOX
INTERVAL HPI:  87 y/o Male with HTN,. Afib , SDH in past, DVT in past( no longer on ac), Thyroidectomy and Thrombocytopenia.   Brought  to ED s/p fall, no LOC,  found down  by family. Complained of epigastric pain, back pain and was found in supine position at home. Stated in ED that whenever he eats he has the sensation of vomiting. At triage pt had a saturation of 91%.  Family reported in ED that pt  resides alone without a home health aide. He is checked on a once a day and was not responding to door or picking up phone .  Family reported a gradual decline over the past few weeks w/labored breathing and increased confusion.  No reported fever, chills, cp, palpitations, mild nausea/-v/-d/-c no travels or sick contacts.  Admitted post fall, Rhabdomyolysis, Suspected Cholecystitis, A Fib and tiny pneumothorax, Found to have fracture of Lumbar spine.  Has enlarged thyroid(thyroidectomy in past) causing deviation of trachea for which family has refused surgical intervention. Also reported with swallowing difficulty for which family has refused peg per DR. Velasquez.  Hospital course complicated with restlessness, agitation requiring pain control and sedation. Then found to be in hypoxic hypercarbic Respiratory failure so was placed on BIPAP.  Not able to provide any details to history.     OVERNIGHT EVENTS:  Lethargic , does not keep O2 on    Vital Signs Last 24 Hrs  T(C): 36.8 (17 Jun 2020 12:27), Max: 38.1 (17 Jun 2020 11:23)  T(F): 98.3 (17 Jun 2020 12:27), Max: 100.6 (17 Jun 2020 11:23)  HR: 53 (17 Jun 2020 12:27) (53 - 106)  BP: 115/44 (17 Jun 2020 12:27) (95/47 - 136/72)  BP(mean): --  RR: 25 (17 Jun 2020 12:27) (20 - 25)  SpO2: 95% (17 Jun 2020 12:27) (81% - 100%)    PHYSICAL EXAM:  GEN:        Lethargic  HEENT:    NRB mask   RESP:       crackles.  CVS:             Regular rate and rhythm.     MEDICATIONS  (STANDING):  dextrose 5%. 1000 milliLiter(s) (75 mL/Hr) IV Continuous <Continuous>  pantoprazole  Injectable 40 milliGRAM(s) IV Push daily    MEDICATIONS  (PRN):  haloperidol    Injectable 5 milliGRAM(s) IV Push every 6 hours PRN Agitation  metoprolol tartrate Injectable 5 milliGRAM(s) IV Push every 4 hours PRN for HR >110bpm  morphine  - Injectable 2 milliGRAM(s) IV Push every 4 hours PRN Moderate Pain (4 - 6)    LABS:                        12.0   16.82 )-----------( 101      ( 17 Jun 2020 08:01 )             38.2     06-17    137  |  97  |  30<H>  ----------------------------<  138<H>  3.2<L>   |  34<H>  |  0.82    Ca    8.7      17 Jun 2020 08:01  Phos  1.4     06-17  Mg     2.0     06-17    TPro  6.9  /  Alb  2.4<L>  /  TBili  1.4<H>  /  DBili  x   /  AST  36  /  ALT  37  /  AlkPhos  66  06-17    06-15 @ 10:48  pH: 7.39  pCO2: 56  pO2: 51  SaO2: 85  06-14 @ 10:07  pH: 7.26  pCO2: 71  pO2: 136  SaO2: 99  06-13 @ 22:20  pH: 7.37  pCO2: 57  pO2: 107  SaO2: 98  06-13 @ 17:21  pH: 7.05  pCO2: >106  pO2: 247  SaO2: 99  06-13 @ 15:47  pH: 7.06  pCO2: >97  pO2: 80  SaO2: 88    ASSESSMENT AND PLAN:  ·	Acute hypoxic hypercarbic Respiratory failure.  ·	Acute metabolic Encephalopathy.  ·	Agitation.  ·	A Fib with RVR.  ·	S/P Fall   ·	Multiple compression deformities of spine.  ·	Large goiter causing deviation of trachea.  ·	Renal Insuffiencey.  ·	DVT history,  ·	SDH history  ·	Dysphagia.    Supportive care with O2 and antibiotics.  Aspiration precautions.  Suction as needed.  For in pt hospice transfer.

## 2020-06-17 NOTE — PROGRESS NOTE ADULT - SUBJECTIVE AND OBJECTIVE BOX
SUBJECTIVE AND OBJECTIVE: Pt lying in bed, asleep. In NAD.    INTERVAL HPI/OVERNIGHT EVENTS: No events.    DNR on chart: Yes    Allergies    No Known Allergies    Intolerances    MEDICATIONS  (STANDING):  dextrose 5%. 1000 milliLiter(s) (75 mL/Hr) IV Continuous <Continuous>  pantoprazole  Injectable 40 milliGRAM(s) IV Push daily    MEDICATIONS  (PRN):  haloperidol    Injectable 5 milliGRAM(s) IV Push every 6 hours PRN Agitation  metoprolol tartrate Injectable 5 milliGRAM(s) IV Push every 4 hours PRN for HR >110bpm  morphine  - Injectable 2 milliGRAM(s) IV Push every 4 hours PRN Moderate Pain (4 - 6)      ITEMS UNCHECKED ARE NOT PRESENT    PRESENT SYMPTOMS: [x]Unable to obtain due to poor mentation   Source if other than patient:  [ ]Family   [ ]Team     Pain (Impact on QOL):    Location:  Minimal acceptable level (0-10 scale):                   Aggravating factors:  Quality:  Radiation:  Severity (0-10 scale):    Timing:    Dyspnea:                           [x]Mild [ ]Moderate [ ]Severe  Anxiety:                             [ ]Mild [ ]Moderate [ ]Severe  Fatigue:                             [ ]Mild [x]Moderate [ ]Severe  Nausea:                             [ ]Mild [ ]Moderate [ ]Severe  Loss of appetite:              [ ]Mild [ ]Moderate [ ]Severe  Constipation:                    [ ]Mild [ ]Moderate [ ]Severe  Grief Present                    [ ] Yes  [ ] No   PAIN AD Score:	0  http://geriatrictoolkit.[facility]/cog/painad.pdf (Ctrl + left click to view)    Other Symptoms:  [x]All other review of systems negative     Karnofsky Performance Score/Palliative Performance Status Version 2:    20  %    http://palliative.info/resource_material/PPSv2.pdf  PHYSICAL EXAM:  Vital Signs Last 24 Hrs  T(C): 36.8 (17 Jun 2020 12:27), Max: 38.1 (17 Jun 2020 11:23)  T(F): 98.3 (17 Jun 2020 12:27), Max: 100.6 (17 Jun 2020 11:23)  HR: 53 (17 Jun 2020 12:27) (53 - 106)  BP: 115/44 (17 Jun 2020 12:27) (95/47 - 136/72)  BP(mean): 64 (16 Jun 2020 13:32) (64 - 64)  RR: 25 (17 Jun 2020 12:27) (20 - 25)  SpO2: 95% (17 Jun 2020 12:27) (81% - 100%) I&O's Summary    16 Jun 2020 07:01  -  17 Jun 2020 07:00  --------------------------------------------------------  IN: 950 mL / OUT: 0 mL / NET: 950 mL     GENERAL:  [ ]Alert  [ ]Oriented x   [x]Lethargic  [ ]Cachexia  [x]Unarousable  [ ]Verbal  [ ]Non-Verbal  Behavioral:   [ ] Anxiety  [x] Delirium [ ] Agitation [ ] Other  HEENT:  [x ]Normal   [ ]Dry mouth   [ ]ET Tube/Trach  [ ]Oral lesions  PULMONARY:   [ ]Clear [ ]Tachypnea  [ ]Audible excessive secretions   [x]Rhonchi        [ ]Right [ ]Left [x]Bilateral  [ ]Crackles        [ ]Right [ ]Left [ ]Bilateral  [ ]Wheezing     [ ]Right [ ]Left [ ]Bilateral  CARDIOVASCULAR:    [ ]Regular [x]Irregular [ ]Tachy  [ ]Carlos [ ]Murmur [ ]Other  GASTROINTESTINAL:  [x]Soft  [ ]Distended   [x]+BS  [ ]Non tender [ ]Tender  [ ]PEG [ ]OGT/ NGT   Last BM: 6/17   GENITOURINARY:  [ ]Normal [x] Incontinent   [ ]Oliguria/Anuria   [ ]Harper  MUSCULOSKELETAL:   [ ]Normal   [ ]Weakness  [x]Bed/Wheelchair bound [ ]Edema  NEUROLOGIC:   [ ]No focal deficits  [x] Cognitive impairment  [x] Dysphagia [x]Dysarthria [ ] Paresis [ ]Other   SKIN:   [x]Normal   [ ]Pressure ulcer(s)  [ ]Rash    CRITICAL CARE:  [ ] Shock Present  [ ]Septic [ ]Cardiogenic [ ]Neurologic [ ]Hypovolemic  [ ]  Vasopressors [ ]  Inotropes   [ ] Respiratory failure present  [ ] Acute  [ ] Chronic [ ] Hypoxic  [ ] Hypercarbic [ ] Other  [ ] Other organ failure     LABS:                        12.0   16.82 )-----------( 101      ( 17 Jun 2020 08:01 )             38.2   06-17    137  |  97  |  30<H>  ----------------------------<  138<H>  3.2<L>   |  34<H>  |  0.82    Ca    8.7      17 Jun 2020 08:01  Phos  1.4     06-17  Mg     2.0     06-17    TPro  6.9  /  Alb  2.4<L>  /  TBili  1.4<H>  /  DBili  x   /  AST  36  /  ALT  37  /  AlkPhos  66  06-17    RADIOLOGY & ADDITIONAL STUDIES:  < from: Xray Chest 1 View-PORTABLE IMMEDIATE (06.13.20 @ 17:16) >  IMPRESSION:  Narrowed trachea. Correlate clinically for possible stridor.    < end of copied text >      Protein Calorie Malnutrition Present: [x] yes [ ] no  [x ] PPSV2 < or = 30%  [ ] significant weight loss [ ] poor nutritional intake [ ] anasarca [ ] catabolic state Albumin, Serum: 2.4 g/dL (06-17-20 @ 08:01)  Artificial Nutrition [ ]     REFERRALS:   [ ]Chaplaincy  [ ] Hospice  [ ]Child Life  [ ]Social Work  [ ]Case management [ ]Holistic Therapy   Goals of Care Document: LUIS ANTONIO Trinidad (06-17-20 @ 10:23)  Goals of Care Conversation:   Advance Directives:  · Does patient have Advance Directive  No  · Does Patient Have a Surrogate  No  · Caregiver:  information could not be obtained    Conversation Discussion:  · Conversation  Hospice Referral  · Conversation Details  Spoke with pt's niece. Hospice consents were emailed to her. She will review them and sign. We discussed the delivery of equipment for today. ( hosp bed, air mattress and 02). I also emailed to her the [facility] hospice contracted list for her to call o private hire an aide. Will cont to f/u.     Padma Trinidad Rn    Personal Advance Directives Treatment Guidelines:   Treatment Guidelines:  · Treatment Guidelines  DNR Order  · Treatment Guideline Comments  Pt is a DNR/DNI.      Electronic Signatures:  Padma Trinidad (RN)  (Signed 17-Jun-2020 10:34)  	Authored: Goals of Care Conversation, Personal Advance Directives Treatment Guidelines      Last Updated: 17-Jun-2020 10:34 by Padma Trinidad (MELINDA) SUBJECTIVE AND OBJECTIVE: Pt lying in bed, asleep. In NAD.    INTERVAL HPI/OVERNIGHT EVENTS: No events.    DNR on chart: Yes    Allergies    No Known Allergies    Intolerances    MEDICATIONS  (STANDING):  dextrose 5%. 1000 milliLiter(s) (75 mL/Hr) IV Continuous <Continuous>  pantoprazole  Injectable 40 milliGRAM(s) IV Push daily    MEDICATIONS  (PRN):  haloperidol    Injectable 5 milliGRAM(s) IV Push every 6 hours PRN Agitation  metoprolol tartrate Injectable 5 milliGRAM(s) IV Push every 4 hours PRN for HR >110bpm  morphine  - Injectable 2 milliGRAM(s) IV Push every 4 hours PRN Moderate Pain (4 - 6)    ITEMS UNCHECKED ARE NOT PRESENT    PRESENT SYMPTOMS: [x]Unable to obtain due to poor mentation   Source if other than patient:  [ ]Family   [ ]Team     Pain (Impact on QOL):    Location:  Minimal acceptable level (0-10 scale):                   Aggravating factors:  Quality:  Radiation:  Severity (0-10 scale):    Timing:    Dyspnea:                           [x]Mild [ ]Moderate [ ]Severe  Anxiety:                             [ ]Mild [ ]Moderate [ ]Severe  Fatigue:                             [ ]Mild [x]Moderate [ ]Severe  Nausea:                             [ ]Mild [ ]Moderate [ ]Severe  Loss of appetite:              [ ]Mild [ ]Moderate [ ]Severe  Constipation:                    [ ]Mild [ ]Moderate [ ]Severe  Grief Present                    [ ] Yes  [ ] No   PAIN AD Score:	0  http://geriatrictoolkit.[facility]/cog/painad.pdf (Ctrl + left click to view)    Other Symptoms:  [x]All other review of systems negative     Karnofsky Performance Score/Palliative Performance Status Version 2:    20  %    http://palliative.info/resource_material/PPSv2.pdf  PHYSICAL EXAM:  Vital Signs Last 24 Hrs  T(C): 36.8 (17 Jun 2020 12:27), Max: 38.1 (17 Jun 2020 11:23)  T(F): 98.3 (17 Jun 2020 12:27), Max: 100.6 (17 Jun 2020 11:23)  HR: 53 (17 Jun 2020 12:27) (53 - 106)  BP: 115/44 (17 Jun 2020 12:27) (95/47 - 136/72)  BP(mean): 64 (16 Jun 2020 13:32) (64 - 64)  RR: 25 (17 Jun 2020 12:27) (20 - 25)  SpO2: 95% (17 Jun 2020 12:27) (81% - 100%) I&O's Summary    16 Jun 2020 07:01  -  17 Jun 2020 07:00  --------------------------------------------------------  IN: 950 mL / OUT: 0 mL / NET: 950 mL     GENERAL:  [ ]Alert  [ ]Oriented x   [x]Lethargic  [ ]Cachexia  [x]Unarousable  [ ]Verbal  [ ]Non-Verbal  Behavioral:   [ ] Anxiety  [x] Delirium [ ] Agitation [ ] Other  HEENT:  [x ]Normal   [ ]Dry mouth   [ ]ET Tube/Trach  [ ]Oral lesions  PULMONARY:   [ ]Clear [ ]Tachypnea  [ ]Audible excessive secretions   [x]Rhonchi        [ ]Right [ ]Left [x]Bilateral  [ ]Crackles        [ ]Right [ ]Left [ ]Bilateral  [ ]Wheezing     [ ]Right [ ]Left [ ]Bilateral  CARDIOVASCULAR:    [ ]Regular [x]Irregular [ ]Tachy  [ ]Carlos [ ]Murmur [ ]Other  GASTROINTESTINAL:  [x]Soft  [ ]Distended   [x]+BS  [ ]Non tender [ ]Tender  [ ]PEG [ ]OGT/ NGT   Last BM: 6/17   GENITOURINARY:  [ ]Normal [x] Incontinent   [ ]Oliguria/Anuria   [ ]Harper  MUSCULOSKELETAL:   [ ]Normal   [ ]Weakness  [x]Bed/Wheelchair bound [ ]Edema  NEUROLOGIC:   [ ]No focal deficits  [x] Cognitive impairment  [x] Dysphagia [x]Dysarthria [ ] Paresis [ ]Other   SKIN:   [x]Normal   [ ]Pressure ulcer(s)  [ ]Rash    CRITICAL CARE:  [ ] Shock Present  [ ]Septic [ ]Cardiogenic [ ]Neurologic [ ]Hypovolemic  [ ]  Vasopressors [ ]  Inotropes   [ ] Respiratory failure present  [ ] Acute  [ ] Chronic [ ] Hypoxic  [ ] Hypercarbic [ ] Other  [ ] Other organ failure     LABS:                        12.0   16.82 )-----------( 101      ( 17 Jun 2020 08:01 )             38.2   06-17    137  |  97  |  30<H>  ----------------------------<  138<H>  3.2<L>   |  34<H>  |  0.82    Ca    8.7      17 Jun 2020 08:01  Phos  1.4     06-17  Mg     2.0     06-17    TPro  6.9  /  Alb  2.4<L>  /  TBili  1.4<H>  /  DBili  x   /  AST  36  /  ALT  37  /  AlkPhos  66  06-17    RADIOLOGY & ADDITIONAL STUDIES:  < from: Xray Chest 1 View-PORTABLE IMMEDIATE (06.13.20 @ 17:16) >  IMPRESSION:  Narrowed trachea. Correlate clinically for possible stridor.    < end of copied text >      Protein Calorie Malnutrition Present: [x] yes [ ] no  [x ] PPSV2 < or = 30%  [ ] significant weight loss [ ] poor nutritional intake [ ] anasarca [ ] catabolic state Albumin, Serum: 2.4 g/dL (06-17-20 @ 08:01)  Artificial Nutrition [ ]     REFERRALS:   [ ]Chaplaincy  [ ] Hospice  [ ]Child Life  [ ]Social Work  [ ]Case management [ ]Holistic Therapy   Goals of Care Document: LUIS ANTONIO Trinidad (06-17-20 @ 10:23)  Goals of Care Conversation:   Advance Directives:  · Does patient have Advance Directive  No  · Does Patient Have a Surrogate  No  · Caregiver:  information could not be obtained    Conversation Discussion:  · Conversation  Hospice Referral  · Conversation Details  Spoke with pt's niece. Hospice consents were emailed to her. She will review them and sign. We discussed the delivery of equipment for today. ( hosp bed, air mattress and 02). I also emailed to her the [facility] hospice contracted list for her to call o private hire an aide. Will cont to f/u.     Padma Trinidad Rn    Personal Advance Directives Treatment Guidelines:   Treatment Guidelines:  · Treatment Guidelines  DNR Order  · Treatment Guideline Comments  Pt is a DNR/DNI.      Electronic Signatures:  Padma Trinidad (RN)  (Signed 17-Jun-2020 10:34)  	Authored: Goals of Care Conversation, Personal Advance Directives Treatment Guidelines      Last Updated: 17-Jun-2020 10:34 by Padma Trinidad (MELINDA)

## 2020-06-17 NOTE — PROGRESS NOTE ADULT - SUBJECTIVE AND OBJECTIVE BOX
Patient is a 88y old  Male who presents with a chief complaint of fall (17 Jun 2020 12:38), in NAD.       MEDICATIONS  (STANDING):  dextrose 5%. 1000 milliLiter(s) (75 mL/Hr) IV Continuous <Continuous>  pantoprazole  Injectable 40 milliGRAM(s) IV Push daily    MEDICATIONS  (PRN):  haloperidol    Injectable 5 milliGRAM(s) IV Push every 6 hours PRN Agitation  metoprolol tartrate Injectable 5 milliGRAM(s) IV Push every 4 hours PRN for HR >110bpm  morphine  - Injectable 2 milliGRAM(s) IV Push every 4 hours PRN Moderate Pain (4 - 6)        REVIEW OF SYSTEMS:  unable to obtain.     Vital Signs Last 24 Hrs  T(C): 36.8 (17 Jun 2020 12:27), Max: 38.1 (17 Jun 2020 11:23)  T(F): 98.3 (17 Jun 2020 12:27), Max: 100.6 (17 Jun 2020 11:23)  HR: 53 (17 Jun 2020 12:27) (53 - 106)  BP: 115/44 (17 Jun 2020 12:27) (95/47 - 136/72)  BP(mean): --  RR: 25 (17 Jun 2020 12:27) (20 - 25)  SpO2: 95% (17 Jun 2020 12:27) (81% - 100%)    PHYSICAL EXAM:  GENERAL: NAD, well-groomed, well-developed  HEAD:  Atraumatic, Normocephalic  EYES:  conjunctiva and sclera clear  ENMT:  Moist mucous membranes   NECK: Supple, No JVD   NERVOUS SYSTEM:  Alert & awake  CHEST/LUNG: fair air entry bilaterally;    HEART: s1,s2, N   ABDOMEN: Soft, Nontender, Nondistended; Bowel sounds present  EXTREMITIES:  2+ Peripheral Pulses, No clubbing, cyanosis, or edema  LYMPH: No lymphadenopathy noted  SKIN: No petechiae.    LABS:                        12.0   16.82 )-----------( 101      ( 17 Jun 2020 08:01 )             38.2     06-17    137  |  97  |  30<H>  ----------------------------<  138<H>  3.2<L>   |  34<H>  |  0.82    Ca    8.7      17 Jun 2020 08:01  Phos  1.4     06-17  Mg     2.0     06-17    TPro  6.9  /  Alb  2.4<L>  /  TBili  1.4<H>  /  DBili  x   /  AST  36  /  ALT  37  /  AlkPhos  66  06-17       cardiac markers Troponin --        CAPILLARY BLOOD GLUCOSE        Cultures    RADIOLOGY & ADDITIONAL TESTS:    Imaging Personally Reviewed:  [ ] YES  [ ] NO    Consultant(s) Notes Reviewed:  [ x ] YES  [ ] NO    Care Discussed with Consultants/Other Providers [ ] YES  [ ] NO

## 2020-06-17 NOTE — DISCHARGE NOTE FOR THE EXPIRED PATIENT - HOSPITAL COURSE
89 y/o Male with PMHx of HTN,. Afib , SDH in past, DVT in past no longer on ac, thyroidectomy and thrombocytopenia comes to ED today s/p fall w/?new compression fx and rhabdo  admit to tele. Iv hydration trend ck. 87 y/o Male with PMHx of HTN,. Afib , SDH in past, DVT in past no longer on ac, thyroidectomy and thrombocytopenia comes to ED today s/p fall w/?new compression fx and rhabdo  admit to tele. Iv hydration trend ck. Called to pronounce pt. with cardiopulmonary arrest secondary to respiratory fialure.  Pt. seen and evaluated at bedside.  Physical exam shows pt. in bed, unresponsive to verbal and tactile stimuli.  Skin pale, heart sounds absent, no spontaneous respirations, no palpable blood pressure or pulse, pupils fixed and dilated.  Pronounced at 2210  .  Family Dawn Taveras Notified notified. autopsy declined asper afore mentioned family member  Medical examiner no Attending notified yes.

## 2020-06-17 NOTE — GOALS OF CARE CONVERSATION - ADVANCED CARE PLANNING - CONVERSATION DETAILS
Spoke with pt's niece. Hospice consents were emailed to her. She will review them and sign. We discussed the delivery of equipment for today. ( hosp bed, air mattress and 02). I also emailed to her the [facility] hospice contracted list for her to call o private hire an aide. Will cont to f/u.     Padma Trinidad Rn

## 2020-06-17 NOTE — DISCHARGE NOTE PROVIDER - NSDCCPCAREPLAN_GEN_ALL_CORE_FT
PRINCIPAL DISCHARGE DIAGNOSIS  Diagnosis: Cholecystitis  Assessment and Plan of Treatment:       SECONDARY DISCHARGE DIAGNOSES  Diagnosis: Acute respiratory failure with hypoxia  Assessment and Plan of Treatment:     Diagnosis: Pneumothorax, unspecified type  Assessment and Plan of Treatment:     Diagnosis: Fracture of lumbar spine  Assessment and Plan of Treatment:     Diagnosis: Non-traumatic rhabdomyolysis  Assessment and Plan of Treatment:

## 2020-06-18 LAB — SARS-COV-2 RNA SPEC QL NAA+PROBE: SIGNIFICANT CHANGE UP

## 2020-06-19 LAB
CULTURE RESULTS: SIGNIFICANT CHANGE UP
CULTURE RESULTS: SIGNIFICANT CHANGE UP
SPECIMEN SOURCE: SIGNIFICANT CHANGE UP
SPECIMEN SOURCE: SIGNIFICANT CHANGE UP

## 2020-06-27 DIAGNOSIS — D69.6 THROMBOCYTOPENIA, UNSPECIFIED: ICD-10-CM

## 2020-06-27 DIAGNOSIS — M62.82 RHABDOMYOLYSIS: ICD-10-CM

## 2020-06-27 DIAGNOSIS — I48.20 CHRONIC ATRIAL FIBRILLATION, UNSPECIFIED: ICD-10-CM

## 2020-06-27 DIAGNOSIS — Z51.5 ENCOUNTER FOR PALLIATIVE CARE: ICD-10-CM

## 2020-06-27 DIAGNOSIS — I10 ESSENTIAL (PRIMARY) HYPERTENSION: ICD-10-CM

## 2020-06-27 DIAGNOSIS — E87.6 HYPOKALEMIA: ICD-10-CM

## 2020-06-27 DIAGNOSIS — E89.0 POSTPROCEDURAL HYPOTHYROIDISM: ICD-10-CM

## 2020-06-27 DIAGNOSIS — G93.41 METABOLIC ENCEPHALOPATHY: ICD-10-CM

## 2020-06-27 DIAGNOSIS — K80.18 CALCULUS OF GALLBLADDER WITH OTHER CHOLECYSTITIS WITHOUT OBSTRUCTION: ICD-10-CM

## 2020-06-27 DIAGNOSIS — J93.9 PNEUMOTHORAX, UNSPECIFIED: ICD-10-CM

## 2020-06-27 DIAGNOSIS — R45.1 RESTLESSNESS AND AGITATION: ICD-10-CM

## 2020-06-27 DIAGNOSIS — J96.01 ACUTE RESPIRATORY FAILURE WITH HYPOXIA: ICD-10-CM
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
## 2020-06-27 DIAGNOSIS — Z66 DO NOT RESUSCITATE: ICD-10-CM

## 2020-06-27 DIAGNOSIS — I24.8 OTHER FORMS OF ACUTE ISCHEMIC HEART DISEASE: ICD-10-CM

## 2020-06-27 DIAGNOSIS — M48.55XA COLLAPSED VERTEBRA, NOT ELSEWHERE CLASSIFIED, THORACOLUMBAR REGION, INITIAL ENCOUNTER FOR FRACTURE: ICD-10-CM

## 2020-06-27 DIAGNOSIS — J90 PLEURAL EFFUSION, NOT ELSEWHERE CLASSIFIED: ICD-10-CM
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
## 2020-06-27 DIAGNOSIS — R13.10 DYSPHAGIA, UNSPECIFIED: ICD-10-CM

## 2020-06-27 DIAGNOSIS — E04.9 NONTOXIC GOITER, UNSPECIFIED: ICD-10-CM

## 2020-06-27 DIAGNOSIS — Z86.718 PERSONAL HISTORY OF OTHER VENOUS THROMBOSIS AND EMBOLISM: ICD-10-CM

## 2020-06-30 DIAGNOSIS — S32.019A UNSPECIFIED FRACTURE OF FIRST LUMBAR VERTEBRA, INITIAL ENCOUNTER FOR CLOSED FRACTURE: ICD-10-CM

## 2020-06-30 DIAGNOSIS — Z78.1 PHYSICAL RESTRAINT STATUS: ICD-10-CM

## 2020-06-30 DIAGNOSIS — D69.6 THROMBOCYTOPENIA, UNSPECIFIED: ICD-10-CM

## 2020-06-30 DIAGNOSIS — J93.9 PNEUMOTHORAX, UNSPECIFIED: ICD-10-CM

## 2020-06-30 DIAGNOSIS — G93.41 METABOLIC ENCEPHALOPATHY: ICD-10-CM
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
## 2020-06-30 DIAGNOSIS — I10 ESSENTIAL (PRIMARY) HYPERTENSION: ICD-10-CM

## 2020-06-30 DIAGNOSIS — F03.90 UNSPECIFIED DEMENTIA WITHOUT BEHAVIORAL DISTURBANCE: ICD-10-CM

## 2020-06-30 DIAGNOSIS — S32.059A UNSPECIFIED FRACTURE OF FIFTH LUMBAR VERTEBRA, INITIAL ENCOUNTER FOR CLOSED FRACTURE: ICD-10-CM

## 2020-06-30 DIAGNOSIS — J96.02 ACUTE RESPIRATORY FAILURE WITH HYPERCAPNIA: ICD-10-CM

## 2020-06-30 DIAGNOSIS — E87.6 HYPOKALEMIA: ICD-10-CM

## 2020-06-30 DIAGNOSIS — E43 UNSPECIFIED SEVERE PROTEIN-CALORIE MALNUTRITION: ICD-10-CM

## 2020-06-30 DIAGNOSIS — M62.82 RHABDOMYOLYSIS: ICD-10-CM

## 2020-06-30 DIAGNOSIS — J96.01 ACUTE RESPIRATORY FAILURE WITH HYPOXIA: ICD-10-CM

## 2020-06-30 DIAGNOSIS — Z51.5 ENCOUNTER FOR PALLIATIVE CARE: ICD-10-CM
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
## 2020-06-30 DIAGNOSIS — E44.0 MODERATE PROTEIN-CALORIE MALNUTRITION: ICD-10-CM

## 2020-06-30 DIAGNOSIS — K81.9 CHOLECYSTITIS, UNSPECIFIED: ICD-10-CM

## 2020-06-30 DIAGNOSIS — Z86.718 PERSONAL HISTORY OF OTHER VENOUS THROMBOSIS AND EMBOLISM: ICD-10-CM

## 2020-06-30 DIAGNOSIS — M84.48XA PATHOLOGICAL FRACTURE, OTHER SITE, INITIAL ENCOUNTER FOR FRACTURE: ICD-10-CM

## 2020-06-30 DIAGNOSIS — Z66 DO NOT RESUSCITATE: ICD-10-CM

## 2020-06-30 DIAGNOSIS — Z79.01 LONG TERM (CURRENT) USE OF ANTICOAGULANTS: ICD-10-CM

## 2020-06-30 DIAGNOSIS — R13.12 DYSPHAGIA, OROPHARYNGEAL PHASE: ICD-10-CM

## 2020-06-30 DIAGNOSIS — S22.079A UNSPECIFIED FRACTURE OF T9-T10 VERTEBRA, INITIAL ENCOUNTER FOR CLOSED FRACTURE: ICD-10-CM

## 2020-06-30 DIAGNOSIS — K80.20 CALCULUS OF GALLBLADDER WITHOUT CHOLECYSTITIS WITHOUT OBSTRUCTION: ICD-10-CM

## 2020-06-30 DIAGNOSIS — I48.20 CHRONIC ATRIAL FIBRILLATION, UNSPECIFIED: ICD-10-CM

## 2020-06-30 DIAGNOSIS — E04.9 NONTOXIC GOITER, UNSPECIFIED: ICD-10-CM

## 2020-06-30 DIAGNOSIS — I24.8 OTHER FORMS OF ACUTE ISCHEMIC HEART DISEASE: ICD-10-CM

## 2020-07-27 NOTE — DIETITIAN INITIAL EVALUATION ADULT. - PERTINENT LABORATORY DATA
Detail Level: Detailed Quality 226: Preventive Care And Screening: Tobacco Use: Screening And Cessation Intervention: Patient screened for tobacco use and is an ex/non-smoker Quality 130: Documentation Of Current Medications In The Medical Record: Current Medications Documented 06-12 Na142 mmol/L Glu 106 mg/dL<H> K+ 3.5 mmol/L Cr  0.64 mg/dL BUN 25 mg/dL<H> 06-12 Alb 2.8 g/dL<L>06-12 ALT 56 U/L AST 68 U/L<H> Alkaline Phosphatase 54 U/L

## 2020-09-09 NOTE — PATIENT PROFILE ADULT - ABILITY TO HEAR (WITH HEARING AID OR HEARING APPLIANCE IF NORMALLY USED):
9/9/2020     RE: Ethel Thompson  [address] [place] [address]  [place] [address]    Dear Colleague,    Thank you for referring your patient, Ethel Thompson, to the [facility]. Please see a copy of my visit note below.    ACUTE TRANSPLANT NEPHROLOGY VISIT    Assessment & Plan      RECOMMENDATIONS   Start taking miralax  oncce and senna together to improve constipation  Do urine tests   Today   Continue to push fluids  Check BP and Heart rate daily  Do your scheduled labs   F/u with video visit next Friday with Transplant nephrology clinic      # DDKT: Trend down               - Baseline Cr ~ TBD.  Currently 1.03              - Proteinuria: FSGS diagnosis with UPCR 1.9g/g pre op.                 UPCR on 9/5 : 1.17 g/gCr --> will               - Date DSA Last Checked: Sep/2020      Latest DSA: No DSA at time of transplant              - BK Viremia: Not checked post transplant              - PD Catheter removed intraop              -Stent in place, remove 4-6 weeks post op              - PRA 29%. Perinephric fluid collection noted on post-op US.      # Immunosuppression: Tacrolimus immediate release (goal 8-10), Mycophenolate mofetil (dose 750 mg every 12 hours) and Prednisone (dose taper)  Induction immunosuppression with thymoglobulin 125mg (2.2mg/kg), basiliximab (dose given POD #1 and schedule outpatient for POD #5  TODAY), and steroid taper.             - Changes: Not at this time               -Moderate risk induction. Give simulect today   mg daily  Tacrolimus 4 mg BID --> Tac level < 3 ( 9/7)   On Steroid taper     # Infection Prophylaxis:   - PJP: Sulfa/TMP (Bactrim)  - CMV: Valcyte x3m, CMV IgG recipient +. Based on improved function will increase dose to 450 mg BID   - Thrush - start Nystatin swish 4 times daily     # Hypertension: Controlled;   Goal BP: < 150/90              - Volume status: Mildly hypervolemic             EDW ~ 54kg. Today weight is  64.4 kg  Wt  Readings from Last 3 Encounters:   09/09/20 64.6 kg (142 lb 6.4 oz)   09/08/20 64.4 kg (142 lb)   09/07/20 63.3 kg (139 lb 8 oz)     Continue Amlodipine 5 mg daily  Continue Lasix 20 mg daily                - Changes: Yes. No      # Electrolytes              -   Potassium : Hypokalemia resolved with KCL 40 meq oral on 9/8/2020  Magnesium normal . On mag oxide supplement 400 mg daily       # Elevated Blood Glucose: Glucose improved  222 ( 9/7) --> 210 ( 9/8)_ --> 112 ( 9/9)   9/8 blood sugar was not fasting              - Management as per primary team.     # Anemia in Chronic Renal Disease: Hgb:  Hb 10.1 . 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     Transplant Office Phone Number: [phone]     Assessment and plan was discussed with the patient and she voiced her understanding and agreement.     Return visit: 1 week      Dinah Strange MD  Patient staffed with Dr Sifuentes          Return visit: as scheduled    Dinah Strange MD    Chief Complaint   Ms. Thompson is a 66 year old here for routine follow up.     History of Present Illness   Post transplant  Adequate: hears normal conversation without difficulty acute care  Visit  C/o pain in at incision site   Had 2 BM   Afebrile      Recent Hospitalizations:  [x] No [x] Yes    New Medical Issues: [x] No [] Yes    Decreased energy: [x] No [] Yes    Chest pain or SOB with exertion:  [x] No [] Yes    Appetite change or weight change: [x] No [] Yes    Nausea, vomiting or diarrhea:  [x] No [] Yes    Fever, sweats or chills: [x] No [] Yes    Leg swelling: [x] No [] Yes        Review of Systems   A comprehensive review of systems was obtained and negative, except as noted in the HPI or PMH.    Problem List   Patient Active Problem List   Diagnosis     Elevated serum immunoglobulin free light chain level     Renovascular hypertension     FSGS (focal segmental glomerulosclerosis)     Hyperlipidemia     Hypothyroidism     SADIE on CPAP     Sensorineural hearing loss (SNHL) of both ears     Anemia in chronic kidney disease     GERD (gastroesophageal reflux disease)     Hypertension     Hepatitis B core antibody positive     End stage renal disease (H)     Secondary hyperparathyroidism (H)     Memory deficits     Kidney replaced by transplant     Immunosuppressed status (H)     Elevated lactic acid level     Acute anemia       Social History   Social History     Tobacco Use     Smoking status: Former Smoker     Packs/day: 1.00     Types: Cigarettes     Start date:      Last attempt to quit:      Years since quittin.7     Smokeless tobacco: Never Used   Substance Use Topics     Alcohol use: No     Frequency: Never     Drug use: No       Allergies   Allergies   Allergen Reactions     Amoxicillin-Pot Clavulanate Nausea and Vomiting       Medications   Current Outpatient Medications   Medication Sig     acetaminophen (TYLENOL) 325 MG tablet Take 2 tablets (650 mg) by mouth every 4 hours as needed for pain     amLODIPine (NORVASC) 5 MG tablet Take 1 tablet (5 mg) by mouth daily     aspirin 81 MG EC tablet Take 81 mg by mouth daily     atorvastatin (LIPITOR) 40 MG tablet Take  0.5 tablets (20 mg) by mouth daily     famotidine (PEPCID) 20 MG tablet Take 20 mg by mouth every evening      fish oil-omega-3 fatty acids 1000 MG capsule Take 1 g by mouth daily      fluticasone (FLONASE) 50 MCG/ACT nasal spray Spray 1 spray into both nostrils 2 times daily as needed for rhinitis or allergies     furosemide (LASIX) 20 MG tablet Take 1 tablet (20 mg) by mouth daily     levothyroxine (SYNTHROID/LEVOTHROID) 112 MCG tablet Take 112 mcg (1 tablet) by mouth every Monday, Tuesday, Wednesday, Thursday, Friday.     Lidocaine (LIDOCARE) 4 % Patch Place 1 patch onto the skin every 24 hours To prevent lidocaine toxicity, patient should be patch free for 12 hrs daily.     magnesium oxide (MAG-OX) 400 MG tablet Take 1 tablet (400 mg) by mouth daily (with lunch)     melatonin 3 MG tablet Take 3 mg by mouth At Bedtime      mycophenolate (GENERIC EQUIVALENT) 250 MG capsule Take 3 capsules (750 mg) by mouth 2 times daily     nystatin (MYCOSTATIN) 148852 UNIT/ML suspension Take 5 mLs (500,000 Units) by mouth 4 times daily for 14 days     ondansetron (ZOFRAN-ODT) 4 MG ODT tab Take 1 tablet (4 mg) by mouth every 6 hours as needed for nausea or vomiting     oxyCODONE (ROXICODONE) 5 MG tablet Take 1 tablet (5 mg) by mouth every 4 hours as needed for moderate to severe pain     phosphorus tablet 250 mg (PHOSPHORUS TABLET 250 MG) 250 MG per tablet Take 2 tablets (500 mg) by mouth 2 times daily     predniSONE (DELTASONE) 10 MG tablet On 9/7 take 60mg (6 tabs)  9/8-9/9 take 40mg (4 tabs daily)  9/10-9/16 take 20mg (2 tabs daily)  9/17-9/23 take 15mg (1.5 tabs daily)  9/24-9/30 take 10mg (1 tab daily)  10/1-10/7 take 5mg (half tab daily)     Psyllium (METAMUCIL FIBER PO) Take one tablespoon by mouth per day every evening     senna-docusate (SENOKOT-S/PERICOLACE) 8.6-50 MG tablet Take 1 tablet by mouth 2 times daily Hold for loose stools     sulfamethoxazole-trimethoprim (BACTRIM) 400-80 MG tablet Take 1 tablet by mouth daily      tacrolimus (GENERIC EQUIVALENT) 0.5 MG capsule Take 1 cap by mouth twice daily as directed by Transplant Center for dose adjustment     tacrolimus (GENERIC EQUIVALENT) 1 MG capsule Take 4 capsules (4 mg) by mouth 2 times daily     valGANciclovir (VALCYTE) 450 MG tablet Take 1 tab every other day. Titrate dose up to a max of 2 tabs (900 mg) by mouth daily when directed by your transplant team.     vitamin B-12 (CYANOCOBALAMIN) 500 MCG tablet Take 500 mcg by mouth daily     vitamin B6 (PYRIDOXINE) 100 MG tablet Take 100 mg by mouth daily     No current facility-administered medications for this visit.      There are no discontinued medications.    Physical Exam   Vital Signs: reviewed     GENERAL APPEARANCE: alert and no distress  HENT: mouth without ulcers or lesions  LYMPHATICS: no cervical or supraclavicular nodes  RESP: lungs clear to auscultation - no rales, rhonchi or wheezes  CV: regular rhythm, normal rate, no rub, no murmur  EDEMA: no LE edema bilaterally  ABDOMEN: soft, mild TTP in incision site . Nondistended,  bowel sounds normal  MS: extremities normal - no gross deformities noted, no evidence of inflammation in joints, no muscle tenderness  SKIN: no rash    Data     Renal Latest Ref Rng & Units 9/9/2020 9/8/2020 9/7/2020   Na 133 - 144 mmol/L 144 142 141   K 3.4 - 5.3 mmol/L 3.9 3.4 3.6   Cl 94 - 109 mmol/L 111(H) 110(H) 108   CO2 20 - 32 mmol/L 29 27 26   BUN 7 - 30 mg/dL 32(H) 34(H) 41(H)   Cr 0.52 - 1.04 mg/dL 1.03 0.96 1.48(H)   Glucose 70 - 99 mg/dL 112(H) 210(H) 222(H)   Ca  8.5 - 10.1 mg/dL 8.6 8.4(L) 8.9   Mg 1.6 - 2.3 mg/dL 2.0 2.3 2.3     Bone Health Latest Ref Rng & Units 9/9/2020 9/8/2020 9/7/2020   Phos 2.5 - 4.5 mg/dL 1.1(L) 1.1(L) 1.9(L)     Heme Latest Ref Rng & Units 9/9/2020 9/8/2020 9/7/2020   WBC 4.0 - 11.0 10e9/L 6.6 6.2 9.8   Hgb 11.7 - 15.7 g/dL 9.6(L) 10.1(L) 10.7(L)   Plt 150 - 450 10e9/L 143(L) 144(L) 134(L)   ABSOLUTE NEUTROPHIL 1.6 - 8.3 10e9/L 5.2 5.6 8.9(H)   ABSOLUTE  LYMPHOCYTES 0.8 - 5.3 10e9/L 0.3(L) 0.2(L) 0.2(L)   ABSOLUTE MONOCYTES 0.0 - 1.3 10e9/L 0.9 0.1 0.4   ABSOLUTE EOSINOPHILS 0.0 - 0.7 10e9/L 0.0 0.0 0.0   ABSOLUTE BASOPHILS 0.0 - 0.2 10e9/L 0.1 0.0 0.0   ABS IMMATURE GRANULOCYTES 0 - 0.4 10e9/L - - 0.3   ABSOLUTE NUCLEATED RBC - - 0.1 0.0     Liver Latest Ref Rng & Units 9/9/2020 9/5/2020 9/4/2020   AP 40 - 150 U/L - 53 60   TBili 0.2 - 1.3 mg/dL - 0.4 1.1   DBili 0.0 - 0.2 mg/dL - 0.2 0.1   ALT 0 - 50 U/L - 52(H) 41   AST 0 - 45 U/L - 50(H) 38   Tot Protein 6.8 - 8.8 g/dL - 5.4(L) 5.2(L)   Albumin 3.4 - 5.0 g/dL 2.9(L) 2.8(L) 2.3(L)     Pancreas Latest Ref Rng & Units 9/3/2020   A1C 0 - 5.6 % 5.4     Iron studies Latest Ref Rng & Units 9/9/2020   Iron 35 - 180 ug/dL 162   Iron sat 15 - 46 % 92(H)   Ferritin 8 - 252 ng/mL 1,757(H)     [facility] Txp Virology Latest Ref Rng & Units 9/3/2020 1/28/2019   EBV CAPSID ANTIBODY IGG 0.0 - 0.8 AI >8.0(H) >8.0(H)   Hep B Core NR:Nonreactive - Reactive(AA)        Recent Labs   Lab Test 09/06/20  0430 09/07/20  0738 09/08/20  0637   DOSTAC Not Provided Not Provided 1,900   TACROL <3.0* <3.0* 6.0     Again, thank you for allowing me to participate in the care of your patient.      Sincerely,    Henry Sifuentes MD

## 2022-01-01 NOTE — ED PROVIDER NOTE - NS ED MD DISPO ADMIT [facility] UNIT
Vascular Surgery Progress Note         PATIENT NAME: Mi Way     TODAY'S DATE: 1/1/2022     SUBJECTIVE:    Pt seen and examined. No new concerns. Temporary catheter placed in RIJ yesterday and dialysis performed. Continues to have baseline shortness of breath. OBJECTIVE:   Vitals:  BP (!) 153/98   Pulse 81   Temp 97.5 °F (36.4 °C) (Oral)   Resp 18   Wt 266 lb 5.1 oz (120.8 kg)   SpO2 98%   BMI 38.21 kg/m²      INTAKE/OUTPUT:      Intake/Output Summary (Last 24 hours) at 1/1/2022 1246  Last data filed at 12/31/2021 2236  Gross per 24 hour   Intake 108 ml   Output 3500 ml   Net -3392 ml                 GENERAL: AOx3, NAD  HEENT: EOMI bilaterally, trachea midline  CARDIAC: Regular rate and rhythm. ABDOMEN: Softly distended, nontender, no guarding  EXTREMITY: moves all extremities, bl LE edema, LUE AV graft without palpable thrill   NEURO: CN II-XII intact. Gross motor intact. ASSESSMENT   Thrombosed LUE AV graft    PLAN  1. Continue dialysis via nontunneled giles for now   2. Plan for fistulagram with possible thrombectomy of graft with IR or vascular surgeon this coming week. If unable to get graft functioning again, will need tunneled cath prior to dc.        Janak Rueda, DO  General Surgery PGY-3 MED/SURG

## 2022-01-14 NOTE — PROGRESS NOTE ADULT - SUBJECTIVE AND OBJECTIVE BOX
Patient is a 87y old  Male who presents with a chief complaint of left frontal-parietal SDH diagnosed 10/5/19 (24 Oct 2019 12:11)      HPI:  Pt is an 86 y/o Male with PMHx of HTN,. Afib on coumadin, DVT, and past thyroidectomy, who was brought to [facility] for increasing confusion and bruises on arms and legs x 2 weeks on 10/5/19.  In the ED, CT head was suspicious for left frontal/parietal acute/subacute SDH. Neurosurgery was consulted and recommended no acute surgical intervention, started on keppra for seizure prophylaxis. Patient was also found to be thrombocytopenic (21), and transfused platelets x 2. Repeat CT head was stable.    Patient was also found to have UTI and started on Keflex which is due to be completed on 10/12. Heme/onc was consulted for thrombocytopenia. They believe that DIC was unlikely, and attributed the thrombocytopenia to likely infection from UTI vs B12 deficiency vs MDS. He was also found to have non occlusive DVT on the right  soleal vein. Interventional radiology was consulted for possible IVC filter, but they felt that since the risk of PE from DVT below the knee was low, the risk of procedure outweighed the benefit. During hospital course, patient also found to be intermittently bradycardic to the 30s with Afib with slow ventricular rate and pauses. Electrophysiology was consulted and recommended to dc digoxin and they will follow up as outpatient. Pt was evaluated by PT, OT, and PM&R and was recommended for acute inpatient rehabilitation. Pt was deemed stable for discharge on 10/11/19 and he was transferred to [facility] for acute inpatient rehabilitation. (11 Oct 2019 13:22)    SUBJECTIVE: Patient seen and examined. Overnight, enhanced supervision upgraded to 1:1 observation. Patient pleasantly confused this morning. States he feels well and denies complaints. Perseverative speech.     PAST MEDICAL & SURGICAL HISTORY:  Lung nodules  SVC syndrome  Acute DVT (deep venous thrombosis)  Essential hypertension  Atrial fibrillation, unspecified type  H/O thyroidectomy      MEDICATIONS  (STANDING):  BACItracin   Ointment 1 Application(s) Topical two times a day  cyanocobalamin Injectable 1000 MICROGram(s) IntraMuscular <User Schedule>  melatonin 6 milliGRAM(s) Oral at bedtime  nystatin Powder 1 Application(s) Topical two times a day  pantoprazole    Tablet 40 milliGRAM(s) Oral before breakfast  QUEtiapine 12.5 milliGRAM(s) Oral <User Schedule>    MEDICATIONS  (PRN):  ondansetron    Tablet 4 milliGRAM(s) Oral two times a day PRN Nausea      Allergies    No Known Allergies    Intolerances          VITALS  87y  Vital Signs Last 24 Hrs  T(C): 36.7 (25 Oct 2019 09:23), Max: 36.7 (24 Oct 2019 21:29)  T(F): 98 (25 Oct 2019 09:23), Max: 98 (24 Oct 2019 21:29)  HR: 80 (25 Oct 2019 09:23) (76 - 80)  BP: 107/69 (25 Oct 2019 09:23) (100/61 - 107/69)  BP(mean): --  RR: 14 (25 Oct 2019 09:23) (14 - 14)  SpO2: 95% (25 Oct 2019 09:23) (94% - 95%)  Daily     Daily         RECENT LABS:                          11.0   4.79  )-----------( 37       ( 25 Oct 2019 06:00 )             34.2     10-24    143  |  107  |  24<H>  ----------------------------<  90  3.8   |  26  |  0.88    Ca    8.8      24 Oct 2019 06:20    TPro  6.9  /  Alb  2.9<L>  /  TBili  0.5  /  DBili  x   /  AST  24  /  ALT  14  /  AlkPhos  80  10-24    LIVER FUNCTIONS - ( 24 Oct 2019 06:20 )  Alb: 2.9 g/dL / Pro: 6.9 g/dL / ALK PHOS: 80 U/L / ALT: 14 U/L / AST: 24 U/L / GGT: x                   CAPILLARY BLOOD GLUCOSE [facility]

## 2022-02-03 NOTE — PATIENT PROFILE ADULT. - MEDICATIONS BROUGHT TO HOSPITAL, PROFILE
no Complex Repair And Rotation Flap Text: The defect edges were debeveled with a #15 scalpel blade.  The primary defect was closed partially with a complex linear closure.  Given the location of the remaining defect, shape of the defect and the proximity to free margins a rotation flap was deemed most appropriate for complete closure of the defect.  Using a sterile surgical marker, an appropriate advancement flap was drawn incorporating the defect and placing the expected incisions within the relaxed skin tension lines where possible.    The area thus outlined was incised deep to adipose tissue with a #15 scalpel blade.  The skin margins were undermined to an appropriate distance in all directions utilizing iris scissors.

## 2022-03-16 NOTE — DISCHARGE NOTE FOR THE EXPIRED PATIENT - TIME PATIENT WAS PRONOUNCED DEAD
17-Jun-2020 22:10
discussed with resident
89 y F with known bladder mass, pt refused workup or treatment, adm w acute hypoxic resp failure 2/2 large L loculated pleural effusion, ABDON likely 2/2 obstruction w severe L hydro, large pelvic mass. Pt lethargic, cachectic, ill appearing, breathing mod labored, abd soft, distended, jalloh in place. DNR/DNI. Hospice eligible. HCP in agreement for IPU for management of symptoms.

## 2022-09-01 NOTE — DISCHARGE NOTE PROVIDER - DISCHARGE DATE
11-Oct-2019 Treatment Plan:  Change Ambien to Ambien CR (extended release version) 6.25 mg at bedtime as needed for sleep. Do not take with alcohol or opioid pain medication. Make sure to have 8-10 hours to devote to sleep after taking. Careful/caution for falls in middle of night.   Continue Effexor-XR/venlafaxine ER 75 mg daily for depression and anxiety.  Can try taking at bedtime.  Tomorrow, 9/2, take at lunch time for 1 day  9/3 take at dinnertime for 1 day  Then, on 9/4, start taking your dose at bedtime.  Start ondansetron/Zofran 4 mg every 8 hrs as needed for nausea.    Continue to follow with neurology as they recommend.    Continue all other cares per primary care provider.   Continue all other medications as reviewed per electronic medical record today.   Safety plan reviewed. To the Emergency Department as needed or call after hours crisis line at [phone] or [phone]. [place] Crisis Text Line. Text [place] to 761456 or Suicide LifeLine Chat: suicidepreventionlifeline.org/chat  Consider individual psychotherapy for additional support and ongoing development of nonpharmacologic coping skills and strategies.  Schedule an appointment with me in 3-4 weeks or sooner as needed. Call [facility] at [phone] to schedule.  Follow up with primary care provider as planned or for acute medical concerns.  Call the psychiatric nurse line with medication questions or concerns at [phone].  MyChart may be used to communicate with your provider, but this is not intended to be used for emergencies.

## 2022-09-12 NOTE — DIETITIAN INITIAL EVALUATION ADULT. - NS AS NUTRI INTERV MEALS SNACK
98.7
Other (specify)/Texture-modified diet/Continue on current diet order (soft). Encourage po at meals, will  update preferences requested by pt at interview. pt aware RD will remain available./General/healthful diet
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
## 2022-09-30 NOTE — PHYSICAL THERAPY INITIAL EVALUATION ADULT - GAIT TRAINING, PT EVAL
no
Independent in ambulation with use of cane device up to 250 feet observing proper gait pattern, posture and use of walking device safely.

## 2022-10-11 NOTE — PROGRESS NOTE ADULT - SUBJECTIVE AND OBJECTIVE BOX
Spoke to wife crystal and informed Patient is a 88y old  Male who presents with a chief complaint of fall (09 Jun 2020 13:57)      HPI:  Pt is an 89 y/o Male with PMHx of HTN,. Afib , SDH in past, DVT in past no longer on ac, thyroidectomy and thrombocytopenia comes to ED today s/p fall, no loc found down  by family. Pt c/o epigastric pain, back pain and was found in supine position at home. Pt is difficult to understand in ER , and states that whenever he eats he has the sensation of vomit. At triage pt had a saturation of 91. Spoke w/family in waiting room, and nephew says pt resides alone without a home health aide. Pt is checked on a once a day and was not responding to door or picking up phone today.  Pt's family reports a gradual decline over the past few weeks w/labored breathing and increased confusion.  No reported fever, chills, cp, palpitations, mild nausea/-v/-d/-c no travels or sick contacts.  in ed ct w/  1. Distended gallbladder with gallstones. There is clinical concern for gallbladder disease, follow-up with right upper quadrant sonogram.  2. There is presacral edema and perirectal fatty stranding. Limited evaluation of the rectum at this level, however underlying proctitis cannot be excluded. Recommend clinical correlation.  3. Suggestion of a recent mild compression fracture deformity of L5 without significant bony retropulsion. (08 Jun 2020 05:43)      INTERVAL HPI/OVERNIGHT EVENTS:  The patient denies melena, hematochezia, hematemesis, nausea, vomiting, abdominal pain, constipation, diarrhea, or change in bowel movements     MEDICATIONS  (STANDING):  aspirin 325 milliGRAM(s) Oral daily  furosemide    Tablet 40 milliGRAM(s) Oral daily  lactated ringers. 1000 milliLiter(s) (80 mL/Hr) IV Continuous <Continuous>  pantoprazole  Injectable 40 milliGRAM(s) IV Push daily  piperacillin/tazobactam IVPB.. 3.375 Gram(s) IV Intermittent every 8 hours  potassium chloride  10 mEq/100 mL IVPB 10 milliEquivalent(s) IV Intermittent every 1 hour  QUEtiapine 12.5 milliGRAM(s) Oral at bedtime    MEDICATIONS  (PRN):      FAMILY HISTORY:  No pertinent family history in first degree relatives      Allergies    No Known Allergies    Intolerances        PMH/PSH:  Lung nodules  SVC syndrome  Acute DVT (deep venous thrombosis)  Essential hypertension  Atrial fibrillation, unspecified type  Cardiac complaint  H/O thyroidectomy  No significant past surgical history        REVIEW OF SYSTEMS:  CONSTITUTIONAL: No fever, weight loss, or fatigue  EYES: No eye pain, visual disturbances, or discharge  ENMT:  No difficulty hearing, tinnitus, vertigo; No sinus or throat pain  NECK: No pain or stiffness  BREASTS: No pain, masses, or nipple discharge  RESPIRATORY: No cough, wheezing, chills or hemoptysis; No shortness of breath  CARDIOVASCULAR: No chest pain, palpitations, dizziness, or leg swelling  GASTROINTESTINAL: See above  GENITOURINARY: No dysuria, frequency, hematuria, or incontinence  NEUROLOGICAL: No headaches, numbness, or tremors  SKIN: No itching, burning, rashes, or lesions   LYMPH NODES: No enlarged glands  ENDOCRINE: No heat or cold intolerance; No hair loss  MUSCULOSKELETAL: No joint pain or swelling; No muscle, back, or extremity pain  PSYCHIATRIC: No depression, anxiety, mood swings, or difficulty sleeping  HEME/LYMPH: No easy bruising, or bleeding gums  ALLERGY AND IMMUNOLOGIC: No hives or eczema    Vital Signs Last 24 Hrs  T(C): 36.4 (10 [place]onte 2020 07:00), Max: 36.7 (09 Jun 2020 17:55)  T(F): 97.6 (10 Delonte 2020 07:00), Max: 98 (09 Jun 2020 17:55)  HR: 109 (10 Delonte 2020 07:00) (90 - 112)  BP: 114/65 (10 Delonte 2020 07:00) (105/51 - 114/65)  BP(mean): --  RR: 18 (10 Delonte 2020 07:00) (18 - 18)  SpO2: 97% (10 Delonte 2020 07:00) (95% - 99%)    PHYSICAL EXAM:  GENERAL: NAD, well-groomed, well-developed  HEAD:  Atraumatic, Normocephalic  EYES: EOMI, PERRLA, conjunctiva and sclera clear  NECK: Supple, No JVD, Normal thyroid  NERVOUS SYSTEM:  Alert & Oriented X3, Good concentration; Motor Strength 5/5 B/L upper and lower extremities;   CHEST/LUNG: Clear to percussion bilaterally; No rales, rhonchi, wheezing, or rubs  HEART: Regular rate and rhythm; No murmurs, rubs, or gallops  ABDOMEN: Soft, Nontender, Nondistended; Bowel sounds present  EXTREMITIES:  2+ Peripheral Pulses, No clubbing, cyanosis, or edema  LYMPH: No lymphadenopathy noted  SKIN: No rashes or lesions    LAB                          11.7   9.05  )-----------( 90       ( 10 Delonte 2020 06:41 )             36.5       CBC:  06-10 @ 06:41  WBC 9.05   Hgb 11.7   Hct 36.5   Plts 90  MCV 97.9  06-09 @ 06:23  WBC 10.80   Hgb 11.2   Hct 34.5   Plts 74  MCV 97.5  06-07 @ 22:53  WBC 15.42   Hgb 12.5   Hct 38.3   Plts 81  MCV 98.5      Chemistry:  06-10 @ 06:41  Na+ 141  K+ 2.9  Cl- 102  CO2 28  BUN 32  Cr 0.70     06-09 @ 06:23  Na+ 138  K+ 3.1  Cl- 104  CO2 26  BUN 35  Cr 0.62     06-07 @ 22:53  Na+ 138  K+ 3.4  Cl- 100  CO2 28  BUN 30  Cr 0.78         Glucose, Serum: 93 mg/dL (06-10 @ 06:41)  Glucose, Serum: 106 mg/dL (06-09 @ 06:23)  Glucose, Serum: 111 mg/dL (06-07 @ 22:53)      10 [place] 2020 06:41    141    |  102    |  32     ----------------------------<  93     2.9     |  28     |  0.70   09 Jun 2020 06:23    138    |  104    |  35     ----------------------------<  106    3.1     |  26     |  0.62   07 Jun 2020 22:53    138    |  100    |  30     ----------------------------<  111    3.4     |  28     |  0.78     Ca    8.4        10 Delonte 2020 06:41  Ca    8.4        09 Jun 2020 06:23  Ca    9.1        07 Jun 2020 22:53  Mg     2.1       10 [place]onte 2020 06:41  Mg     2.1       07 Jun 2020 22:53    TPro  7.1    /  Alb  2.6    /  TBili  1.7    /  DBili  x      /  AST  108    /  ALT  58     /  AlkPhos  54     10 Jun 2020 06:41  TPro  6.8    /  Alb  2.6    /  TBili  1.7    /  DBili  x      /  AST  103    /  ALT  47     /  AlkPhos  56     09 Jun 2020 06:23  TPro  8.2    /  Alb  3.3    /  TBili  2.8    /  DBili  x      /  AST  131    /  ALT  41     /  AlkPhos  76     07 Jun 2020 22:53              CAPILLARY BLOOD GLUCOSE          C-Reactive Protein, Serum: 7.53 mg/dL (06-08 @ 07:44)      RADIOLOGY & ADDITIONAL TESTS:    Imaging Personally Reviewed:  [ ] YES  [ ] NO    Consultant(s) Notes Reviewed:  [ ] YES  [ ] NO    Care Discussed with Consultants/Other Providers [ ] YES  [ ] NO

## 2022-11-21 NOTE — PATIENT PROFILE ADULT. - ANESTHESIA, PREVIOUS REACTION, PROFILE
Tetracycline Pregnancy And Lactation Text: This medication is Pregnancy Category D and not consider safe during pregnancy. It is also excreted in breast milk. Topical Clindamycin Pregnancy And Lactation Text: This medication is Pregnancy Category B and is considered safe during pregnancy. It is unknown if it is excreted in breast milk. Topical Retinoid counseling:  Patient advised to apply a pea-sized amount only at bedtime and wait 30 minutes after washing their face before applying.  If too drying, patient may add a non-comedogenic moisturizer. The patient verbalized understanding of the proper use and possible adverse effects of retinoids.  All of the patient's questions and concerns were addressed. Bactrim Counseling:  I discussed with the patient the risks of sulfa antibiotics including but not limited to GI upset, allergic reaction, drug rash, diarrhea, dizziness, photosensitivity, and yeast infections.  Rarely, more serious reactions can occur including but not limited to aplastic anemia, agranulocytosis, methemoglobinemia, blood dyscrasias, liver or kidney failure, lung infiltrates or desquamative/blistering drug rashes. Spironolactone Pregnancy And Lactation Text: This medication can cause feminization of the male fetus and should be avoided during pregnancy. The active metabolite is also found in breast milk. Use Enhanced Medication Counseling?: No High Dose Vitamin A Pregnancy And Lactation Text: High dose vitamin A therapy is contraindicated during pregnancy and breast feeding. Minocycline Counseling: Patient advised regarding possible photosensitivity and discoloration of the teeth, skin, lips, tongue and gums.  Patient instructed to avoid sunlight, if possible.  When exposed to sunlight, patients should wear protective clothing, sunglasses, and sunscreen.  The patient was instructed to call the office immediately if the following severe adverse effects occur:  hearing changes, easy bruising/bleeding, severe headache, or vision changes.  The patient verbalized understanding of the proper use and possible adverse effects of minocycline.  All of the patient's questions and concerns were addressed. Benzoyl Peroxide Counseling: Patient counseled that medicine may cause skin irritation and bleach clothing.  In the event of skin irritation, the patient was advised to reduce the amount of the drug applied or use it less frequently.   The patient verbalized understanding of the proper use and possible adverse effects of benzoyl peroxide.  All of the patient's questions and concerns were addressed. Isotretinoin Counseling: Patient should get monthly blood tests, not donate blood, not drive at night if vision affected, not share medication, and not undergo elective surgery for 6 months after tx completed. Side effects reviewed, pt to contact office should one occur. Bactrim Pregnancy And Lactation Text: This medication is Pregnancy Category D and is known to cause fetal risk.  It is also excreted in breast milk. Birth Control Pills Pregnancy And Lactation Text: This medication should be avoided if pregnant and for the first 30 days post-partum. Tazorac Pregnancy And Lactation Text: This medication is not safe during pregnancy. It is unknown if this medication is excreted in breast milk. none Birth Control Pills Counseling: Birth Control Pill Counseling: I discussed with the patient the potential side effects of OCPs including but not limited to increased risk of stroke, heart attack, thrombophlebitis, deep venous thrombosis, hepatic adenomas, breast changes, GI upset, headaches, and depression.  The patient verbalized understanding of the proper use and possible adverse effects of OCPs. All of the patient's questions and concerns were addressed. Topical Clindamycin Counseling: Patient counseled that this medication may cause skin irritation or allergic reactions.  In the event of skin irritation, the patient was advised to reduce the amount of the drug applied or use it less frequently.   The patient verbalized understanding of the proper use and possible adverse effects of clindamycin.  All of the patient's questions and concerns were addressed. Azithromycin Pregnancy And Lactation Text: This medication is considered safe during pregnancy and is also secreted in breast milk. Topical Retinoid Pregnancy And Lactation Text: This medication is Pregnancy Category C. It is unknown if this medication is excreted in breast milk. Dapsone Pregnancy And Lactation Text: This medication is Pregnancy Category C and is not considered safe during pregnancy or breast feeding. Erythromycin Counseling:  I discussed with the patient the risks of erythromycin including but not limited to GI upset, allergic reaction, drug rash, diarrhea, increase in liver enzymes, and yeast infections. Dapsone Counseling: I discussed with the patient the risks of dapsone including but not limited to hemolytic anemia, agranulocytosis, rashes, methemoglobinemia, kidney failure, peripheral neuropathy, headaches, GI upset, and liver toxicity.  Patients who start dapsone require monitoring including baseline LFTs and weekly CBCs for the first month, then every month thereafter.  The patient verbalized understanding of the proper use and possible adverse effects of dapsone.  All of the patient's questions and concerns were addressed. Sarecycline Counseling: Patient advised regarding possible photosensitivity and discoloration of the teeth, skin, lips, tongue and gums.  Patient instructed to avoid sunlight, if possible.  When exposed to sunlight, patients should wear protective clothing, sunglasses, and sunscreen.  The patient was instructed to call the office immediately if the following severe adverse effects occur:  hearing changes, easy bruising/bleeding, severe headache, or vision changes.  The patient verbalized understanding of the proper use and possible adverse effects of sarecycline.  All of the patient's questions and concerns were addressed. High Dose Vitamin A Counseling: Side effects reviewed, pt to contact office should one occur. Tetracycline Counseling: Patient counseled regarding possible photosensitivity and increased risk for sunburn.  Patient instructed to avoid sunlight, if possible.  When exposed to sunlight, patients should wear protective clothing, sunglasses, and sunscreen.  The patient was instructed to call the office immediately if the following severe adverse effects occur:  hearing changes, easy bruising/bleeding, severe headache, or vision changes.  The patient verbalized understanding of the proper use and possible adverse effects of tetracycline.  All of the patient's questions and concerns were addressed. Patient understands to avoid pregnancy while on therapy due to potential birth defects. Doxycycline Counseling:  Patient counseled regarding possible photosensitivity and increased risk for sunburn.  Patient instructed to avoid sunlight, if possible.  When exposed to sunlight, patients should wear protective clothing, sunglasses, and sunscreen.  The patient was instructed to call the office immediately if the following severe adverse effects occur:  hearing changes, easy bruising/bleeding, severe headache, or vision changes.  The patient verbalized understanding of the proper use and possible adverse effects of doxycycline.  All of the patient's questions and concerns were addressed. Topical Sulfur Applications Pregnancy And Lactation Text: This medication is Pregnancy Category C and has an unknown safety profile during pregnancy. It is unknown if this topical medication is excreted in breast milk. Tazorac Counseling:  Patient advised that medication is irritating and drying.  Patient may need to apply sparingly and wash off after an hour before eventually leaving it on overnight.  The patient verbalized understanding of the proper use and possible adverse effects of tazorac.  All of the patient's questions and concerns were addressed. Doxycycline Pregnancy And Lactation Text: This medication is Pregnancy Category D and not consider safe during pregnancy. It is also excreted in breast milk but is considered safe for shorter treatment courses. Benzoyl Peroxide Pregnancy And Lactation Text: This medication is Pregnancy Category C. It is unknown if benzoyl peroxide is excreted in breast milk. Azithromycin Counseling:  I discussed with the patient the risks of azithromycin including but not limited to GI upset, allergic reaction, drug rash, diarrhea, and yeast infections. Topical Sulfur Applications Counseling: Topical Sulfur Counseling: Patient counseled that this medication may cause skin irritation or allergic reactions.  In the event of skin irritation, the patient was advised to reduce the amount of the drug applied or use it less frequently.   The patient verbalized understanding of the proper use and possible adverse effects of topical sulfur application.  All of the patient's questions and concerns were addressed. Spironolactone Counseling: Patient advised regarding risks of diarrhea, abdominal pain, hyperkalemia, birth defects (for female patients), liver toxicity and renal toxicity. The patient may need blood work to monitor liver and kidney function and potassium levels while on therapy. The patient verbalized understanding of the proper use and possible adverse effects of spironolactone.  All of the patient's questions and concerns were addressed. Erythromycin Pregnancy And Lactation Text: This medication is Pregnancy Category B and is considered safe during pregnancy. It is also excreted in breast milk. Detail Level: Simple Isotretinoin Pregnancy And Lactation Text: This medication is Pregnancy Category X and is considered extremely dangerous during pregnancy. It is unknown if it is excreted in breast milk.

## 2022-12-15 NOTE — ED ADULT TRIAGE NOTE - CHIEF COMPLAINT QUOTE
As per EMS pt found on floor in supine position. Pt complains of pain to back, unable to scale or scale Syringe Size Used (Required For Enhanced Ndc): 300 mg/2ml prefilled syringe As per EMS pt found on floor in supine position. Pt complains of pain to back, unable to scale or scale. Pt takes ASA.

## 2023-01-01 NOTE — H&P ADULT. - PROBLEM SELECTOR PLAN 5
Male
Largest 4mm, also has an enlarged paratracheal lymph node - bring up possibility of malignancy (thyroid or another primary) - not the acute issue here as SVC syndrome needs to be dealt with first

## 2023-02-01 NOTE — CHART NOTE - NSCHARTNOTESELECT_GEN_ALL_CORE
Nutrition Services Albendazole Pregnancy And Lactation Text: This medication is Pregnancy Category C and it isn't known if it is safe during pregnancy. It is also excreted in breast milk.

## 2023-02-18 NOTE — 1. 4.0 ADULT PLAN OF CARE - VENOUS THROMBOEMBOLIC DISEASE: SIGNS AND SYMPTOMS OF POTENTIAL PROBLEMS ASSESSED, QM
Modafinil Approved    Filling Pharmacy: [facility]  Additional Information: Pharmacy closed, left detailed message with approval information.            deep vein thrombosis

## 2023-05-15 NOTE — DISCHARGE NOTE ADULT - NS AS DC VTE INSTRUCTION
05-03-23  Type of therapy: Coping skills  Type of session: Group  Level of patient participation: Participated with encouragement  Duration of participation: 30 minutes  Therapy conducted by: Nursing  --    05-03-23  Type of therapy: Dialectical behavior therapy,Coping skills  Type of session: Group  Level of patient participation: Attentive,Engaged  Duration of participation: 30 minutes  Therapy conducted by: Social work  Therapy Summary: Patient attended the Crisis Skills Group with  and peers. “Urge Surfing” was taught which helps with managing unwanted behaviors by acknowledging the urge, riding it out, managing triggers and using delay and distraction skills. Patients offered support and feedback while  facilitated the group.    Amandeep was an active listener. He appeared open to the therapeutic intervention. He left the group early and is encouraged to attend future sessions.    05-03-23  Type of therapy: Coping skills,Psychoeducation,Stress management  Type of session: Group  Level of patient participation: Attentive  Duration of participation: 45 minutes  Therapy conducted by: Social work  Therapy Summary: Patient attended the Patient Education Group with  and peers. The “Mental Health Maintenance Plan” worksheet was reviewed. Patients were encouraged to consider their triggers and warnings signs to help manage mental health issues. Effective self-care and coping strategies were also reviewed and patients were encouraged to practice these skills on a regular basis. Patients offered support and feedback while  facilitated the discussion.    05-03-23  Type of therapy: Addiction education,Coping skills  Type of session: Group  Level of patient participation: Attentive,Engaged,Participates  Duration of participation: 45 minutes  Therapy conducted by: Social work  Therapy Summary: Patient attended the Exploring Recovery Group with  and peers. Patients reviewed some tips for avoiding relapse, such as avoiding triggers, engaging in positive activities, attending self help groups and reaching out to a friend or sponsor.  facilitated the discussion and patients provided feedback and support.    05-04-23  Type of therapy: Coping skills,Self esteem  Type of session: Group  Level of patient participation: Attentive,Participates  Duration of participation: 30 minutes  Therapy conducted by: Nursing  --    05-04-23  Type of therapy: Dialectical behavior therapy,Coping skills  Type of session: Group  Level of patient participation: Attentive  Duration of participation: 45 minutes  Therapy conducted by: Social work  Therapy Summary: Patient attended the Crisis Skills Group with  and peers. The DBT Wise Mind ACCEPTS skill was reviewed (a Distress Tolerance skill that uses distraction to temporarily distance yourself from a distressing situation or emotion). The following skills were reviewed: “activities, contributing, comparisons, emotions, pushing away, thoughts, sensations”. Patients offered support and feedback while  facilitated the group.    Amandeep was an active listener. He appeared open to the therapeutic intervention. He is encouraged to attend future sessions.    05-04-23  Type of therapy: Focus on community resources,Stress management  Type of session: Group  Level of patient participation: Attentive  Duration of participation: 30 minutes  Therapy conducted by: Social work  Therapy Summary: Patient attended the Social Work Group with  and peers. The topic of building a routine was discussed, as well as the (mental health) benefits of creating daily structure. Patients were given a sample routine worksheet to complete at their leisure. Patients offered support and feedback while  facilitated the discussion.      Amandeep was an active listener and appeared focused on the dialogue. He left the group early and is encouraged to attend future sessions.    05-05-23  Type of therapy: Daily living skills  Type of session: Group  Level of patient participation: Attentive  Duration of participation: 30 minutes  Therapy conducted by: Nursing  --    05-08-23  Type of therapy: Coping skills  Type of session: Group  Level of patient participation: Attentive,Engaged  Duration of participation: 30 minutes  Therapy conducted by: Nursing  --    05-08-23  Type of therapy: Coping skills,Creative arts therapy,Inspiration and motiviation,Leisure development,Stress management,Symptom management  Type of session: Group  Level of patient participation: Engaged,Participates  Duration of participation: 45 minutes  Therapy conducted by: Psych rehab  Therapy Summary: pt enjoys working on sketching /drawings , pt is well focused and well engaged .    05-08-23  Type of therapy: Coping skills,Psychoeducation,Self esteem  Type of session: Group  Level of patient participation: Attentive,Engaged,Participates  Duration of participation: 45 minutes  Therapy conducted by: Social work  Therapy Summary: Patient attended the Social Work Group with SW and peers. The “Protective Factors” worksheet was reviewed which highlights factors that contribute to mental health and allow a person to be resilient in the face of challenges. The following factors were reviewed: social supports, coping skills, physical health, sense of purpose, self-esteem and healthy thinking. Patients were asked to consider which factors have been the most valuable and which ones they would like to improve. Patients offered support and feedback while  facilitated the discussion.      Amandeep participated and appeared open to the therapeutic intervention. He engaged well with peers. He discussed his goal to continue working on strengthening his coping skills and self esteem.     05-08-23  Type of therapy: Coping skills  Type of session: Group  Level of patient participation: Attentive,Engaged  Duration of participation: 30 minutes  Therapy conducted by: Nursing  --    05-08-23  Type of therapy: Coping skills,Creative arts therapy,Inspiration and motiviation,Leisure development,Stress management,Symptom management  Type of session: Group  Level of patient participation: Engaged,Participates  Duration of participation: 45 minutes  Therapy conducted by: Psych rehab  Therapy Summary: pt enjoys working on sketching /drawings , pt is well focused and well engaged .    05-08-23  Type of therapy: Coping skills,Psychoeducation,Self esteem  Type of session: Group  Level of patient participation: Attentive,Engaged,Participates  Duration of participation: 45 minutes  Therapy conducted by: Social work  Therapy Summary: Patient attended the Social Work Group with SW and peers. The “Protective Factors” worksheet was reviewed which highlights factors that contribute to mental health and allow a person to be resilient in the face of challenges. The following factors were reviewed: social supports, coping skills, physical health, sense of purpose, self-esteem and healthy thinking. Patients were asked to consider which factors have been the most valuable and which ones they would like to improve. Patients offered support and feedback while  facilitated the discussion.      Amandeep participated and appeared open to the therapeutic intervention. He engaged well with peers. He discussed his goal to continue working on strengthening his coping skills and self esteem.    05-08-23  Type of therapy: Inspiration and motiviation,Self esteem  Type of session: Group  Level of patient participation: Attentive,Engaged,Participates  Duration of participation: 45 minutes  Therapy conducted by: Social work  Therapy Summary: Patient attended the Support Group with  and peers. Patients were encouraged to engage in a group activity and were each given two questions to read aloud and answer. Questions were geared towards recognizing one’s own values and ethics.  facilitated the discussion and patients provided feedback and support.     Patient participated in the group activity and engaged well with peers.    05-09-23  Type of therapy: Coping skills  Type of session: Group  Level of patient participation: Participated with encouragement  Duration of participation: 30 minutes  Therapy conducted by: Nursing  --    05-10-23  Type of therapy: Coping skills,Mindfulness,Psychoeducation  Type of session: Group  Level of patient participation: Attentive,Engaged,Participates  Duration of participation: 60 minutes  Therapy conducted by: Social work  Therapy Summary: Patient attended the Patient Education Group with  and peers.  reviewed physical, mental and soothing grounding techniques to help quiet distressing thoughts. The 5-4-3-2-1 method was also taught.  facilitated the discussion while patients provided feedback and support.     Patient participated and appeared open to reviewing the materials provided.    05-10-23  Type of therapy: Addiction education,Coping skills  Type of session: Group  Level of patient participation: Attentive,Engaged,Participates  Duration of participation: 45 minutes  Therapy conducted by: Social work  Therapy Summary: Patient attended the Exploring Recovery Group with  and peers. The "Recovery Skills/ Avoiding High Risk Situations" Worksheet was reviewed. Patients were asked to attempt to identify their triggers (substance use and/ or mental health) and discuss ways to avoid high risk situations to encourage a life in recovery. Patients provided feedback and support while  facilitated the discussion.    05-11-23  Type of therapy: Stress management  Type of session: Group  Level of patient participation: Participates  Duration of participation: 30 minutes  Therapy conducted by: Social work  Therapy Summary: Patient attended the group with  and peers focused on learning about stress and stress management tips. Patients were encouraged to identify their current stressors and discussed each stress management skills.           Amandeep was attentive and engaged throughout the session. He was respectful of his peers and shared appropriately. He shared that he spent time with his daughter in the past to manage stress.    05-12-23  Type of therapy: Educational/vocational,Self esteem  Type of session: Group  Level of patient participation: Attentive,Engaged,Participates  Duration of participation: 45 minutes  Therapy conducted by: Social work  Therapy Summary: Patient attended the Value and Contribution Group with  and peers. The topic of setting and achieving goals was discussed and patients were asked to identify their own personal goals, including career, social/ family and attitude. The effects of mental health on goal obtainment were explored as well as the importance of mental health treatment follow up. Patients offered support and feedback while  facilitated the discussion.      Amandeep was an active participant and engaged well with peers. He remained in good behavioral control.    05-12-23  Type of therapy: Self esteem  Type of session: Group  Level of patient participation: Attentive,Engaged,Participates  Duration of participation: 45 minutes  Therapy conducted by: Social work  Therapy Summary: Topic: Patients were asked to consider a time they were able to show strength/ courage.     Response: Patient was an active participant and engaged in the group activity.    05-15-23  Type of therapy: Coping skills,Creative arts therapy,Inspiration and motiviation,Stress management  Type of session: Group  Level of patient participation: Engaged,Participates  Duration of participation: 45 minutes  Therapy conducted by: Psych rehab  Therapy Summary: Pt is well engaed during RT sessions pt enjoys art ,/ sketching , Amandeep is focused , calm cooperative and has verbalized improved mood .     05-01-23  Type of therapy: Coping skills,Inspiration and motiviation,Leisure development,Stress management  Type of session: Group  Level of patient participation: Participated with encouragement  Duration of participation: 30 minutes  Therapy conducted by: Psych rehab  Therapy Summary: Pt will need reminders and encouragement .   Coumadin/Warfarin - Dietary Advice.../Coumadin/Warfarin - Compliance.../Coumadin/Warfarin - Follow-up monitoring.../Coumadin/Warfarin - Potential for adverse drug reactions and interactions

## 2023-06-03 NOTE — PROVIDER CONTACT NOTE (OTHER) - BACKGROUND
dx. non-traumatic subdural hemorrhage
pt with SDH and low platelet count; iron deficiency anemia; acute DVT to right lower extremity.
show

## 2024-02-14 NOTE — ED PROVIDER NOTE - FAMILY HISTORY
Admit to OBGYN   Clear liquid diet   F/u T&S, CBC, RPR   IVF LR 125cc/hr   Continuous fetal monitoring and tocometry   Analgesia at maternal request   Vertex by TAUS  GBS negative  Induction plan cain balloon and cytotec  
Fetal tachycardia and maternal fever   Tmax 101.8, Tlast 2/13 @1834 100.8  S/p gent and clinda  Fundal tenderness noted 2/14  
Lochia WNL   Recovering well   Appropriate bowel and bladder function   Pain well controlled   Tolerating diet   Breastfeeding  Ambulating without issues   No lower extremity tenderness  GBS neg  Rh pos    
No pertinent family history in first degree relatives

## 2024-08-29 NOTE — PHYSICAL THERAPY INITIAL EVALUATION ADULT - STANDING BALANCE: DYNAMIC, REHAB EVAL
Appointment for DEACON 9/5/24. Please review refill request. Thank you   with rolling walker/poor balance

## 2024-10-10 NOTE — PATIENT PROFILE ADULT. - DOES PATIENT HAVE ADVANCE DIRECTIVE
"Routine Foot Care    Date/Time: 10/10/2024 11:00 AM    Performed by: Walt Zhang DPM  Authorized by: Walt Zhang DPM    Time out: Immediately prior to procedure a "time out" was called to verify the correct patient, procedure, equipment, support staff and site/side marked as required.    Consent Done?:  Yes (Verbal)  Hyperkeratotic Skin Lesions?: No      Nail Care Type:  Debride(Left 1st Toe, Right 1st Toe and Right 2nd Toe)  Patient tolerance:  Patient tolerated the procedure well with no immediate complications    "
pt unsure

## 2024-10-15 NOTE — H&P ADULT. - ENDOCRINE
We are seeing you today for follow-up on your kidneys on your most hospitalization.  Your kidney function has improved.  We recommend that you continue to hydrate with water throughout the day.  Continue to avoid NSAIDs.  It is safe to take Tylenol for pain.  We have started you back on Jardiance today.  I would like you to repeat blood test in 1 week.  You may discontinue your torsemide which is your water pill.  Continue to check your blood pressure daily.  Please notify our team or your cardiology team if your blood pressure is consistently greater than 140/90.  Please check your weight daily, call for weight gain of 3 pounds in 1 day or 5 pounds throughout the week, increase swelling in your legs, or shortness of breath.  I have referred you to hematology today.  Someone should contact you to schedule this appointment.    I would like you to check blood work 1 to 2 weeks prior to your next appointment in approximately 6 months.  Please call us in the meantime if you have any additional questions or concerns.  
negative

## 2025-01-10 NOTE — ED PROVIDER NOTE - CHIEF COMPLAINT
Attended Phase II Cardiac Rehab. No medication or health history changes reported. See [facility] for details.   The patient is a 88y Male complaining of fall.

## 2025-02-12 NOTE — PROGRESS NOTE ADULT - ASSESSMENT
Case Management Assessment  Initial Evaluation    Date/Time of Evaluation: 2/12/2025 1:45 PM  Assessment Completed by: Allison Ramos    If patient is discharged prior to next notation, then this note serves as note for discharge by case management.    Patient Name: Bill Fish                   YOB: 1982  Diagnosis: Hyponatremia [E87.1]  Acute kidney injury (HCC) [N17.9]  Nausea and vomiting, unspecified vomiting type [R11.2]                   Date / Time: 2/11/2025  9:21 PM    Patient Admission Status: Inpatient   Readmission Risk (Low < 19, Mod (19-27), High > 27): Readmission Risk Score: 11.3    Current PCP: Dominic Manuel MD  PCP verified by CM? Yes    Chart Reviewed: Yes      History Provided by: Patient, Medical Record  Patient Orientation: Alert and Oriented    Patient Cognition: Alert    Hospitalization in the last 30 days (Readmission):  No    If yes, Readmission Assessment in CM Navigator will be completed.    Advance Directives:      Code Status: Full Code   Patient's Primary Decision Maker is: Legal Next of Kin      Discharge Planning:    Patient lives with: Children Type of Home: House  Primary Care Giver: Self  Patient Support Systems include: Family Members   Current Financial resources: Medicaid  Current community resources: None  Current services prior to admission: None            Current DME:              Type of Home Care services:  None    ADLS  Prior functional level: Independent in ADLs/IADLs  Current functional level: Independent in ADLs/IADLs    PT AM-PAC:   /24  OT AM-PAC:   /24    Family can provide assistance at DC: Yes  Would you like Case Management to discuss the discharge plan with any other family members/significant others, and if so, who? No  Plans to Return to Present Housing: Yes  Other Identified Issues/Barriers to RETURNING to current housing: none  Potential Assistance needed at discharge: N/A            Potential DME:    Patient expects to discharge  89 y/o Male with PMHx of HTN,. Afib , SDH in past, DVT in past no longer on ac, thyroidectomy and thrombocytopenia comes to ED today s/p fall w/?new compression fx and rhabdo. Pt c/o epigastric pain, back pain and was found in supine position at home. Pt is difficult to understand in ER , and states that whenever he eats he has the sensation of vomit. At triage pt had a saturation of 91. Pt's family reports a gradual decline over the past few weeks w/labored breathing and increased confusion.  No reported fever, chills, cp, palpitations, mild nausea/-v/-d/-c no travels or sick contacts.    Non-traumatic rhabdomyolysis, s/p fall- follow CK  contuinues to be elvated holding ivf secondary to pleural effusion   Fracture of lumbar spine- ortho on board. MRI demonstrates chronic L2, L3, and L4 VCFx's and acute T10, L1, L5 VCFx's. No acute orthopedic surgical intervention needed at this time per ortho.   Tiny Pneumothorax, unspecified type- monitor  Acute abdominal pain-   -CT abd- Distended gallbladder with gallstones. presacral edema and perirectal fatty stranding.   -US abd- Cholelithiasis and distended gallbladder. No biliary ductal dilatation.   -Seen by Sx/Dr. Salmeron.; no acute surgical intervention at this time. Patient is not a surgical candidate.  -Seen by GI/Dr. Renae ; NPO, s/p  Broad spectrum antibiotics no evidence of acute cholicystitis, HIDA neg, MBS- Incomplete epiglottic deflection. F/UP Sx. ENT/Dr. Ramey (left  in office no [phone]) and Endo Dr. Crawford consulted. pain mgmt   Hypokalemia- resolved   Elev trop; likely demand ischemia s/p fall  Acute metabolic encephalopathy/dementia- monitor mental status     Long discussion with niece was eating ok at home  knows that he has been more confused wants him home agrees to home hospice   Chronic AFIB- not on AC per spouse hx of SDH. pt off dig, had mare  on metoprolol and cardizem consider digoxin Preventive measure- IPC    niece Dawn [phone] is proxy and primary contact

## 2025-04-19 NOTE — ED ADULT NURSE NOTE - SUICIDE SCREENING QUESTION 1
RT EQUIPMENT DEVICE RELATED - SKIN ASSESSMENT    Gary Score:  Gary Score: 18     RT Medical Equipment/Device:     NIV Mask:  Under-the-nose   size:  C    Skin Assessment:      Nose:  Intact    Device Skin Pressure Protection:  Skin-to-device areas padded:  None Required    Nurse Notification:  Delma Vaca, RRT     Patient unable to complete

## 2025-04-23 NOTE — PROGRESS NOTE ADULT - SUBJECTIVE AND OBJECTIVE BOX
Goal Outcome Evaluation:    PRIMARY Concern: Originally presented with aggressive behavior from his MCF, Hx of dementia w/ aggression.  Now pt somnolent w/ fever and concern of aspiration pneumonia.    SAFETY RISK Concerns (fall risk, behaviors, etc.): Aggression & fall risk        Aggression Tool Color: Yellow. Still combative with cares.  Isolation/Type: N/A  Tests/Procedures for NEXT shift:   Consults? (Pending/following, signed-off?) Psych, SW/SLP following   Where is patient from? (Home, TCU, etc.): [facility]  Other Important info for NEXT shift:   Anticipated DC date & active delays: TBD     SUMMARY NOTE:  Orientation/Cognitive: Somnolent during the night, more awake and restless this morning. PRN Seroquel given x1. Unable to assess orientation, pt not responding to questions.  Observation Goals (Met/ Not Met): Inpatient  Mobility Level/Assist Equipment: A2 Kyung steady  Antibiotics & Plan (IV/po, length of tx left): IV Rocephin   Pain Management: No s/sx of pain noted or reported  Tele/VS/O2: VSS on 4L Oxymask  ABNL Lab/BG: VBG completed x2 yesterday  Diet: Start on soft and bite sized diet (level 6) w/ thin liquid on 4/21 by SLP. Assist w/ feeding. Poor appetite.   Bowel/Bladder: Incontinent of urine; 3 BMs this shift  Skin Concerns: Scab to lower extremity, abrasion/excoriation  and redness back and rt shin. Mepilex in place on back & Sacrum/Coccyx   Drains/Devices: PIV Infusing D5 @125/hr  Patient Stated Goal for Today:       Patient is a 87y old  Male who presents with a chief complaint of left frontal-parietal SDH diagnosed 10/5/19 (20 Oct 2019 09:06)      HPI:  Pt is an 86 y/o Male with PMHx of HTN,. Afib on coumadin, DVT, and past thyroidectomy, who was brought to [facility] for increasing confusion and bruises on arms and legs x 2 weeks on 10/5/19.  In the ED, CT head was suspicious for left frontal/parietal acute/subacute SDH. Neurosurgery was consulted and recommended no acute surgical intervention, started on keppra for seizure prophylaxis. Patient was also found to be thrombocytopenic (21), and transfused platelets x 2. Repeat CT head was stable.    Patient was also found to have UTI and started on Keflex which is due to be completed on 10/12. Heme/onc was consulted for thrombocytopenia. They believe that DIC was unlikely, and attributed the thrombocytopenia to likely infection from UTI vs B12 deficiency vs MDS. He was also found to have non occlusive DVT on the right  soleal vein. Interventional radiology was consulted for possible IVC filter, but they felt that since the risk of PE from DVT below the knee was low, the risk of procedure outweighed the benefit. During hospital course, patient also found to be intermittently bradycardic to the 30s with Afib with slow ventricular rate and pauses. Electrophysiology was consulted and recommended to dc digoxin and they will follow up as outpatient. Pt was evaluated by PT, OT, and PM&R and was recommended for acute inpatient rehabilitation. Pt was deemed stable for discharge on 10/11/19 and he was transferred to [facility] for acute inpatient rehabilitation. (11 Oct 2019 13:22)      PAST MEDICAL & SURGICAL HISTORY:  Lung nodules  SVC syndrome  Acute DVT (deep venous thrombosis)  Essential hypertension  Atrial fibrillation, unspecified type  H/O thyroidectomy      MEDICATIONS  (STANDING):  cyanocobalamin Injectable 1000 MICROGram(s) IntraMuscular daily  melatonin 6 milliGRAM(s) Oral at bedtime  nystatin Powder 1 Application(s) Topical two times a day  pantoprazole    Tablet 40 milliGRAM(s) Oral before breakfast    MEDICATIONS  (PRN):  ondansetron    Tablet 4 milliGRAM(s) Oral two times a day PRN Nausea      Allergies    No Known Allergies    Intolerances          VITALS  87y  Vital Signs Last 24 Hrs  T(C): 36.9 (21 Oct 2019 08:34), Max: 36.9 (21 Oct 2019 08:34)  T(F): 98.4 (21 Oct 2019 08:34), Max: 98.4 (21 Oct 2019 08:34)  HR: 92 (21 Oct 2019 08:34) (71 - 92)  BP: 107/74 (21 Oct 2019 08:34) (107/74 - 110/68)  BP(mean): --  RR: 15 (21 Oct 2019 08:34) (15 - 16)  SpO2: 96% (21 Oct 2019 08:34) (95% - 96%)  Daily     Daily         RECENT LABS:                          11.0   5.66  )-----------( 38       ( 21 Oct 2019 07:25 )             34.0     10-21    142  |  105  |  23  ----------------------------<  86  3.4<L>   |  26  |  0.76    Ca    8.7      21 Oct 2019 07:25                CAPILLARY BLOOD GLUCOSE          EXAM:  CT BRAIN      PROCEDURE DATE:  10/18/2019        INTERPRETATION:  CT brain without contrast    History delirium    Comparison 4:20 AM    There is a stable left frontotemporal arachnoid cyst. There is no   significant residual or recurrent subdural hematoma. There is no   hemorrhage or cortical edema, mass effect or midline shift. There is mild   central atrophy without hydrocephalus.    IMPRESSION:    No acute findings    A preliminary report was given by [facility].